# Patient Record
Sex: MALE | Race: WHITE | Employment: OTHER | URBAN - METROPOLITAN AREA
[De-identification: names, ages, dates, MRNs, and addresses within clinical notes are randomized per-mention and may not be internally consistent; named-entity substitution may affect disease eponyms.]

---

## 2017-10-05 ENCOUNTER — HOSPITAL ENCOUNTER (OUTPATIENT)
Dept: SURGERY | Age: 82
Discharge: HOME | End: 2017-10-05
Attending: ORTHOPAEDIC SURGERY
Payer: MEDICARE

## 2017-10-05 VITALS
WEIGHT: 164 LBS | SYSTOLIC BLOOD PRESSURE: 148 MMHG | BODY MASS INDEX: 24.29 KG/M2 | HEIGHT: 69 IN | HEART RATE: 66 BPM | OXYGEN SATURATION: 98 % | RESPIRATION RATE: 14 BRPM | DIASTOLIC BLOOD PRESSURE: 76 MMHG

## 2017-10-05 LAB
ALBUMIN SERPL-MCNC: 3.2 G/DL (ref 3.2–4.6)
ALBUMIN/GLOB SERPL: 1.1 {RATIO} (ref 1.2–3.5)
ALP SERPL-CCNC: 78 U/L (ref 50–136)
ALT SERPL-CCNC: 43 U/L (ref 12–65)
ANION GAP SERPL CALC-SCNC: 8 MMOL/L (ref 7–16)
APPEARANCE UR: CLEAR
APTT PPP: 25 SEC (ref 23.5–31.7)
AST SERPL-CCNC: 27 U/L (ref 15–37)
BACTERIA SPEC CULT: NORMAL
BILIRUB SERPL-MCNC: 1.3 MG/DL (ref 0.2–1.1)
BILIRUB UR QL: NEGATIVE
BUN SERPL-MCNC: 36 MG/DL (ref 8–23)
CALCIUM SERPL-MCNC: 8.4 MG/DL (ref 8.3–10.4)
CHLORIDE SERPL-SCNC: 109 MMOL/L (ref 98–107)
CO2 SERPL-SCNC: 24 MMOL/L (ref 21–32)
COLOR UR: NORMAL
CREAT SERPL-MCNC: 1.57 MG/DL (ref 0.8–1.5)
CRP SERPL-MCNC: <0.2 MG/DL (ref 0–0.9)
ERYTHROCYTE [DISTWIDTH] IN BLOOD BY AUTOMATED COUNT: 15.6 % (ref 11.9–14.6)
ERYTHROCYTE [SEDIMENTATION RATE] IN BLOOD: 2 MM/HR (ref 0–20)
GLOBULIN SER CALC-MCNC: 2.9 G/DL (ref 2.3–3.5)
GLUCOSE SERPL-MCNC: 253 MG/DL (ref 65–100)
GLUCOSE UR STRIP.AUTO-MCNC: NEGATIVE MG/DL
HCT VFR BLD AUTO: 44.6 % (ref 41.1–50.3)
HGB BLD-MCNC: 14.6 G/DL (ref 13.6–17.2)
HGB UR QL STRIP: NEGATIVE
INR PPP: 1 (ref 0.9–1.2)
KETONES UR QL STRIP.AUTO: NEGATIVE MG/DL
LEUKOCYTE ESTERASE UR QL STRIP.AUTO: NEGATIVE
MAGNESIUM SERPL-MCNC: 1.7 MG/DL (ref 1.8–2.4)
MCH RBC QN AUTO: 31.9 PG (ref 26.1–32.9)
MCHC RBC AUTO-ENTMCNC: 32.7 G/DL (ref 31.4–35)
MCV RBC AUTO: 97.6 FL (ref 79.6–97.8)
NITRITE UR QL STRIP.AUTO: NEGATIVE
PH UR STRIP: 5.5 [PH] (ref 5–9)
PLATELET # BLD AUTO: 126 K/UL (ref 150–450)
PMV BLD AUTO: 11.5 FL (ref 10.8–14.1)
POTASSIUM SERPL-SCNC: 4.8 MMOL/L (ref 3.5–5.1)
PROT SERPL-MCNC: 6.1 G/DL (ref 6.3–8.2)
PROT UR STRIP-MCNC: NEGATIVE MG/DL
PROTHROMBIN TIME: 11 SEC (ref 9.6–12)
RBC # BLD AUTO: 4.57 M/UL (ref 4.23–5.67)
SERVICE CMNT-IMP: NORMAL
SODIUM SERPL-SCNC: 141 MMOL/L (ref 136–145)
SP GR UR REFRACTOMETRY: 1.02 (ref 1–1.02)
UROBILINOGEN UR QL STRIP.AUTO: 0.2 EU/DL (ref 0.2–1)
WBC # BLD AUTO: 4.4 K/UL (ref 4.3–11.1)

## 2017-10-05 PROCEDURE — 85730 THROMBOPLASTIN TIME PARTIAL: CPT | Performed by: ORTHOPAEDIC SURGERY

## 2017-10-05 PROCEDURE — 85027 COMPLETE CBC AUTOMATED: CPT | Performed by: ORTHOPAEDIC SURGERY

## 2017-10-05 PROCEDURE — 87641 MR-STAPH DNA AMP PROBE: CPT | Performed by: ORTHOPAEDIC SURGERY

## 2017-10-05 PROCEDURE — 85610 PROTHROMBIN TIME: CPT | Performed by: ORTHOPAEDIC SURGERY

## 2017-10-05 PROCEDURE — 80053 COMPREHEN METABOLIC PANEL: CPT | Performed by: ORTHOPAEDIC SURGERY

## 2017-10-05 PROCEDURE — 81003 URINALYSIS AUTO W/O SCOPE: CPT | Performed by: ORTHOPAEDIC SURGERY

## 2017-10-05 PROCEDURE — 86140 C-REACTIVE PROTEIN: CPT | Performed by: ORTHOPAEDIC SURGERY

## 2017-10-05 PROCEDURE — 85652 RBC SED RATE AUTOMATED: CPT | Performed by: ORTHOPAEDIC SURGERY

## 2017-10-05 PROCEDURE — 83735 ASSAY OF MAGNESIUM: CPT | Performed by: ORTHOPAEDIC SURGERY

## 2017-10-05 RX ORDER — ATORVASTATIN CALCIUM 40 MG/1
40 TABLET, FILM COATED ORAL
COMMUNITY

## 2017-10-05 RX ORDER — SIMETHICONE 125 MG
125 CAPSULE ORAL
COMMUNITY

## 2017-10-05 RX ORDER — LOPERAMIDE HYDROCHLORIDE 2 MG/1
2 CAPSULE ORAL
COMMUNITY

## 2017-10-05 RX ORDER — DIPHENOXYLATE HYDROCHLORIDE AND ATROPINE SULFATE 2.5; .025 MG/1; MG/1
1 TABLET ORAL
COMMUNITY

## 2017-10-05 RX ORDER — SODIUM CHLORIDE 9 MG/ML
250 INJECTION, SOLUTION INTRAVENOUS AS NEEDED
Status: CANCELLED | OUTPATIENT
Start: 2017-10-05

## 2017-10-05 RX ORDER — LEVOTHYROXINE SODIUM 50 UG/1
50 TABLET ORAL
COMMUNITY

## 2017-10-05 NOTE — PERIOP NOTES
Patient verified name, , and surgery as listed in [facility]. Type 3 surgery, joint PAT assessment complete. Labs per surgeon: CBC, CMP, Magnesium, PT/PTT, ESR, CRP, UA, MRSA swab collected  Labs per anesthesia protocol: no additional labs needed  EKG: done 17 at [facility]- result within anesthesia guidelines; will request previous ECHO and Stress test for anesthesia reference. Pt instructed to go to o/p lab, Entrance B, the day before surgery for Type and Cross blood draw and green armband placement on non-operative arm. Orders placed in EMR on hold for arrival.    Hibiclens and instructions to return bottle on DOS given per hospital policy. Nasal Swab collected per MD order and instructions for Mupirocin nasal ointment if required. Patient provided with handouts including Guide to Surgery, Pain Management, Hand Hygiene, Blood Transfusion Education, and [facility] Brochure. Patient answered medical/surgical history questions at their best of ability. All prior to admission medications documented in [facility]. Original medication prescription bottles not visualized during patient appointment. Patient instructed to hold all vitamins 7 days prior to surgery and NSAIDS 5 days prior to surgery. Medications to be held: mobic and vitamins. Patient instructed to continue previous medications as prescribed prior to surgery and to take the following medications the day of surgery according to anesthesia guidelines with a small sip of water: asa 81mg, eye drops, synthroid, imodium, simethicone, lomotil, psyllium husk. Patient teach back successful and patient demonstrates knowledge of instruction.

## 2017-10-05 NOTE — PERIOP NOTES
Lab results within anesthesia guidelines except for elevated creatinine and decreased magnesium- will have anesthesia review per protocol. Non-fasting BG >250- will fax result to surgeon's office for review per anesthesia guidelines. MRSA swab result review- no further action required. Recent Results (from the past 12 hour(s))   CBC W/O DIFF    Collection Time: 10/05/17 10:22 AM   Result Value Ref Range    WBC 4.4 4.3 - 11.1 K/uL    RBC 4.57 4.23 - 5.67 M/uL    HGB 14.6 13.6 - 17.2 g/dL    HCT 44.6 41.1 - 50.3 %    MCV 97.6 79.6 - 97.8 FL    MCH 31.9 26.1 - 32.9 PG    MCHC 32.7 31.4 - 35.0 g/dL    RDW 15.6 (H) 11.9 - 14.6 %    PLATELET 013 (L) 716 - 450 K/uL    MPV 11.5 10.8 - 72.3 FL   METABOLIC PANEL, COMPREHENSIVE    Collection Time: 10/05/17 10:22 AM   Result Value Ref Range    Sodium 141 136 - 145 mmol/L    Potassium 4.8 3.5 - 5.1 mmol/L    Chloride 109 (H) 98 - 107 mmol/L    CO2 24 21 - 32 mmol/L    Anion gap 8 7 - 16 mmol/L    Glucose 253 (H) 65 - 100 mg/dL    BUN 36 (H) 8 - 23 MG/DL    Creatinine 1.57 (H) 0.8 - 1.5 MG/DL    GFR est AA 54 (L) >60 ml/min/1.73m2    GFR est non-AA 45 (L) >60 ml/min/1.73m2    Calcium 8.4 8.3 - 10.4 MG/DL    Bilirubin, total 1.3 (H) 0.2 - 1.1 MG/DL    ALT (SGPT) 43 12 - 65 U/L    AST (SGOT) 27 15 - 37 U/L    Alk.  phosphatase 78 50 - 136 U/L    Protein, total 6.1 (L) 6.3 - 8.2 g/dL    Albumin 3.2 3.2 - 4.6 g/dL    Globulin 2.9 2.3 - 3.5 g/dL    A-G Ratio 1.1 (L) 1.2 - 3.5     MAGNESIUM    Collection Time: 10/05/17 10:22 AM   Result Value Ref Range    Magnesium 1.7 (L) 1.8 - 2.4 mg/dL   PROTHROMBIN TIME + INR    Collection Time: 10/05/17 10:22 AM   Result Value Ref Range    Prothrombin time 11.0 9.6 - 12.0 sec    INR 1.0 0.9 - 1.2     PTT    Collection Time: 10/05/17 10:22 AM   Result Value Ref Range    aPTT 25.0 23.5 - 31.7 SEC   SED RATE, AUTOMATED    Collection Time: 10/05/17 10:22 AM   Result Value Ref Range    Sed rate, automated 2 0 - 20 mm/hr   C REACTIVE PROTEIN, QT Collection Time: 10/05/17 10:22 AM   Result Value Ref Range    C-Reactive protein <0.2 0.0 - 0.9 mg/dL   MSSA/MRSA SC BY PCR, NASAL SWAB    Collection Time: 10/05/17 10:22 AM   Result Value Ref Range    Special Requests: NASAL      Culture result:        SA target not detected. A MRSA NEGATIVE, SA NEGATIVE test result does not preclude MRSA or SA nasal colonization.    URINALYSIS W/ RFLX MICROSCOPIC    Collection Time: 10/05/17 11:15 AM   Result Value Ref Range    Color KIT      Appearance CLEAR      Specific gravity 1.022 1.001 - 1.023      pH (UA) 5.5 5.0 - 9.0      Protein NEGATIVE  NEG mg/dL    Glucose NEGATIVE  mg/dL    Ketone NEGATIVE  NEG mg/dL    Bilirubin NEGATIVE  NEG      Blood NEGATIVE  NEG      Urobilinogen 0.2 0.2 - 1.0 EU/dL    Nitrites NEGATIVE  NEG      Leukocyte Esterase NEGATIVE  NEG

## 2017-10-06 ENCOUNTER — ANESTHESIA EVENT (OUTPATIENT)
Dept: SURGERY | Age: 82
DRG: 483 | End: 2017-10-06
Payer: MEDICARE

## 2017-10-06 NOTE — PERIOP NOTES
10/5/2017 11:53 AM - Kailash, Lab In Briefcase   Component Results   Component Value Flag Ref Range Units Status   Special Requests: NASAL     Final   Culture result:      Final   SA target not detected.                                 A MRSA NEGATIVE, SA NEGATIVE test result does not preclude MRSA or SA nasal colonization.

## 2017-10-06 NOTE — PERIOP NOTES
Anesthesia () reviewed chart. Orders received to repeat Mg++ level DOS, otherwise chart OK for surgery.

## 2017-10-10 PROBLEM — T84.028A INSTABILITY OF PROSTHETIC SHOULDER JOINT (HCC): Status: ACTIVE | Noted: 2017-10-10

## 2017-10-10 PROBLEM — M12.812 ROTATOR CUFF TEAR ARTHROPATHY, LEFT: Status: ACTIVE | Noted: 2017-10-10

## 2017-10-10 PROBLEM — Z96.619 INSTABILITY OF PROSTHETIC SHOULDER JOINT (HCC): Status: ACTIVE | Noted: 2017-10-10

## 2017-10-10 PROBLEM — M75.102 ROTATOR CUFF TEAR ARTHROPATHY, LEFT: Status: ACTIVE | Noted: 2017-10-10

## 2017-10-10 PROBLEM — Z96.612 S/P SHOULDER HEMIARTHROPLASTY, LEFT: Status: ACTIVE | Noted: 2017-10-10

## 2017-10-10 NOTE — BRIEF OP NOTE
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 REMOVAL IMPLANT LEFT SHOULDER \"PRESS FIT EXACTECH HEMIARTHROPLASTY\"     2.  REVISION LEFT TOTAL SHOULDER ARTHROPLASTY WITH REVERSE DELTA EXTEND PROSTHESIS, LATISSIMUS DORSI AND TERES MAJOR TENDON TRANSFERS LEFT SHOULDER    Surgeon(s) and Role:     * Basil Harmon MD - Primary           Anesthesia: General WITH INTERSCALENE BLOCK    Complications: NONE    Implants:     Implant Name Type Inv.  Item Serial No.  Lot No. LRB No. Used Action   CEMENT BNE SIMPLEX TOBRA 4 --  - [id number]  CEMENT BNE SIMPLEX TOBRA 4 --  [id number] [employer] [facility] [id number] Left 1 Implanted   COMPNT GLENOSPHERE ECC 42MM -- DELTA EXTEND - [id number]  COMPNT GLENOSPHERE ECC 42MM -- DELTA EXTEND [id number] [facility] [employer] [id number] Left 1 Implanted   COMPNT RHONDA METAGLENE -- DELTA EXTEND - [id number]  COMPNT RHONDA METAGLENE -- DELTA EXTEND [id number] [facility] [employer] [id number] Left 1 Implanted   SCR GLENOID THRD 4.5X18MM -- DELTA EXTEND - [id number]  SCR GLENOID THRD 4.5X18MM -- DELTA EXTEND [id number] [facility] [employer] [id number] Left 1 Implanted   SCR GLENOID THRD 4.5X24MM -- DELTA EXTEND - [id number]  SCR GLENOID THRD 4.5X24MM -- DELTA EXTEND [id number] [facility] [employer] [id number] Left 1 Implanted   SCR BNE LCK GLENOID 4.5X48MM -- DELTA EXTEND - [id number]  SCR BNE LCK GLENOID 4.5X48MM -- DELTA EXTEND [id number] [facility] [employer] [id number] Left 1 Implanted   SCR BNE LCK GLENOID 4.5X48MM -- DELTA EXTEND - [id number]  SCR BNE LCK GLENOID 4.5X48MM -- DELTA EXTEND [id number] [facility] [employer] [id number] Left 1 Implanted   RSTRCTR ALEXANDER BNE BIOABSRB 10MM -- Jayson [id number]  RSTRCTR ALEXANDER BNE BIOABSRB 10MM -- BIOSTOP G [id number] [employer] [facility] [employer] [id number] Left 1 Implanted   STEM [facility] EPI STD SZ2 10M -- Zainab Lucia - [id number]  STEM HUM MBLOC EPI STD SZ2 10M -- DELTA XTEND [id number] [facility] [employer] [id number] Left 1 Implanted   CUP HUM STD DELT PE 42+9MM -- Zainab Myers - [id number]   CUP HUM STD DELT PE 42+9MM -- Zainab Myers [id number] [facility] [employer] [id number] Left 1 Implanted          Tresa English MD

## 2017-10-10 NOTE — H&P
[facility] [place] HISTORY AND PHYSICAL    Subjective:     Patient is a 80 y.o. RHD MALE WITH LEFT SHOULDER PAIN. SEE OFFICE NOTE. Patient Active Problem List    Diagnosis Date Noted    Instability of prosthetic shoulder joint ([facility] [address] 10/10/2017    Rotator cuff tear arthropathy, left 10/10/2017    S/P shoulder hemiarthroplasty, left 10/10/2017    Abscess of foot 04/06/2012     Past Medical History:   Diagnosis Date    Anemia fall 2016    r/t GI bleeds     Arthritis     osteo    CAD (coronary artery disease) 1997    4 stents total (1997, 2012, 2013); \"ECHO 7/2016 normal with good EF\" per cardio office note; \"Stress test 6/2016 Findings consistent with very mild inferior ischemia\"    Carotid stenosis 04/2017    carotid u/s: Right internal carotid artery has 50-69% stenosis. Left internal carotid artery has <50% stenosis.  Diabetes ([facility] [address] 05/1997    type 2; oral med; rare BG checks, last hgba1c- 6.9 (6/2017)    Diverticulosis 12/2011    with GI bleeding; no current problems    GERD (gastroesophageal reflux disease)     on med for control     Glaucoma     Heart murmur     ECHO 7/14/16- mild AS, mild MR, mild TR    Hiatal hernia     History of kidney stones     Hypercholesteremia     on med for control     Hypertension     hx of- no medications currently    Hypothyroid     managed with medication    MI (myocardial infarction)     x2, STEMI    TIA (transient ischemic attack) 07/2016    experienced numbness/tingling L arm and left lower face- \"only lasted a couple of hrs\"; 7/13 CT head: cerebral white matter changes have progressed since 4/19/13 likely indicative of chronic small vessel ischemic disease, no acute intracranial abnormality.       Unspecified sleep apnea     denies     Urethral stenosis     has had difficulty with placement/removal previously     Vasovagal syncope       Past Surgical History:   Procedure Laterality Date    HX BUNIONECTOMY Left     with hammer toe    HX CARPAL TUNNEL RELEASE Right 1968    HX CARPAL TUNNEL RELEASE Left 2012    HX CATARACT REMOVAL  1987 and 1990    Teofilo with IOLens implamts    HX COLECTOMY  01/13/2017    HX HEART CATHETERIZATION  1189,0596, 2012, 2013    total 4 stents: 2 stents- 1997, 1 stent- 2012 (BMS to RCA), 1 stent- 2013 (ANGELLA to RCA)    HX HEENT Right     eye surgery to replace lens due to fall     HX HEMORRHOIDECTOMY  1987    HX HERNIA REPAIR  1944    L inguinal    HX KNEE REPLACEMENT Bilateral 2002, 2008    HX LAP CHOLECYSTECTOMY  6004    umbilical hernia repair    HX ORTHOPAEDIC Left     trigger finger     HX ORTHOPAEDIC  0324-5899    multiple knee surgery    HX ROTATOR CUFF REPAIR Right 1995    HX ROTATOR CUFF REPAIR Left 2000    HX ROTATOR CUFF REPAIR Right 2007    with martina tendon repair    HX SHOULDER ARTHROSCOPY Left 2015    HX TONSILLECTOMY  1938    HX UROLOGICAL  1996    cysto with stone extraction and stent      Prior to Admission medications    Medication Sig Start Date End Date Taking? Authorizing Provider   simethicone (GAS-X) 125 mg capsule Take 125 mg by mouth four (4) times daily as needed for Flatulence. Historical Provider   loperamide (IMODIUM) 2 mg capsule Take 2 mg by mouth four (4) times daily as needed for Diarrhea. Historical Provider   diphenoxylate-atropine (LOMOTIL) 2.5-0.025 mg per tablet Take 1 Tab by mouth two (2) times daily as needed for Diarrhea. Historical Provider   atorvastatin (LIPITOR) 40 mg tablet Take 40 mg by mouth nightly. Historical Provider   levothyroxine (SYNTHROID) 50 mcg tablet Take 50 mcg by mouth Daily (before breakfast). Historical Provider   Psyllium Husk-Sucrose 3.4 gram/7 gram powd Take 3 Caps by mouth two (2) times a day. Historical Provider   omega-3 fatty acids-vitamin e (FISH OIL) 1,000 mg cap Take 2 Caps by mouth daily. Historical Provider   bimatoprost (LUMIGAN) 0.01 % ophthalmic drops Administer 1 Drop to right eye nightly.     Historical Provider timolol (TIMOPTIC) 0.5 % ophthalmic solution Administer 1 Drop to right eye every morning. Historical Provider   Omeprazole delayed release (PRILOSEC D/R) 20 mg tablet Take 20 mg by mouth Daily (before dinner). Historical Provider   meloxicam (MOBIC) 15 mg tablet Take 15 mg by mouth daily. Indications: OSTEOARTHRITIS    Historical Provider   brimonidine (ALPHAGAN P) 0.1 % ophthalmic solution Administer 1 Drop to right eye two (2) times a day. Take / use AM day of surgery  per anesthesia protocols. Historical Provider   glimepiride (AMARYL) 2 mg tablet Take 1 mg by mouth daily. Indications: type 2 diabetes mellitus    Historical Provider   b complex vitamins tablet Take 1 Tab by mouth daily. Historical Provider   aspirin 81 mg Tab Take 81 mg by mouth daily. Take / use 81 mg AM day of surgery  per anesthesia protocols. Skip Other, MD     No Known Allergies   Social History   Substance Use Topics    Smoking status: Never Smoker    Smokeless tobacco: Never Used    Alcohol use 4.2 oz/week     7 Glasses of wine per week      Family History   Problem Relation Age of Onset    Heart Disease Father     Heart Attack Father     Stroke Mother     Alzheimer Sister     Alzheimer Sister       Review of Systems  A comprehensive review of systems was negative except for that written in the HPI. Objective:     No data found. There were no vitals taken for this visit. General:  Alert, cooperative, no distress, appears stated age. Head:  Normocephalic, without obvious abnormality, atraumatic. Back:   Symmetric, no curvature. ROM normal. No CVA tenderness. Lungs:   Clear to auscultation bilaterally. Chest wall:  No tenderness or deformity. Heart:  Regular rate and rhythm, S1, S2 normal, no murmur, click, rub or gallop. Extremities: Extremities normal, atraumatic, no cyanosis or edema. Pulses: 2+ and symmetric all extremities.    Skin: Skin color, texture, turgor normal. No rashes or lesions   Lymph nodes: Cervical, supraclavicular, and axillary nodes normal.   Neurologic: CNII-XII intact. Normal strength, sensation and reflexes throughout. Assessment:   Principal Problem:    Instability of prosthetic shoulder joint ([address] (10/10/2017)    Active Problems:    Rotator cuff tear arthropathy, left (10/10/2017)      S/P shoulder hemiarthroplasty, left (10/10/2017)        Plan:     The various methods of treatment have been discussed with the patient and family. PATIENT HAS EXHAUSTED NON-OPERATIVE MODALITIES. After consideration of risks, benefits and other options for treatment, the patient has consented to surgical intervention. SEE OFFICE NOTE.       Gail Quiroga MD

## 2017-10-13 ENCOUNTER — ANESTHESIA (OUTPATIENT)
Dept: SURGERY | Age: 82
DRG: 483 | End: 2017-10-13
Payer: MEDICARE

## 2017-10-13 ENCOUNTER — APPOINTMENT (OUTPATIENT)
Dept: GENERAL RADIOLOGY | Age: 82
DRG: 483 | End: 2017-10-13
Attending: ORTHOPAEDIC SURGERY
Payer: MEDICARE

## 2017-10-13 ENCOUNTER — HOSPITAL ENCOUNTER (INPATIENT)
Age: 82
LOS: 3 days | Discharge: NURSING, REHAB OR LONG-TERM CARE | DRG: 483 | End: 2017-10-16
Attending: ORTHOPAEDIC SURGERY | Admitting: ORTHOPAEDIC SURGERY
Payer: MEDICARE

## 2017-10-13 LAB
EST. AVERAGE GLUCOSE BLD GHB EST-MCNC: 131 MG/DL
GLUCOSE BLD STRIP.AUTO-MCNC: 128 MG/DL (ref 65–100)
HBA1C MFR BLD: 6.2 % (ref 4.8–6)
MAGNESIUM SERPL-MCNC: 1.8 MG/DL (ref 1.8–2.4)

## 2017-10-13 PROCEDURE — 74011250636 HC RX REV CODE- 250/636: Performed by: ANESTHESIOLOGY

## 2017-10-13 PROCEDURE — 0RRK00Z REPLACEMENT OF LEFT SHOULDER JOINT WITH REVERSE BALL AND SOCKET SYNTHETIC SUBSTITUTE, OPEN APPROACH: ICD-10-PCS | Performed by: ORTHOPAEDIC SURGERY

## 2017-10-13 PROCEDURE — 0RPK0JZ REMOVAL OF SYNTHETIC SUBSTITUTE FROM LEFT SHOULDER JOINT, OPEN APPROACH: ICD-10-PCS | Performed by: ORTHOPAEDIC SURGERY

## 2017-10-13 PROCEDURE — 77030002937 HC SUT MERS J&J -B: Performed by: ORTHOPAEDIC SURGERY

## 2017-10-13 PROCEDURE — 77030003602 HC NDL NRV BLK BBMI -B: Performed by: ANESTHESIOLOGY

## 2017-10-13 PROCEDURE — 76210000016 HC OR PH I REC 1 TO 1.5 HR: Performed by: ORTHOPAEDIC SURGERY

## 2017-10-13 PROCEDURE — 74011250636 HC RX REV CODE- 250/636

## 2017-10-13 PROCEDURE — 77030035643 HC BLD SAW OSC PRECIS STRY -C: Performed by: ORTHOPAEDIC SURGERY

## 2017-10-13 PROCEDURE — 87075 CULTR BACTERIA EXCEPT BLOOD: CPT | Performed by: ORTHOPAEDIC SURGERY

## 2017-10-13 PROCEDURE — 74011000250 HC RX REV CODE- 250: Performed by: ANESTHESIOLOGY

## 2017-10-13 PROCEDURE — 76060000036 HC ANESTHESIA 2.5 TO 3 HR: Performed by: ORTHOPAEDIC SURGERY

## 2017-10-13 PROCEDURE — 83036 HEMOGLOBIN GLYCOSYLATED A1C: CPT | Performed by: ORTHOPAEDIC SURGERY

## 2017-10-13 PROCEDURE — 77030018836 HC SOL IRR NACL ICUM -A: Performed by: ORTHOPAEDIC SURGERY

## 2017-10-13 PROCEDURE — 77030020782 HC GWN BAIR PAWS FLX 3M -B: Performed by: ANESTHESIOLOGY

## 2017-10-13 PROCEDURE — 88305 TISSUE EXAM BY PATHOLOGIST: CPT | Performed by: ORTHOPAEDIC SURGERY

## 2017-10-13 PROCEDURE — 65270000029 HC RM PRIVATE

## 2017-10-13 PROCEDURE — 73030 X-RAY EXAM OF SHOULDER: CPT

## 2017-10-13 PROCEDURE — 86900 BLOOD TYPING SEROLOGIC ABO: CPT | Performed by: ORTHOPAEDIC SURGERY

## 2017-10-13 PROCEDURE — 77030013727 HC IRR FAN PULSVC ZIMM -B: Performed by: ORTHOPAEDIC SURGERY

## 2017-10-13 PROCEDURE — 77030019940 HC BLNKT HYPOTHRM STRY -B: Performed by: ANESTHESIOLOGY

## 2017-10-13 PROCEDURE — 77030018846 HC SOL IRR STRL H20 ICUM -A: Performed by: ORTHOPAEDIC SURGERY

## 2017-10-13 PROCEDURE — 88331 PATH CONSLTJ SURG 1 BLK 1SPC: CPT | Performed by: ORTHOPAEDIC SURGERY

## 2017-10-13 PROCEDURE — 77030011283 HC ELECTRD NDL COVD -A: Performed by: ORTHOPAEDIC SURGERY

## 2017-10-13 PROCEDURE — 74011000250 HC RX REV CODE- 250

## 2017-10-13 PROCEDURE — 83735 ASSAY OF MAGNESIUM: CPT | Performed by: ANESTHESIOLOGY

## 2017-10-13 PROCEDURE — 74011250637 HC RX REV CODE- 250/637: Performed by: ANESTHESIOLOGY

## 2017-10-13 PROCEDURE — 82962 GLUCOSE BLOOD TEST: CPT

## 2017-10-13 PROCEDURE — 77030011640 HC PAD GRND REM COVD -A: Performed by: ORTHOPAEDIC SURGERY

## 2017-10-13 PROCEDURE — 77030016570 HC BLNKT BAIR HGGR 3M -B: Performed by: ANESTHESIOLOGY

## 2017-10-13 PROCEDURE — 86923 COMPATIBILITY TEST ELECTRIC: CPT | Performed by: ORTHOPAEDIC SURGERY

## 2017-10-13 PROCEDURE — 74011250636 HC RX REV CODE- 250/636: Performed by: ORTHOPAEDIC SURGERY

## 2017-10-13 PROCEDURE — 77030031139 HC SUT VCRL2 J&J -A: Performed by: ORTHOPAEDIC SURGERY

## 2017-10-13 PROCEDURE — 77030008477 HC STYL SATN SLP COVD -A: Performed by: ANESTHESIOLOGY

## 2017-10-13 PROCEDURE — 74011250637 HC RX REV CODE- 250/637: Performed by: INTERNAL MEDICINE

## 2017-10-13 PROCEDURE — 77030002913 HC SUT ETHBND J&J -B: Performed by: ORTHOPAEDIC SURGERY

## 2017-10-13 PROCEDURE — 77030019908 HC STETH ESOPH SIMS -A: Performed by: ANESTHESIOLOGY

## 2017-10-13 PROCEDURE — 87205 SMEAR GRAM STAIN: CPT | Performed by: ORTHOPAEDIC SURGERY

## 2017-10-13 PROCEDURE — 0LX20ZZ TRANSFER LEFT SHOULDER TENDON, OPEN APPROACH: ICD-10-PCS | Performed by: ORTHOPAEDIC SURGERY

## 2017-10-13 PROCEDURE — C1776 JOINT DEVICE (IMPLANTABLE): HCPCS | Performed by: ORTHOPAEDIC SURGERY

## 2017-10-13 PROCEDURE — 76010000172 HC OR TIME 2.5 TO 3 HR INTENSV-TIER 1: Performed by: ORTHOPAEDIC SURGERY

## 2017-10-13 PROCEDURE — 77030008703 HC TU ET UNCUF COVD -A: Performed by: ANESTHESIOLOGY

## 2017-10-13 PROCEDURE — 94760 N-INVAS EAR/PLS OXIMETRY 1: CPT

## 2017-10-13 PROCEDURE — C1713 ANCHOR/SCREW BN/BN,TIS/BN: HCPCS | Performed by: ORTHOPAEDIC SURGERY

## 2017-10-13 PROCEDURE — 77030002986 HC SUT PROL J&J -A: Performed by: ORTHOPAEDIC SURGERY

## 2017-10-13 DEVICE — RESTRICTOR CEM DIA10MM UNIV FEM CNL UHMWPE BIOSTP G: Type: IMPLANTABLE DEVICE | Site: SHOULDER | Status: FUNCTIONAL

## 2017-10-13 DEVICE — SCREW BNE L18MM DIA4.5MM SHLDR TI NONLOCKING FULL THRD FOR: Type: IMPLANTABLE DEVICE | Site: SHOULDER | Status: FUNCTIONAL

## 2017-10-13 DEVICE — IMPLANTABLE DEVICE: Type: IMPLANTABLE DEVICE | Site: SHOULDER | Status: FUNCTIONAL

## 2017-10-13 DEVICE — SPHERE GLEN DIA42MM SHLDR CO CHROM ECC FOR DELT XTEND REV: Type: IMPLANTABLE DEVICE | Site: SHOULDER | Status: FUNCTIONAL

## 2017-10-13 DEVICE — SCREW BNE L24MM DIA4.5MM METAGLENE NONLOCKING FOR PLATFRM: Type: IMPLANTABLE DEVICE | Site: SHOULDER | Status: FUNCTIONAL

## 2017-10-13 DEVICE — SCREW BNE L48MM DIA4.5MM GLEN SHLDR METAGLENE LOK FOR DELT: Type: IMPLANTABLE DEVICE | Site: SHOULDER | Status: FUNCTIONAL

## 2017-10-13 DEVICE — STEM HUM SZ 2 L137MM DIA10MM 155DEG STD SHLDR CO CHROME HA: Type: IMPLANTABLE DEVICE | Site: SHOULDER | Status: FUNCTIONAL

## 2017-10-13 DEVICE — CEMENT BNE 20ML 41GM FULL DOSE PMMA W/ TOBRA M VISC RADPQ: Type: IMPLANTABLE DEVICE | Site: SHOULDER | Status: FUNCTIONAL

## 2017-10-13 DEVICE — CUP HUM DIA42MM +9MM OFFSET STD SHLDR POLYETH DELT XTEND: Type: IMPLANTABLE DEVICE | Site: SHOULDER | Status: FUNCTIONAL

## 2017-10-13 RX ORDER — PROPOFOL 10 MG/ML
INJECTION, EMULSION INTRAVENOUS AS NEEDED
Status: DISCONTINUED | OUTPATIENT
Start: 2017-10-13 | End: 2017-10-13 | Stop reason: HOSPADM

## 2017-10-13 RX ORDER — ACETAMINOPHEN 500 MG
500 TABLET ORAL
Status: DISCONTINUED | OUTPATIENT
Start: 2017-10-13 | End: 2017-10-16 | Stop reason: HOSPADM

## 2017-10-13 RX ORDER — MAGNESIUM SULFATE HEPTAHYDRATE 40 MG/ML
2 INJECTION, SOLUTION INTRAVENOUS ONCE
Status: COMPLETED | OUTPATIENT
Start: 2017-10-13 | End: 2017-10-14

## 2017-10-13 RX ORDER — LIDOCAINE HYDROCHLORIDE 10 MG/ML
0.1 INJECTION INFILTRATION; PERINEURAL AS NEEDED
Status: DISCONTINUED | OUTPATIENT
Start: 2017-10-13 | End: 2017-10-13 | Stop reason: HOSPADM

## 2017-10-13 RX ORDER — HYDROMORPHONE HYDROCHLORIDE 2 MG/1
2 TABLET ORAL
Status: DISCONTINUED | OUTPATIENT
Start: 2017-10-13 | End: 2017-10-16 | Stop reason: HOSPADM

## 2017-10-13 RX ORDER — HYDROMORPHONE HYDROCHLORIDE 2 MG/ML
0.5 INJECTION, SOLUTION INTRAMUSCULAR; INTRAVENOUS; SUBCUTANEOUS
Status: DISCONTINUED | OUTPATIENT
Start: 2017-10-13 | End: 2017-10-13 | Stop reason: HOSPADM

## 2017-10-13 RX ORDER — NEOSTIGMINE METHYLSULFATE 1 MG/ML
INJECTION INTRAVENOUS AS NEEDED
Status: DISCONTINUED | OUTPATIENT
Start: 2017-10-13 | End: 2017-10-13 | Stop reason: HOSPADM

## 2017-10-13 RX ORDER — TIMOLOL MALEATE 5 MG/ML
1 SOLUTION/ DROPS OPHTHALMIC
Status: DISCONTINUED | OUTPATIENT
Start: 2017-10-14 | End: 2017-10-16 | Stop reason: HOSPADM

## 2017-10-13 RX ORDER — LEVOTHYROXINE SODIUM 50 UG/1
50 TABLET ORAL
Status: DISCONTINUED | OUTPATIENT
Start: 2017-10-14 | End: 2017-10-16 | Stop reason: HOSPADM

## 2017-10-13 RX ORDER — SODIUM CHLORIDE 0.9 % (FLUSH) 0.9 %
5-10 SYRINGE (ML) INJECTION AS NEEDED
Status: DISCONTINUED | OUTPATIENT
Start: 2017-10-13 | End: 2017-10-13 | Stop reason: HOSPADM

## 2017-10-13 RX ORDER — GLYCOPYRROLATE 0.2 MG/ML
INJECTION INTRAMUSCULAR; INTRAVENOUS AS NEEDED
Status: DISCONTINUED | OUTPATIENT
Start: 2017-10-13 | End: 2017-10-13 | Stop reason: HOSPADM

## 2017-10-13 RX ORDER — ROCURONIUM BROMIDE 10 MG/ML
INJECTION, SOLUTION INTRAVENOUS AS NEEDED
Status: DISCONTINUED | OUTPATIENT
Start: 2017-10-13 | End: 2017-10-13 | Stop reason: HOSPADM

## 2017-10-13 RX ORDER — SODIUM CHLORIDE, SODIUM LACTATE, POTASSIUM CHLORIDE, CALCIUM CHLORIDE 600; 310; 30; 20 MG/100ML; MG/100ML; MG/100ML; MG/100ML
150 INJECTION, SOLUTION INTRAVENOUS CONTINUOUS
Status: DISCONTINUED | OUTPATIENT
Start: 2017-10-13 | End: 2017-10-13 | Stop reason: HOSPADM

## 2017-10-13 RX ORDER — ONDANSETRON 2 MG/ML
INJECTION INTRAMUSCULAR; INTRAVENOUS AS NEEDED
Status: DISCONTINUED | OUTPATIENT
Start: 2017-10-13 | End: 2017-10-13 | Stop reason: HOSPADM

## 2017-10-13 RX ORDER — DOCUSATE SODIUM 100 MG/1
100 CAPSULE, LIQUID FILLED ORAL 2 TIMES DAILY
Status: DISCONTINUED | OUTPATIENT
Start: 2017-10-14 | End: 2017-10-16 | Stop reason: HOSPADM

## 2017-10-13 RX ORDER — FACIAL-BODY WIPES
10 EACH TOPICAL DAILY PRN
Status: DISCONTINUED | OUTPATIENT
Start: 2017-10-13 | End: 2017-10-16 | Stop reason: HOSPADM

## 2017-10-13 RX ORDER — SODIUM CHLORIDE 0.9 % (FLUSH) 0.9 %
5-10 SYRINGE (ML) INJECTION EVERY 8 HOURS
Status: DISCONTINUED | OUTPATIENT
Start: 2017-10-13 | End: 2017-10-13 | Stop reason: HOSPADM

## 2017-10-13 RX ORDER — MIDAZOLAM HYDROCHLORIDE 1 MG/ML
2 INJECTION, SOLUTION INTRAMUSCULAR; INTRAVENOUS
Status: DISCONTINUED | OUTPATIENT
Start: 2017-10-13 | End: 2017-10-13 | Stop reason: HOSPADM

## 2017-10-13 RX ORDER — INSULIN LISPRO 100 [IU]/ML
INJECTION, SOLUTION INTRAVENOUS; SUBCUTANEOUS
Status: DISCONTINUED | OUTPATIENT
Start: 2017-10-14 | End: 2017-10-15

## 2017-10-13 RX ORDER — ASPIRIN 81 MG/1
81 TABLET ORAL DAILY
Status: DISCONTINUED | OUTPATIENT
Start: 2017-10-14 | End: 2017-10-16 | Stop reason: HOSPADM

## 2017-10-13 RX ORDER — SODIUM CHLORIDE 9 MG/ML
50 INJECTION, SOLUTION INTRAVENOUS CONTINUOUS
Status: DISCONTINUED | OUTPATIENT
Start: 2017-10-13 | End: 2017-10-13 | Stop reason: HOSPADM

## 2017-10-13 RX ORDER — ROPIVACAINE HYDROCHLORIDE 5 MG/ML
INJECTION, SOLUTION EPIDURAL; INFILTRATION; PERINEURAL AS NEEDED
Status: DISCONTINUED | OUTPATIENT
Start: 2017-10-13 | End: 2017-10-13 | Stop reason: HOSPADM

## 2017-10-13 RX ORDER — HYDROMORPHONE HYDROCHLORIDE 4 MG/1
4 TABLET ORAL
Status: DISCONTINUED | OUTPATIENT
Start: 2017-10-13 | End: 2017-10-16 | Stop reason: HOSPADM

## 2017-10-13 RX ORDER — SODIUM CHLORIDE 9 MG/ML
250 INJECTION, SOLUTION INTRAVENOUS AS NEEDED
Status: DISCONTINUED | OUTPATIENT
Start: 2017-10-13 | End: 2017-10-13 | Stop reason: HOSPADM

## 2017-10-13 RX ORDER — SIMETHICONE 80 MG
120 TABLET,CHEWABLE ORAL
Status: DISCONTINUED | OUTPATIENT
Start: 2017-10-13 | End: 2017-10-14

## 2017-10-13 RX ORDER — ACETAMINOPHEN 500 MG
1000 TABLET ORAL
Status: DISCONTINUED | OUTPATIENT
Start: 2017-10-13 | End: 2017-10-13 | Stop reason: HOSPADM

## 2017-10-13 RX ORDER — LOPERAMIDE HYDROCHLORIDE 2 MG/1
2 CAPSULE ORAL
Status: COMPLETED | OUTPATIENT
Start: 2017-10-13 | End: 2017-10-13

## 2017-10-13 RX ORDER — HYDROMORPHONE HCL IN 0.9% NACL 50 MG/50ML
1 PLASTIC BAG, INJECTION (ML) INJECTION
Status: DISCONTINUED | OUTPATIENT
Start: 2017-10-13 | End: 2017-10-16 | Stop reason: HOSPADM

## 2017-10-13 RX ORDER — VANCOMYCIN HYDROCHLORIDE
1250 EVERY 24 HOURS
Status: DISCONTINUED | OUTPATIENT
Start: 2017-10-14 | End: 2017-10-14 | Stop reason: DRUGHIGH

## 2017-10-13 RX ORDER — LANOLIN ALCOHOL/MO/W.PET/CERES
1 CREAM (GRAM) TOPICAL 2 TIMES DAILY WITH MEALS
Status: DISCONTINUED | OUTPATIENT
Start: 2017-10-14 | End: 2017-10-16 | Stop reason: HOSPADM

## 2017-10-13 RX ORDER — FENTANYL CITRATE 50 UG/ML
100 INJECTION, SOLUTION INTRAMUSCULAR; INTRAVENOUS ONCE
Status: COMPLETED | OUTPATIENT
Start: 2017-10-13 | End: 2017-10-13

## 2017-10-13 RX ORDER — ATORVASTATIN CALCIUM 40 MG/1
40 TABLET, FILM COATED ORAL
Status: DISCONTINUED | OUTPATIENT
Start: 2017-10-13 | End: 2017-10-16 | Stop reason: HOSPADM

## 2017-10-13 RX ORDER — SODIUM CHLORIDE 9 MG/ML
250 INJECTION, SOLUTION INTRAVENOUS AS NEEDED
Status: DISCONTINUED | OUTPATIENT
Start: 2017-10-13 | End: 2017-10-16 | Stop reason: HOSPADM

## 2017-10-13 RX ORDER — LOPERAMIDE HYDROCHLORIDE 2 MG/1
2 CAPSULE ORAL
Status: DISCONTINUED | OUTPATIENT
Start: 2017-10-13 | End: 2017-10-14

## 2017-10-13 RX ORDER — FAMOTIDINE 20 MG/1
20 TABLET, FILM COATED ORAL ONCE
Status: COMPLETED | OUTPATIENT
Start: 2017-10-13 | End: 2017-10-13

## 2017-10-13 RX ORDER — HYDROMORPHONE HYDROCHLORIDE 1 MG/ML
1 INJECTION, SOLUTION INTRAMUSCULAR; INTRAVENOUS; SUBCUTANEOUS
Status: DISCONTINUED | OUTPATIENT
Start: 2017-10-13 | End: 2017-10-13 | Stop reason: SDUPTHER

## 2017-10-13 RX ORDER — LIDOCAINE HYDROCHLORIDE 20 MG/ML
INJECTION, SOLUTION EPIDURAL; INFILTRATION; INTRACAUDAL; PERINEURAL AS NEEDED
Status: DISCONTINUED | OUTPATIENT
Start: 2017-10-13 | End: 2017-10-13 | Stop reason: HOSPADM

## 2017-10-13 RX ORDER — DIPHENOXYLATE HYDROCHLORIDE AND ATROPINE SULFATE 2.5; .025 MG/1; MG/1
1 TABLET ORAL
Status: DISCONTINUED | OUTPATIENT
Start: 2017-10-13 | End: 2017-10-16 | Stop reason: HOSPADM

## 2017-10-13 RX ORDER — PANTOPRAZOLE SODIUM 40 MG/1
40 TABLET, DELAYED RELEASE ORAL
Status: DISCONTINUED | OUTPATIENT
Start: 2017-10-14 | End: 2017-10-14

## 2017-10-13 RX ORDER — HYDROCODONE BITARTRATE AND ACETAMINOPHEN 5; 325 MG/1; MG/1
1 TABLET ORAL AS NEEDED
Status: DISCONTINUED | OUTPATIENT
Start: 2017-10-13 | End: 2017-10-13 | Stop reason: HOSPADM

## 2017-10-13 RX ADMIN — ONDANSETRON 4 MG: 2 INJECTION INTRAMUSCULAR; INTRAVENOUS at 18:08

## 2017-10-13 RX ADMIN — MAGNESIUM SULFATE HEPTAHYDRATE 2 G: 40 INJECTION, SOLUTION INTRAVENOUS at 23:00

## 2017-10-13 RX ADMIN — FENTANYL CITRATE 100 MCG: 50 INJECTION, SOLUTION INTRAMUSCULAR; INTRAVENOUS at 13:00

## 2017-10-13 RX ADMIN — MIDAZOLAM HYDROCHLORIDE 2 MG: 1 INJECTION, SOLUTION INTRAMUSCULAR; INTRAVENOUS at 13:00

## 2017-10-13 RX ADMIN — GLYCOPYRROLATE 0.4 MG: 0.2 INJECTION INTRAMUSCULAR; INTRAVENOUS at 18:08

## 2017-10-13 RX ADMIN — SODIUM CHLORIDE, SODIUM LACTATE, POTASSIUM CHLORIDE, AND CALCIUM CHLORIDE: 600; 310; 30; 20 INJECTION, SOLUTION INTRAVENOUS at 15:54

## 2017-10-13 RX ADMIN — SODIUM CHLORIDE, SODIUM LACTATE, POTASSIUM CHLORIDE, AND CALCIUM CHLORIDE: 600; 310; 30; 20 INJECTION, SOLUTION INTRAVENOUS at 16:01

## 2017-10-13 RX ADMIN — SODIUM CHLORIDE 1 G: 900 INJECTION, SOLUTION INTRAVENOUS at 16:45

## 2017-10-13 RX ADMIN — FAMOTIDINE 20 MG: 20 TABLET ORAL at 10:15

## 2017-10-13 RX ADMIN — NEOSTIGMINE METHYLSULFATE 3 MG: 1 INJECTION INTRAVENOUS at 18:08

## 2017-10-13 RX ADMIN — ROPIVACAINE HYDROCHLORIDE 30 ML: 5 INJECTION, SOLUTION EPIDURAL; INFILTRATION; PERINEURAL at 13:05

## 2017-10-13 RX ADMIN — LOPERAMIDE HYDROCHLORIDE 2 MG: 2 CAPSULE ORAL at 19:18

## 2017-10-13 RX ADMIN — SODIUM CHLORIDE, SODIUM LACTATE, POTASSIUM CHLORIDE, AND CALCIUM CHLORIDE 150 ML/HR: 600; 310; 30; 20 INJECTION, SOLUTION INTRAVENOUS at 10:00

## 2017-10-13 RX ADMIN — ATORVASTATIN CALCIUM 40 MG: 40 TABLET, FILM COATED ORAL at 23:59

## 2017-10-13 RX ADMIN — PROPOFOL 200 MG: 10 INJECTION, EMULSION INTRAVENOUS at 16:02

## 2017-10-13 RX ADMIN — LIDOCAINE HYDROCHLORIDE 0.1 ML: 10 INJECTION, SOLUTION INFILTRATION; PERINEURAL at 10:00

## 2017-10-13 RX ADMIN — ROCURONIUM BROMIDE 50 MG: 10 INJECTION, SOLUTION INTRAVENOUS at 16:02

## 2017-10-13 RX ADMIN — LIDOCAINE HYDROCHLORIDE 80 MG: 20 INJECTION, SOLUTION EPIDURAL; INFILTRATION; INTRACAUDAL; PERINEURAL at 16:00

## 2017-10-13 NOTE — ANESTHESIA PREPROCEDURE EVALUATION
Anesthetic History   No history of anesthetic complications            Review of Systems / Medical History  Patient summary reviewed, nursing notes reviewed and pertinent labs reviewed    Pulmonary        Sleep apnea: No treatment           Neuro/Psych         TIA     Cardiovascular    Hypertension: well controlled          Past MI and cardiac stents (x3, 1999, 2012 and 2013, no angina, still taking asa)    Exercise tolerance: <4 METS  Comments: 81 asa    Recent Vasovagal episode with full cardiac workup found to be negative.  Preserved EF and no significant Coronary obstruction   GI/Hepatic/Renal     GERD: well controlled    Renal disease: stones and CRI       Endo/Other    Diabetes: well controlled, type 2  Hypothyroidism: well controlled  Arthritis     Other Findings            Physical Exam    Airway  Mallampati: II  TM Distance: 4 - 6 cm  Neck ROM: normal range of motion   Mouth opening: Normal     Cardiovascular  Regular rate and rhythm,  S1 and S2 normal,  no murmur, click, rub, or gallop  Rhythm: regular  Rate: normal         Dental    Dentition: Caps/crowns     Pulmonary  Breath sounds clear to auscultation               Abdominal         Other Findings            Anesthetic Plan    ASA: 3  Anesthesia type: general - interscalene block      Post-op pain plan if not by surgeon: peripheral nerve block single    Induction: Intravenous  Anesthetic plan and risks discussed with: Patient and Family

## 2017-10-13 NOTE — H&P
Date of Surgery Update:  Holly Hoffman was seen and examined. History and physical has been reviewed. The patient has been examined.  There have been no significant clinical changes since the completion of the originally dated History and Physical.    Signed By: Julio Garnica MD     October 13, 2017 10:41 AM

## 2017-10-13 NOTE — PROGRESS NOTES
Doing well  Follow cultures  Continue vanco  Leave mendez in place until Sunday  Transfer to [facility] Monday if stable

## 2017-10-13 NOTE — PERIOP NOTES
TRANSFER - OUT REPORT:    Verbal report given to Samantha Reynoso RN on Jazmin Drain  being transferred to  [phone] for routine post - op       Report consisted of patients Situation, Background, Assessment and   Recommendations(SBAR). Information from the following report(s) OR Summary, Procedure Summary, Intake/Output and MAR was reviewed with the receiving nurse. Opportunity for questions and clarification was provided.       Patient transported on room air

## 2017-10-13 NOTE — DISCHARGE SUMMARY
[facility] Discharge Summary      Patient ID:  Leslie Henley  [id number]  37 y.o.  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 [facility]) [B67.323M, Z96.612]  Left rotator cuff tear arthropathy [M12.812]  History of left shoulder replacement [Z96.612]    Discharge Diagnoses: Principal Problem:    Instability of prosthetic shoulder joint ([address] (10/10/2017)    Active Problems:    Rotator cuff tear arthropathy, left (10/10/2017)      S/P shoulder hemiarthroplasty, left (10/10/2017)        Surgeon: Dory Evans MD      Preoperative Medical Clearance: YES                          Perioperative Antibiotics: Ancef  ___                                                Vancomycin  _X__          Post Op complications: none        Discharged to: SNF    Discharge instructions:  -Resume pre hospital diet             -Resume home medications per medical continuation form     SLING LEFT SHOULDER  CONTINUE PHYSICAL THERAPY  -Follow up in office as scheduled       Signed:  Dory Evans MD  10/16/2017  3:58 PM

## 2017-10-13 NOTE — ANESTHESIA POSTPROCEDURE EVALUATION
Post-Anesthesia Evaluation and Assessment    Patient: Ruth Ann Pete MRN: [id number]  SSN: [id number]    YOB: 1933  Age: 80 y.o. Sex: male       Cardiovascular Function/Vital Signs  Visit Vitals    /70    Pulse 65    Temp 36.4 °C (97.6 °F)    Resp 16    Ht 5' 9\" (1.753 m)    Wt 74.4 kg (164 lb)    SpO2 96%    BMI 24.22 kg/m2       Patient is status post general anesthesia for Procedure(s):   REMOVAL IMPLANT LEFT SHOULDER \"PRESS FIT EXACTECH HEMIARTHROPLASTY\"   2.  REVISION LEFT TOTAL SHOULDER ARTHROPLASTY WITH REVERSE DELTA EXTEND PROSTHESIS, LATISSIMUS DORSI AND TERES MAJOR TENDON TRANSFERS LEFT SHOULDER  .    Nausea/Vomiting: None    Postoperative hydration reviewed and adequate. Pain:  Pain Scale 1: Numeric (0 - 10) (10/13/17 1852)  Pain Intensity 1: 0 (10/13/17 1852)   Managed    Neurological Status:   Neuro (WDL): Exceptions to [facility] (10/13/17 1852)  Neuro  Neurologic State: Drowsy (10/13/17 1852)  Orientation Level: Oriented to person (10/13/17 1852)   At baseline    Mental Status and Level of Consciousness: Arousable    Pulmonary Status:   O2 Device: Room air (10/13/17 1852)   Adequate oxygenation and airway patent    Complications related to anesthesia: None    Post-anesthesia assessment completed.  No concerns    Signed By: Edyth Sandifer, MD     October 13, 2017

## 2017-10-14 ENCOUNTER — APPOINTMENT (OUTPATIENT)
Dept: GENERAL RADIOLOGY | Age: 82
DRG: 483 | End: 2017-10-14
Attending: ORTHOPAEDIC SURGERY
Payer: MEDICARE

## 2017-10-14 LAB
ANION GAP SERPL CALC-SCNC: 10 MMOL/L (ref 7–16)
BUN SERPL-MCNC: 27 MG/DL (ref 8–23)
CALCIUM SERPL-MCNC: 7.8 MG/DL (ref 8.3–10.4)
CHLORIDE SERPL-SCNC: 108 MMOL/L (ref 98–107)
CO2 SERPL-SCNC: 22 MMOL/L (ref 21–32)
CREAT SERPL-MCNC: 1.46 MG/DL (ref 0.8–1.5)
ERYTHROCYTE [DISTWIDTH] IN BLOOD BY AUTOMATED COUNT: 15.6 % (ref 11.9–14.6)
GLUCOSE BLD STRIP.AUTO-MCNC: 129 MG/DL (ref 65–100)
GLUCOSE SERPL-MCNC: 208 MG/DL (ref 65–100)
HCT VFR BLD AUTO: 36.2 % (ref 41.1–50.3)
HGB BLD-MCNC: 11.9 G/DL (ref 13.6–17.2)
MAGNESIUM SERPL-MCNC: 1.8 MG/DL (ref 1.8–2.4)
MCH RBC QN AUTO: 31.8 PG (ref 26.1–32.9)
MCHC RBC AUTO-ENTMCNC: 32.9 G/DL (ref 31.4–35)
MCV RBC AUTO: 96.8 FL (ref 79.6–97.8)
PLATELET # BLD AUTO: 136 K/UL (ref 150–450)
PMV BLD AUTO: 11.4 FL (ref 10.8–14.1)
POTASSIUM SERPL-SCNC: 4.2 MMOL/L (ref 3.5–5.1)
RBC # BLD AUTO: 3.74 M/UL (ref 4.23–5.67)
SODIUM SERPL-SCNC: 140 MMOL/L (ref 136–145)
WBC # BLD AUTO: 7.5 K/UL (ref 4.3–11.1)

## 2017-10-14 PROCEDURE — 74011000250 HC RX REV CODE- 250: Performed by: INTERNAL MEDICINE

## 2017-10-14 PROCEDURE — 74011250637 HC RX REV CODE- 250/637: Performed by: ORTHOPAEDIC SURGERY

## 2017-10-14 PROCEDURE — 97162 PT EVAL MOD COMPLEX 30 MIN: CPT

## 2017-10-14 PROCEDURE — 74011250636 HC RX REV CODE- 250/636: Performed by: ORTHOPAEDIC SURGERY

## 2017-10-14 PROCEDURE — 83735 ASSAY OF MAGNESIUM: CPT | Performed by: ORTHOPAEDIC SURGERY

## 2017-10-14 PROCEDURE — 80048 BASIC METABOLIC PNL TOTAL CA: CPT | Performed by: ORTHOPAEDIC SURGERY

## 2017-10-14 PROCEDURE — 73030 X-RAY EXAM OF SHOULDER: CPT

## 2017-10-14 PROCEDURE — 74011250637 HC RX REV CODE- 250/637: Performed by: INTERNAL MEDICINE

## 2017-10-14 PROCEDURE — 85027 COMPLETE CBC AUTOMATED: CPT | Performed by: ORTHOPAEDIC SURGERY

## 2017-10-14 PROCEDURE — 97110 THERAPEUTIC EXERCISES: CPT

## 2017-10-14 PROCEDURE — 82962 GLUCOSE BLOOD TEST: CPT

## 2017-10-14 PROCEDURE — 97116 GAIT TRAINING THERAPY: CPT

## 2017-10-14 PROCEDURE — 65270000029 HC RM PRIVATE

## 2017-10-14 PROCEDURE — 74011000302 HC RX REV CODE- 302: Performed by: ORTHOPAEDIC SURGERY

## 2017-10-14 PROCEDURE — 86580 TB INTRADERMAL TEST: CPT | Performed by: ORTHOPAEDIC SURGERY

## 2017-10-14 RX ORDER — MELOXICAM 7.5 MG/1
15 TABLET ORAL DAILY
Status: DISCONTINUED | OUTPATIENT
Start: 2017-10-14 | End: 2017-10-16 | Stop reason: HOSPADM

## 2017-10-14 RX ORDER — LOPERAMIDE HYDROCHLORIDE 2 MG/1
2 CAPSULE ORAL
Status: DISCONTINUED | OUTPATIENT
Start: 2017-10-14 | End: 2017-10-16 | Stop reason: HOSPADM

## 2017-10-14 RX ORDER — SODIUM CHLORIDE 0.9 % (FLUSH) 0.9 %
5-10 SYRINGE (ML) INJECTION EVERY 8 HOURS
Status: DISCONTINUED | OUTPATIENT
Start: 2017-10-14 | End: 2017-10-16 | Stop reason: HOSPADM

## 2017-10-14 RX ORDER — SODIUM CHLORIDE 9 MG/ML
75 INJECTION, SOLUTION INTRAVENOUS CONTINUOUS
Status: DISPENSED | OUTPATIENT
Start: 2017-10-14 | End: 2017-10-15

## 2017-10-14 RX ORDER — PROMETHAZINE HYDROCHLORIDE 25 MG/1
25 TABLET ORAL
Status: DISCONTINUED | OUTPATIENT
Start: 2017-10-14 | End: 2017-10-16 | Stop reason: HOSPADM

## 2017-10-14 RX ORDER — SIMETHICONE 80 MG
120 TABLET,CHEWABLE ORAL EVERY 4 HOURS
Status: DISCONTINUED | OUTPATIENT
Start: 2017-10-14 | End: 2017-10-16 | Stop reason: HOSPADM

## 2017-10-14 RX ORDER — SODIUM CHLORIDE 0.9 % (FLUSH) 0.9 %
5-10 SYRINGE (ML) INJECTION AS NEEDED
Status: DISCONTINUED | OUTPATIENT
Start: 2017-10-14 | End: 2017-10-16 | Stop reason: HOSPADM

## 2017-10-14 RX ADMIN — SIMETHICONE CHEW TAB 80 MG 120 MG: 80 TABLET ORAL at 03:12

## 2017-10-14 RX ADMIN — HYDROMORPHONE HYDROCHLORIDE 2 MG: 2 TABLET ORAL at 00:42

## 2017-10-14 RX ADMIN — ACETAMINOPHEN 500 MG: 500 TABLET, FILM COATED ORAL at 11:00

## 2017-10-14 RX ADMIN — HYDROMORPHONE HYDROCHLORIDE 2 MG: 2 TABLET ORAL at 03:26

## 2017-10-14 RX ADMIN — DIPHENOXYLATE HYDROCHLORIDE AND ATROPINE SULFATE 1 TABLET: 2.5; .025 TABLET ORAL at 23:15

## 2017-10-14 RX ADMIN — ATORVASTATIN CALCIUM 40 MG: 40 TABLET, FILM COATED ORAL at 21:09

## 2017-10-14 RX ADMIN — Medication 325 MG: at 08:04

## 2017-10-14 RX ADMIN — VANCOMYCIN HYDROCHLORIDE 1000 MG: 1 INJECTION, POWDER, LYOPHILIZED, FOR SOLUTION INTRAVENOUS at 18:43

## 2017-10-14 RX ADMIN — SIMETHICONE CHEW TAB 80 MG 120 MG: 80 TABLET ORAL at 00:00

## 2017-10-14 RX ADMIN — Medication 325 MG: at 18:01

## 2017-10-14 RX ADMIN — SODIUM CHLORIDE 75 ML/HR: 900 INJECTION, SOLUTION INTRAVENOUS at 11:00

## 2017-10-14 RX ADMIN — LOPERAMIDE HYDROCHLORIDE 2 MG: 2 CAPSULE ORAL at 23:08

## 2017-10-14 RX ADMIN — ASPIRIN 81 MG: 81 TABLET, COATED ORAL at 08:04

## 2017-10-14 RX ADMIN — B-COMPLEX W/ C & FOLIC ACID TAB 1 TABLET: TAB at 09:00

## 2017-10-14 RX ADMIN — TUBERCULIN PURIFIED PROTEIN DERIVATIVE 5 UNITS: 5 INJECTION, SOLUTION INTRADERMAL at 03:20

## 2017-10-14 RX ADMIN — SODIUM CHLORIDE 250 ML: 900 INJECTION, SOLUTION INTRAVENOUS at 00:03

## 2017-10-14 RX ADMIN — DOCUSATE SODIUM 100 MG: 100 CAPSULE, LIQUID FILLED ORAL at 18:00

## 2017-10-14 RX ADMIN — SIMETHICONE CHEW TAB 80 MG 120 MG: 80 TABLET ORAL at 16:00

## 2017-10-14 RX ADMIN — LEVOTHYROXINE SODIUM 50 MCG: 50 TABLET ORAL at 08:04

## 2017-10-14 RX ADMIN — SIMETHICONE CHEW TAB 80 MG 120 MG: 80 TABLET ORAL at 12:00

## 2017-10-14 RX ADMIN — ACETAMINOPHEN 500 MG: 500 TABLET, FILM COATED ORAL at 18:00

## 2017-10-14 RX ADMIN — HYDROMORPHONE HYDROCHLORIDE 4 MG: 4 TABLET ORAL at 21:09

## 2017-10-14 RX ADMIN — PSYLLIUM HUSK 1 PACKET: 3.4 POWDER ORAL at 09:00

## 2017-10-14 RX ADMIN — SIMETHICONE CHEW TAB 80 MG 120 MG: 80 TABLET ORAL at 08:00

## 2017-10-14 RX ADMIN — MELOXICAM 15 MG: 7.5 TABLET ORAL at 09:00

## 2017-10-14 RX ADMIN — SIMETHICONE CHEW TAB 80 MG 120 MG: 80 TABLET ORAL at 23:00

## 2017-10-14 RX ADMIN — LOPERAMIDE HYDROCHLORIDE 2 MG: 2 CAPSULE ORAL at 00:00

## 2017-10-14 RX ADMIN — TIMOLOL MALEATE 1 DROP: 5 SOLUTION/ DROPS OPHTHALMIC at 08:04

## 2017-10-14 RX ADMIN — HYDROMORPHONE HYDROCHLORIDE 4 MG: 4 TABLET ORAL at 07:55

## 2017-10-14 RX ADMIN — HYDROMORPHONE HYDROCHLORIDE 4 MG: 4 TABLET ORAL at 10:27

## 2017-10-14 RX ADMIN — PSYLLIUM HUSK 1 PACKET: 3.4 POWDER ORAL at 18:00

## 2017-10-14 RX ADMIN — LOPERAMIDE HYDROCHLORIDE 2 MG: 2 CAPSULE ORAL at 03:11

## 2017-10-14 NOTE — CONSULTS
MD Cely   Medical Director  [address] [place] [address], [address] [facility]  Tel: [phone]     Physical Medicine & Rehabilitation Note-consult    Patient: Jimbo Villalba MRN: [id number]  SSN: [id number]    YOB: 1933  Age: 80 y.o. Sex: male      Admit Date: 10/13/2017  Admitting Physician: Nela Garcia MD    Medical Decision Making/Plan/Recommend: s/p Reverse left total shoulder arthroplasty. Mobility impairment and functional deficits. Continue PT, OT for left shoulder rehab per protocol. Therapies limited to active and passive range of motion left elbow, hand. Gentle pendulums. No other shoulder motion. Sling to left shoulder. NWB left shoulder. Continue mobility, transfers, gait training. Will follow progress. Discussed rehab options and goals with patient. Recommend sub acute rehab at [facility]. Chief Complaint : Gait dysfunction secondary to below. 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 Removal of implant, left shoulder, \"press-fit Ex[employer]ech   hemiarthroplasty. 2. Revision left total shoulder arthroplasty with a reverse Delta Xtend prosthesis, latissimus dorsi and teres major tendon transfer, left shoulder    Pain  DVT risk  Post op hemorrhagic anemia  Hypertension  CAD (coronary artery disease)/ stents  Diabetes ([facility] [address]  Acute Rehab Dx:  Gait impairment  Mobility and ambulation deficits  Self Care/ADL deficits    Medical Dx:  Past Medical History:   Diagnosis Date    Anemia fall 2016    r/t GI bleeds     Arthritis     osteo    CAD (coronary artery disease) 1997 4 stents total (1997, 2012, 2013); \"ECHO 7/2016 normal with good EF\" per cardio office note; \"Stress test 6/2016 Findings consistent with very mild inferior ischemia\"    Carotid stenosis 04/2017    carotid u/s: Right internal carotid artery has 50-69% stenosis.  Left internal carotid artery has <50% stenosis.  Diabetes ([facility] [address] 05/1997    type 2; oral med; rare BG checks, last hgba1c- 6.9 (6/2017)    Diverticulosis 12/2011    with GI bleeding; no current problems    GERD (gastroesophageal reflux disease)     on med for control     Glaucoma     Heart murmur     ECHO 7/14/16- mild AS, mild MR, mild TR    Hiatal hernia     History of kidney stones     Hypercholesteremia     on med for control     Hypertension     hx of- no medications currently    Hypothyroid     managed with medication    MI (myocardial infarction)     x2, STEMI    TIA (transient ischemic attack) 07/2016    experienced numbness/tingling L arm and left lower face- \"only lasted a couple of hrs\"; 7/13 CT head: cerebral white matter changes have progressed since 4/19/13 likely indicative of chronic small vessel ischemic disease, no acute intracranial abnormality.  Unspecified sleep apnea     denies     Urethral stenosis     has had difficulty with placement/removal previously     Vasovagal syncope      Subjective:     Date of Evaluation:  October 14, 2017    HPI: Jana Borjas is a 80 y.o. male patient at [address] [place] [address] who was admitted on 10/13/2017  by Hiren Gutierrez MD with below mentioned medical history, is being seen for Physical Medicine and Rehabilitation consult. Jana Borjas who is s/p prior left shoulder hemiarthroplasty, presented with progressively worsening pain due to periprosthetic instability and rotator cuff tear arthropathy. He underwent a removal of implant, left shoulder, \"press-fit [employer]actech   Hemiarthroplasty and revision left total shoulder arthroplasty with a reverse Delta Xtend prosthesis, latissimus dorsi and teres major tendon transfer per Dr. Hiren Gutierrez MD on 10/13/2017. Pain, and common post op issues fairlywell tolerated. Patient is to be NWB LUE. Patient is to start to mobilize with acute PT and OT.  He has significant functional deficits, mobility impairement due to shoulder pain, decreased strength and NWB status. We are consulted to assist with rehab needs and placement. Elian Palomino is seen and examined today. Medical Records reviewed. He denies any other pre morbid functional deficits. He has been independent with ambulation, prior to admission, his UE ADLs were limited by pain, weakness. Current Level of Function: bed mobility - min A, transfers - CGA, decreased balance , ambulation - requires min A. Previous Functional Level-     independent       Family History   Problem Relation Age of Onset    Heart Disease Father     Heart Attack Father     Stroke Mother     Alzheimer Sister     Alzheimer Sister       Social History   Substance Use Topics    Smoking status: Never Smoker    Smokeless tobacco: Never Used    Alcohol use 4.2 oz/week     7 Glasses of wine per week     Past Surgical History:   Procedure Laterality Date    HX BUNIONECTOMY Left     with hammer toe    HX CARPAL TUNNEL RELEASE Right 1968    HX CARPAL TUNNEL RELEASE Left 2012    HX CATARACT REMOVAL  1987 and 1990    Teofilo with IOLens implamts    HX COLECTOMY  01/13/2017    HX HEART CATHETERIZATION  2938,4998, 2012, 2013    total 4 stents: 2 stents- 1997, 1 stent- 2012 (BMS to RCA), 1 stent- 2013 (ANGELLA to RCA)    HX HEENT Right     eye surgery to replace lens due to fall     HX HEMORRHOIDECTOMY  1987    HX HERNIA REPAIR  1944    L inguinal    HX KNEE REPLACEMENT Bilateral 2002, 2008    HX LAP CHOLECYSTECTOMY  0543    umbilical hernia repair    HX ORTHOPAEDIC Left     trigger finger     HX ORTHOPAEDIC  3183-0643    multiple knee surgery    HX ROTATOR CUFF REPAIR Right 1995    HX ROTATOR CUFF REPAIR Left 2000    HX ROTATOR CUFF REPAIR Right 2007    with martina tendon repair    HX SHOULDER ARTHROSCOPY Left 2015    HX TONSILLECTOMY  1938    HX UROLOGICAL  1996    cysto with stone extraction and stent      Prior to Admission medications    Medication Sig Start Date End Date Taking?  Authorizing Provider   simethicone (GAS-X) 125 mg capsule Take 125 mg by mouth four (4) times daily as needed for Flatulence. Yes Historical Provider   loperamide (IMODIUM) 2 mg capsule Take 2 mg by mouth four (4) times daily as needed for Diarrhea. Yes Historical Provider   diphenoxylate-atropine (LOMOTIL) 2.5-0.025 mg per tablet Take 1 Tab by mouth two (2) times daily as needed for Diarrhea. Yes Historical Provider   atorvastatin (LIPITOR) 40 mg tablet Take 40 mg by mouth nightly. Yes Historical Provider   levothyroxine (SYNTHROID) 50 mcg tablet Take 50 mcg by mouth Daily (before breakfast). Yes Historical Provider   Psyllium Husk-Sucrose 3.4 gram/7 gram powd Take 3 Caps by mouth two (2) times a day. Yes Historical Provider   omega-3 fatty acids-vitamin e (FISH OIL) 1,000 mg cap Take 2 Caps by mouth daily. Yes Historical Provider   bimatoprost (LUMIGAN) 0.01 % ophthalmic drops Administer 1 Drop to right eye nightly. Yes Historical Provider   timolol (TIMOPTIC) 0.5 % ophthalmic solution Administer 1 Drop to right eye every morning. Yes Historical Provider   Omeprazole delayed release (PRILOSEC D/R) 20 mg tablet Take 20 mg by mouth Daily (before dinner). Yes Historical Provider   meloxicam (MOBIC) 15 mg tablet Take 15 mg by mouth daily. Indications: OSTEOARTHRITIS   Yes Historical Provider   brimonidine (ALPHAGAN P) 0.1 % ophthalmic solution Administer 1 Drop to right eye two (2) times a day. Take / use AM day of surgery  per anesthesia protocols. Yes Historical Provider   glimepiride (AMARYL) 2 mg tablet Take 1 mg by mouth daily. Indications: type 2 diabetes mellitus   Yes Historical Provider   b complex vitamins tablet Take 1 Tab by mouth daily. Yes Historical Provider   aspirin 81 mg Tab Take 81 mg by mouth daily. Take / use 81 mg AM day of surgery  per anesthesia protocols.    Yes Skip Turner MD     No Known Allergies     Review of Systems:  Denies chest pain, shortness of breath, cough, headache, visual problems, abdominal pain, dysurea, calf pain. Pertinent positives are as noted in the medical records and unremarkable otherwise. Objective:     Vitals:  Blood pressure 117/76, pulse 90, temperature 98.4 °F (36.9 °C), resp. rate 16, height 5' 9\" (1.753 m), weight 164 lb (74.4 kg), SpO2 98 %. Temp (24hrs), Av.9 °F (36.6 °C), Min:97.5 °F (36.4 °C), Max:98.4 °F (36.9 °C)    BMI (calculated): 24.2 (10/13/17 0944)   Intake and Output:  10/12 1901 - 10/14 0700  In: 2450 [I.V.:2450]  Out: 750 [Urine:500]    Physical Exam:   General: Alert and age appropriately oriented. No acute cardio respiratory distress. HEENT: Normocephalic, no conjunctival pallor. Oral mucosa moist without cyanosis. No JVD. Lungs: Clear to auscultation bilaterally. Respiration even and unlabored   Heart: Regular rate and rhythm, S1, S2   No  Murmurs. Abdomen: Soft, non-tender, non-distended. Genitourinary: defered   Neuromuscular:      Grossly no focal motor deficits. Moves left fingers, hand well. Left wrist extension 5/5   Left wrist flexion 5/5   Left ADMQ 5/5   No sensory deficits distally BUE to soft touch. Skin/extremity: Non tender calves BLE. No rashes, no marginal erythema.                                                                                          Labs/Studies:  Recent Results (from the past 72 hour(s))   TYPE + CROSSMATCH    Collection Time: 10/13/17 10:35 AM   Result Value Ref Range    Crossmatch Expiration 10/16/2017     ABO/Rh(D) A POSITIVE     Antibody screen NEG     Unit number [id number]     Blood component type  LR AS5     Unit division 00     Status of unit ALLOCATED     Crossmatch result Compatible     Unit number [id number]     Blood component type  LR AS5     Unit division 00     Status of unit ALLOCATED     Crossmatch result Compatible    MAGNESIUM    Collection Time: 10/13/17 10:35 AM   Result Value Ref Range    Magnesium 1.8 1.8 - 2.4 mg/dL   HEMOGLOBIN A1C WITH EAG Collection Time: 10/13/17 10:35 AM   Result Value Ref Range    Hemoglobin A1c 6.2 (H) 4.8 - 6.0 %    Est. average glucose 131 mg/dL   GLUCOSE, POC    Collection Time: 10/13/17 10:38 AM   Result Value Ref Range    Glucose (POC) 128 (H) 65 - 100 mg/dL   CBC W/O DIFF    Collection Time: 10/14/17  9:02 AM   Result Value Ref Range    WBC 7.5 4.3 - 11.1 K/uL    RBC 3.74 (L) 4.23 - 5.67 M/uL    HGB 11.9 (L) 13.6 - 17.2 g/dL    HCT 36.2 (L) 41.1 - 50.3 %    MCV 96.8 79.6 - 97.8 FL    MCH 31.8 26.1 - 32.9 PG    MCHC 32.9 31.4 - 35.0 g/dL    RDW 15.6 (H) 11.9 - 14.6 %    PLATELET 300 (L) 155 - 450 K/uL    MPV 11.4 10.8 - 04.4 FL   METABOLIC PANEL, BASIC    Collection Time: 10/14/17  9:02 AM   Result Value Ref Range    Sodium 140 136 - 145 mmol/L    Potassium 4.2 3.5 - 5.1 mmol/L    Chloride 108 (H) 98 - 107 mmol/L    CO2 22 21 - 32 mmol/L    Anion gap 10 7 - 16 mmol/L    Glucose 208 (H) 65 - 100 mg/dL    BUN 27 (H) 8 - 23 MG/DL    Creatinine 1.46 0.8 - 1.5 MG/DL    GFR est AA 59 (L) >60 ml/min/1.73m2    GFR est non-AA 49 (L) >60 ml/min/1.73m2    Calcium 7.8 (L) 8.3 - 10.4 MG/DL   MAGNESIUM    Collection Time: 10/14/17  9:02 AM   Result Value Ref Range    Magnesium 1.8 1.8 - 2.4 mg/dL       Functional Assessment:  Reviewed participation and progress in therapies      Requires assist   Ambulation:  Requires assist for safety    Impression/Plan:     Principal Problem:    Instability of prosthetic shoulder joint ([address] (10/10/2017)    Active Problems:    Rotator cuff tear arthropathy, left (10/10/2017)      S/P shoulder hemiarthroplasty, left (10/10/2017)        Current Facility-Administered Medications   Medication Dose Route Frequency Provider Last Rate Last Dose    0.9% sodium chloride infusion  75 mL/hr IntraVENous CONTINUOUS Yesika Garduno MD 75 mL/hr at 10/14/17 1100 75 mL/hr at 10/14/17 1100    sodium chloride (NS) flush 5-10 mL  5-10 mL IntraVENous Gal Gastelum MD        sodium chloride (NS) flush 5-10 mL 5-10 mL IntraVENous PRN Addie RomeroOppenheim., MD        promethazine [facility]) tablet 25 mg  25 mg Oral Q4H PRN Addie Oppenheim., MD        brimonidine (ALPHAGAN P) 0.1 % ophthalmic solution 1 Drop (Patient Supplied)  1 Drop Both Eyes BID Frederick Severe, MD        [facility]) tablet 420 mg  120 mg Oral Q4H Frederick Severe, MD        loperamide (IMODIUM) capsule 2 mg  2 mg Oral Q4H PRN Ayan Severe, MD        meloxicam (MOBIC) tablet 15 mg  15 mg Oral DAILY Ayan Severe, MD        vancomycin (VANCOCIN) 1,000 mg in 0.9% sodium chloride (MBP/ADV) 250 mL  1,000 mg IntraVENous Q24H Rosa Oppenheim., MD        0.9% sodium chloride infusion 250 mL  250 mL IntraVENous PRN Addie RomeroOppenheim., MD 15 mL/hr at 10/14/17 0003 250 mL at 10/14/17 0003    bisacodyl (DULCOLAX) suppository 10 mg  10 mg Rectal DAILY PRN Addie Oppenheim., MD        sodium phosphate (FLEET'S) enema 118 mL  1 Enema Rectal PRN Addie Oppenheim., MD        ferrous sulfate tablet 325 mg  1 Tab Oral BID WITH MEALS Rosa Oppenheim., MD   325 mg at 10/14/17 0804    docusate sodium (COLACE) capsule 100 mg  100 mg Oral BID Addie Sultanaheim., MD        HYDROmorphone (DILAUDID) tablet 4 mg  4 mg Oral Q3H PRN Addie Oppenheim., MD   4 mg at 10/14/17 0755    HYDROmorphone (DILAUDID) tablet 2 mg  2 mg Oral Q3H PRN Addie Oppenheim., MD   2 mg at 10/14/17 7272    tuberculin injection 5 Units  5 Units IntraDERMal ONCE Rosa Oppenheim., MD   5 Units at 10/14/17 0320    HYDROmorphone in NS (DILAUDID) injection 1 mg  1 mg IntraVENous Q1H PRN Addie RomeroOppenheim., MD        atorvastatin (LIPITOR) tablet 40 mg  40 mg Oral QHS Frederick Severe, MD   40 mg at 10/13/17 6175    diphenoxylate-atropine (LOMOTIL) tablet 1 Tab  1 Tab Oral BID PRN Frederick Severe, MD        levothyroxine (SYNTHROID) tablet 50 mcg  50 mcg Oral ACB Frederick Severe, MD   50 mcg at 10/14/17 0804    psyllium husk-aspartame (METAMUCIL FIBER) packet 1 Packet  1 Packet Oral BID Chica Rivera MD        bimatoprost (LUMIGAN) 0.01 % ophthalmic drops 1 Drop (Patient Supplied)  1 Drop Right Eye QHS Chica Rivera MD   1 Drop at 10/13/17 2300    timolol (TIMOPTIC) 0.5 % ophthalmic solution 1 Drop  1 Drop Right Eye 7am Chica Rivera MD   1 Drop at 10/14/17 0804    multivitamin, stress formula (STRESS TAB) tablet 1 Tab  1 Tab Oral DAILY Chica Rivera MD        aspirin delayed-release tablet 81 mg  81 mg Oral DAILY Chica Rivera MD   81 mg at 10/14/17 0804    insulin lispro (HUMALOG) injection   SubCUTAneous AC&HS Chica Rivera MD        acetaminophen (TYLENOL) tablet 500 mg  500 mg Oral Q4H PRN Chica Rivera MD            Recommendations: Recommend sub acute rehab at [facility]. Coordination of rehab/medical care. Counseling of Physical Medicine & Rehab care issues management. Monitoring and management of rehab conditions per the plan of care/orders. Will follow along with you for PM&R issues and provide you follow up. Will follow with SW/ /Admissions Coordinators regarding insurance approvals and acceptance. Continue PT, OT for left shoulder rehab per protocol. Therapies limited to active and passive range of motion left elbow, hand. Gentle pendulums. No other shoulder motion. Sling to left shoulder. NWB left shoulder. Continue mobility, transfers, gait training. Will follow progress. Rehabilitation Management/ Medical Management: 1. Devices: sling L shoulder  2. Consult:Rehab team including PT, OT,  and . 3. Disposition Rehab-discussed with patient. 4. Thigh-high or knee-high VIDAL's when out of bed. 5. DVT Prophylaxis - per primary  6. Incentive spirometer Q1H while awake  7. Post op hemorrhagic anemia-monitor. 8. Activity: NWB LUE, sling to left shoulder  9. Planned Labs: CBC,BMP  10. Pain Control:  Continue scheduled tylenol, mobic and  PRN meds.    11. Wound Care: Keep left shoulder wound clean and dry and reinforce dressing PRN. Follow up with Dr Kvng Costello  2 weeks after discharge from rehab. Follow up with ORTHO per instructions. Thank you for the opportunity to participate in the care of this patient.     Signed By: Laurel Chase MD     October 14, 2017

## 2017-10-14 NOTE — PROGRESS NOTES
Shift assessment completed. Pt is alert & oriented x4. Able to verbalize needs. Resting quietly with no distress noted. Dressing to left shoulder is clean, dry and intact. Neurovascular and peripheral vascular checks WNL. Billy is draining clear yellow urine without difficulty. Pain is being managed with Dilaudid with patient tolerating well. Bed low and locked. Call light within reach. Patient instructed to call for assistance. Pt verbalizes understanding. Will monitor.

## 2017-10-14 NOTE — PROGRESS NOTES
Problem: Mobility Impaired (Adult and Pediatric)  Goal: *Acute Goals and Plan of Care (Insert Text)  GOALS (1-4 days):  (1.) Patient will move from supine to sit and sit to supine in bed with STAND BY ASSIST.   (2.) Patient will transfer from bed to chair and chair to bed with SUPERVISION using the least restrictive device. (3.) Patient will ambulate with SUPERVISION for 300 feet with the least restrictive device. (4.) Patient will be independent with shoulder HEP to increase range of motion per MD orders. ________________________________________________________________________________________________      PHYSICAL THERAPY: INITIAL ASSESSMENT, TREATMENT DAY: DAY OF ASSESSMENT, AM 10/14/2017  INPATIENT: Hospital Day: 2  [place] / Product Type: Medicare /      NAME/AGE/GENDER: Mateus Gambino is a 80 y.o. 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  REVISION LEFT TOTAL SHOULDER ARTHROPLASTY WITH REVERSE DELTA EXTEND PROSTHESIS, LATISSIMUS DORSI AND TERES MAJOR TENDON TRANSFERS LEFT SHOULDER   (Left)  1 Day Post-Op  ICD-10: Treatment Diagnosis:       · Pain in Left Shoulder (M25.512)  · Stiffness of Left Shoulder, Not elsewhere classified (M25.612)  · Generalized Muscle Weakness (M62.81)  · Other lack of cordination (R27.8)  · Difficulty in walking, Not elsewhere classified (R26.2)  · Other abnormalities of gait and mobility (R26.89)   Precaution/Allergies:  Review of patient's allergies indicates no known allergies.        ASSESSMENT:      Mr. Ramin Marr presents with painful & stiff left shoulder along with unsteady gait & transfers. This pt tea benefit from follow up therapy to help restore safe function & to establish HEP. This section established at most recent assessment   PROBLEM LIST (Impairments causing functional limitations):  1. Decreased [place] with Bed Mobility  2. Decreased [place] with Transfers  3. Decreased [place] with Ambulation  4. Decreased [place] with shoulder HEP    INTERVENTIONS PLANNED: (Benefits and precautions of physical therapy have been discussed with the patient.)  1. Bed Mobility Training  2. Transfer Training  3. Gait Training  4. Therapeutic Exercises per MD orders  5. Modalities for Pain      TREATMENT PLAN: Frequency/Duration: twice daily for duration of hospital stay  Rehabilitation Potential For Stated Goals: GOOD      RECOMMENDED REHABILITATION/EQUIPMENT: (at time of discharge pending progress): Continue Skilled Therapy and Rehab. HISTORY:   History of Present Injury/Illness (Reason for Referral):  Painful left shoulder  Past Medical History/Comorbidities:   Mr. Wing Briseno  has a past medical history of Anemia (fall 2016); Arthritis; CAD (coronary artery disease) (1997); Carotid stenosis (04/2017); Diabetes ([address] (05/1997); Diverticulosis (12/2011); GERD (gastroesophageal reflux disease); Glaucoma; Heart murmur; Hiatal hernia; History of kidney stones; Hypercholesteremia; Hypertension; Hypothyroid; MI (myocardial infarction); TIA (transient ischemic attack) (07/2016); Unspecified sleep apnea; Urethral stenosis; and Vasovagal syncope. He also has no past medical history of Adverse effect of anesthesia; Aneurysm ([address] Arrhythmia; Asthma; Autoimmune disease ([address] Cancer ([address] Chronic kidney disease; Chronic pain; Coagulation disorder ([address] COPD; DEMENTIA; Difficult intubation; Endocarditis; Gastrointestinal disorder; Heart failure ([address] Ill-defined condition; Infectious disease; Liver disease; Malignant hyperthermia due to anesthesia;  Morbid obesity ([address] Nausea & vomiting; Neurological disorder; Other ill-defined conditions(799.89); Pseudocholinesterase deficiency; Psychiatric disorder; PUD (peptic ulcer disease); Rheumatic fever; Seizures ([facility] Sleep disorder; Stroke [facility]); or Thromboembolus ([facility] Mr. Adela Dixon  has a past surgical history that includes heart catheterization (3141,2062, 2012, 2013); tonsillectomy (1938); cataract removal (1987 and 1990); lap cholecystectomy (2003); hernia repair (1944); urological (1996); orthopaedic (Left); orthopaedic (1558-1648); shoulder arthroscopy (Left, 2015); knee replacement (Bilateral, 2002, 2008); hemorrhoidectomy (1987); heent (Right); carpal tunnel release (Right, 1968); carpal tunnel release (Left, 2012); bunionectomy (Left); rotator cuff repair (Right, 1995); rotator cuff repair (Left, 2000); rotator cuff repair (Right, 2007); and colectomy (01/13/2017). Social History/Living Environment:   Home Environment: Private residence  # Steps to Enter: 3  Rails to Enter: No  One/Two Story Residence: One story  Living Alone: Yes  Support Systems: Child(wojciech)  Patient Expects to be Discharged to[de-identified] Skilled nursing facility  Prior Level of Function/Work/Activity:  Pt was independent without an assistive device prior to this admission   Number of Personal Factors/Comorbidities that affect the Plan of Care: 3+: HIGH COMPLEXITY   EXAMINATION:   Most Recent Physical Functioning:   Gross Assessment:  AROM: Generally decreased, functional (right UE & both LE's)  Strength: Generally decreased, functional (right UE & both LE's)  Coordination: Generally decreased, functional (right UE & both LE's)                    Balance:  Sitting: Intact; Without support  Standing: Impaired; Without support Bed Mobility:  Supine to Sit: Minimum assistance  Sit to Supine: Contact guard assistance  Scooting: Contact guard assistance       Transfers:  Sit to Stand: Stand-by asssistance  Stand to Sit: Stand-by asssistance  Bed to Chair: Stand-by asssistance  Gait:     Speed/Tracy: Fluctuations  Step Length:  (equal)  Gait Abnormalities: Decreased step clearance  Distance (ft): 300 Feet (ft)  Assistive Device:  (none)  Ambulation - Level of Assistance: Stand-by asssistance  Duration: 15 Minutes   Functional Mobility:         Gait/Ambulation:  cga        Transfers:  cga        Bed Mobility:  min   Body Structures Involved:  1. Joints  2. Muscles Body Functions Affected:  1. Sensory/Pain  2. Movement Related Activities and Participation Affected:  1. General Tasks and Demands  2. Mobility   Number of elements that affect the Plan of Care: 4+: HIGH COMPLEXITY   CLINICAL PRESENTATION:   Presentation: Evolving clinical presentation with changing clinical characteristics: MODERATE COMPLEXITY   CLINICAL DECISION MAKIN [facility] Inpatient Short Form  How much difficulty does the patient currently have. .. Unable A Lot A Little None   1. Turning over in bed (including adjusting bedclothes, sheets and blankets)? [ ] 1   [ ] 2   [X] 3   [ ] 4   2. Sitting down on and standing up from a chair with arms ( e.g., wheelchair, bedside commode, etc.)   [ ] 1   [ ] 2   [X] 3   [ ] 4   3. Moving from lying on back to sitting on the side of the bed? [ ] 1   [ ] 2   [X] 3   [ ] 4   How much help from another person does the patient currently need. .. Total A Lot A Little None   4. Moving to and from a bed to a chair (including a wheelchair)? [ ] 1   [ ] 2   [X] 3   [ ] 4   5. Need to walk in hospital room? [ ] 1   [ ] 2   [X] 3   [ ] 4   6. Climbing 3-5 steps with a railing? [X] 1   [ ] 2   [ ] 3   [ ] 4   © , Trustees of [address] [place] [address] [address] [address], under license to [employer]. All rights reserved    Score:  Initial: 16 Most Recent: X (Date: -- )     Interpretation of Tool:  Represents activities that are increasingly more difficult (i.e. Bed mobility, Transfers, Gait).        Score 24 23 22-20 19-15 14-10 9-7 6 Modifier CH CI CJ CK CL CM CN         · Mobility - Walking and Moving Around:               - CURRENT STATUS:    CK - 40%-59% impaired, limited or restricted               - GOAL STATUS:           CJ - 20%-39% impaired, limited or restricted               - D/C STATUS:                       ---------------To be determined---------------  [place] / Product Type: Medicare /       Medical Necessity:     · Patient is expected to demonstrate progress in strength, range of motion, balance, coordination and functional technique to decrease assistance required with bed mobility, transfers, gait & HEP. Reason for Services/Other Comments:  · Patient continues to require skilled intervention due to pt not safe with functional mobility & HEP. Use of outcome tool(s) and clinical judgement create a POC that gives a: Questionable prediction of patient's progress: MODERATE COMPLEXITY                 TREATMENT:   (In addition to Assessment/Re-Assessment sessions the following treatments were rendered)   Pre-treatment Symptoms/Complaints:  none  Pain: Initial: visual scale  Pain Intensity 1: 3  Pain Location 1: Shoulder  Pain Orientation 1: Left  Pain Intervention(s) 1: Cold pack, Exercise  Post Session:  3/10      Therapeutic Exercise: (15 Minutes):  Exercises per grid below to improve mobility and dynamic movement of arm - left to improve functional endurance. Required minimal verbal cues to promote proper body alignment and promote proper body mechanics. Progressed repetitions as indicated. Gait Training (15 Minutes):  Gait training to improve and/or restore physical functioning as related to mobility, strength, balance, coordination and dynamic movement of leg - bilateral to improve functional gait.   Ambulated 300 Feet (ft) with Stand-by asssistance using a  (none) and minimal cues related to their stride length and heel strike to promote proper body alignment, promote proper body posture and promote proper body mechanics. Date:  10/14 Date:    Date:      ACTIVITY/EXERCISE AM PM AM PM AM PM   Gripping 15 20            Wrist Flexion/Extension 15  20           Wrist Ulnar/Radial Deviation               Pronation/Supination 15  20           Elbow Flexion/Extension 15aa  20aa           Shoulder Flexion/Extension               Shoulder AB/ADduction               Shoulder IR/ER               Pulleys               Pendulums Gentle 15p  Clock & counter clockwise  gentle 20p clock & counter clockwise           Shrugs               Isometric:                 Flexion               Extension               ABduction               ADduction               Biceps/Triceps                               B = bilateral; AA = active assistive; A = active; P = passive  Education:  [X]  Home Exercises      [X]  Sling Application       [X]  Movement Precautions        [ ]  Pulleys          [ ]  Use of Ice    [ ]  Other:   Treatment/Session Assessment:    · Response to Treatment:  No concerns  · Interdisciplinary Collaboration:  · Registered Nurse  · After treatment position/precautions:  · Supine in bed, Bed/Chair-wheels locked, Bed in low position, Call light within reach and RN notified  · Compliance with Program/Exercises: Will assess as treatment progresses. · Recommendations/Intent for next treatment session: \"Next visit will focus on reduction in assistance provided\".   Total Treatment Duration:  PT Patient Time In/Time Out  Time In: 5892  Time Out: 8223 [facility],

## 2017-10-14 NOTE — PROGRESS NOTES
Problem: Mobility Impaired (Adult and Pediatric)  Goal: *Acute Goals and Plan of Care (Insert Text)  GOALS (1-4 days):  (1.) Patient will move from supine to sit and sit to supine in bed with STAND BY ASSIST.   (2.) Patient will transfer from bed to chair and chair to bed with SUPERVISION using the least restrictive device. (3.) Patient will ambulate with SUPERVISION for 300 feet with the least restrictive device. (4.) Patient will be independent with shoulder HEP to increase range of motion per MD orders. ________________________________________________________________________________________________      PHYSICAL THERAPY: INITIAL ASSESSMENT, TREATMENT DAY: DAY OF ASSESSMENT, AM 10/14/2017  INPATIENT: Hospital Day: 2  [place] / Product Type: Medicare /      NAME/AGE/GENDER: Elian Palomino is a 80 y.o. 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  REVISION LEFT TOTAL SHOULDER ARTHROPLASTY WITH REVERSE DELTA EXTEND PROSTHESIS, LATISSIMUS DORSI AND TERES MAJOR TENDON TRANSFERS LEFT SHOULDER   (Left)  1 Day Post-Op  ICD-10: Treatment Diagnosis:       · Pain in Left Shoulder (M25.512)  · Stiffness of Left Shoulder, Not elsewhere classified (M25.612)  · Generalized Muscle Weakness (M62.81)  · Other lack of cordination (R27.8)  · Difficulty in walking, Not elsewhere classified (R26.2)  · Other abnormalities of gait and mobility (R26.89)   Precaution/Allergies:  Review of patient's allergies indicates no known allergies.        ASSESSMENT:      Mr. Larwence Stack presents with painful & stiff left shoulder along with unsteady gait & transfers. This pt tea benefit from follow up therapy to help restore safe function & to establish HEP. This section established at most recent assessment   PROBLEM LIST (Impairments causing functional limitations):  1. Decreased [place] with Bed Mobility  2. Decreased [place] with Transfers  3. Decreased [place] with Ambulation  4. Decreased [place] with shoulder HEP    INTERVENTIONS PLANNED: (Benefits and precautions of physical therapy have been discussed with the patient.)  1. Bed Mobility Training  2. Transfer Training  3. Gait Training  4. Therapeutic Exercises per MD orders  5. Modalities for Pain      TREATMENT PLAN: Frequency/Duration: twice daily for duration of hospital stay  Rehabilitation Potential For Stated Goals: GOOD      RECOMMENDED REHABILITATION/EQUIPMENT: (at time of discharge pending progress): Continue Skilled Therapy and Rehab. HISTORY:   History of Present Injury/Illness (Reason for Referral):  Painful left shoulder  Past Medical History/Comorbidities:   Mr. Wing Briseno  has a past medical history of Anemia (fall 2016); Arthritis; CAD (coronary artery disease) (1997); Carotid stenosis (04/2017); Diabetes ([address] (05/1997); Diverticulosis (12/2011); GERD (gastroesophageal reflux disease); Glaucoma; Heart murmur; Hiatal hernia; History of kidney stones; Hypercholesteremia; Hypertension; Hypothyroid; MI (myocardial infarction); TIA (transient ischemic attack) (07/2016); Unspecified sleep apnea; Urethral stenosis; and Vasovagal syncope. He also has no past medical history of Adverse effect of anesthesia; Aneurysm ([address] Arrhythmia; Asthma; Autoimmune disease ([address] Cancer ([address] Chronic kidney disease; Chronic pain; Coagulation disorder ([address] COPD; DEMENTIA; Difficult intubation; Endocarditis; Gastrointestinal disorder; Heart failure ([address] Ill-defined condition; Infectious disease; Liver disease; Malignant hyperthermia due to anesthesia;  Morbid obesity ([address] Nausea & vomiting; Neurological disorder; Other ill-defined conditions(799.89); Pseudocholinesterase deficiency; Psychiatric disorder; PUD (peptic ulcer disease); Rheumatic fever; Seizures ([facility] Sleep disorder; Stroke [facility]); or Thromboembolus ([facility] Mr. Miriam Ludwig  has a past surgical history that includes heart catheterization (5974,2850, 2012, 2013); tonsillectomy (1938); cataract removal (1987 and 1990); lap cholecystectomy (2003); hernia repair (1944); urological (1996); orthopaedic (Left); orthopaedic (8982-2098); shoulder arthroscopy (Left, 2015); knee replacement (Bilateral, 2002, 2008); hemorrhoidectomy (1987); heent (Right); carpal tunnel release (Right, 1968); carpal tunnel release (Left, 2012); bunionectomy (Left); rotator cuff repair (Right, 1995); rotator cuff repair (Left, 2000); rotator cuff repair (Right, 2007); and colectomy (01/13/2017). Social History/Living Environment:   Home Environment: Private residence  # Steps to Enter: 3  Rails to Enter: No  One/Two Story Residence: One story  Living Alone: Yes  Support Systems: Child(wojciech)  Patient Expects to be Discharged to[de-identified] Skilled nursing facility  Prior Level of Function/Work/Activity:  Pt was independent without an assistive device prior to this admission   Number of Personal Factors/Comorbidities that affect the Plan of Care: 3+: HIGH COMPLEXITY   EXAMINATION:   Most Recent Physical Functioning:   Gross Assessment:  AROM: Generally decreased, functional (right UE & both LE's)  Strength: Generally decreased, functional (right UE & both LE's)  Coordination: Generally decreased, functional (right UE & both LE's)               Posture:     Balance:  Sitting: Intact; Without support  Standing: Impaired; With support (manual) Bed Mobility:  Supine to Sit: Minimum assistance  Sit to Supine:  (NT)  Scooting: Contact guard assistance  Wheelchair Mobility:     Transfers:  Sit to Stand: Contact guard assistance  Stand to Sit: Contact guard assistance  Bed to Chair: Contact guard assistance  Gait:     Speed/Tracy: Delayed  Step Length: Left shortened;Right shortened  Gait Abnormalities: Decreased step clearance;Trunk sway increased  Distance (ft): 100 Feet (ft)  Assistive Device:  (none)  Ambulation - Level of Assistance: Contact guard assistance   Functional Mobility:         Gait/Ambulation:  cga        Transfers:  cga        Bed Mobility:  min   Body Structures Involved:  1. Joints  2. Muscles Body Functions Affected:  1. Sensory/Pain  2. Movement Related Activities and Participation Affected:  1. General Tasks and Demands  2. Mobility   Number of elements that affect the Plan of Care: 4+: HIGH COMPLEXITY   CLINICAL PRESENTATION:   Presentation: Evolving clinical presentation with changing clinical characteristics: MODERATE COMPLEXITY   CLINICAL DECISION MAKIN [facility] Inpatient Short Form  How much difficulty does the patient currently have. .. Unable A Lot A Little None   1. Turning over in bed (including adjusting bedclothes, sheets and blankets)? [ ] 1   [ ] 2   [X] 3   [ ] 4   2. Sitting down on and standing up from a chair with arms ( e.g., wheelchair, bedside commode, etc.)   [ ] 1   [ ] 2   [X] 3   [ ] 4   3. Moving from lying on back to sitting on the side of the bed? [ ] 1   [ ] 2   [X] 3   [ ] 4   How much help from another person does the patient currently need. .. Total A Lot A Little None   4. Moving to and from a bed to a chair (including a wheelchair)? [ ] 1   [ ] 2   [X] 3   [ ] 4   5. Need to walk in hospital room? [ ] 1   [ ] 2   [X] 3   [ ] 4   6. Climbing 3-5 steps with a railing? [X] 1   [ ] 2   [ ] 3   [ ] 4   © , Trustees of [address] [place] [address] [address] [address], under license to [employer]. All rights reserved    Score:  Initial: 16 Most Recent: X (Date: -- )     Interpretation of Tool:  Represents activities that are increasingly more difficult (i.e. Bed mobility, Transfers, Gait). Score 24 23 22-20 19-15 14-10 9-7 6       Modifier CH CI CJ CK CL CM CN         · Mobility - Walking and Moving Around:               - CURRENT STATUS:    CK - 40%-59% impaired, limited or restricted               - GOAL STATUS:           CJ - 20%-39% impaired, limited or restricted               - D/C STATUS:                       ---------------To be determined---------------  [place] / Product Type: Medicare /       Medical Necessity:     · Patient is expected to demonstrate progress in strength, range of motion, balance, coordination and functional technique to decrease assistance required with bed mobility, transfers, gait & HEP. Reason for Services/Other Comments:  · Patient continues to require skilled intervention due to pt not safe with functional mobility & HEP. Use of outcome tool(s) and clinical judgement create a POC that gives a: Questionable prediction of patient's progress: MODERATE COMPLEXITY                 TREATMENT:   (In addition to Assessment/Re-Assessment sessions the following treatments were rendered)   Pre-treatment Symptoms/Complaints:  weakness  Pain: Initial: visual scale  Pain Intensity 1: 3  Pain Location 1: Shoulder  Pain Orientation 1: Left  Pain Intervention(s) 1: Repositioned  Post Session:  3/10      Therapeutic Exercise: (13 Minutes):  Exercises per grid below to improve mobility and dynamic movement of arm - left to improve functional endurance. Required minimal verbal cues to promote proper body alignment and promote proper body mechanics. Progressed repetitions as indicated.   Assessment/12 min           Date:  10/14 Date:    Date:      ACTIVITY/EXERCISE AM PM AM PM AM PM   Gripping 15             Wrist Flexion/Extension 15             Wrist Ulnar/Radial Deviation               Pronation/Supination 15             Elbow Flexion/Extension 15aa             Shoulder Flexion/Extension               Shoulder AB/ADduction Shoulder IR/ER               Pulleys               Pendulums Gentle 15p  Clock & counter clockwise             Shrugs               Isometric:                 Flexion               Extension               ABduction               ADduction               Biceps/Triceps                               B = bilateral; AA = active assistive; A = active; P = passive  Education:  [X]  Home Exercises      [X]  Sling Application       [X]  Movement Precautions        [ ]  Pulleys          [ ]  Use of Ice    [ ]  Other:   Treatment/Session Assessment:    · Response to Treatment:  Tolerated well  · Interdisciplinary Collaboration:  · Registered Nurse  · After treatment position/precautions:  · Up in chair  · Bed/Chair-wheels locked  · Call light within reach  · RN notified  · Compliance with Program/Exercises: Will assess as treatment progresses. · Recommendations/Intent for next treatment session: \"Next visit will focus on reduction in assistance provided\".   Total Treatment Duration:  PT Patient Time In/Time Out  Time In: 0945  Time Out: 1000 [facility] [place]

## 2017-10-14 NOTE — PROGRESS NOTES
He felt a pop rolling on the shoulder and requests an XR  Exam with no signs of acute issues    Will XR

## 2017-10-14 NOTE — PROGRESS NOTES
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 ([facility] [S78.590C, Z96.612]  Left rotator cuff tear arthropathy [M12.812]  History of left shoulder replacement [Z96.612]    Post Op day: 1 Day Post-Op    Subjective:     Craig Bangura     Appears to be doing okay       Objective:     Vital Signs:    Temp (24hrs), Av.9 °F (36.6 °C), Min:97.5 °F (36.4 °C), Max:98.4 °F (36.9 °C)      LAB:    [unfilled]  Lab Results   Component Value Date/Time    INR 1.0 10/05/2017 10:22 AM     Lab Results   Component Value Date/Time    HGB 11.9 10/14/2017 09:02 AM    HGB 14.6 10/05/2017 10:22 AM       Physical Exam:    No apparent distress   No neurovascular issues reported  No gross problems noted     Plan:     Continue PT/OT rehab as indicated    Nursing and SS for disposition plans         Signed By: Jennifer Vela MD

## 2017-10-14 NOTE — PROGRESS NOTES
TRANSFER - IN REPORT:    Verbal report received from [employer], RN(name) on Helene Martinez  being received from [employer]) for routine progression of care      Report consisted of patients Situation, Background, Assessment and   Recommendations(SBAR). Information from the following report(s) SBAR, Kardex, ED Summary, OR Summary, Procedure Summary, Intake/Output, MAR and Recent Results was reviewed with the receiving nurse. Opportunity for questions and clarification was provided. Assessment completed upon patients arrival to unit and care assumed.

## 2017-10-14 NOTE — CONSULTS
HOSPITALIST CONSULT  NAME:  Franci Nevarez   Age:  80 y.o.  :   1933   MRN:   [id number]  PCP: Chery Sampson MD  Consulting MD:    Treatment Team: Attending Provider: Len Samson MD; Consulting Provider: Cristela Birmingham MD    ADMISSION DIAGNOSIS:. Periprosthetic instability status post hemiarthroplasty, left   shoulder. 2. Rotator cuff tear arthropathy, left shoulder.      REASON FOR CONSULT: diabetes and HTN management. HPI:   Patient is a 80yr old male with pmhx sig for dm, htn, cad. He is not on any meds for the hTN, he takes amaryl for his diabetes. He had shoulder surgery with ortho today and hospitalist consulted for dm and htn. Last Hba1c was 6.2 yesterday. He has no complaints except wanting to ensure he gets all his meds at the scheduled times that he takes at home. 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 Complete ROS done and is as stated in HPI or otherwise negative  Past Medical History:   Diagnosis Date    Anemia 2016    r/t GI bleeds     Arthritis     osteo    CAD (coronary artery disease)     4 stents total (, , ); \"ECHO 2016 normal with good EF\" per cardio office note; \"Stress test 2016 Findings consistent with very mild inferior ischemia\"    Carotid stenosis 2017    carotid u/s: Right internal carotid artery has 50-69% stenosis. Left internal carotid artery has <50% stenosis.     Diabetes ([address] 1997    type 2; oral med; rare BG checks, last hgba1c- 6.9 (2017)    Diverticulosis 2011    with GI bleeding; no current problems    GERD (gastroesophageal reflux disease)     on med for control     Glaucoma     Heart murmur     ECHO 16- mild AS, mild MR, mild TR    Hiatal hernia     History of kidney stones     Hypercholesteremia     on med for control     Hypertension     hx of- no medications currently    Hypothyroid     managed with medication    MI (myocardial infarction)     x2, STEMI    TIA (transient ischemic attack) 07/2016    experienced numbness/tingling L arm and left lower face- \"only lasted a couple of hrs\"; 7/13 CT head: cerebral white matter changes have progressed since 4/19/13 likely indicative of chronic small vessel ischemic disease, no acute intracranial abnormality.  Unspecified sleep apnea     denies     Urethral stenosis     has had difficulty with placement/removal previously     Vasovagal syncope       Past Surgical History:   Procedure Laterality Date    HX BUNIONECTOMY Left     with hammer toe    HX CARPAL TUNNEL RELEASE Right 1968    HX CARPAL TUNNEL RELEASE Left 2012    HX CATARACT REMOVAL  1987 and 1990    Teofilo with IOLens implamts    HX COLECTOMY  01/13/2017    HX HEART CATHETERIZATION  7447,9286, 2012, 2013    total 4 stents: 2 stents- 1997, 1 stent- 2012 (BMS to RCA), 1 stent- 2013 (ANGELLA to RCA)    HX HEENT Right     eye surgery to replace lens due to fall     HX HEMORRHOIDECTOMY  1987    HX HERNIA REPAIR  1944    L inguinal    HX KNEE REPLACEMENT Bilateral 2002, 2008    HX LAP CHOLECYSTECTOMY  6820    umbilical hernia repair    HX ORTHOPAEDIC Left     trigger finger     HX ORTHOPAEDIC  2184-4822    multiple knee surgery    HX ROTATOR CUFF REPAIR Right 1995    HX ROTATOR CUFF REPAIR Left 2000    HX ROTATOR CUFF REPAIR Right 2007    with martina tendon repair    HX SHOULDER ARTHROSCOPY Left 2015    HX TONSILLECTOMY  1938    HX UROLOGICAL  1996    cysto with stone extraction and stent      Prior to Admission Medications   Prescriptions Last Dose Informant Patient Reported? Taking? Omeprazole delayed release (PRILOSEC D/R) 20 mg tablet 10/12/2017 at Unknown time  Yes Yes   Sig: Take 20 mg by mouth Daily (before dinner). Psyllium Husk-Sucrose 3.4 gram/7 gram powd 10/6/2017 at Unknown time  Yes Yes   Sig: Take 3 Caps by mouth two (2) times a day. aspirin 81 mg Tab 10/13/2017 at Unknown time  Yes Yes   Sig: Take 81 mg by mouth daily.  Take / use 81 mg AM day of surgery  per anesthesia protocols. atorvastatin (LIPITOR) 40 mg tablet 10/12/2017 at Unknown time  Yes Yes   Sig: Take 40 mg by mouth nightly. b complex vitamins tablet 10/6/2017 at Unknown time  Yes Yes   Sig: Take 1 Tab by mouth daily. bimatoprost (LUMIGAN) 0.01 % ophthalmic drops 10/12/2017 at Unknown time  Yes Yes   Sig: Administer 1 Drop to right eye nightly. brimonidine (ALPHAGAN P) 0.1 % ophthalmic solution 10/13/2017 at Unknown time  Yes Yes   Sig: Administer 1 Drop to right eye two (2) times a day. Take / use AM day of surgery  per anesthesia protocols. diphenoxylate-atropine (LOMOTIL) 2.5-0.025 mg per tablet 10/13/2017 at Unknown time  Yes Yes   Sig: Take 1 Tab by mouth two (2) times daily as needed for Diarrhea.   glimepiride (AMARYL) 2 mg tablet 10/12/2017 at Unknown time  Yes Yes   Sig: Take 1 mg by mouth daily. Indications: type 2 diabetes mellitus   levothyroxine (SYNTHROID) 50 mcg tablet 10/13/2017 at Unknown time  Yes Yes   Sig: Take 50 mcg by mouth Daily (before breakfast). loperamide (IMODIUM) 2 mg capsule 10/13/2017 at Unknown time  Yes Yes   Sig: Take 2 mg by mouth four (4) times daily as needed for Diarrhea.   meloxicam (MOBIC) 15 mg tablet 10/6/2017 at Unknown time  Yes Yes   Sig: Take 15 mg by mouth daily. Indications: OSTEOARTHRITIS   omega-3 fatty acids-vitamin e (FISH OIL) 1,000 mg cap 10/6/2017 at Unknown time  Yes Yes   Sig: Take 2 Caps by mouth daily. simethicone (GAS-X) 125 mg capsule 10/6/2017 at Unknown time  Yes Yes   Sig: Take 125 mg by mouth four (4) times daily as needed for Flatulence. timolol (TIMOPTIC) 0.5 % ophthalmic solution 10/13/2017 at Unknown time  Yes Yes   Sig: Administer 1 Drop to right eye every morning.       Facility-Administered Medications: None     No Known Allergies   Social History   Substance Use Topics    Smoking status: Never Smoker    Smokeless tobacco: Never Used    Alcohol use 4.2 oz/week     7 Glasses of wine per week Family History   Problem Relation Age of Onset    Heart Disease Father     Heart Attack Father     Stroke Mother     Alzheimer Sister     Alzheimer Sister       Objective:     Visit Vitals    /73 (BP 1 Location: Right arm)    Pulse 63    Temp 97.7 °F (36.5 °C)    Resp 16    Ht 5' 9\" (1.753 m)    Wt 74.4 kg (164 lb)    SpO2 97%    BMI 24.22 kg/m2      Temp (24hrs), Av.6 °F (36.4 °C), Min:97.6 °F (36.4 °C), Max:97.7 °F (36.5 °C)    Oxygen Therapy  O2 Sat (%): 97 % (10/13/17 2146)  Pulse via Oximetry: 98 beats per minute (10/13/17 2146)  O2 Device: Room air (10/13/17 2146)  O2 Flow Rate (L/min): 2 l/min (10/13/17 1535)  Physical Exam:  General:    Alert, cooperative, no distress, appears stated age. Head:   Normocephalic, without obvious abnormality, atraumatic. Nose:  Nares normal. No drainage or sinus tenderness. Lungs:   Clear to auscultation bilaterally. No Wheezing or Rhonchi. No rales. Heart:   Regular rate and rhythm,  no murmur, rub or gallop. Abdomen:   Soft, non-tender. Not distended. Bowel sounds normal.   Extremities: Left arm is in a sling. Skin:     Texture, turgor normal. No rashes or lesions.   Not Jaundiced  Neurologic: Alert and oriented x 3, no focal deficits   Data Review: personally by me   Recent Results (from the past 24 hour(s))   TYPE + CROSSMATCH    Collection Time: 10/13/17 10:35 AM   Result Value Ref Range    Crossmatch Expiration 10/16/2017     ABO/Rh(D) A POSITIVE     Antibody screen NEG     Unit number [id number]     Blood component type  LR AS5     Unit division 00     Status of unit ALLOCATED     Crossmatch result Compatible     Unit number [id number]     Blood component type  LR AS5     Unit division 00     Status of unit ALLOCATED     Crossmatch result Compatible    MAGNESIUM    Collection Time: 10/13/17 10:35 AM   Result Value Ref Range    Magnesium 1.8 1.8 - 2.4 mg/dL   HEMOGLOBIN A1C WITH EAG    Collection Time: 10/13/17 10:35 AM Result Value Ref Range    Hemoglobin A1c 6.2 (H) 4.8 - 6.0 %    Est. average glucose 131 mg/dL   GLUCOSE, POC    Collection Time: 10/13/17 10:38 AM   Result Value Ref Range    Glucose (POC) 128 (H) 65 - 100 mg/dL     Imaging /Procedures /Studies reviewed personally by me  XR Results (most recent):    Results from [place] encounter on 10/13/17   XR SHOULDER LT AP/LAT MIN 2 V   Narrative Left Shoulder     INDICATION: Follow-up surgery    AP and oblique views of the left shoulder were obtained     FINDINGS: The patient is post revision of the shoulder replacement. Implants  are well positioned. There is no fracture or dislocation. Impression IMPRESSION: Post left shoulder replacement          CT Scan  No results found for this or any previous visit. Assessment and Plan: Active Hospital Problems    Diagnosis Date Noted    Instability of prosthetic shoulder joint ([facility] [address] 10/10/2017    Rotator cuff tear arthropathy, left 10/10/2017    S/P shoulder hemiarthroplasty, left 10/10/2017       PLAN  ·  DM- appears fairly well controlled will hold Amaryl while in house and cover with insulin sliding scale- can continue amaryl on discharge  · HTN- will order  prn bp meds   · Pain mgt- will order tylenol per pt request as he does want any narcotics  · Continue other home meds. Pt has permission to take his home medications that we do not have in house  · dvt ppx per otho  · We will sign off now. Call if needed.       Signed By: Paxton Elizondo MD     October 13, 2017

## 2017-10-14 NOTE — OP NOTES
[address] [facility]   OPERATIVE REPORT       Name:  Jeramie Grant   MR#:  [id number]   :  1933   Account #:  [de-identified]   Date of Adm:  10/13/2017       DATE OF SURGERY: 10/13/2017     PREOPERATIVE DIAGNOSES   1. Periprosthetic instability status post hemiarthroplasty, left   shoulder. 2. Rotator cuff tear arthropathy, left shoulder. POSTOPERATIVE DIAGNOSES   1. Periprosthetic instability status post hemiarthroplasty, left   shoulder. 2. Rotator cuff tear arthropathy, left shoulder. NAME OF PROCEDURES   1. Removal of implant, left shoulder, \"press-fit Exactech   hemiarthroplasty. 2. Revision left total shoulder arthroplasty with a reverse   Delta Xtend prosthesis, latissimus dorsi and teres major tendon   transfer, left shoulder. PATHOLOGY: Periprosthetic instability with an unstable press-fit   Exactech hemiarthroplasty. CPT CODES: 15569 and [id number]. ICD-10 CODES: T84.028, F4409790 and M12.812. HARDWARE UTILIZED: A DePuy metaglene 42 eccentric glenosphere,   42+9 cup, 10-2 stem, 10 mm Biostop G, 48 mm superior and   inferior locking screw, 24 mm anterior and 18 mm posterior   cortical screws. SURGEON: Toya Cortes. Joceline Briseno MD     ANESTHESIA:     INDICATIONS: The patient is an 43-year-old gentleman with a   complex history regarding his left shoulder. The patient has   undergone a previous rotator cuff repair to the left shoulder. The patient has known rotator cuff arthropathy roughly a year   ago, underwent a hemiarthroplasty, left shoulder. The patient   has developed a painful hemiarthroplasty with periprosthetic   instability with anterior superior escape. The patient has   exhausted nonoperative modalities and following preoperative   cardiac clearance, is now brought to the operative suite for   revision left total shoulder arthroplasty. PROCEDURE: Following identification of the patient, the patient   taken to the operative suite.  Following administration of   general anesthesia, interscalene block for postop pain control,   placement of a Billy catheter, measurement of a CBC, ESR and CRP   all within normal limits, WBC 4.4, respectively, ESR 2, and CRP   of 0.2, the patient was then very carefully positioned on the   operative table in the beach chair fashion. No antibiotics were   administered preoperatively. Left shoulder was then prepped and   draped in sterile fashion. The previous surgical incision was identified and marked. Floraseal was applied. Once the [facility] was allowed to cure, the skin was   incised, subcutaneous tissue was then dissected down to the   cephalic vein. This was very carefully dissected free proximally   and distally and retracted laterally with the deltoid. The   deltoid was very meticulously and carefully elevated off the   proximal humerus. The humeral shaft was identified and   dissection was very carefully carried proximally. Glenohumeral   joint was identified. The shoulder was noted to be unstable,   with the proximal humerus subluxating anterior superior. The   shoulder was then mobilized, it was dissected free, it was   carefully dislocated. Frozen section was sent. Multiple cultures   were sent, labeled 1, 2, and 3 aerobic, anaerobic from the left   shoulder. The patient then received 1 g of IV vancomycin. The Exactech hemiarthroplasty was identified. The head was   removed, the set screw was removed, the neck was removed as   well. With use of an osteotome and a rongeur, the proximal stem   was dissected free. The Exactech humeral extractor was then applied and the press-  fit stem was removed without incident. Osteophytes and debris   were resected meticulously around the humeral stem. Axillary   nerve was protected throughout the procedure. Once this was then   complete, the glenoid was then inspected, glenoid retractors   were then inserted, glenoid was then meticulously exposed.    Starter wire was then drilled, glenoid was then drilled and   reamed and metaglene was inserted and secured with a 48 mm   superior and inferior locking screw, 24 mm anterior and 18 mm   posterior cortical screws. Metaglene was stable. A 42 eccentric   glenosphere was inserted and secured in standard fashion. Metaglene and glenosphere were stable. Humerus was dislocated   back from the wound and reamed to accommodate a 10-2 stem. It   was noted that 10-2 stem with 42+9 cup gave excellent stability   and excellent mobility. At this point, the humeral shaft was   then identified. The latissimus dorsi and teres major tendons   were then identified. They were mobilized off the humeral shaft. They were tagged and mobilized posteriorly for later transfer. Axillary and radial nerves were protected. At this point, the pathologist called and noted that the frozen   section was negative. At this point, the humeral canal was irrigated and dried and   vigorously debrided. A 10 mm Biostop G was then placed distally. A 10-2 stem was cemented. Antibiotic-impregnated cement was   mixed. Cement was inserted in the humeral canal and a 10-2 stem   was cemented in appropriate version. The fins were preloaded   with #5 sutures. Once this was then achieved and once the cement   was allowed to cure, a true 42+9 cup was inserted. Shoulder was   reduced, there was excellent stability with excellent mobility. At this point, the latissimus dorsi and teres major tendons were   then transferred posteriorly and secured using #5 and #2   Mersilene sutures. The arm was put through range of motion, and   stable. At this point, the wound was then irrigated. Deltopectoral   interval was approximated with #5 Mersilene sutures. Skin was   closed with 0 Vicryl figure-of-eight sutures and an 0 Prolene   subcuticular stitch. A sterile dressing was applied, sling and   swathe were applied.  The patient was then transferred to the   recovery room in stable condition. CPT CODES: 55715 and [id number]. ICD-10 CODES: T84.028, [id number], [id number]. Hardware utilized was [employer]um 42 eccentric glenosphere,   42+9 cup, 10-2 stem, 10 mm Biostop G, 48 mm superior and   inferior locking screw, 24 mm anterior and 18 mm posterior   cortical screws. The cement that was utilized had antibiotics in   the cement. His preoperative white count was 4.4, normal; ESR was 2, normal;   and CRP was less than 0.2, which was normal.     Billy catheter was placed preoperatively. Frozen section was negative. The patient received 1 g of IV vancomycin after the cultures   were obtained.          MD RENÉE Pimentel / Gillian Necessary   D:  10/13/2017   18:57   T:  10/13/2017   23:08   Job #:  [id number]

## 2017-10-14 NOTE — PROGRESS NOTES
10/13/17 2146   Oxygen Therapy   O2 Sat (%) 97 %   Pulse via Oximetry 98 beats per minute   O2 Device Room air   O2 spot check complete. Patient on room air and resting. No distress at this time.

## 2017-10-15 LAB
ANION GAP SERPL CALC-SCNC: 9 MMOL/L (ref 7–16)
BUN SERPL-MCNC: 30 MG/DL (ref 8–23)
CALCIUM SERPL-MCNC: 7.4 MG/DL (ref 8.3–10.4)
CHLORIDE SERPL-SCNC: 107 MMOL/L (ref 98–107)
CO2 SERPL-SCNC: 23 MMOL/L (ref 21–32)
CREAT SERPL-MCNC: 1.73 MG/DL (ref 0.8–1.5)
ERYTHROCYTE [DISTWIDTH] IN BLOOD BY AUTOMATED COUNT: 15.5 % (ref 11.9–14.6)
GLUCOSE BLD STRIP.AUTO-MCNC: 176 MG/DL (ref 65–100)
GLUCOSE SERPL-MCNC: 155 MG/DL (ref 65–100)
HCT VFR BLD AUTO: 30.6 % (ref 41.1–50.3)
HGB BLD-MCNC: 10 G/DL (ref 13.6–17.2)
MAGNESIUM SERPL-MCNC: 1.6 MG/DL (ref 1.8–2.4)
MCH RBC QN AUTO: 31.5 PG (ref 26.1–32.9)
MCHC RBC AUTO-ENTMCNC: 32.7 G/DL (ref 31.4–35)
MCV RBC AUTO: 96.5 FL (ref 79.6–97.8)
PLATELET # BLD AUTO: 106 K/UL (ref 150–450)
PMV BLD AUTO: 11 FL (ref 10.8–14.1)
POTASSIUM SERPL-SCNC: 3.8 MMOL/L (ref 3.5–5.1)
RBC # BLD AUTO: 3.17 M/UL (ref 4.23–5.67)
SODIUM SERPL-SCNC: 139 MMOL/L (ref 136–145)
WBC # BLD AUTO: 5.3 K/UL (ref 4.3–11.1)

## 2017-10-15 PROCEDURE — 74011250637 HC RX REV CODE- 250/637: Performed by: HOSPITALIST

## 2017-10-15 PROCEDURE — 85027 COMPLETE CBC AUTOMATED: CPT | Performed by: ORTHOPAEDIC SURGERY

## 2017-10-15 PROCEDURE — 74011250637 HC RX REV CODE- 250/637: Performed by: ORTHOPAEDIC SURGERY

## 2017-10-15 PROCEDURE — 36415 COLL VENOUS BLD VENIPUNCTURE: CPT | Performed by: ORTHOPAEDIC SURGERY

## 2017-10-15 PROCEDURE — 97530 THERAPEUTIC ACTIVITIES: CPT

## 2017-10-15 PROCEDURE — 97116 GAIT TRAINING THERAPY: CPT

## 2017-10-15 PROCEDURE — 82962 GLUCOSE BLOOD TEST: CPT

## 2017-10-15 PROCEDURE — 80048 BASIC METABOLIC PNL TOTAL CA: CPT | Performed by: ORTHOPAEDIC SURGERY

## 2017-10-15 PROCEDURE — 97110 THERAPEUTIC EXERCISES: CPT

## 2017-10-15 PROCEDURE — 74011250636 HC RX REV CODE- 250/636: Performed by: ORTHOPAEDIC SURGERY

## 2017-10-15 PROCEDURE — 83735 ASSAY OF MAGNESIUM: CPT | Performed by: ORTHOPAEDIC SURGERY

## 2017-10-15 PROCEDURE — 65270000029 HC RM PRIVATE

## 2017-10-15 PROCEDURE — 74011250637 HC RX REV CODE- 250/637: Performed by: INTERNAL MEDICINE

## 2017-10-15 RX ORDER — PANTOPRAZOLE SODIUM 40 MG/1
40 TABLET, DELAYED RELEASE ORAL
Status: DISCONTINUED | OUTPATIENT
Start: 2017-10-15 | End: 2017-10-16 | Stop reason: HOSPADM

## 2017-10-15 RX ORDER — GLIMEPIRIDE 2 MG/1
1 TABLET ORAL
Status: DISCONTINUED | OUTPATIENT
Start: 2017-10-15 | End: 2017-10-16 | Stop reason: HOSPADM

## 2017-10-15 RX ORDER — MAGNESIUM SULFATE HEPTAHYDRATE 40 MG/ML
2 INJECTION, SOLUTION INTRAVENOUS ONCE
Status: COMPLETED | OUTPATIENT
Start: 2017-10-15 | End: 2017-10-15

## 2017-10-15 RX ADMIN — LOPERAMIDE HYDROCHLORIDE 2 MG: 2 CAPSULE ORAL at 06:49

## 2017-10-15 RX ADMIN — SIMETHICONE CHEW TAB 80 MG 120 MG: 80 TABLET ORAL at 03:00

## 2017-10-15 RX ADMIN — TIMOLOL MALEATE 1 DROP: 5 SOLUTION/ DROPS OPHTHALMIC at 07:00

## 2017-10-15 RX ADMIN — SIMETHICONE CHEW TAB 80 MG 120 MG: 80 TABLET ORAL at 08:00

## 2017-10-15 RX ADMIN — MAGNESIUM SULFATE IN WATER 2 G: 40 INJECTION, SOLUTION INTRAVENOUS at 10:37

## 2017-10-15 RX ADMIN — SIMETHICONE CHEW TAB 80 MG 120 MG: 80 TABLET ORAL at 16:00

## 2017-10-15 RX ADMIN — PSYLLIUM HUSK 1 PACKET: 3.4 POWDER ORAL at 09:00

## 2017-10-15 RX ADMIN — B-COMPLEX W/ C & FOLIC ACID TAB 1 TABLET: TAB at 09:09

## 2017-10-15 RX ADMIN — Medication 325 MG: at 09:09

## 2017-10-15 RX ADMIN — DIPHENOXYLATE HYDROCHLORIDE AND ATROPINE SULFATE 1 TABLET: 2.5; .025 TABLET ORAL at 10:36

## 2017-10-15 RX ADMIN — Medication 5 ML: at 22:00

## 2017-10-15 RX ADMIN — SIMETHICONE CHEW TAB 80 MG 120 MG: 80 TABLET ORAL at 12:00

## 2017-10-15 RX ADMIN — ACETAMINOPHEN 500 MG: 500 TABLET, FILM COATED ORAL at 23:21

## 2017-10-15 RX ADMIN — LEVOTHYROXINE SODIUM 50 MCG: 50 TABLET ORAL at 06:36

## 2017-10-15 RX ADMIN — PSYLLIUM HUSK 1 PACKET: 3.4 POWDER ORAL at 18:00

## 2017-10-15 RX ADMIN — ACETAMINOPHEN 500 MG: 500 TABLET, FILM COATED ORAL at 10:30

## 2017-10-15 RX ADMIN — HYDROMORPHONE HYDROCHLORIDE 4 MG: 4 TABLET ORAL at 00:22

## 2017-10-15 RX ADMIN — ASPIRIN 81 MG: 81 TABLET, COATED ORAL at 09:09

## 2017-10-15 RX ADMIN — SIMETHICONE CHEW TAB 80 MG 120 MG: 80 TABLET ORAL at 06:50

## 2017-10-15 RX ADMIN — HYDROMORPHONE HYDROCHLORIDE 4 MG: 4 TABLET ORAL at 05:06

## 2017-10-15 RX ADMIN — MELOXICAM 15 MG: 7.5 TABLET ORAL at 09:00

## 2017-10-15 RX ADMIN — LOPERAMIDE HYDROCHLORIDE 2 MG: 2 CAPSULE ORAL at 23:17

## 2017-10-15 RX ADMIN — PANTOPRAZOLE SODIUM 40 MG: 40 TABLET, DELAYED RELEASE ORAL at 10:29

## 2017-10-15 RX ADMIN — HYDROMORPHONE HYDROCHLORIDE 4 MG: 4 TABLET ORAL at 10:30

## 2017-10-15 RX ADMIN — VANCOMYCIN HYDROCHLORIDE 1000 MG: 1 INJECTION, POWDER, LYOPHILIZED, FOR SOLUTION INTRAVENOUS at 16:00

## 2017-10-15 RX ADMIN — HYDROMORPHONE HYDROCHLORIDE 2 MG: 2 TABLET ORAL at 18:51

## 2017-10-15 RX ADMIN — Medication 325 MG: at 18:45

## 2017-10-15 RX ADMIN — HYDROMORPHONE HYDROCHLORIDE 2 MG: 2 TABLET ORAL at 23:21

## 2017-10-15 RX ADMIN — ATORVASTATIN CALCIUM 40 MG: 40 TABLET, FILM COATED ORAL at 18:45

## 2017-10-15 RX ADMIN — DIPHENOXYLATE HYDROCHLORIDE AND ATROPINE SULFATE 1 TABLET: 2.5; .025 TABLET ORAL at 23:16

## 2017-10-15 RX ADMIN — ACETAMINOPHEN 500 MG: 500 TABLET, FILM COATED ORAL at 18:54

## 2017-10-15 RX ADMIN — LOPERAMIDE HYDROCHLORIDE 2 MG: 2 CAPSULE ORAL at 03:06

## 2017-10-15 RX ADMIN — GLIMEPIRIDE 1 MG: 2 TABLET ORAL at 12:18

## 2017-10-15 RX ADMIN — ACETAMINOPHEN 500 MG: 500 TABLET, FILM COATED ORAL at 05:06

## 2017-10-15 NOTE — PROGRESS NOTES
Called by RN pt a physician post  shoulder surgery  Requesting to return to oral anti hyperglycemic agent.  Refusing sliding scale  Resume home meds  And  Monitor

## 2017-10-15 NOTE — PROGRESS NOTES
Problem: Mobility Impaired (Adult and Pediatric)  Goal: *Acute Goals and Plan of Care (Insert Text)  GOALS (1-4 days):  (1.) Patient will move from supine to sit and sit to supine in bed with STAND BY ASSIST.   (2.) Patient will transfer from bed to chair and chair to bed with SUPERVISION using the least restrictive device. (3.) Patient will ambulate with SUPERVISION for 300 feet with the least restrictive device. (4.) Patient will be independent with shoulder HEP to increase range of motion per MD orders. ________________________________________________________________________________________________      PHYSICAL THERAPY: Daily Note, Treatment Day: 1st and AM 10/15/2017  INPATIENT: Hospital Day: 3  [place] / [place] / Product Type: Medicare /      NAME/AGE/GENDER: Brandi Diego is a 80 y.o. 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  REVISION LEFT TOTAL SHOULDER ARTHROPLASTY WITH REVERSE DELTA EXTEND PROSTHESIS, LATISSIMUS DORSI AND TERES MAJOR TENDON TRANSFERS LEFT SHOULDER   (Left)  2 Days Post-Op  ICD-10: Treatment Diagnosis:       · Pain in Left Shoulder (M25.512)  · Stiffness of Left Shoulder, Not elsewhere classified (M25.612)  · Generalized Muscle Weakness (M62.81)  · Other lack of cordination (R27.8)  · Difficulty in walking, Not elsewhere classified (R26.2)  · Other abnormalities of gait and mobility (R26.89)   Precaution/Allergies:  Review of patient's allergies indicates no known allergies. ASSESSMENT:      Mr. Mány presents with painful & stiff left shoulder along with unsteady gait & transfers.  This pt tea benefit from follow up therapy to help restore safe function & to establish HEP. This section established at most recent assessment   PROBLEM LIST (Impairments causing functional limitations):  1. Decreased [place] with Bed Mobility  2. Decreased [place] with Transfers  3. Decreased [place] with Ambulation  4. Decreased [place] with shoulder HEP    INTERVENTIONS PLANNED: (Benefits and precautions of physical therapy have been discussed with the patient.)  1. Bed Mobility Training  2. Transfer Training  3. Gait Training  4. Therapeutic Exercises per MD orders  5. Modalities for Pain      TREATMENT PLAN: Frequency/Duration: twice daily for duration of hospital stay  Rehabilitation Potential For Stated Goals: GOOD      RECOMMENDED REHABILITATION/EQUIPMENT: (at time of discharge pending progress): Continue Skilled Therapy and Rehab. HISTORY:   History of Present Injury/Illness (Reason for Referral):  Painful left shoulder  Past Medical History/Comorbidities:   Mr. Venkatesh Garrison  has a past medical history of Anemia (fall 2016); Arthritis; CAD (coronary artery disease) (1997); Carotid stenosis (04/2017); Diabetes ([address] (05/1997); Diverticulosis (12/2011); GERD (gastroesophageal reflux disease); Glaucoma; Heart murmur; Hiatal hernia; History of kidney stones; Hypercholesteremia; Hypertension; Hypothyroid; MI (myocardial infarction); TIA (transient ischemic attack) (07/2016); Unspecified sleep apnea; Urethral stenosis; and Vasovagal syncope. He also has no past medical history of Adverse effect of anesthesia; Aneurysm ([address] Arrhythmia; Asthma; Autoimmune disease ([address] Cancer ([address] Chronic kidney disease; Chronic pain; Coagulation disorder ([address] COPD; DEMENTIA; Difficult intubation; Endocarditis; Gastrointestinal disorder; Heart failure ([address] Ill-defined condition; Infectious disease; Liver disease; Malignant hyperthermia due to anesthesia; Morbid obesity ([address]  Nausea & vomiting; Neurological disorder; Other ill-defined conditions(799.89); Pseudocholinesterase deficiency; Psychiatric disorder; PUD (peptic ulcer disease); Rheumatic fever; Seizures ([facility] Sleep disorder; Stroke [facility]); or Thromboembolus ([facility] Mr. Aurora Gerardo  has a past surgical history that includes heart catheterization (4939,3672, 2012, 2013); tonsillectomy (1938); cataract removal (1987 and 1990); lap cholecystectomy (2003); hernia repair (1944); urological (1996); orthopaedic (Left); orthopaedic (1920-9801); shoulder arthroscopy (Left, 2015); knee replacement (Bilateral, 2002, 2008); hemorrhoidectomy (1987); heent (Right); carpal tunnel release (Right, 1968); carpal tunnel release (Left, 2012); bunionectomy (Left); rotator cuff repair (Right, 1995); rotator cuff repair (Left, 2000); rotator cuff repair (Right, 2007); and colectomy (01/13/2017).   Social History/Living Environment:   Home Environment: Private residence  # Steps to Enter: 3  Rails to Enter: No  One/Two Story Residence: One story  Living Alone: Yes  Support Systems: Child(wojciech)  Patient Expects to be Discharged to[de-identified] Skilled nursing facility  Current DME Used/Available at Home: Raised toilet seat, Walker  Prior Level of Function/Work/Activity:  Pt was independent without an assistive device prior to this admission   Number of Personal Factors/Comorbidities that affect the Plan of Care: 3+: HIGH COMPLEXITY   EXAMINATION:   Most Recent Physical Functioning:   Gross Assessment:                       Balance:  Sitting: Intact  Standing: Intact  Standing - Static: Good  Standing - Dynamic : Fair Bed Mobility:          Transfers:  Sit to Stand: Supervision;Stand-by asssistance  Stand to Sit: Supervision;Stand-by asssistance  Gait:     Speed/Tracy: Fluctuations  Step Length: Left shortened;Right shortened  Gait Abnormalities: Decreased step clearance  Distance (ft): 15 Feet (ft)  Assistive Device:  (held to IV pole)  Ambulation - Level of Assistance: Stand-by asssistance Functional Mobility:         Gait/Ambulation:  cga        Transfers:  cga        Bed Mobility:  min   Body Structures Involved:  1. Joints  2. Muscles Body Functions Affected:  1. Sensory/Pain  2. Movement Related Activities and Participation Affected:  1. General Tasks and Demands  2. Mobility   Number of elements that affect the Plan of Care: 4+: HIGH COMPLEXITY   CLINICAL PRESENTATION:   Presentation: Evolving clinical presentation with changing clinical characteristics: MODERATE COMPLEXITY   CLINICAL DECISION MAKIN [facility] Short Form  How much difficulty does the patient currently have. .. Unable A Lot A Little None   1. Turning over in bed (including adjusting bedclothes, sheets and blankets)? [ ] 1   [ ] 2   [X] 3   [ ] 4   2. Sitting down on and standing up from a chair with arms ( e.g., wheelchair, bedside commode, etc.)   [ ] 1   [ ] 2   [X] 3   [ ] 4   3. Moving from lying on back to sitting on the side of the bed? [ ] 1   [ ] 2   [X] 3   [ ] 4   How much help from another person does the patient currently need. .. Total A Lot A Little None   4. Moving to and from a bed to a chair (including a wheelchair)? [ ] 1   [ ] 2   [X] 3   [ ] 4   5. Need to walk in hospital room? [ ] 1   [ ] 2   [X] 3   [ ] 4   6. Climbing 3-5 steps with a railing? [X] 1   [ ] 2   [ ] 3   [ ] 4   © , Trustees of [address] [place] [address], under license to [employer]. All rights reserved    Score:  Initial: 16 Most Recent: X (Date: -- )     Interpretation of Tool:  Represents activities that are increasingly more difficult (i.e. Bed mobility, Transfers, Gait).        Score 24 23 22-20 19-15 14-10 9-7 6       Modifier CH CI CJ CK CL CM CN         · Mobility - Walking and Moving Around:               - CURRENT STATUS:    CK - 40%-59% impaired, limited or restricted               - GOAL STATUS:           CJ - 20%-39% impaired, limited or restricted  - D/C STATUS:                       ---------------To be determined---------------  [place] / [place] / Product Type: Medicare /       Medical Necessity:     · Patient is expected to demonstrate progress in strength, range of motion, balance, coordination and functional technique to decrease assistance required with bed mobility, transfers, gait & HEP. Reason for Services/Other Comments:  · Patient continues to require skilled intervention due to pt not safe with functional mobility & HEP. Use of outcome tool(s) and clinical judgement create a POC that gives a: Questionable prediction of patient's progress: MODERATE COMPLEXITY                 TREATMENT:   (In addition to Assessment/Re-Assessment sessions the following treatments were rendered)   Pre-treatment Symptoms/Complaints:  none  Pain: Initial: visual scale     Post Session:  3/10      Therapeutic Exercise: (15 Minutes):  Exercises per grid below to improve mobility and dynamic movement of arm - left to improve functional endurance. Required minimal verbal cues to promote proper body alignment and promote proper body mechanics. Progressed repetitions as indicated.              Date:  10/14 Date:  10/15/17  Date:      ACTIVITY/EXERCISE AM PM AM PM AM PM   Gripping 15 20  20          Wrist Flexion/Extension 15  20  20         Wrist Ulnar/Radial Deviation               Pronation/Supination 15  20  20         Elbow Flexion/Extension 15aa  20aa  20         Shoulder Flexion/Extension               Shoulder AB/ADduction               Shoulder IR/ER               Pulleys               Pendulums Gentle 15p  Clock & counter clockwise  gentle 20p clock & counter clockwise 20 gentle          Shrugs               Isometric:                 Flexion               Extension               ABduction               ADduction               Biceps/Triceps                               B = bilateral; AA = active assistive; A = active; P = passive  Education:  [X]  Home Exercises      [X]  Sling Application       [X]  Movement Precautions        [ ]  Pulleys          [ ]  Use of Ice    [ ]  Other:   Treatment/Session Assessment:    · Response to Treatment:  No concerns  · Interdisciplinary Collaboration:  · Registered Nurse and Certified Nursing Assistant/Patient Care Technician  · After treatment position/precautions:  · up in bathroom with CNA in room  · Compliance with Program/Exercises: Will assess as treatment progresses. · Recommendations/Intent for next treatment session: \"Next visit will focus on reduction in assistance provided\".   Total Treatment Duration:  PT Patient Time In/Time Out  Time In: 1120  Time Out: [facility],

## 2017-10-15 NOTE — PROGRESS NOTES
Patient resting quietly, alert and oriented, no distress noted. Left shoulder dressing c/d/i with sling in place and elevated on pillow, assisted to bathroom to have vanessa lowery patent. Neurovascular and peripheral vascular checks WNL. Bed low and locked position. Call light within reach. Fresh ice placed on shoulder. Patient instructed to call for assistance, verbalizes understanding. Nursing assessment complete.

## 2017-10-15 NOTE — PROGRESS NOTES
Pt is refusing sliding scale insulin and requesting Amaryl. Pt wants nurse to call hospitalist and request his PO diabetes medication. Called Dr. Krystle Bansal for medication, and orders received.

## 2017-10-15 NOTE — PROGRESS NOTES
IV access taken out of right hand,site infiltrated,,new line placed by PICC line RN,,site placed on right upper arm,IV line,patent with fluids infusing,IV antibiotic also administered,,very conversant,,takes his own home medications,same with eye drops,,dressing was changed earlier,,incision line well approximated,,steri strips all intact,,no drainage,,small amount of swelling and bruising noted on left upper arm,,refused offer of prn medication for pain (Dilaudid) but took the Tylenol tablets,,arm in sling,strong radial pulse,,< 3sec.cap.refills,,very conversant,pleasant patient. .. Mliss Saez Florinda Saez

## 2017-10-15 NOTE — PROGRESS NOTES
Refused to have mendez catheter taken out today,,stated that he had major problem with mendez being taken out early last time he was a patient here,but agreed to have it out tomorrow,,has FLU vaccination ten days ago and Pneumonia vac. About two years ago as stated,,positione to his right side for comfort,,ice pack to left shoulder applied. .. Christine Chatman

## 2017-10-15 NOTE — PROGRESS NOTES
Pt resting in bed. Call light within reach. Bed low to ground. Side rails up x 3. Shift assessment complete. Radial pulses +2. Moves fingers well. Pain well controled at this time. No need voiced at this time.

## 2017-10-15 NOTE — PROGRESS NOTES
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 ([facility] [O10.737A, Z96.612]  Left rotator cuff tear arthropathy [M12.812]  History of left shoulder replacement [Z96.612]    Post Op day: 2 Days Post-Op    Subjective:     Chiara Lopes     Appears to be doing okay       Objective:     Vital Signs:    Temp (24hrs), Av.1 °F (36.7 °C), Min:97.9 °F (36.6 °C), Max:98.6 °F (37 °C)      LAB:    [unfilled]  Lab Results   Component Value Date/Time    INR 1.0 10/05/2017 10:22 AM     Lab Results   Component Value Date/Time    HGB 10.0 10/15/2017 04:24 AM    HGB 11.9 10/14/2017 09:02 AM       Physical Exam:    No apparent distress   No neurovascular issues reported  No gross problems noted     Plan:     Continue PT/OT rehab as indicated    Nursing and SS for disposition plans         Signed By: Artemio Roldan MD

## 2017-10-16 VITALS
SYSTOLIC BLOOD PRESSURE: 156 MMHG | DIASTOLIC BLOOD PRESSURE: 78 MMHG | TEMPERATURE: 98.1 F | BODY MASS INDEX: 24.29 KG/M2 | HEIGHT: 69 IN | HEART RATE: 72 BPM | RESPIRATION RATE: 16 BRPM | OXYGEN SATURATION: 99 % | WEIGHT: 164 LBS

## 2017-10-16 LAB
ANION GAP SERPL CALC-SCNC: 6 MMOL/L (ref 7–16)
BACTERIA SPEC CULT: NORMAL
BUN SERPL-MCNC: 20 MG/DL (ref 8–23)
CALCIUM SERPL-MCNC: 7.8 MG/DL (ref 8.3–10.4)
CHLORIDE SERPL-SCNC: 110 MMOL/L (ref 98–107)
CO2 SERPL-SCNC: 26 MMOL/L (ref 21–32)
CREAT SERPL-MCNC: 1.2 MG/DL (ref 0.8–1.5)
ERYTHROCYTE [DISTWIDTH] IN BLOOD BY AUTOMATED COUNT: 15.6 % (ref 11.9–14.6)
GLUCOSE SERPL-MCNC: 133 MG/DL (ref 65–100)
GRAM STN SPEC: NORMAL
HCT VFR BLD AUTO: 29.9 % (ref 41.1–50.3)
HGB BLD-MCNC: 9.6 G/DL (ref 13.6–17.2)
MAGNESIUM SERPL-MCNC: 1.9 MG/DL (ref 1.8–2.4)
MCH RBC QN AUTO: 31.1 PG (ref 26.1–32.9)
MCHC RBC AUTO-ENTMCNC: 32.1 G/DL (ref 31.4–35)
MCV RBC AUTO: 96.8 FL (ref 79.6–97.8)
MM INDURATION POC: 0 MM (ref 0–5)
PLATELET # BLD AUTO: 137 K/UL (ref 150–450)
PMV BLD AUTO: 11.2 FL (ref 10.8–14.1)
POTASSIUM SERPL-SCNC: 3.8 MMOL/L (ref 3.5–5.1)
PPD POC: 0 NEGATIVE
PPD POC: NORMAL NEGATIVE
PPD POC: NORMAL NEGATIVE
RBC # BLD AUTO: 3.09 M/UL (ref 4.23–5.67)
SERVICE CMNT-IMP: NORMAL
SODIUM SERPL-SCNC: 142 MMOL/L (ref 136–145)
WBC # BLD AUTO: 4.2 K/UL (ref 4.3–11.1)

## 2017-10-16 PROCEDURE — 74011250637 HC RX REV CODE- 250/637: Performed by: HOSPITALIST

## 2017-10-16 PROCEDURE — 83735 ASSAY OF MAGNESIUM: CPT | Performed by: ORTHOPAEDIC SURGERY

## 2017-10-16 PROCEDURE — 74011250637 HC RX REV CODE- 250/637: Performed by: INTERNAL MEDICINE

## 2017-10-16 PROCEDURE — 80048 BASIC METABOLIC PNL TOTAL CA: CPT | Performed by: ORTHOPAEDIC SURGERY

## 2017-10-16 PROCEDURE — 97116 GAIT TRAINING THERAPY: CPT

## 2017-10-16 PROCEDURE — 97110 THERAPEUTIC EXERCISES: CPT

## 2017-10-16 PROCEDURE — 74011250637 HC RX REV CODE- 250/637: Performed by: ORTHOPAEDIC SURGERY

## 2017-10-16 PROCEDURE — 85027 COMPLETE CBC AUTOMATED: CPT | Performed by: ORTHOPAEDIC SURGERY

## 2017-10-16 PROCEDURE — 36415 COLL VENOUS BLD VENIPUNCTURE: CPT | Performed by: ORTHOPAEDIC SURGERY

## 2017-10-16 RX ADMIN — TIMOLOL MALEATE 1 DROP: 5 SOLUTION/ DROPS OPHTHALMIC at 07:00

## 2017-10-16 RX ADMIN — B-COMPLEX W/ C & FOLIC ACID TAB 1 TABLET: TAB at 08:05

## 2017-10-16 RX ADMIN — SIMETHICONE CHEW TAB 80 MG 120 MG: 80 TABLET ORAL at 06:25

## 2017-10-16 RX ADMIN — GLIMEPIRIDE 1 MG: 2 TABLET ORAL at 08:06

## 2017-10-16 RX ADMIN — MELOXICAM 7.5 MG: 7.5 TABLET ORAL at 08:05

## 2017-10-16 RX ADMIN — LEVOTHYROXINE SODIUM 50 MCG: 50 TABLET ORAL at 06:22

## 2017-10-16 RX ADMIN — Medication 325 MG: at 08:06

## 2017-10-16 RX ADMIN — SIMETHICONE CHEW TAB 80 MG 120 MG: 80 TABLET ORAL at 03:00

## 2017-10-16 RX ADMIN — MELOXICAM 15 MG: 7.5 TABLET ORAL at 08:06

## 2017-10-16 RX ADMIN — ACETAMINOPHEN 500 MG: 500 TABLET, FILM COATED ORAL at 06:37

## 2017-10-16 RX ADMIN — PSYLLIUM HUSK 1 PACKET: 3.4 POWDER ORAL at 06:25

## 2017-10-16 RX ADMIN — SIMETHICONE CHEW TAB 80 MG 120 MG: 80 TABLET ORAL at 11:37

## 2017-10-16 RX ADMIN — DIPHENOXYLATE HYDROCHLORIDE AND ATROPINE SULFATE 1 TABLET: 2.5; .025 TABLET ORAL at 11:41

## 2017-10-16 RX ADMIN — PANTOPRAZOLE SODIUM 40 MG: 40 TABLET, DELAYED RELEASE ORAL at 06:22

## 2017-10-16 RX ADMIN — Medication 5 ML: at 04:00

## 2017-10-16 RX ADMIN — DOCUSATE SODIUM 100 MG: 100 CAPSULE, LIQUID FILLED ORAL at 08:03

## 2017-10-16 RX ADMIN — HYDROMORPHONE HYDROCHLORIDE 4 MG: 4 TABLET ORAL at 06:41

## 2017-10-16 RX ADMIN — LOPERAMIDE HYDROCHLORIDE 2 MG: 2 CAPSULE ORAL at 06:24

## 2017-10-16 RX ADMIN — LOPERAMIDE HYDROCHLORIDE 2 MG: 2 CAPSULE ORAL at 03:00

## 2017-10-16 RX ADMIN — SIMETHICONE CHEW TAB 80 MG 120 MG: 80 TABLET ORAL at 00:00

## 2017-10-16 RX ADMIN — ASPIRIN 81 MG: 81 TABLET, COATED ORAL at 08:06

## 2017-10-16 NOTE — PROGRESS NOTES
Patient transfered to [facility] today for rehab. Orders faxed prior to d/c.   Tuan Gaffney

## 2017-10-16 NOTE — PROGRESS NOTES
Patient resting quietly, alert and oriented, no distress noted. Left shoulder dressing c/d/i with sling in place and elevated on pillows. Patient was just up walking in room, stated that he just voided but requested a post void bladder scan. Bladder scanned for 212 and 226 respectively. Neurovascular and peripheral vascular checks WNL. Bed low and locked position. Call light within reach. Patient instructed to call for assistance, verbalizes understanding. Nursing assessment complete. Fresh ice obtained for shoulder.

## 2017-10-16 NOTE — DISCHARGE SUMMARY
Karlie Dias MD  Medical Director  [address] [place] [address], [address] [facility]  Tel: [phone]       REHABILITATION DISCHARGE SUMMARY     Patient: Star Lynch MRN: [id number]  SSN: [id number]    YOB: 1933  Age: 80 y.o. Sex: male      Date: 10/16/2017  Admit Date: 10/13/2017  Discharge Date: 10/16/2017    Admitting Physician: Pio Ross MD   Primary Care Physician: Gifty Boyce MD     Admission Condition: good    Chief Complaint : Gait dysfunction secondary to below. 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 Removal of implant, left shoulder, \"press-fit [employer]   hemiarthroplasty. 2. Revision left total shoulder arthroplasty with a reverse Delta Xtend prosthesis, latissimus dorsi and teres major tendon transfer, left shoulder     Pain  DVT risk  Post op hemorrhagic anemia  Hypertension  CAD (coronary artery disease)/ stents  Diabetes ([address]  Acute Rehab Dx:  Gait impairment  Mobility and ambulation deficits  Self Care/ADL deficits      HPI: Star Lynch is a 80 y.o. male patient at [address] [place] [address] who was admitted on 10/13/2017  by Pio Ross MD with below mentioned medical history, is being seen for Physical Medicine and Rehabilitation. Star Lynch who is s/p prior left shoulder hemiarthroplasty, presented with progressively worsening pain due to periprosthetic instability and rotator cuff tear arthropathy. He underwent a removal of implant, left shoulder, \"press-fit [employer]   Hemiarthroplasty and revision left total shoulder arthroplasty with a reverse Delta Xtend prosthesis, latissimus dorsi and teres major tendon transfer per Dr. Pio Ross MD on 10/13/2017. Pain, and common post op issues fairlywell tolerated. Patient is to be NWB LUE. Patient is to start to mobilize with acute PT and OT.  He has significant functional deficits, mobility impairement due to shoulder pain, decreased strength and NWB status. Damien Bazzi is seen and examined today. Medical Records reviewed. He denies any other pre morbid functional deficits. He has been independent with ambulation, prior to admission, his UE ADLs were limited by pain, weakness.        Rehabiitation Course:   Functional  Level On Admission: Walk: Moderate [place]  Functional Level At Discharge: Walk: Stand by Assist  Home Architecture: Home Situation  Home Environment: Private residence (10/14/17 1000)  # Steps to Enter: 3 (10/14/17 1000)  Rails to Enter: No (10/14/17 1000)  One/Two Story Residence: One story (10/14/17 1000)  Living Alone: Yes (10/14/17 1000)  Support Systems: Child(wojciech) (10/14/17 1000)  Patient Expects to be Discharged to[de-identified] Skilled nursing facility (10/14/17 1000)  Current DME Used/Available at Home: Raised toilet seat;Walker (10/13/17 0624)     Past Medical History:   Diagnosis Date    Anemia fall 2016    r/t GI bleeds     Arthritis     osteo    CAD (coronary artery disease) 1997    4 stents total (1997, 2012, 2013); \"ECHO 7/2016 normal with good EF\" per cardio office note; \"Stress test 6/2016 Findings consistent with very mild inferior ischemia\"    Carotid stenosis 04/2017    carotid u/s: Right internal carotid artery has 50-69% stenosis. Left internal carotid artery has <50% stenosis.     Diabetes ([facility] [address] 05/1997    type 2; oral med; rare BG checks, last hgba1c- 6.9 (6/2017)    Diverticulosis 12/2011    with GI bleeding; no current problems    GERD (gastroesophageal reflux disease)     on med for control     Glaucoma     Heart murmur     ECHO 7/14/16- mild AS, mild MR, mild TR    Hiatal hernia     History of kidney stones     Hypercholesteremia     on med for control     Hypertension     hx of- no medications currently    Hypothyroid     managed with medication    MI (myocardial infarction)     x2, STEMI    TIA (transient ischemic attack) 07/2016    experienced numbness/tingling L arm and left lower face- \"only lasted a couple of hrs\"; 7/13 CT head: cerebral white matter changes have progressed since 4/19/13 likely indicative of chronic small vessel ischemic disease, no acute intracranial abnormality.  Unspecified sleep apnea     denies     Urethral stenosis     has had difficulty with placement/removal previously     Vasovagal syncope       Past Surgical History:   Procedure Laterality Date    HX BUNIONECTOMY Left     with hammer toe    HX CARPAL TUNNEL RELEASE Right 1968    HX CARPAL TUNNEL RELEASE Left 2012    HX CATARACT REMOVAL  1987 and 1990    Teofilo with IOLens implamts    HX COLECTOMY  01/13/2017    HX HEART CATHETERIZATION  1783,2270, 2012, 2013    total 4 stents: 2 stents- 1997, 1 stent- 2012 (BMS to RCA), 1 stent- 2013 (ANGELLA to RCA)    HX HEENT Right     eye surgery to replace lens due to fall     HX [address]    L inguinal    HX KNEE REPLACEMENT Bilateral 2002, 2008    HX LAP CHOLECYSTECTOMY  6409    umbilical hernia repair    HX ORTHOPAEDIC Left     trigger finger     HX ORTHOPAEDIC  [id number]    multiple knee surgery    HX ROTATOR CUFF REPAIR Right 1995    HX ROTATOR CUFF REPAIR Left 2000    HX ROTATOR CUFF REPAIR Right 2007    with matrina tendon repair    HX SHOULDER ARTHROSCOPY Left 2015    HX TONSILLECTOMY  1938    HX UROLOGICAL  1996    cysto with stone extraction and stent      Family History   Problem Relation Age of Onset    Heart Disease Father     Heart Attack Father     Stroke Mother     Alzheimer Sister     Alzheimer Sister       Social History   Substance Use Topics    Smoking status: Never Smoker    Smokeless tobacco: Never Used    Alcohol use 4.2 oz/week     7 Glasses of wine per week       No Known Allergies    Prior to Admission medications    Medication Sig Start Date End Date Taking?  Authorizing Provider   simethicone (GAS-X) 125 mg capsule Take 125 mg by mouth four (4) times daily as needed for Flatulence. Yes Historical Provider   loperamide (IMODIUM) 2 mg capsule Take 2 mg by mouth four (4) times daily as needed for Diarrhea. Yes Historical Provider   diphenoxylate-atropine (LOMOTIL) 2.5-0.025 mg per tablet Take 1 Tab by mouth two (2) times daily as needed for Diarrhea. Yes Historical Provider   atorvastatin (LIPITOR) 40 mg tablet Take 40 mg by mouth nightly. Yes Historical Provider   levothyroxine (SYNTHROID) 50 mcg tablet Take 50 mcg by mouth Daily (before breakfast). Yes Historical Provider   Psyllium Husk-Sucrose 3.4 gram/7 gram powd Take 3 Caps by mouth two (2) times a day. Yes Historical Provider   omega-3 fatty acids-vitamin e (FISH OIL) 1,000 mg cap Take 2 Caps by mouth daily. Yes Historical Provider   bimatoprost (LUMIGAN) 0.01 % ophthalmic drops Administer 1 Drop to right eye nightly. Yes Historical Provider   timolol (TIMOPTIC) 0.5 % ophthalmic solution Administer 1 Drop to right eye every morning. Yes Historical Provider   Omeprazole delayed release (PRILOSEC D/R) 20 mg tablet Take 20 mg by mouth Daily (before dinner). Yes Historical Provider   meloxicam (MOBIC) 15 mg tablet Take 15 mg by mouth daily. Indications: OSTEOARTHRITIS   Yes Historical Provider   brimonidine (ALPHAGAN P) 0.1 % ophthalmic solution Administer 1 Drop to right eye two (2) times a day. Take / use AM day of surgery  per anesthesia protocols. Yes Historical Provider   glimepiride (AMARYL) 2 mg tablet Take 1 mg by mouth daily. Indications: type 2 diabetes mellitus   Yes Historical Provider   b complex vitamins tablet Take 1 Tab by mouth daily. Yes Historical Provider   aspirin 81 mg Tab Take 81 mg by mouth daily. Take / use 81 mg AM day of surgery  per anesthesia protocols.    Yes Skip Turner MD       Current Medications:  Current Facility-Administered Medications   Medication Dose Route Frequency Provider Last Rate Last Dose    pantoprazole (PROTONIX) tablet 40 mg  40 mg Oral ACB Alfredo Mukherjee MD   40 mg at 10/16/17 0622    glimepiride (AMARYL) tablet 1 mg  1 mg Oral ACB Titus Cruz MD   1 mg at 10/16/17 0806    sodium chloride (NS) flush 5-10 mL  5-10 mL IntraVENous Q8H Alfredo Mukherjee MD   5 mL at 10/16/17 0400    sodium chloride (NS) flush 5-10 mL  5-10 mL IntraVENous PRN Alfredo Mukherjee MD        promethazine (PHENERGAN) tablet 25 mg  25 mg Oral Q4H PRN Alfredo Mukherjee MD        brimonidine (ALPHAGAN P) 0.1 % ophthalmic solution 1 Drop (Patient Supplied)  1 Drop Both Eyes BID Ella Mcgregor MD   1 Drop at 10/16/17 0900    simethicone (MYLICON) tablet 929 mg  120 mg Oral Q4H Ella Mcgregor MD   120 mg at 10/16/17 4914    loperamide (IMODIUM) capsule 2 mg  2 mg Oral Q4H PRN Ella Mcgregor MD   2 mg at 10/16/17 5594    meloxicam (MOBIC) tablet 15 mg  15 mg Oral DAILY Ella Mcgregor MD   15 mg at 10/16/17 0806    vancomycin (VANCOCIN) 1,000 mg in 0.9% sodium chloride (MBP/ADV) 250 mL  1,000 mg IntraVENous Q24H Alfredo Mukherjee  mL/hr at 10/15/17 1600 1,000 mg at 10/15/17 1600    0.9% sodium chloride infusion 250 mL  250 mL IntraVENous PRN Alfredo Mukherjee MD 15 mL/hr at 10/14/17 0003 250 mL at 10/14/17 0003    bisacodyl (DULCOLAX) suppository 10 mg  10 mg Rectal DAILY PRN Alfredo Mukherjee MD        sodium phosphate (FLEET'S) enema 118 mL  1 Enema Rectal PRN Alfredo Mukherjee MD        ferrous sulfate tablet 325 mg  1 Tab Oral BID WITH MEALS Alfredo Mukherjee MD   325 mg at 10/16/17 1929    docusate sodium (COLACE) capsule 100 mg  100 mg Oral BID Alfredo Mukherjee MD   100 mg at 10/16/17 0803    HYDROmorphone (DILAUDID) tablet 4 mg  4 mg Oral Q3H PRN Alfredo Mukherjee MD   4 mg at 10/16/17 0641    HYDROmorphone (DILAUDID) tablet 2 mg  2 mg Oral Q3H PRN Alfredo Mukherjee MD   2 mg at 10/15/17 2321    HYDROmorphone in NS (DILAUDID) injection 1 mg  1 mg IntraVENous Q1H PRN Alfredo Mukherjee MD        atorvastatin (LIPITOR) tablet 40 mg  40 mg Oral QHS Deyanira Bruno MD   40 mg at 10/15/17 1845    diphenoxylate-atropine (LOMOTIL) tablet 1 Tab  1 Tab Oral BID PRN Deyanira Bruno MD   1 Tab at 10/15/17 2316    levothyroxine (SYNTHROID) tablet 50 mcg  50 mcg Oral ACB Deyanira Bruno MD   50 mcg at 10/16/17 0622    psyllium husk-aspartame (METAMUCIL FIBER) packet 1 Packet  1 Packet Oral BID Deyanira Bruno MD   1 Packet at 10/16/17 5247    bimatoprost (LUMIGAN) 0.01 % ophthalmic drops 1 Drop (Patient Supplied)  1 Drop Right Eye QHS Deyanira Bruno MD   1 Drop at 10/14/17 2200    timolol (TIMOPTIC) 0.5 % ophthalmic solution 1 Drop  1 Drop Right Eye 7am Deyanira Bruno MD   1 Drop at 10/16/17 0700    multivitamin, stress formula (STRESS TAB) tablet 1 Tab  1 Tab Oral DAILY Deyanira Bruno MD   1 Tab at 10/16/17 0805    aspirin delayed-release tablet 81 mg  81 mg Oral DAILY Deyanira Bruno MD   81 mg at 10/16/17 0806    acetaminophen (TYLENOL) tablet 500 mg  500 mg Oral Q4H PRN Deyanira Bruno MD   500 mg at 10/16/17 7302        Review of Systems: Denies chest pain, shortness of breath, cough, headache, visual problems, abdominal pain, dysurea, calf pain. Pertinent positives are as noted in the medical records and unremarkable otherwise. Vital Signs: Patient Vitals for the past 8 hrs:   BP Temp Pulse Resp SpO2   10/16/17 0801 156/78 98.1 °F (36.7 °C) 72 16 99 %   10/16/17 0221 152/80 98.2 °F (36.8 °C) 71 17 -      Physical Exam:   General: Alert and age appropriately oriented. No acute cardio respiratory distress. HEENT: Normocephalic, no conjunctival pallor. Oral mucosa moist without cyanosis. No JVD. Lungs: Clear to auscultation bilaterally. Respiration even and unlabored   Heart: Regular rate and rhythm, S1, S2   No  Murmurs. Abdomen: Soft, non-tender, non-distended. Genitourinary: defered   Neuromuscular:     Grossly no focal motor deficits.  Moves left fingers, hand well. Left wrist extension 5/5   Left wrist flexion 5/5   Left ADMQ 5/5   No sensory deficits distally BUE to soft touch. Skin/extremity: Non tender calves BLE.  No rashes, no marginal erythema.            Skin Incision(s)/Wound(s):        Lab Review:   Recent Results (from the past 72 hour(s))   TYPE + CROSSMATCH    Collection Time: 10/13/17 10:35 AM   Result Value Ref Range    Crossmatch Expiration 10/16/2017     ABO/Rh(D) A POSITIVE     Antibody screen NEG     Unit number [id number]     Blood component type  LR AS5     Unit division 00     Status of unit ALLOCATED     Crossmatch result Compatible     Unit number [id number]     Blood component type  LR AS5     Unit division 00     Status of unit ALLOCATED     Crossmatch result Compatible    MAGNESIUM    Collection Time: 10/13/17 10:35 AM   Result Value Ref Range    Magnesium 1.8 1.8 - 2.4 mg/dL   HEMOGLOBIN A1C WITH EAG    Collection Time: 10/13/17 10:35 AM   Result Value Ref Range    Hemoglobin A1c 6.2 (H) 4.8 - 6.0 %    Est. average glucose 131 mg/dL   GLUCOSE, POC    Collection Time: 10/13/17 10:38 AM   Result Value Ref Range    Glucose (POC) 128 (H) 65 - 100 mg/dL   CULTURE, WOUND W GRAM STAIN    Collection Time: 10/13/17  5:00 PM   Result Value Ref Range    Special Requests: LEFT  SHOL  1        GRAM STAIN 0 TO 1 WBC'S/OIF     GRAM STAIN NO DEFINITE ORGANISM SEEN      Culture result: NO GROWTH 1 DAY     CULTURE, WOUND W GRAM STAIN    Collection Time: 10/13/17  5:00 PM   Result Value Ref Range    Special Requests: LEFT  SHOL  2        GRAM STAIN NO WBCS SEEN      GRAM STAIN NO DEFINITE ORGANISM SEEN      Culture result: NO GROWTH 1 DAY     CULTURE, WOUND W GRAM STAIN    Collection Time: 10/13/17  5:00 PM   Result Value Ref Range    Special Requests: LEFT  SHOL  3        GRAM STAIN NO WBCS SEEN      GRAM STAIN NO DEFINITE ORGANISM SEEN      Culture result: NO GROWTH 1 DAY     CULTURE, WOUND W GRAM STAIN    Collection Time: 10/13/17 5:00 PM   Result Value Ref Range    Special Requests: LEFT  SHOL  4        GRAM STAIN NO WBCS SEEN      GRAM STAIN NO DEFINITE ORGANISM SEEN      Culture result: NO GROWTH 1 DAY     CULTURE, WOUND W GRAM STAIN    Collection Time: 10/13/17  5:00 PM   Result Value Ref Range    Special Requests: LFT  SHOL  5       GRAM STAIN NO WBCS SEEN      GRAM STAIN NO DEFINITE ORGANISM SEEN      Culture result: NO GROWTH 1 DAY     CULTURE, WOUND W GRAM STAIN    Collection Time: 10/13/17  5:00 PM   Result Value Ref Range    Special Requests: PENDING     GRAM STAIN NO WBCS SEEN      GRAM STAIN NO DEFINITE ORGANISM SEEN      Culture result: NO GROWTH 1 DAY     CULTURE, ANAEROBIC    Collection Time: 10/13/17  5:00 PM   Result Value Ref Range    Special Requests: LEFT  1        Culture result: NO ANAEROBIC GROWTH IN 2 DAYS     CULTURE, ANAEROBIC    Collection Time: 10/13/17  5:00 PM   Result Value Ref Range    Special Requests: LEFT  2        Culture result: NO ANAEROBIC GROWTH IN 2 DAYS     CULTURE, ANAEROBIC    Collection Time: 10/13/17  5:00 PM   Result Value Ref Range    Special Requests: LEFT  3        Culture result: NO ANAEROBIC GROWTH IN 2 DAYS     CULTURE, ANAEROBIC    Collection Time: 10/13/17  5:00 PM   Result Value Ref Range    Special Requests: LEFT  4        Culture result: NO ANAEROBIC GROWTH IN 2 DAYS     CULTURE, ANAEROBIC    Collection Time: 10/13/17  5:00 PM   Result Value Ref Range    Special Requests: LEFT  5        Culture result: NO ANAEROBIC GROWTH IN 2 DAYS     CBC W/O DIFF    Collection Time: 10/14/17  9:02 AM   Result Value Ref Range    WBC 7.5 4.3 - 11.1 K/uL    RBC 3.74 (L) 4.23 - 5.67 M/uL    HGB 11.9 (L) 13.6 - 17.2 g/dL    HCT 36.2 (L) 41.1 - 50.3 %    MCV 96.8 79.6 - 97.8 FL    MCH 31.8 26.1 - 32.9 PG    MCHC 32.9 31.4 - 35.0 g/dL    RDW 15.6 (H) 11.9 - 14.6 %    PLATELET 292 (L) 174 - 450 K/uL    MPV 11.4 10.8 - 74.7 FL   METABOLIC PANEL, BASIC    Collection Time: 10/14/17  9:02 AM   Result Value Ref Range    Sodium 140 136 - 145 mmol/L    Potassium 4.2 3.5 - 5.1 mmol/L    Chloride 108 (H) 98 - 107 mmol/L    CO2 22 21 - 32 mmol/L    Anion gap 10 7 - 16 mmol/L    Glucose 208 (H) 65 - 100 mg/dL    BUN 27 (H) 8 - 23 MG/DL    Creatinine 1.46 0.8 - 1.5 MG/DL    GFR est AA 59 (L) >60 ml/min/1.73m2    GFR est non-AA 49 (L) >60 ml/min/1.73m2    Calcium 7.8 (L) 8.3 - 10.4 MG/DL   MAGNESIUM    Collection Time: 10/14/17  9:02 AM   Result Value Ref Range    Magnesium 1.8 1.8 - 2.4 mg/dL   GLUCOSE, POC    Collection Time: 10/14/17  9:00 PM   Result Value Ref Range    Glucose (POC) 129 (H) 65 - 100 mg/dL   PLEASE READ & DOCUMENT PPD TEST IN 24 HRS    Collection Time: 10/15/17  3:00 AM   Result Value Ref Range    PPD 0 Negative    mm Induration 0 mm   METABOLIC PANEL, BASIC    Collection Time: 10/15/17  4:24 AM   Result Value Ref Range    Sodium 139 136 - 145 mmol/L    Potassium 3.8 3.5 - 5.1 mmol/L    Chloride 107 98 - 107 mmol/L    CO2 23 21 - 32 mmol/L    Anion gap 9 7 - 16 mmol/L    Glucose 155 (H) 65 - 100 mg/dL    BUN 30 (H) 8 - 23 MG/DL    Creatinine 1.73 (H) 0.8 - 1.5 MG/DL    GFR est AA 49 (L) >60 ml/min/1.73m2    GFR est non-AA 40 (L) >60 ml/min/1.73m2    Calcium 7.4 (L) 8.3 - 10.4 MG/DL   MAGNESIUM    Collection Time: 10/15/17  4:24 AM   Result Value Ref Range    Magnesium 1.6 (L) 1.8 - 2.4 mg/dL   CBC W/O DIFF    Collection Time: 10/15/17  4:24 AM   Result Value Ref Range    WBC 5.3 4.3 - 11.1 K/uL    RBC 3.17 (L) 4.23 - 5.67 M/uL    HGB 10.0 (L) 13.6 - 17.2 g/dL    HCT 30.6 (L) 41.1 - 50.3 %    MCV 96.5 79.6 - 97.8 FL    MCH 31.5 26.1 - 32.9 PG    MCHC 32.7 31.4 - 35.0 g/dL    RDW 15.5 (H) 11.9 - 14.6 %    PLATELET 483 (L) 779 - 450 K/uL    MPV 11.0 10.8 - 14.1 FL   GLUCOSE, POC    Collection Time: 10/15/17 11:39 AM   Result Value Ref Range    Glucose (POC) 176 (H) 65 - 100 mg/dL   PLEASE READ & DOCUMENT PPD TEST IN 48 HRS    Collection Time: 10/16/17  3:00 AM   Result Value Ref Range    PPD  Negative    mm Induration 0 mm   METABOLIC PANEL, BASIC    Collection Time: 10/16/17  5:18 AM   Result Value Ref Range    Sodium 142 136 - 145 mmol/L    Potassium 3.8 3.5 - 5.1 mmol/L    Chloride 110 (H) 98 - 107 mmol/L    CO2 26 21 - 32 mmol/L    Anion gap 6 (L) 7 - 16 mmol/L    Glucose 133 (H) 65 - 100 mg/dL    BUN 20 8 - 23 MG/DL    Creatinine 1.20 0.8 - 1.5 MG/DL    GFR est AA >60 >60 ml/min/1.73m2    GFR est non-AA >60 >60 ml/min/1.73m2    Calcium 7.8 (L) 8.3 - 10.4 MG/DL   MAGNESIUM    Collection Time: 10/16/17  5:18 AM   Result Value Ref Range    Magnesium 1.9 1.8 - 2.4 mg/dL   CBC W/O DIFF    Collection Time: 10/16/17  5:18 AM   Result Value Ref Range    WBC 4.2 (L) 4.3 - 11.1 K/uL    RBC 3.09 (L) 4.23 - 5.67 M/uL    HGB 9.6 (L) 13.6 - 17.2 g/dL    HCT 29.9 (L) 41.1 - 50.3 %    MCV 96.8 79.6 - 97.8 FL    MCH 31.1 26.1 - 32.9 PG    MCHC 32.1 31.4 - 35.0 g/dL    RDW 15.6 (H) 11.9 - 14.6 %    PLATELET 539 (L) 733 - 450 K/uL    MPV 11.2 10.8 - 14.1 FL   PLEASE READ & DOCUMENT PPD TEST IN 72 HRS    Collection Time: 10/16/17  8:21 AM   Result Value Ref Range    PPD neg Negative    mm Induration 0 mm       PT Initial  PT Most Recent   AROM: Generally decreased, functional (right UE & both LE's) (10/14/17 1000) Generally decreased, functional (right UE & both LE's) (10/14/17 1000)         Strength: Generally decreased, functional (right UE & both LE's) (10/14/17 1000) Generally decreased, functional (right UE & both LE's) (10/14/17 1000)   Coordination: Generally decreased, functional (right UE & both LE's) (10/14/17 1000) Generally decreased, functional (right UE & both LE's) (10/14/17 1000)                     Distance (ft): 100 Feet (ft) (10/14/17 1000) 15 Feet (ft) (10/15/17 1140)   Assistive Device:  (none) (10/14/17 1000)  (held to IV pole) (10/15/17 1140)       OT Initial OT Most Recent                                               ST Initial ST Most Recent                        Principal Problem:    Instability of prosthetic shoulder joint ([facility] [address] (10/10/2017)    Active Problems:    Rotator cuff tear arthropathy, left (10/10/2017)      S/P shoulder hemiarthroplasty, left (10/10/2017)          Condition on discharge from [facility] to SNF:  good  Rehabilitation  potential : Good   Goals in Rehab : Patient to reach maximal rehabilitation potential in functional mobility,ambulation and ADL/ self care skills ; to improve strength and endurance  Plan / Disposition :STR-Rehab  as discussed with patient/ family and the Case management/ Social service team  Continue PT, OT for left shoulder rehab per protocol. Therapies limited to active and passive range of motion left elbow, hand. Gentle pendulums. No other shoulder motion. Sling to left shoulder. NWB left shoulder. Continue mobility, transfers, gait training.           Discharge Instructions:  Rehabilitation Management/ Medical Management: 1. Devices: sling L shoulder  2. Consult:Rehab team including PT, OT,  and . 3. Disposition Rehab-discussed with patient. 4. Thigh-high or knee-high VIDAL's when out of bed. 5. DVT Prophylaxis - per primary  6. Incentive spirometer Q1H while awake  7. Post op hemorrhagic anemia-monitor. 8. Activity: NWB LUE, sling to left shoulder. Continue PT, OT for left shoulder rehab per protocol. Therapies limited to active and passive range of motion left elbow, hand. Gentle pendulums. No other shoulder motion. Sling to left shoulder. NWB left shoulder. Continue mobility, transfers, gait training.       9. Planned Labs: CBC,BMP  10. Pain Control:  Continue scheduled tylenol, mobic and  PRN meds. 11. Wound Care: Keep left shoulder wound clean and dry and reinforce dressing PRN. Follow up with ORTHO per instructions. Follow up with Dr Kvng Costello  2 weeks after discharge from rehab. [phone]. Transfer Medications:    ferrous sulfate tablet 325 mg   Route: Take 1 Tab by mouth two (2) times daily (with meals).  - Oral HYDROmorphone (DILAUDID) tablet 2 mg   Ordered Dose: 1-2 tabs Route: Oral Frequency: EVERY 4-6 HOURS  as needed for pain         acetaminophen (TYLENOL) tablet 500 mg   Route: Take 1 Tab by mouth every four (4) hours as needed (pain). - Oral        senna-docusate (PERICOLACE) 8.6-50 mg per tablet 2 Tab Ordered Dose: 2 Tab Route: Oral Frequency: DAILY       Current Discharge Medication List      CONTINUE these medications which have NOT CHANGED    Details   simethicone (GAS-X) 125 mg capsule Take 125 mg by mouth four (4) times daily as needed for Flatulence. loperamide (IMODIUM) 2 mg capsule Take 2 mg by mouth four (4) times daily as needed for Diarrhea. diphenoxylate-atropine (LOMOTIL) 2.5-0.025 mg per tablet Take 1 Tab by mouth two (2) times daily as needed for Diarrhea. atorvastatin (LIPITOR) 40 mg tablet Take 40 mg by mouth nightly. levothyroxine (SYNTHROID) 50 mcg tablet Take 50 mcg by mouth Daily (before breakfast). Psyllium Husk-Sucrose 3.4 gram/7 gram powd Take 3 Caps by mouth two (2) times a day. omega-3 fatty acids-vitamin e (FISH OIL) 1,000 mg cap Take 2 Caps by mouth daily. bimatoprost (LUMIGAN) 0.01 % ophthalmic drops Administer 1 Drop to right eye nightly. timolol (TIMOPTIC) 0.5 % ophthalmic solution Administer 1 Drop to right eye every morning. Omeprazole delayed release (PRILOSEC D/R) 20 mg tablet Take 20 mg by mouth Daily (before dinner). meloxicam (MOBIC) 15 mg tablet Take 15 mg by mouth daily. Indications: OSTEOARTHRITIS. HOLD. brimonidine (ALPHAGAN P) 0.1 % ophthalmic solution Administer 1 Drop to right eye two (2) times a day. glimepiride (AMARYL) 2 mg tablet Take 1 mg by mouth daily. Indications: type 2 diabetes mellitus      b complex vitamins tablet Take 1 Tab by mouth daily. aspirin 81 mg Tab Take 81 mg by mouth daily. O.K. Admit to sub acute rehab today. Discharge time: 35 minutes.     Signed By: [facility] Emerson Ferguson MD     October 16, 2017

## 2017-10-16 NOTE — PROGRESS NOTES
Problem: Mobility Impaired (Adult and Pediatric)  Goal: *Acute Goals and Plan of Care (Insert Text)  GOALS (1-4 days):  (1.) Patient will move from supine to sit and sit to supine in bed with STAND BY ASSIST.   (2.) Patient will transfer from bed to chair and chair to bed with SUPERVISION using the least restrictive device. Met 10/16  (3.) Patient will ambulate with SUPERVISION for 300 feet with the least restrictive device. Met 10/16  (4.) Patient will be independent with shoulder HEP to increase range of motion per MD orders. Met 10/16  ________________________________________________________________________________________________      PHYSICAL THERAPY: Daily Note, Treatment Day: 1st and PM 10/16/2017  INPATIENT: Hospital Day: 4  [place] / [place] / Product Type: Medicare /      NAME/AGE/GENDER: Jana Borjas is a 80 y.o. 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  REVISION LEFT TOTAL SHOULDER ARTHROPLASTY WITH REVERSE DELTA EXTEND PROSTHESIS, LATISSIMUS DORSI AND TERES MAJOR TENDON TRANSFERS LEFT SHOULDER   (Left)  3 Days Post-Op  ICD-10: Treatment Diagnosis:       · Pain in Left Shoulder (M25.512)  · Stiffness of Left Shoulder, Not elsewhere classified (M25.612)  · Generalized Muscle Weakness (M62.81)  · Other lack of cordination (R27.8)  · Difficulty in walking, Not elsewhere classified (R26.2)  · Other abnormalities of gait and mobility (R26.89)   Precaution/Allergies:  Review of patient's allergies indicates no known allergies. ASSESSMENT:      Mr. Gerald Mcgraw has progressed well with therapy. Most goals are met.  Pt still requesting assist to come out of bed due some abdominal muscle issue. Pt is ready for the next phase of his rehab program      This section established at most recent assessment   PROBLEM LIST (Impairments causing functional limitations):  1. Decreased [place] with Bed Mobility  2. Decreased [place] with Transfers  3. Decreased [place] with Ambulation  4. Decreased [place] with shoulder HEP    INTERVENTIONS PLANNED: (Benefits and precautions of physical therapy have been discussed with the patient.)  1. Bed Mobility Training  2. Transfer Training  3. Gait Training  4. Therapeutic Exercises per MD orders  5. Modalities for Pain      TREATMENT PLAN: Frequency/Duration: twice daily for duration of hospital stay  Rehabilitation Potential For Stated Goals: GOOD      RECOMMENDED REHABILITATION/EQUIPMENT: (at time of discharge pending progress): Continue Skilled Therapy. HISTORY:   History of Present Injury/Illness (Reason for Referral):  Painful left shoulder  Past Medical History/Comorbidities:   Mr. Devaughn Villanueva  has a past medical history of Anemia (fall 2016); Arthritis; CAD (coronary artery disease) (1997); Carotid stenosis (04/2017); Diabetes ([address] (05/1997); Diverticulosis (12/2011); GERD (gastroesophageal reflux disease); Glaucoma; Heart murmur; Hiatal hernia; History of kidney stones; Hypercholesteremia; Hypertension; Hypothyroid; MI (myocardial infarction); TIA (transient ischemic attack) (07/2016); Unspecified sleep apnea; Urethral stenosis; and Vasovagal syncope. He also has no past medical history of Adverse effect of anesthesia; Aneurysm ([address] Arrhythmia; Asthma; Autoimmune disease ([address] Cancer ([address] Chronic kidney disease; Chronic pain; Coagulation disorder ([address] COPD; DEMENTIA; Difficult intubation; Endocarditis; Gastrointestinal disorder; Heart failure ([address] Ill-defined condition; Infectious disease; Liver disease; Malignant hyperthermia due to anesthesia;  Morbid obesity ([facility] Nausea & vomiting; Neurological disorder; Other ill-defined conditions(799.89); Pseudocholinesterase deficiency; Psychiatric disorder; PUD (peptic ulcer disease); Rheumatic fever; Seizures ([facility] Sleep disorder; Stroke [facility]); or Thromboembolus ([facility] Mr. Rah Zuniga  has a past surgical history that includes heart catheterization (0324,6889, 2012, 2013); tonsillectomy (1938); cataract removal (1987 and 1990); lap cholecystectomy (2003); hernia repair (1944); urological (1996); orthopaedic (Left); orthopaedic (7756-1551); shoulder arthroscopy (Left, 2015); knee replacement (Bilateral, 2002, 2008); hemorrhoidectomy (1987); heent (Right); carpal tunnel release (Right, 1968); carpal tunnel release (Left, 2012); bunionectomy (Left); rotator cuff repair (Right, 1995); rotator cuff repair (Left, 2000); rotator cuff repair (Right, 2007); and colectomy (01/13/2017). Social History/Living Environment:   Home Environment: Private residence  # Steps to Enter: 3  Rails to Enter: No  One/Two Story Residence: One story  Living Alone: Yes  Support Systems: Child(wojciech)  Patient Expects to be Discharged to[de-identified] Skilled nursing facility  Current DME Used/Available at Home: Raised toilet seat, Walker  Prior Level of Function/Work/Activity:  Pt was independent without an assistive device prior to this admission   Number of Personal Factors/Comorbidities that affect the Plan of Care: 3+: HIGH COMPLEXITY   EXAMINATION:   Most Recent Physical Functioning:   Gross Assessment:  AROM: Generally decreased, functional (right UE & both LE's)  Strength: Generally decreased, functional (right UE & both LE's)  Coordination: Generally decreased, functional (right UE & both LE's)                    Balance:  Sitting: Intact; Without support  Standing: Intact; Without support Bed Mobility:  Supine to Sit: Minimum assistance  Sit to Supine:  (NT)  Scooting: Supervision       Transfers:  Sit to Stand: Supervision  Stand to Sit: Supervision  Bed to Chair: Supervision  Gait:     Speed/Tracy: Fluctuations  Step Length:  (equal)  Gait Abnormalities: Decreased step clearance  Distance (ft): 650 Feet (ft)  Assistive Device:  (none)  Ambulation - Level of Assistance: Supervision  Duration: 12 Minutes   Functional Mobility:         Gait/Ambulation:  sup        Transfers:  sup        Bed Mobility:  min   Body Structures Involved:  1. Joints  2. Muscles Body Functions Affected:  1. Sensory/Pain  2. Movement Related Activities and Participation Affected:  1. General Tasks and Demands  2. Mobility   Number of elements that affect the Plan of Care: 4+: HIGH COMPLEXITY   CLINICAL PRESENTATION:   Presentation: Evolving clinical presentation with changing clinical characteristics: MODERATE COMPLEXITY   CLINICAL DECISION MAKIN [facility] Mobility Inpatient Short Form  How much difficulty does the patient currently have. .. Unable A Lot A Little None   1. Turning over in bed (including adjusting bedclothes, sheets and blankets)? [ ] 1   [ ] 2   [X] 3   [ ] 4   2. Sitting down on and standing up from a chair with arms ( e.g., wheelchair, bedside commode, etc.)   [ ] 1   [ ] 2   [X] 3   [ ] 4   3. Moving from lying on back to sitting on the side of the bed? [ ] 1   [ ] 2   [X] 3   [ ] 4   How much help from another person does the patient currently need. .. Total A Lot A Little None   4. Moving to and from a bed to a chair (including a wheelchair)? [ ] 1   [ ] 2   [X] 3   [ ] 4   5. Need to walk in hospital room? [ ] 1   [ ] 2   [X] 3   [ ] 4   6. Climbing 3-5 steps with a railing? [X] 1   [ ] 2   [ ] 3   [ ] 4   © 2007, Trustees of [address] [place] [id number], under license to [employer]. All rights reserved    Score:  Initial: 16 Most Recent: X (Date: -- )     Interpretation of Tool:  Represents activities that are increasingly more difficult (i.e. Bed mobility, Transfers, Gait).        Score 24 23 22-20 19-15 14-10 9-7 6 Modifier CH CI CJ CK CL CM CN         · Mobility - Walking and Moving Around:               - CURRENT STATUS:    CK - 40%-59% impaired, limited or restricted               - GOAL STATUS:           CJ - 20%-39% impaired, limited or restricted               - D/C STATUS:                       ---------------To be determined---------------  [place] / Product Type: Medicare /       Medical Necessity:     · Patient is expected to demonstrate progress in strength, range of motion, balance, coordination and functional technique to decrease assistance required with bed mobility, transfers, gait & HEP. Reason for Services/Other Comments:  · Patient continues to require skilled intervention due to pt not safe with functional mobility & HEP. Use of outcome tool(s) and clinical judgement create a POC that gives a: Questionable prediction of patient's progress: MODERATE COMPLEXITY                 TREATMENT:   (In addition to Assessment/Re-Assessment sessions the following treatments were rendered)   Pre-treatment Symptoms/Complaints:  Pt pleased with his progress  Pain: Initial: visual scale  Pain Intensity 1: 2  Pain Location 1: Shoulder  Pain Orientation 1: Left  Pain Intervention(s) 1: Repositioned  Post Session:  2/10      Therapeutic Exercise: (13 Minutes):  Exercises per grid below to improve mobility and dynamic movement of arm - left to improve functional endurance. Required minimal verbal cues to promote proper body alignment and promote proper body mechanics. Progressed repetitions as indicated. Gait Training (12 Minutes):  Gait training to improve and/or restore physical functioning as related to mobility, strength, balance, coordination and dynamic movement of leg - bilateral to improve functional gait.   Ambulated 650 Feet (ft) with Supervision using a  (none) and slight cues related to their stride length and heel strike to promote proper body alignment, promote proper body posture and promote proper body mechanics. Date:  10/14 Date:  10/15/17  Date:10/16    ACTIVITY/EXERCISE AM PM AM PM AM PM   Gripping 15 20  20  20  25      Wrist Flexion/Extension 15  20  20  20  25     Wrist Ulnar/Radial Deviation               Pronation/Supination 15  20  20  20  25     Elbow Flexion/Extension 15aa  20aa  20  20  25     Shoulder Flexion/Extension               Shoulder AB/ADduction               Shoulder IR/ER               Pulleys               Pendulums Gentle 15p  Clock & counter clockwise  gentle 20p clock & counter clockwise 20 gentle     25p gentle clock & counter clockwise     Shrugs               Isometric:                 Flexion               Extension               ABduction               ADduction               Biceps/Triceps                               B = bilateral; AA = active assistive; A = active; P = passive  Education:  [X]  Home Exercises      [X]  Sling Application       [X]  Movement Precautions        [ ]  Pulleys          [ x]  Use of Ice    [ ]  Other:   Treatment/Session Assessment:    · Response to Treatment:  No distress  · Interdisciplinary Collaboration:  · Registered Nurse  · After treatment position/precautions:  · Up in chair, Bed/Chair-wheels locked, Call light within reach and RN notified  · Compliance with Program/Exercises: Will assess as treatment progresses. · Recommendations/Intent for next treatment session: \"Next visit will focus on reduction in assistance provided\".   Total Treatment Duration:  PT Patient Time In/Time Out  Time In: 1030  Time Out: 209 Ann Marie Ornelas, PT

## 2017-10-16 NOTE — PROGRESS NOTES
TRANSFER - OUT REPORT:    Verbal report given to Micha RN(name) on Berhane Chester  being transferred to [facility](unit) for routine progression of care       Report consisted of patients Situation, Background, Assessment and   Recommendations(SBAR). Information from the following report(s) SBAR, Kardex, OR Summary, Procedure Summary, Intake/Output, MAR, Accordion, Recent Results and Med Rec Status was reviewed with the receiving nurse. Lines:       Opportunity for questions and clarification was provided.       Patient transported with:

## 2017-10-16 NOTE — PROGRESS NOTES
Shift Assessment Complete. Pt is post op day 3 from LTSA. Pt is A&Ox 3.  +2 radial pulses with purposeful movement in all four extremities. Dressing is clean, dry and intact. Pt denies any pain or need at this time. Bed low and locked. Side rails x3. Call light with in reach. Pt verbalizes understanding of call light.

## 2017-10-17 LAB
ABO + RH BLD: NORMAL
BLD PROD TYP BPU: NORMAL
BLD PROD TYP BPU: NORMAL
BLOOD GROUP ANTIBODIES SERPL: NORMAL
BPU ID: NORMAL
BPU ID: NORMAL
CROSSMATCH RESULT,%XM: NORMAL
CROSSMATCH RESULT,%XM: NORMAL
SPECIMEN EXP DATE BLD: NORMAL
STATUS OF UNIT,%ST: NORMAL
STATUS OF UNIT,%ST: NORMAL
UNIT DIVISION, %UDIV: 0
UNIT DIVISION, %UDIV: 0

## 2017-11-06 LAB
BACTERIA SPEC CULT: NORMAL
SERVICE CMNT-IMP: NORMAL

## 2017-12-18 ENCOUNTER — HOSPITAL ENCOUNTER (OUTPATIENT)
Dept: PHYSICAL THERAPY | Age: 82
Discharge: HOME | End: 2017-12-18
Payer: MEDICARE

## 2017-12-18 PROCEDURE — G8984 CARRY CURRENT STATUS: HCPCS

## 2017-12-18 PROCEDURE — 97162 PT EVAL MOD COMPLEX 30 MIN: CPT

## 2017-12-18 PROCEDURE — G8985 CARRY GOAL STATUS: HCPCS

## 2017-12-18 NOTE — PROGRESS NOTES
Ambulatory/Rehab Services H2 Model Falls Risk Assessment    Risk Factor Pts. ·   Confusion/Disorientation/Impulsivity  []    4 ·   Symptomatic Depression  []   2 ·   Altered Elimination  []   1 ·   Dizziness/Vertigo  []   1 ·   Gender (Male)  [x]   1 ·   Any administered antiepileptics (anticonvulsants):  []   2 ·   Any administered benzodiazepines:  []   1 ·   Visual Impairment (specify):  []   1 ·   Portable Oxygen Use  []   1 ·   Orthostatic ? BP  []   1 ·   History of Recent Falls (within 3 mos.)  []   5     Ability to Rise from Chair (choose one) Pts. ·   Ability to rise in a single movement  []   0 ·   Pushes up, successful in one attempt  [x]   1 ·   Multiple attempts, but successful  []   3 ·   Unable to rise without assistance  []   4   Total: (5 or greater = High Risk) 2     Falls Prevention Plan:   []                Physical Limitations to Exercise (specify):   []                Mobility Assistance Device (type):   []                Exercise/Equipment Adaptation (specify):    ©2010 [facility] [place] [address] [place] [address] Patent #[id number].  Federal Law prohibits the replication, distribution or use without written permission from [facility] [employer]

## 2017-12-18 NOTE — THERAPY EVALUATION
Glenn Coreas  : 1933  [place] / [place] / Product Type: Medicare /  [address] at [facility] [place]  [facility], [address], [place] [address].  [phone]   [phone]       OUTPATIENT PHYSICAL THERAPY:Initial Assessment 2017      ICD-10: Treatment Diagnosis: Stiffness of left shoulder, not elsewhere classified (M25.612); Muscle wasting and atrophy, not elsewhere classified, left shoulder (M62.512); Presence of left artificial shoulder joint (X09.762)  Precautions/Allergies: Reverse TSA precautions; R RC deficient shoulder; lumbar stenosis. Fall Risk Score: 2 (? 5 = High Risk)  MD Orders: Eval and Treat; HEP; ROM; Strength; \"full motion/full strength\" (17) MEDICAL/REFERRING DIAGNOSIS:Rem Implant Lt Shldr/Revision TSA    DATE OF ONSET: 10-13-17  REFERRING PHYSICIAN: Eliseo Hubbard MD  RETURN PHYSICIAN APPOINTMENT: 1-15-17     INITIAL ASSESSMENT:  Mr. Lawyer Diaz 2 months s/p removal of implant of L shoulder hemiarthroplasty, and revision L TSA with a reverse Delta Xtend prosthesis, and latissimus dorsi and teres major tendon transfer. He appears to be progressing well at this point. His pain is under control, and he had no significant pain reports. He is mostly stiff to forward elevation PROM. Rotation PROM is WFL's. He is quite weak to the anterior deltoid primarily, and scapulohumeral rhythm dysfunction are both contributing to limited active elevation range against gravity. This weakness and stiffness is limiting functional use of his L/non-dominant UE. His R/dominant UE has known rotator cuff pathology, which is severely limiting functional use of this UE. He also has co-morbid lumbar spine pathology with radicular weakness to L4-5 levels that affects his gait and stability. He lives alone, and needs to be able to perform basic personal care and household ADL's independently.  He will benefit from PT for guided reverse TSA rehab to promote safe return to functional use of the L UE, and to prep for pending R shoulder surgery. PROBLEM LIST (Impacting functional limitations):  1. Decreased L shoulder ROM   2. Weakness L shoulder INTERVENTIONS PLANNED:  1. Manual therapies, therapeutic exercises, HEP for ROM    2. Therapeutic exercises and HEP for strength   TREATMENT PLAN:  Effective Dates: 12/18/2017 TO 3/17/2018. Frequency/Duration: 2-3 times a week for 12 weeks  GOALS: (Goals have been discussed and agreed upon with patient.)  Short-Term Functional Goals: Time Frame: 6 weeks   1. L shoulder AROM forward elevation greater than 120 degrees to progress into functional ranges. 2. Demonstrate good L shoulder forward elevation strength for reach to head with grooming ADL's and reaching to shoulder height with household ADL's.  3. Independent with initial HEP. Discharge Goals: Time Frame: 12 weeks  1. Score less than 20% on the DASH. 2. L shoulder AROM forward elevation greater than 135 degrees for improved use with household activities reaching overhead. 3. Demonstrate good functional L shoulder strength and endurance for improved use of his L UE with his normalized daily activities. 4. Independent with advanced shoulder HEP for continued self-management. Rehabilitation Potential For Stated Goals: Good  Regarding Gloria Braden's therapy, I certify that the treatment plan above will be carried out by a therapist or under their direction. Thank you for this referral,      Manish Rivera, PT, MSPT, OCS       Referring Physician Signature:               Yan Baez MD               The information in this section was collected on 12/18/17 (except where otherwise noted). HISTORY:   History of Present Injury/Illness (Reason for Referral): Long, complex history of L shoulder pain and dysfunction with rotator cuff pathology.  He was s/p L rotator cuff repair on 8/28/15. He had a fall to both hands when walking up steps at his house in June 2016, resulting complete cuff tears B shoulders. L shoulder hemiarthroplasty done 7/2016. He did rehab, but poor outcome, and was still unable to raise and use L arm over shoulder. This revision Reverse TSA on L was done on 10/13/2017. Was in the hospital 3 days, then [facility] to [facility], and finally [facility] to home on 12/15/17. He had complication of a hemearthrosis with drop in hematocrit and hemoglobin. It was aspirated. Blood values improved. He as been doing OT post-op shoulder rehab when in the nursing home, consisting of shoulder/UE ROM and strengthening. He has not done HEP yet. He has co-morbid R rotator cuff pathology limiting functional use of his dominant arm, and anticipates shoulder arthroplasty on this in the near future. he also has co-morbid lumbar pathology with L L4-5 myotome weakness resulting in L foot drop. He lives alone and needs to be able to manage all his ADL's independently. Past Medical History/Comorbidities: Mr. Wing Briseno  has a past medical history of Anemia (fall 2016); Arthritis; CAD (coronary artery disease) (1997); Carotid stenosis (04/2017); Diabetes ([address] (05/1997); Diverticulosis (12/2011); GERD (gastroesophageal reflux disease); Glaucoma; Heart murmur; Hiatal hernia; History of kidney stones; Hypercholesteremia; Hypertension; Hypothyroid; MI (myocardial infarction); TIA (transient ischemic attack) (07/2016); Unspecified sleep apnea; Urethral stenosis; and Vasovagal syncope.  Mr. Wing Briseno  has a past surgical history that includes heart catheterization (3626,7767, 2012, 2013); tonsillectomy (1938); cataract removal (1987 and 1990); lap cholecystectomy (2003); hernia repair (1944); urological (1996); orthopaedic (Left); orthopaedic (0518-6594); shoulder arthroscopy (Left, 2015); knee replacement (Bilateral, 2002, 2008); hemorrhoidectomy (1987); heent (Right); carpal tunnel release (Right, 1968); carpal tunnel release (Left, 2012); bunionectomy (Left); rotator cuff repair (Right, 1995); rotator cuff repair (Left, 2000); rotator cuff repair (Right, 2007); and colectomy (01/13/2017). Social History/Living Environment:  Lives alone. Has supportive children and grandchildren living in town. Prior Level of Function/Work/Activity: Limited overhead use of L UE prior to this surgery. Limited use of R UE currently secondary to cuff pathology. Dominant Side: RIGHT  Current Medications:       simethicone (GAS-X) 125 mg capsule, Take 125 mg by mouth four (4) times daily as needed for Flatulence. , Disp: , Rfl:     loperamide (IMODIUM) 2 mg capsule, Take 2 mg by mouth four (4) times daily as needed for Diarrhea., Disp: , Rfl:     diphenoxylate-atropine (LOMOTIL) 2.5-0.025 mg per tablet, Take 1 Tab by mouth two (2) times daily as needed for Diarrhea., Disp: , Rfl:     atorvastatin (LIPITOR) 40 mg tablet, Take 40 mg by mouth nightly., Disp: , Rfl:     levothyroxine (SYNTHROID) 50 mcg tablet, Take 50 mcg by mouth Daily (before breakfast). , Disp: , Rfl:     Psyllium Husk-Sucrose 3.4 gram/7 gram powd, Take 3 Caps by mouth two (2) times a day., Disp: , Rfl:     omega-3 fatty acids-vitamin e (FISH OIL) 1,000 mg cap, Take 2 Caps by mouth daily. , Disp: , Rfl:     bimatoprost (LUMIGAN) 0.01 % ophthalmic drops, Administer 1 Drop to right eye nightly., Disp: , Rfl:     timolol (TIMOPTIC) 0.5 % ophthalmic solution, Administer 1 Drop to right eye every morning., Disp: , Rfl:     Omeprazole delayed release (PRILOSEC D/R) 20 mg tablet, Take 20 mg by mouth Daily (before dinner). , Disp: , Rfl:     meloxicam (MOBIC) 15 mg tablet, Take 15 mg by mouth daily. Indications: OSTEOARTHRITIS, Disp: , Rfl:     brimonidine (ALPHAGAN P) 0.1 % ophthalmic solution, Administer 1 Drop to right eye two (2) times a day. Take / use AM day of surgery  per anesthesia protocols. , Disp: , Rfl:     glimepiride (AMARYL) 2 mg tablet, Take 1 mg by mouth daily. Indications: type 2 diabetes mellitus, Disp: , Rfl:     b complex vitamins tablet, Take 1 Tab by mouth daily. , Disp: , Rfl:     aspirin 81 mg Tab, Take 81 mg by mouth daily. Take / use 81 mg AM day of surgery  per anesthesia protocols. , Disp: , Rfl:    Date Last Reviewed: 12/18/17   Number of Personal Factors/Comorbidities that affect the Plan of Care: 3+: HIGH COMPLEXITY   EXAMINATION:   Observation/Orthostatic Postural Assessment: Walks into clinic without significant distress, L UE hanging normally by side. Surgical scar to L anterior shoulder/humerus is well-healed. No apparent discoloration noted. Palpation:No more than mild tenderness to L upper trap, anterior shoulder. ROM:   LUE AROM  L Shoulder Flexion: 95 (forward elevation)  LUE PROM  L Shoulder Flexion: 135 (forward elevation)  L Shoulder ABduction: 80 (with scapula stabilized)  L Shoulder Internal Rotation: 60 (stabilzed at 45 and 60 deg abd)  L Shoulder External Rotation: 60 (in shoulder neutral and 45-60 deg abd)   L Forearm, Wrist, and Hand AROM: grossly WNL's.        RUE AROM  R Shoulder Flexion: 45 (forward elevation with humeral head supeior glide)  R Shoulder Internal Rotation: 20 (supported in abduction; to T8 behind back.)  R Shoulder External Rotation: 50 (by side, 70 deg supported in abduction)  RUE PROM  R Shoulder Flexion: 150 (forward elevation)  R Shoulder ABduction: 90 (with scapula stabilized)  R Shoulder Internal Rotation: 50 (stabilized at 45 adn 80 deg abd)  R Shoulder External Rotation: 75 (in shoulder neutral, 70 at 45 adn 80 deg abd)      Strength:    L Shoulder Forward Elevation and Abduction: 3- against gravity; Strong and painless to extension, abd, flex, elbow flex and ext with Soft Tissue Differential testing. R Shoulder: 3- forward elevation against gravity. Special Tests: None for shoulder. Neurological Screen: Motor and light touch sensory to UQ appear intact.   Functional Mobility: Sit to/from Stand: compensatory independent with use of UE's, slow to elevate. Bed Mobility: compensatory independent with decreased use of UE's. Reports compensatory independent with dressing, grooming, self-care, and basic household ADL's with limited use of UE's and inability to reach overhead. Body Structures Involved:  1. Joints  2. Muscles Body Functions Affected:  1. Neuromusculoskeletal  2. Movement Related Activities and Participation Affected:  1. General Tasks and Demands  2. Mobility  3. Self Care   Number of elements (examined above) that affect the Plan of Care: 4+: HIGH COMPLEXITY   CLINICAL PRESENTATION:   Presentation: Evolving clinical presentation with changing clinical characteristics: MODERATE COMPLEXITY   CLINICAL DECISION MAKING:   Outcome Measure: Tool Used: Disabilities of the Arm, Shoulder and Hand (DASH) Questionnaire - Quick Version  Score:  Initial: 25/55 or 32% disability Most Recent: X/55 (Date: -- )   Interpretation of Score: The DASH is designed to measure the activities of daily living in person's with upper extremity dysfunction or pain. Each section is scored on a 1-5 scale, 5 representing the greatest disability. The scores of each section are added together for a total score of 55. This number is divided by 11, followed by subtracting 1 and multiplying by 25 to get a percent score of disability. This value represents the percentage disability: 0-20% minimal disability; 20-40% moderate disability; 40-60% severe disability; % dependent for care or exaggerated symptom behavior. Minimal detectable change is 12%. Score 11 12-19 20-28 29-37 38-45 46-54 55   Modifier CH CI CJ CK CL CM CN ?    Carrying, Moving, and Handling Objects:     - CURRENT STATUS: CJ - 20%-39% impaired, limited or restricted    - GOAL STATUS: CI - 1%-19% impaired, limited or restricted    - D/C STATUS:  ---------------To be determined---------------    Medical Necessity:   · Patient is expected to demonstrate progress in strength and range of motion to promote increased functional use of his L/non-dominant UE. · Co-morbid R rotator cuff pathology and lumbar spine pathology will most likely complicate progress. · Lives alone and will need to be able function independently. Reason for Services/Other Comments:  · Patient continues to require skilled intervention due to the complexity of his history of shoulder pathology and the complexity of this revision reverse TSA. · He will need safe progression of post-op rehab to maximize return to functional use of his L UE.  · He is pending to have R shoulder arthroplasty in the future   Use of outcome tool(s) and clinical judgement create a POC that gives a: Questionable prediction of patient's progress: MODERATE COMPLEXITY            TREATMENT:   (In addition to Assessment/Re-Assessment sessions the following treatments were rendered)  Pre-treatment Symptoms/Complaints: No significant pain to report to L shoulder, except with sleep posturing/positioning and activities requiring humeral elevation. Feels he is doing better than pre-surgery with who he is able to reach his head and reach out of car window to mailbox and bank deposit. Pain: Initial: Pain Intensity 1: 2 (/10 L shoulder)  Post Session:  No increase. Therapeutic Exercise (6 Minutes): Review of exercise done most recently in rehab center. Instruction and performance in exercises for HEP, including shoulder motion wand flexion and ER in supine; active strength exercises, including prone extension, horizontal abduction, scaption, and rows, side-lying abd to 90-deg, supine flexion \"press\", and standing wall slide scaption. Education in Reverse TSA precautions. He verbalizes understanding. Required moderate verbal and tactile cues for exercise technique and scapulohumeral rhythm. HEP: Provided written HEP for the above exercises,.  verbalizes understanding.     Treatment/Session Assessment: · Response to Treatment:  He is about 2 months post-op now. Good PROM, but stiff to forward elevation. Good start to strength work, but marked weakness to anterior deltoid. Pain is low and under control. · Compliance with Program/Exercises: Will assess as treatment progresses. · Recommendations/Intent for next treatment session: We will continue with L shoulder ROM treatment; review and progress early phase scapulohumeral strengthening.    Total Treatment Duration: 65 Minutes  PT Patient Time In/Time Out  Time In: 1440  Time Out: [address] Park[address]ailene Borja, PT, MSPT, OCS

## 2017-12-20 ENCOUNTER — HOSPITAL ENCOUNTER (OUTPATIENT)
Dept: PHYSICAL THERAPY | Age: 82
Discharge: HOME | End: 2017-12-20
Payer: MEDICARE

## 2017-12-20 PROCEDURE — 97110 THERAPEUTIC EXERCISES: CPT

## 2017-12-20 NOTE — PROGRESS NOTES
Jessika Daniel  : 1933  [place] / [place] / Product Type: Medicare /  [address]  [facility]  [address] [place] [address] [address], [place] [address].  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 Fall Risk Score: 2 (? 5 = High Risk)  MD Orders: Eval and Treat; HEP; ROM; Strength; \"full motion/full strength\" (17) MEDICAL/REFERRING DIAGNOSIS:Rem Implant Lt Shldr/Revision TSA    DATE OF ONSET: 10-13-17  REFERRING PHYSICIAN: Oliva Jamil MD  RETURN PHYSICIAN APPOINTMENT: 1-15-17     INITIAL ASSESSMENT:  Mr. Mychal Mobley 2 months s/p removal of implant of L shoulder hemiarthroplasty, and revision L TSA with a reverse Delta Xtend prosthesis, and latissimus dorsi and teres major tendon transfer. He appears to be progressing well at this point. His pain is under control, and he had no significant pain reports. He is mostly stiff to forward elevation PROM. Rotation PROM is WFL's. He is quite weak to the anterior deltoid primarily, and scapulohumeral rhythm dysfunction are both contributing to limited active elevation range against gravity. This weakness and stiffness is limiting functional use of his L/non-dominant UE. His R/dominant UE has known rotator cuff pathology, which is severely limiting functional use of this UE. He also has co-morbid lumbar spine pathology with radicular weakness to L4-5 levels that affects his gait and stability. He lives alone, and needs to be able to perform basic personal care and household ADL's independently.  He will benefit from PT for guided reverse TSA rehab to promote safe return to functional use of the L UE, and to prep for pending R shoulder surgery. PROBLEM LIST (Impacting functional limitations):  1. Decreased L shoulder ROM   2. Weakness L shoulder INTERVENTIONS PLANNED:  1. Manual therapies, therapeutic exercises, HEP for ROM    2. Therapeutic exercises and HEP for strength   TREATMENT PLAN:  Effective Dates: 12/18/2017 TO 3/17/2018. Frequency/Duration: 2-3 times a week for 12 weeks  GOALS: (Goals have been discussed and agreed upon with patient.)  Short-Term Functional Goals: Time Frame: 6 weeks   1. L shoulder AROM forward elevation greater than 120 degrees to progress into functional ranges. 2. Demonstrate good L shoulder forward elevation strength for reach to head with grooming ADL's and reaching to shoulder height with household ADL's.  3. Independent with initial HEP. Discharge Goals: Time Frame: 12 weeks  1. Score less than 20% on the DASH. 2. L shoulder AROM forward elevation greater than 135 degrees for improved use with household activities reaching overhead. 3. Demonstrate good functional L shoulder strength and endurance for improved use of his L UE with his normalized daily activities. 4. Independent with advanced shoulder HEP for continued self-management. Rehabilitation Potential For Stated Goals: Good              The information in this section was collected on 12/18/17 (except where otherwise noted). HISTORY:   History of Present Injury/Illness (Reason for Referral): Long, complex history of L shoulder pain and dysfunction with rotator cuff pathology. He was s/p L rotator cuff repair on 8/28/15. He had a fall to both hands when walking up steps at his house in June 2016, resulting complete cuff tears B shoulders. L shoulder hemiarthroplasty done 7/2016. He did rehab, but poor outcome, and was still unable to raise and use L arm over shoulder. This revision Reverse TSA on L was done on 10/13/2017.  Was in the hospital 3 days, then [facility] to [facility], and finally [address] [address] to home on 12/15/17. He had complication of a hemearthrosis with drop in hematocrit and hemoglobin. It was aspirated. Blood values improved. He as been doing OT post-op shoulder rehab when in the nursing home, consisting of shoulder/UE ROM and strengthening. He has not done HEP yet. He has co-morbid R rotator cuff pathology limiting functional use of his dominant arm, and anticipates shoulder arthroplasty on this in the near future. he also has co-morbid lumbar pathology with L L4-5 myotome weakness resulting in L foot drop. He lives alone and needs to be able to manage all his ADL's independently. Past Medical History/Comorbidities: Mr. Miriam Ludwig  has a past medical history of Anemia (fall 2016); Arthritis; CAD (coronary artery disease) (1997); Carotid stenosis (04/2017); Diabetes ([address] (05/1997); Diverticulosis (12/2011); GERD (gastroesophageal reflux disease); Glaucoma; Heart murmur; Hiatal hernia; History of kidney stones; Hypercholesteremia; Hypertension; Hypothyroid; MI (myocardial infarction); TIA (transient ischemic attack) (07/2016); Unspecified sleep apnea; Urethral stenosis; and Vasovagal syncope. Mr. Miriam Ludwig  has a past surgical history that includes heart catheterization (0934,1417, 2012, 2013); tonsillectomy (1938); cataract removal (1987 and 1990); lap cholecystectomy (2003); hernia repair (1944); urological (1996); orthopaedic (Left); orthopaedic (6476-2089); shoulder arthroscopy (Left, 2015); knee replacement (Bilateral, 2002, 2008); hemorrhoidectomy (1987); heent (Right); carpal tunnel release (Right, 1968); carpal tunnel release (Left, 2012); bunionectomy (Left); rotator cuff repair (Right, 1995); rotator cuff repair (Left, 2000); rotator cuff repair (Right, 2007); and colectomy (01/13/2017). Social History/Living Environment:  Lives alone. Has supportive children and grandchildren living in town.    Prior Level of Function/Work/Activity: Limited overhead use of L UE prior to this surgery. Limited use of R UE currently secondary to cuff pathology. Dominant Side: RIGHT  Current Medications:       simethicone (GAS-X) 125 mg capsule, Take 125 mg by mouth four (4) times daily as needed for Flatulence. , Disp: , Rfl:     loperamide (IMODIUM) 2 mg capsule, Take 2 mg by mouth four (4) times daily as needed for Diarrhea., Disp: , Rfl:     diphenoxylate-atropine (LOMOTIL) 2.5-0.025 mg per tablet, Take 1 Tab by mouth two (2) times daily as needed for Diarrhea., Disp: , Rfl:     atorvastatin (LIPITOR) 40 mg tablet, Take 40 mg by mouth nightly., Disp: , Rfl:     levothyroxine (SYNTHROID) 50 mcg tablet, Take 50 mcg by mouth Daily (before breakfast). , Disp: , Rfl:     Psyllium Husk-Sucrose 3.4 gram/7 gram powd, Take 3 Caps by mouth two (2) times a day., Disp: , Rfl:     omega-3 fatty acids-vitamin e (FISH OIL) 1,000 mg cap, Take 2 Caps by mouth daily. , Disp: , Rfl:     bimatoprost (LUMIGAN) 0.01 % ophthalmic drops, Administer 1 Drop to right eye nightly., Disp: , Rfl:     timolol (TIMOPTIC) 0.5 % ophthalmic solution, Administer 1 Drop to right eye every morning., Disp: , Rfl:     Omeprazole delayed release (PRILOSEC D/R) 20 mg tablet, Take 20 mg by mouth Daily (before dinner). , Disp: , Rfl:     meloxicam (MOBIC) 15 mg tablet, Take 15 mg by mouth daily. Indications: OSTEOARTHRITIS, Disp: , Rfl:     brimonidine (ALPHAGAN P) 0.1 % ophthalmic solution, Administer 1 Drop to right eye two (2) times a day. Take / use AM day of surgery  per anesthesia protocols. , Disp: , Rfl:     glimepiride (AMARYL) 2 mg tablet, Take 1 mg by mouth daily. Indications: type 2 diabetes mellitus, Disp: , Rfl:     b complex vitamins tablet, Take 1 Tab by mouth daily. , Disp: , Rfl:     aspirin 81 mg Tab, Take 81 mg by mouth daily. Take / use 81 mg AM day of surgery  per anesthesia protocols. , Disp: , Rfl:    Date Last Reviewed: 12/18/17   Number of Personal Factors/Comorbidities that affect the Plan of Care: 3+: HIGH COMPLEXITY   EXAMINATION:   12/20/2017  Observation/Orthostatic Postural Assessment: Walks into clinic without significant distress,    Palpation:Unremarkable. ROM: Not measured. Strength: not measured. .             Body Structures Involved:  1. Joints  2. Muscles Body Functions Affected:  1. Neuromusculoskeletal  2. Movement Related Activities and Participation Affected:  1. General Tasks and Demands  2. Mobility  3. Self Care   Number of elements (examined above) that affect the Plan of Care: 4+: HIGH COMPLEXITY   CLINICAL PRESENTATION:   Presentation: Evolving clinical presentation with changing clinical characteristics: MODERATE COMPLEXITY   CLINICAL DECISION MAKING:   Outcome Measure: Tool Used: Disabilities of the Arm, Shoulder and Hand (DASH) Questionnaire - Quick Version  Score:  Initial: 25/55 or 32% disability Most Recent: X/55 (Date: -- )   Interpretation of Score: The DASH is designed to measure the activities of daily living in person's with upper extremity dysfunction or pain. Each section is scored on a 1-5 scale, 5 representing the greatest disability. The scores of each section are added together for a total score of 55. This number is divided by 11, followed by subtracting 1 and multiplying by 25 to get a percent score of disability. This value represents the percentage disability: 0-20% minimal disability; 20-40% moderate disability; 40-60% severe disability; % dependent for care or exaggerated symptom behavior. Minimal detectable change is 12%. Score 11 12-19 20-28 29-37 38-45 46-54 55   Modifier CH CI CJ CK CL CM CN ?    Carrying, Moving, and Handling Objects:     - CURRENT STATUS: CJ - 20%-39% impaired, limited or restricted    - GOAL STATUS: CI - 1%-19% impaired, limited or restricted    - D/C STATUS:  ---------------To be determined---------------    Medical Necessity:   · Patient is expected to demonstrate progress in strength and range of motion to promote increased functional use of his L/non-dominant UE. · Co-morbid R rotator cuff pathology and lumbar spine pathology will most likely complicate progress. · Lives alone and will need to be able function independently. Reason for Services/Other Comments:  · Patient continues to require skilled intervention due to the complexity of his history of shoulder pathology and the complexity of this revision reverse TSA. · He will need safe progression of post-op rehab to maximize return to functional use of his L UE.  · He is pending to have R shoulder arthroplasty in the future   Use of outcome tool(s) and clinical judgement create a POC that gives a: Questionable prediction of patient's progress: MODERATE COMPLEXITY            TREATMENT:   (In addition to Assessment/Re-Assessment sessions the following treatments were rendered)  12/20/2017  Pre-treatment Symptoms/Complaints: No pain to report. Has been working on the [employer] provided. The wall slide is difficult. Pain: Initial:   No pain to report. Post Session:  No increase. Therapeutic Exercise (45 Minutes): L shoulder motion exercises with PROM and hold/relax physiologic motions to ER in shoulder neutral, 45, and 60-80 deg abd, IR stabilized at 45 and 60 deg abd, abduction, and forward elevation, 3x30\" each; light reciprocal inhibition to stretch into abduction; and assisted reciprocal inhibition to horizontal abductor stretch, 10\"x6. L shoulder muscle activation with manual resisted alternating isometrics to rhythmic stabilizations in supine 0-deg ER, 100-deg flexion, and side-lying 90-deg abd, 3x15-20\" each. Humeral elevation strengthening with supine shoulder flexion/\"press\" 1# x30 rep, 2# x5 rep; standing abduction to 90-deg AAROM 1# x10, flexion 1# x5; bent row 5# x30; 30-deg beach chair elevation \"press\" AAROM to AROM 3x10; wall slide x10; and UE [place] to forward elevation 3x10-15.  Required moderate verbal and tactile cues for exercise technique and scapulohumeral rhythm. HEP: He is to continue with existing HEP. He  verbalizes understanding. Treatment/Session Assessment:    · Response to Treatment:  Good performance of the exercise done today. Weakness to the shoulder limiting humeral elevation. · Compliance with Program/Exercises: Appears compliant. · Recommendations/Intent for next treatment session: We will continue with L shoulder ROM treatment; review and progress early phase scapulohumeral strengthening.    Total Treatment Duration: 45 Minutes  PT Patient Time In/Time Out  Time In: 0950  Time Out: [address] Hammonds, PT, MSPT, OCS

## 2017-12-21 ENCOUNTER — HOSPITAL ENCOUNTER (OUTPATIENT)
Dept: PHYSICAL THERAPY | Age: 82
Discharge: HOME | End: 2017-12-21
Payer: MEDICARE

## 2017-12-21 PROCEDURE — 97110 THERAPEUTIC EXERCISES: CPT

## 2017-12-21 NOTE — PROGRESS NOTES
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 Fall Risk Score: 2 (? 5 = High Risk)  MD Orders: Eval and Treat; HEP; ROM; Strength; \"full motion/full strength\" (17) MEDICAL/REFERRING DIAGNOSIS:Rem Implant Lt Shldr/Revision TSA    DATE OF ONSET: 10-13-17  REFERRING PHYSICIAN: Ezekiel Carpio MD  RETURN PHYSICIAN APPOINTMENT: 1-15-17     INITIAL ASSESSMENT:  Mr. Feliciano Armas 2 months s/p removal of implant of L shoulder hemiarthroplasty, and revision L TSA with a reverse Delta Xtend prosthesis, and latissimus dorsi and teres major tendon transfer. He appears to be progressing well at this point. His pain is under control, and he had no significant pain reports. He is mostly stiff to forward elevation PROM. Rotation PROM is WFL's. He is quite weak to the anterior deltoid primarily, and scapulohumeral rhythm dysfunction are both contributing to limited active elevation range against gravity. This weakness and stiffness is limiting functional use of his L/non-dominant UE. His R/dominant UE has known rotator cuff pathology, which is severely limiting functional use of this UE. He also has co-morbid lumbar spine pathology with radicular weakness to L4-5 levels that affects his gait and stability. He lives alone, and needs to be able to perform basic personal care and household ADL's independently.  He will benefit from PT for guided reverse TSA rehab to promote safe return to functional use of the L UE, and to prep for pending R shoulder surgery. PROBLEM LIST (Impacting functional limitations):  1. Decreased L shoulder ROM   2. Weakness L shoulder INTERVENTIONS PLANNED:  1. Manual therapies, therapeutic exercises, HEP for ROM    2. Therapeutic exercises and HEP for strength   TREATMENT PLAN:  Effective Dates: 12/18/2017 TO 3/17/2018. Frequency/Duration: 2-3 times a week for 12 weeks  GOALS: (Goals have been discussed and agreed upon with patient.)  Short-Term Functional Goals: Time Frame: 6 weeks   1. L shoulder AROM forward elevation greater than 120 degrees to progress into functional ranges. 2. Demonstrate good L shoulder forward elevation strength for reach to head with grooming ADL's and reaching to shoulder height with household ADL's.  3. Independent with initial HEP. Discharge Goals: Time Frame: 12 weeks  1. Score less than 20% on the DASH. 2. L shoulder AROM forward elevation greater than 135 degrees for improved use with household activities reaching overhead. 3. Demonstrate good functional L shoulder strength and endurance for improved use of his L UE with his normalized daily activities. 4. Independent with advanced shoulder HEP for continued self-management. Rehabilitation Potential For Stated Goals: Good              The information in this section was collected on 12/18/17 (except where otherwise noted). HISTORY:   History of Present Injury/Illness (Reason for Referral): Long, complex history of L shoulder pain and dysfunction with rotator cuff pathology. He was s/p L rotator cuff repair on 8/28/15. He had a fall to both hands when walking up steps at his house in June 2016, resulting complete cuff tears B shoulders. L shoulder hemiarthroplasty done 7/2016. He did rehab, but poor outcome, and was still unable to raise and use L arm over shoulder. This revision Reverse TSA on L was done on 10/13/2017.  Was in the hospital 3 days, then [facility] to [facility], and finally [address] [address] to home on 12/15/17. He had complication of a hemearthrosis with drop in hematocrit and hemoglobin. It was aspirated. Blood values improved. He as been doing OT post-op shoulder rehab when in the nursing home, consisting of shoulder/UE ROM and strengthening. He has not done HEP yet. He has co-morbid R rotator cuff pathology limiting functional use of his dominant arm, and anticipates shoulder arthroplasty on this in the near future. he also has co-morbid lumbar pathology with L L4-5 myotome weakness resulting in L foot drop. He lives alone and needs to be able to manage all his ADL's independently. Past Medical History/Comorbidities: Mr. Zoltan Bhatia  has a past medical history of Anemia (fall 2016); Arthritis; CAD (coronary artery disease) (1997); Carotid stenosis (04/2017); Diabetes ([address] (05/1997); Diverticulosis (12/2011); GERD (gastroesophageal reflux disease); Glaucoma; Heart murmur; Hiatal hernia; History of kidney stones; Hypercholesteremia; Hypertension; Hypothyroid; MI (myocardial infarction); TIA (transient ischemic attack) (07/2016); Unspecified sleep apnea; Urethral stenosis; and Vasovagal syncope. Mr. Zoltan Bhatia  has a past surgical history that includes heart catheterization (6930,2767, 2012, 2013); tonsillectomy (1938); cataract removal (1987 and 1990); lap cholecystectomy (2003); hernia repair (1944); urological (1996); orthopaedic (Left); orthopaedic ([id number]); shoulder arthroscopy (Left, 2015); knee replacement (Bilateral, 2002, 2008); hemorrhoidectomy (1987); heent (Right); carpal tunnel release (Right, 1968); carpal tunnel release (Left, 2012); bunionectomy (Left); rotator cuff repair (Right, 1995); rotator cuff repair (Left, 2000); rotator cuff repair (Right, 2007); and colectomy (01/13/2017). Social History/Living Environment:  Lives alone. Has supportive children and grandchildren living in town.    Prior Level of Function/Work/Activity: Limited overhead use of L UE prior to this surgery. Limited use of R UE currently secondary to cuff pathology. Dominant Side: RIGHT  Current Medications:       simethicone (GAS-X) 125 mg capsule, Take 125 mg by mouth four (4) times daily as needed for Flatulence. , Disp: , Rfl:     loperamide (IMODIUM) 2 mg capsule, Take 2 mg by mouth four (4) times daily as needed for Diarrhea., Disp: , Rfl:     diphenoxylate-atropine (LOMOTIL) 2.5-0.025 mg per tablet, Take 1 Tab by mouth two (2) times daily as needed for Diarrhea., Disp: , Rfl:     atorvastatin (LIPITOR) 40 mg tablet, Take 40 mg by mouth nightly., Disp: , Rfl:     levothyroxine (SYNTHROID) 50 mcg tablet, Take 50 mcg by mouth Daily (before breakfast). , Disp: , Rfl:     Psyllium Husk-Sucrose 3.4 gram/7 gram powd, Take 3 Caps by mouth two (2) times a day., Disp: , Rfl:     omega-3 fatty acids-vitamin e (FISH OIL) 1,000 mg cap, Take 2 Caps by mouth daily. , Disp: , Rfl:     bimatoprost (LUMIGAN) 0.01 % ophthalmic drops, Administer 1 Drop to right eye nightly., Disp: , Rfl:     timolol (TIMOPTIC) 0.5 % ophthalmic solution, Administer 1 Drop to right eye every morning., Disp: , Rfl:     Omeprazole delayed release (PRILOSEC D/R) 20 mg tablet, Take 20 mg by mouth Daily (before dinner). , Disp: , Rfl:     meloxicam (MOBIC) 15 mg tablet, Take 15 mg by mouth daily. Indications: OSTEOARTHRITIS, Disp: , Rfl:     brimonidine (ALPHAGAN P) 0.1 % ophthalmic solution, Administer 1 Drop to right eye two (2) times a day. Take / use AM day of surgery  per anesthesia protocols. , Disp: , Rfl:     glimepiride (AMARYL) 2 mg tablet, Take 1 mg by mouth daily. Indications: type 2 diabetes mellitus, Disp: , Rfl:     b complex vitamins tablet, Take 1 Tab by mouth daily. , Disp: , Rfl:     aspirin 81 mg Tab, Take 81 mg by mouth daily. Take / use 81 mg AM day of surgery  per anesthesia protocols. , Disp: , Rfl:    Date Last Reviewed: 12/18/17   Number of Personal Factors/Comorbidities that affect the Plan of Care: 3+: HIGH COMPLEXITY   EXAMINATION:   12/21/2017  Observation/Orthostatic Postural Assessment: Walks into clinic without significant distress,    Palpation:Unremarkable. ROM: Not measured. Strength: not measured. .             Body Structures Involved:  1. Joints  2. Muscles Body Functions Affected:  1. Neuromusculoskeletal  2. Movement Related Activities and Participation Affected:  1. General Tasks and Demands  2. Mobility  3. Self Care   Number of elements (examined above) that affect the Plan of Care: 4+: HIGH COMPLEXITY   CLINICAL PRESENTATION:   Presentation: Evolving clinical presentation with changing clinical characteristics: MODERATE COMPLEXITY   CLINICAL DECISION MAKING:   Outcome Measure: Tool Used: Disabilities of the Arm, Shoulder and Hand (DASH) Questionnaire - Quick Version  Score:  Initial: 25/55 or 32% disability Most Recent: X/55 (Date: -- )   Interpretation of Score: The DASH is designed to measure the activities of daily living in person's with upper extremity dysfunction or pain. Each section is scored on a 1-5 scale, 5 representing the greatest disability. The scores of each section are added together for a total score of 55. This number is divided by 11, followed by subtracting 1 and multiplying by 25 to get a percent score of disability. This value represents the percentage disability: 0-20% minimal disability; 20-40% moderate disability; 40-60% severe disability; % dependent for care or exaggerated symptom behavior. Minimal detectable change is 12%. Score 11 12-19 20-28 29-37 38-45 46-54 55   Modifier CH CI CJ CK CL CM CN ?    Carrying, Moving, and Handling Objects:     - CURRENT STATUS: CJ - 20%-39% impaired, limited or restricted    - GOAL STATUS: CI - 1%-19% impaired, limited or restricted    - D/C STATUS:  ---------------To be determined---------------    Medical Necessity:   · Patient is expected to demonstrate progress in strength and range of motion to promote increased functional use of his L/non-dominant UE. · Co-morbid R rotator cuff pathology and lumbar spine pathology will most likely complicate progress. · Lives alone and will need to be able function independently. Reason for Services/Other Comments:  · Patient continues to require skilled intervention due to the complexity of his history of shoulder pathology and the complexity of this revision reverse TSA. · He will need safe progression of post-op rehab to maximize return to functional use of his L UE.  · He is pending to have R shoulder arthroplasty in the future   Use of outcome tool(s) and clinical judgement create a POC that gives a: Questionable prediction of patient's progress: MODERATE COMPLEXITY            TREATMENT:   (In addition to Assessment/Re-Assessment sessions the following treatments were rendered)  12/21/2017  Pre-treatment Symptoms/Complaints:L shoulder is a little sore this morning. Had a rough night dealing with GI issues last night, so tired. Pain: Initial: Pain Intensity 1: 4 (/10 L shoulder) Post Session:  No increase reported. Therapeutic Exercise (50 Minutes): Initiated UBE on level 1 x8' for active warm up. L shoulder motion exercises with PROM to AAROM 3\"x10 each and hold/relax physiologic motions, 30\"x3 each, to ER in shoulder neutral, 45, and 80 deg abd, IR stabilized at 45 and 60 deg abd, abduction, forward elevation, and horizontal abd. L shoulder muscle activation with manual resisted alternating isometrics to rhythmic stabilizations in supine 0-deg ER, 100-deg flexion, and side-lying 90-deg abd, 3x15-20\" each. Humeral elevation strengthening with side-lying abduction, 3x10; side-lying middle and lower trap 2x10 each; standing flexion and Abduction to 90-deg active eccentrics 2x5 each; and UE [place] forward elevation 2x15 with horiz abd/add in elevation 2x10.  Required moderate verbal and tactile cues for exercise technique and scapulohumeral rhythm. HEP: He is to continue with existing HEP. Also provided written HEP for lumbopelvic mobility in hook-lying and sitting, and ankle dorsiflexion AROM in sitting and with pre-gait weight shifting in stride. He  verbalizes understanding. Treatment/Session Assessment:    · Response to Treatment:  Good performance of the exercise done today. Tightness to pec major noted. · Compliance with Program/Exercises: Appears compliant. · Recommendations/Intent for next treatment session: We will continue with L shoulder ROM treatment; review and progress early phase scapulohumeral strengthening.    Total Treatment Duration: 50 Minutes  PT Patient Time In/Time Out  Time In: 1120  Time Out: 1102 [facility], PT, MSPT, OCS

## 2017-12-26 ENCOUNTER — HOSPITAL ENCOUNTER (OUTPATIENT)
Dept: PHYSICAL THERAPY | Age: 82
Discharge: HOME | End: 2017-12-26
Payer: MEDICARE

## 2017-12-26 PROCEDURE — 97140 MANUAL THERAPY 1/> REGIONS: CPT

## 2017-12-26 PROCEDURE — 97110 THERAPEUTIC EXERCISES: CPT

## 2017-12-26 NOTE — PROGRESS NOTES
Rajesh Hernandez  : 1933  [place] / [place] / Product Type: Medicare /  [address]  [address]  [address] [facility], [address] [place] [address],[address], [address] 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 Fall Risk Score: 2 (? 5 = High Risk)  MD Orders: Eval and Treat; HEP; ROM; Strength; \"full motion/full strength\" (17) MEDICAL/REFERRING DIAGNOSIS:Rem Implant Lt Shldr/Revision TSA    DATE OF ONSET: 10-13-17  REFERRING PHYSICIAN: Obinna David MD  RETURN PHYSICIAN APPOINTMENT: 1-15-17     INITIAL ASSESSMENT:  Mr. Lauren Meza 2 months s/p removal of implant of L shoulder hemiarthroplasty, and revision L TSA with a reverse Delta Xtend prosthesis, and latissimus dorsi and teres major tendon transfer. He appears to be progressing well at this point. His pain is under control, and he had no significant pain reports. He is mostly stiff to forward elevation PROM. Rotation PROM is WFL's. He is quite weak to the anterior deltoid primarily, and scapulohumeral rhythm dysfunction are both contributing to limited active elevation range against gravity. This weakness and stiffness is limiting functional use of his L/non-dominant UE. His R/dominant UE has known rotator cuff pathology, which is severely limiting functional use of this UE. He also has co-morbid lumbar spine pathology with radicular weakness to L4-5 levels that affects his gait and stability. He lives alone, and needs to be able to perform basic personal care and household ADL's independently.  He will benefit from PT for guided reverse TSA rehab to promote safe return to functional use of the L UE, and to prep for pending R shoulder surgery. PROBLEM LIST (Impacting functional limitations):  1. Decreased L shoulder ROM   2. Weakness L shoulder INTERVENTIONS PLANNED:  1. Manual therapies, therapeutic exercises, HEP for ROM    2. Therapeutic exercises and HEP for strength   TREATMENT PLAN:  Effective Dates: 12/18/2017 TO 3/17/2018. Frequency/Duration: 2-3 times a week for 12 weeks  GOALS: (Goals have been discussed and agreed upon with patient.)  Short-Term Functional Goals: Time Frame: 6 weeks   1. L shoulder AROM forward elevation greater than 120 degrees to progress into functional ranges. 2. Demonstrate good L shoulder forward elevation strength for reach to head with grooming ADL's and reaching to shoulder height with household ADL's.  3. Independent with initial HEP. Discharge Goals: Time Frame: 12 weeks  1. Score less than 20% on the DASH. 2. L shoulder AROM forward elevation greater than 135 degrees for improved use with household activities reaching overhead. 3. Demonstrate good functional L shoulder strength and endurance for improved use of his L UE with his normalized daily activities. 4. Independent with advanced shoulder HEP for continued self-management. Rehabilitation Potential For Stated Goals: Good              The information in this section was collected on 12/18/17 (except where otherwise noted). HISTORY:   History of Present Injury/Illness (Reason for Referral): Long, complex history of L shoulder pain and dysfunction with rotator cuff pathology. He was s/p L rotator cuff repair on 8/28/15. He had a fall to both hands when walking up steps at his house in June 2016, resulting complete cuff tears B shoulders. L shoulder hemiarthroplasty done 7/2016. He did rehab, but poor outcome, and was still unable to raise and use L arm over shoulder. This revision Reverse TSA on L was done on 10/13/2017.  Was in the hospital 3 days, then [facility] to [facility], and finally [address] [address] to home on 12/15/17. He had complication of a hemearthrosis with drop in hematocrit and hemoglobin. It was aspirated. Blood values improved. He as been doing OT post-op shoulder rehab when in the nursing home, consisting of shoulder/UE ROM and strengthening. He has not done HEP yet. He has co-morbid R rotator cuff pathology limiting functional use of his dominant arm, and anticipates shoulder arthroplasty on this in the near future. he also has co-morbid lumbar pathology with L L4-5 myotome weakness resulting in L foot drop. He lives alone and needs to be able to manage all his ADL's independently. Past Medical History/Comorbidities: Mr. Venkatesh Garrison  has a past medical history of Anemia (fall 2016); Arthritis; CAD (coronary artery disease) (1997); Carotid stenosis (04/2017); Diabetes ([facility] [address] (05/1997); Diverticulosis (12/2011); GERD (gastroesophageal reflux disease); Glaucoma; Heart murmur; Hiatal hernia; History of kidney stones; Hypercholesteremia; Hypertension; Hypothyroid; MI (myocardial infarction); TIA (transient ischemic attack) (07/2016); Unspecified sleep apnea; Urethral stenosis; and Vasovagal syncope. Mr. Venkatesh Garrison  has a past surgical history that includes heart catheterization (0843,8110, 2012, 2013); tonsillectomy (1938); cataract removal (1987 and 1990); lap cholecystectomy (2003); hernia repair (1944); urological (1996); orthopaedic (Left); orthopaedic (3848-3077); shoulder arthroscopy (Left, 2015); knee replacement (Bilateral, 2002, 2008); hemorrhoidectomy (1987); heent (Right); carpal tunnel release (Right, 1968); carpal tunnel release (Left, 2012); bunionectomy (Left); rotator cuff repair (Right, 1995); rotator cuff repair (Left, 2000); rotator cuff repair (Right, 2007); and colectomy (01/13/2017). Social History/Living Environment:  Lives alone. Has supportive children and grandchildren living in town.    Prior Level of Function/Work/Activity: Limited overhead use of L UE prior to this surgery. Limited use of R UE currently secondary to cuff pathology. Dominant Side: RIGHT  Current Medications:       simethicone (GAS-X) 125 mg capsule, Take 125 mg by mouth four (4) times daily as needed for Flatulence. , Disp: , Rfl:     loperamide (IMODIUM) 2 mg capsule, Take 2 mg by mouth four (4) times daily as needed for Diarrhea., Disp: , Rfl:     diphenoxylate-atropine (LOMOTIL) 2.5-0.025 mg per tablet, Take 1 Tab by mouth two (2) times daily as needed for Diarrhea., Disp: , Rfl:     atorvastatin (LIPITOR) 40 mg tablet, Take 40 mg by mouth nightly., Disp: , Rfl:     levothyroxine (SYNTHROID) 50 mcg tablet, Take 50 mcg by mouth Daily (before breakfast). , Disp: , Rfl:     Psyllium Husk-Sucrose 3.4 gram/7 gram powd, Take 3 Caps by mouth two (2) times a day., Disp: , Rfl:     omega-3 fatty acids-vitamin e (FISH OIL) 1,000 mg cap, Take 2 Caps by mouth daily. , Disp: , Rfl:     bimatoprost (LUMIGAN) 0.01 % ophthalmic drops, Administer 1 Drop to right eye nightly., Disp: , Rfl:     timolol (TIMOPTIC) 0.5 % ophthalmic solution, Administer 1 Drop to right eye every morning., Disp: , Rfl:     Omeprazole delayed release (PRILOSEC D/R) 20 mg tablet, Take 20 mg by mouth Daily (before dinner). , Disp: , Rfl:     meloxicam (MOBIC) 15 mg tablet, Take 15 mg by mouth daily. Indications: OSTEOARTHRITIS, Disp: , Rfl:     brimonidine (ALPHAGAN P) 0.1 % ophthalmic solution, Administer 1 Drop to right eye two (2) times a day. Take / use AM day of surgery  per anesthesia protocols. , Disp: , Rfl:     glimepiride (AMARYL) 2 mg tablet, Take 1 mg by mouth daily. Indications: type 2 diabetes mellitus, Disp: , Rfl:     b complex vitamins tablet, Take 1 Tab by mouth daily. , Disp: , Rfl:     aspirin 81 mg Tab, Take 81 mg by mouth daily. Take / use 81 mg AM day of surgery  per anesthesia protocols. , Disp: , Rfl:    Date Last Reviewed: 12/2617   Number of Personal Factors/Comorbidities that affect the Plan of Care: 3+: HIGH COMPLEXITY   EXAMINATION:   12/26/2017  Observation/Orthostatic Postural Assessment: not assessed today    Palpation:tight lat dorsi, pec majot/minor     ROM: Not measured. Strength: not measured. .             Body Structures Involved:  1. Joints  2. Muscles Body Functions Affected:  1. Neuromusculoskeletal  2. Movement Related Activities and Participation Affected:  1. General Tasks and Demands  2. Mobility  3. Self Care   Number of elements (examined above) that affect the Plan of Care: 4+: HIGH COMPLEXITY   CLINICAL PRESENTATION:   Presentation: Evolving clinical presentation with changing clinical characteristics: MODERATE COMPLEXITY   CLINICAL DECISION MAKING:   Outcome Measure: Tool Used: Disabilities of the Arm, Shoulder and Hand (DASH) Questionnaire - Quick Version  Score:  Initial: 25/55 or 32% disability Most Recent: X/55 (Date: -- )   Interpretation of Score: The DASH is designed to measure the activities of daily living in person's with upper extremity dysfunction or pain. Each section is scored on a 1-5 scale, 5 representing the greatest disability. The scores of each section are added together for a total score of 55. This number is divided by 11, followed by subtracting 1 and multiplying by 25 to get a percent score of disability. This value represents the percentage disability: 0-20% minimal disability; 20-40% moderate disability; 40-60% severe disability; % dependent for care or exaggerated symptom behavior. Minimal detectable change is 12%. Score 11 12-19 20-28 29-37 38-45 46-54 55   Modifier CH CI CJ CK CL CM CN ?    Carrying, Moving, and Handling Objects:     - CURRENT STATUS: CJ - 20%-39% impaired, limited or restricted    - GOAL STATUS: CI - 1%-19% impaired, limited or restricted    - D/C STATUS:  ---------------To be determined---------------    Medical Necessity:   · Patient is expected to demonstrate progress in strength and range of motion to promote increased functional use of his L/non-dominant UE. · Co-morbid R rotator cuff pathology and lumbar spine pathology will most likely complicate progress. · Lives alone and will need to be able function independently. Reason for Services/Other Comments:  · Patient continues to require skilled intervention due to the complexity of his history of shoulder pathology and the complexity of this revision reverse TSA. · He will need safe progression of post-op rehab to maximize return to functional use of his L UE.  · He is pending to have R shoulder arthroplasty in the future   Use of outcome tool(s) and clinical judgement create a POC that gives a: Questionable prediction of patient's progress: MODERATE COMPLEXITY            TREATMENT:   (In addition to Assessment/Re-Assessment sessions the following treatments were rendered)  12/26/2017  Pre-treatment Symptoms/Complaints:L shoulder is mostly stiff feeling. Pain: Initial:  no pain rating reported since pt was stating no pain. Post Session:  Stated arm felt looser. Manual: (8 min) functional  myofascial release of lat dorsi and pec major/minor with progressive active stretching  Therapeutic Exercise ( 35 min):  L shoulder motion exercises with PROM to AAROM 3\"x10 each for ER in shoulder neutral and 45, and 80 deg abd;  forward elevation and horizontal abd. Date:  12/26/17 Date:   Date:     Activity/Exercise Parameters Parameters Parameters   Mid and lower trap Side lye  10x ea  1# 10x 2 ea     Push up + Manual resist  10x2     Side lye shoulder ABD 10x  1# 10x     Shoulder flexion Supine:   10x  Sitting: Active eccentric with min assist  100 degrees  Flexion and scaption  5x ea     Horz ABD Stand bent over L foot on stool:  1# 3x 3  Standing:  Yellow band  At 80 degrees flexion- 5x 2     Shoulder EXT 3# 10x2     Elbow EXT 3# 10x2         HEP: He is to continue with existing HEP. Added directions for addition of 1# weight to some exercises. Checked those off on and handed him updated copy of HEP. Treatment/Session Assessment:    · Response to Treatment:  Upper trap compensation for shoulder flexion above ~ 75 degrees but was able to hold arm at ~ 100 degrees when placed there. · Compliance with Program/Exercises: Appears compliant. · Recommendations/Intent for next treatment session: We will continue with L shoulder ROM treatment; review and progress early phase scapulohumeral strengthening. Note exercise program established for HEP in chart.   Total Treatment Duration: 43 Minutes  PT Patient Time In/Time Out  Time In: 1120  Time Out: [address], PT, [facility]

## 2017-12-28 ENCOUNTER — HOSPITAL ENCOUNTER (OUTPATIENT)
Dept: PHYSICAL THERAPY | Age: 82
Discharge: HOME | End: 2017-12-28
Payer: MEDICARE

## 2017-12-28 PROCEDURE — 97110 THERAPEUTIC EXERCISES: CPT

## 2017-12-28 PROCEDURE — 97140 MANUAL THERAPY 1/> REGIONS: CPT

## 2017-12-28 NOTE — PROGRESS NOTES
Jl Silva  : 1933  [place] / [place] / Product Type: Medicare /  [address]  at [facility] [facility]  [facility], [address], [facility] [place] Ke[place].  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 Fall Risk Score: 2 (? 5 = High Risk)  MD Orders: Eval and Treat; HEP; ROM; Strength; \"full motion/full strength\" (17) MEDICAL/REFERRING DIAGNOSIS:Rem Implant Lt Shldr/Revision TSA    DATE OF ONSET: 10-13-17  REFERRING PHYSICIAN: Lori Richards MD  RETURN PHYSICIAN APPOINTMENT: 1-15-17     INITIAL ASSESSMENT:  Mr. Katalina Jaramillo 2 months s/p removal of implant of L shoulder hemiarthroplasty, and revision L TSA with a reverse Delta Xtend prosthesis, and latissimus dorsi and teres major tendon transfer. He appears to be progressing well at this point. His pain is under control, and he had no significant pain reports. He is mostly stiff to forward elevation PROM. Rotation PROM is WFL's. He is quite weak to the anterior deltoid primarily, and scapulohumeral rhythm dysfunction are both contributing to limited active elevation range against gravity. This weakness and stiffness is limiting functional use of his L/non-dominant UE. His R/dominant UE has known rotator cuff pathology, which is severely limiting functional use of this UE. He also has co-morbid lumbar spine pathology with radicular weakness to L4-5 levels that affects his gait and stability. He lives alone, and needs to be able to perform basic personal care and household ADL's independently.  He will benefit from PT for guided reverse TSA rehab to promote safe return to functional use of the L UE, and to prep for pending R shoulder surgery. PROBLEM LIST (Impacting functional limitations):  1. Decreased L shoulder ROM   2. Weakness L shoulder INTERVENTIONS PLANNED:  1. Manual therapies, therapeutic exercises, HEP for ROM    2. Therapeutic exercises and HEP for strength   TREATMENT PLAN:  Effective Dates: 12/18/2017 TO 3/17/2018. Frequency/Duration: 2-3 times a week for 12 weeks  GOALS: (Goals have been discussed and agreed upon with patient.)  Short-Term Functional Goals: Time Frame: 6 weeks   1. L shoulder AROM forward elevation greater than 120 degrees to progress into functional ranges. 2. Demonstrate good L shoulder forward elevation strength for reach to head with grooming ADL's and reaching to shoulder height with household ADL's.  3. Independent with initial HEP. Discharge Goals: Time Frame: 12 weeks  1. Score less than 20% on the DASH. 2. L shoulder AROM forward elevation greater than 135 degrees for improved use with household activities reaching overhead. 3. Demonstrate good functional L shoulder strength and endurance for improved use of his L UE with his normalized daily activities. 4. Independent with advanced shoulder HEP for continued self-management. Rehabilitation Potential For Stated Goals: Good              The information in this section was collected on 12/18/17 (except where otherwise noted). HISTORY:   History of Present Injury/Illness (Reason for Referral): Long, complex history of L shoulder pain and dysfunction with rotator cuff pathology. He was s/p L rotator cuff repair on 8/28/15. He had a fall to both hands when walking up steps at his house in June 2016, resulting complete cuff tears B shoulders. L shoulder hemiarthroplasty done 7/2016. He did rehab, but poor outcome, and was still unable to raise and use L arm over shoulder. This revision Reverse TSA on L was done on 10/13/2017.  Was in the hospital 3 days, then [facility] to [facility], and finally [address] [address] to home on 12/15/17. He had complication of a hemearthrosis with drop in hematocrit and hemoglobin. It was aspirated. Blood values improved. He as been doing OT post-op shoulder rehab when in the nursing home, consisting of shoulder/UE ROM and strengthening. He has not done HEP yet. He has co-morbid R rotator cuff pathology limiting functional use of his dominant arm, and anticipates shoulder arthroplasty on this in the near future. he also has co-morbid lumbar pathology with L L4-5 myotome weakness resulting in L foot drop. He lives alone and needs to be able to manage all his ADL's independently. Past Medical History/Comorbidities: Mr. Sabas Pepper  has a past medical history of Anemia (fall 2016); Arthritis; CAD (coronary artery disease) (1997); Carotid stenosis (04/2017); Diabetes ([address] (05/1997); Diverticulosis (12/2011); GERD (gastroesophageal reflux disease); Glaucoma; Heart murmur; Hiatal hernia; History of kidney stones; Hypercholesteremia; Hypertension; Hypothyroid; MI (myocardial infarction); TIA (transient ischemic attack) (07/2016); Unspecified sleep apnea; Urethral stenosis; and Vasovagal syncope. Mr. Sabas Pepper  has a past surgical history that includes heart catheterization (2510,1571, 2012, 2013); tonsillectomy (1938); cataract removal (1987 and 1990); lap cholecystectomy (2003); hernia repair (1944); urological (1996); orthopaedic (Left); orthopaedic ([id number]); shoulder arthroscopy (Left, 2015); knee replacement (Bilateral, 2002, 2008); hemorrhoidectomy (1987); heent (Right); carpal tunnel release (Right, 1968); carpal tunnel release (Left, 2012); bunionectomy (Left); rotator cuff repair (Right, 1995); rotator cuff repair (Left, 2000); rotator cuff repair (Right, 2007); and colectomy (01/13/2017). Social History/Living Environment:  Lives alone. Has supportive children and grandchildren living in town.    Prior Level of Function/Work/Activity: Limited overhead use of L UE prior to this surgery. Limited use of R UE currently secondary to cuff pathology. Dominant Side: RIGHT  Current Medications:       simethicone (GAS-X) 125 mg capsule, Take 125 mg by mouth four (4) times daily as needed for Flatulence. , Disp: , Rfl:     loperamide (IMODIUM) 2 mg capsule, Take 2 mg by mouth four (4) times daily as needed for Diarrhea., Disp: , Rfl:     diphenoxylate-atropine (LOMOTIL) 2.5-0.025 mg per tablet, Take 1 Tab by mouth two (2) times daily as needed for Diarrhea., Disp: , Rfl:     atorvastatin (LIPITOR) 40 mg tablet, Take 40 mg by mouth nightly., Disp: , Rfl:     levothyroxine (SYNTHROID) 50 mcg tablet, Take 50 mcg by mouth Daily (before breakfast). , Disp: , Rfl:     Psyllium Husk-Sucrose 3.4 gram/7 gram powd, Take 3 Caps by mouth two (2) times a day., Disp: , Rfl:     omega-3 fatty acids-vitamin e (FISH OIL) 1,000 mg cap, Take 2 Caps by mouth daily. , Disp: , Rfl:     bimatoprost (LUMIGAN) 0.01 % ophthalmic drops, Administer 1 Drop to right eye nightly., Disp: , Rfl:     timolol (TIMOPTIC) 0.5 % ophthalmic solution, Administer 1 Drop to right eye every morning., Disp: , Rfl:     Omeprazole delayed release (PRILOSEC D/R) 20 mg tablet, Take 20 mg by mouth Daily (before dinner). , Disp: , Rfl:     meloxicam (MOBIC) 15 mg tablet, Take 15 mg by mouth daily. Indications: OSTEOARTHRITIS, Disp: , Rfl:     brimonidine (ALPHAGAN P) 0.1 % ophthalmic solution, Administer 1 Drop to right eye two (2) times a day. Take / use AM day of surgery  per anesthesia protocols. , Disp: , Rfl:     glimepiride (AMARYL) 2 mg tablet, Take 1 mg by mouth daily. Indications: type 2 diabetes mellitus, Disp: , Rfl:     b complex vitamins tablet, Take 1 Tab by mouth daily. , Disp: , Rfl:     aspirin 81 mg Tab, Take 81 mg by mouth daily. Take / use 81 mg AM day of surgery  per anesthesia protocols. , Disp: , Rfl:    Date Last Reviewed: 12/26/17   EXAMINATION:   Observation/Orthostatic Postural Assessment: not assessed today Palpation:tight lat dorsi, pec majot/minor     ROM: Not measured. Strength: not measured. .             Body Structures Involved:  1. Joints  2. Muscles Body Functions Affected:  1. Neuromusculoskeletal  2. Movement Related Activities and Participation Affected:  1. General Tasks and Demands  2. Mobility  3. Self Care   CLINICAL DECISION MAKING:   Outcome Measure: Tool Used: Disabilities of the Arm, Shoulder and Hand (DASH) Questionnaire - Quick Version  Score:  Initial: 25/55 or 32% disability Most Recent: X/55 (Date: -- )   Interpretation of Score: The DASH is designed to measure the activities of daily living in person's with upper extremity dysfunction or pain. Each section is scored on a 1-5 scale, 5 representing the greatest disability. The scores of each section are added together for a total score of 55. This number is divided by 11, followed by subtracting 1 and multiplying by 25 to get a percent score of disability. This value represents the percentage disability: 0-20% minimal disability; 20-40% moderate disability; 40-60% severe disability; % dependent for care or exaggerated symptom behavior. Minimal detectable change is 12%. Score 11 12-19 20-28 29-37 38-45 46-54 55   Modifier CH CI CJ CK CL CM CN ? Carrying, Moving, and Handling Objects:     - CURRENT STATUS: CJ - 20%-39% impaired, limited or restricted    - GOAL STATUS: CI - 1%-19% impaired, limited or restricted    - D/C STATUS:  ---------------To be determined---------------    Medical Necessity:   · Patient is expected to demonstrate progress in strength and range of motion to promote increased functional use of his L/non-dominant UE. · Co-morbid R rotator cuff pathology and lumbar spine pathology will most likely complicate progress. · Lives alone and will need to be able function independently.     Reason for Services/Other Comments:  · Patient continues to require skilled intervention due to the complexity of his history of shoulder pathology and the complexity of this revision reverse TSA. · He will need safe progression of post-op rehab to maximize return to functional use of his L UE.  · He is pending to have R shoulder arthroplasty in the future            TREATMENT:   (In addition to Assessment/Re-Assessment sessions the following treatments were rendered)  12/28/2017  Pre-treatment Symptoms/Complaints: reports that shoulder is feeling okay. No new problems. Pain: Initial: Pain Intensity 1: 3 (3 with movement, none at rest)   Post Session:  Stated arm felt felt better,   Manual: (8 min) functional  myofascial release of lat dorsi and pec major/minor with progressive active stretching  Therapeutic Exercise (20 [id number] min):  L shoulder motion exercises with PROM to AAROM 3\"x10 each for ER in shoulder neutral and 45, and 80 deg abd;  forward elevation and horizontal abd. Date:  12/26/17 Date:  12/28/17 Date:     Activity/Exercise Parameters Parameters Parameters   Mid and lower trap Side lye  10x ea  1# 10x 2 ea Side lying  2x10 ea with liftoff    Push up + Manual resist  10x2 Serratus punch 2x10    Side lye shoulder ABD 10x  1# 10x 2x10    Shoulder flexion Supine:   10x  Sitting: Active eccentric with min assist  100 degrees  Flexion and scaption  5x ea Supine 2x10    Horz ABD Stand bent over L foot on stool:  1# 3x 3  Standing:  Yellow band  At 80 degrees flexion- 5x 2 -    Shoulder EXT 3# 10x2 -    Elbow EXT 3# 10x2 -    Side lying ER  2x10    IR with band   Yellow 1x10    ER with band   Yellow 1x10    B bicep curls  2# 2x10              HEP: He is to continue with existing HEP. Treatment/Session Assessment:    · Response to Treatment:  Patient did well with most exercises, but had most difficulty with ER with band, though ER in side lying was okay. Requested help to put jacket on at end of session since R shoulder is problematic also.    · Compliance with Program/Exercises: Appears compliant. · Recommendations/Intent for next treatment session: We will continue with L shoulder ROM treatment and progression of exercises.    Total Treatment Duration: 43 Minutes  PT Patient Time In/Time Out  Time In: 1120  Time Out: [address] [place] [address] [address] Eleazar Quick, PT

## 2018-01-03 ENCOUNTER — HOSPITAL ENCOUNTER (OUTPATIENT)
Dept: PHYSICAL THERAPY | Age: 83
Discharge: HOME | End: 2018-01-03
Payer: MEDICARE

## 2018-01-03 PROCEDURE — 97110 THERAPEUTIC EXERCISES: CPT

## 2018-01-03 NOTE — PROGRESS NOTES
Marylen Downy  : 1933  [place] / [place] / Product Type: Medicare /  [address]  at [facility]  [address], [address], [place].  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 Fall Risk Score: 2 (? 5 = High Risk)  MD Orders: Eval and Treat; HEP; ROM; Strength; \"full motion/full strength\" (17) MEDICAL/REFERRING DIAGNOSIS:Rem Implant Lt Shldr/Revision TSA    DATE OF ONSET: 10-13-17  REFERRING PHYSICIAN: Fuentes Camarena MD  RETURN PHYSICIAN APPOINTMENT: 1-15-17     INITIAL ASSESSMENT:  Mr. Carr Danger 2 months s/p removal of implant of L shoulder hemiarthroplasty, and revision L TSA with a reverse Delta Xtend prosthesis, and latissimus dorsi and teres major tendon transfer. He appears to be progressing well at this point. His pain is under control, and he had no significant pain reports. He is mostly stiff to forward elevation PROM. Rotation PROM is WFL's. He is quite weak to the anterior deltoid primarily, and scapulohumeral rhythm dysfunction are both contributing to limited active elevation range against gravity. This weakness and stiffness is limiting functional use of his L/non-dominant UE. His R/dominant UE has known rotator cuff pathology, which is severely limiting functional use of this UE. He also has co-morbid lumbar spine pathology with radicular weakness to L4-5 levels that affects his gait and stability. He lives alone, and needs to be able to perform basic personal care and household ADL's independently.  He will benefit from PT for guided reverse TSA rehab to promote safe return to functional use of the L UE, and to prep for pending R shoulder surgery. PROBLEM LIST (Impacting functional limitations):  1. Decreased L shoulder ROM   2. Weakness L shoulder INTERVENTIONS PLANNED:  1. Manual therapies, therapeutic exercises, HEP for ROM    2. Therapeutic exercises and HEP for strength   TREATMENT PLAN:  Effective Dates: 12/18/2017 TO 3/17/2018. Frequency/Duration: 2-3 times a week for 12 weeks  GOALS: (Goals have been discussed and agreed upon with patient.)  Short-Term Functional Goals: Time Frame: 6 weeks   1. L shoulder AROM forward elevation greater than 120 degrees to progress into functional ranges. 2. Demonstrate good L shoulder forward elevation strength for reach to head with grooming ADL's and reaching to shoulder height with household ADL's.  3. Independent with initial HEP. Discharge Goals: Time Frame: 12 weeks  1. Score less than 20% on the DASH. 2. L shoulder AROM forward elevation greater than 135 degrees for improved use with household activities reaching overhead. 3. Demonstrate good functional L shoulder strength and endurance for improved use of his L UE with his normalized daily activities. 4. Independent with advanced shoulder HEP for continued self-management. Rehabilitation Potential For Stated Goals: Good              The information in this section was collected on 12/18/17 (except where otherwise noted). HISTORY:   History of Present Injury/Illness (Reason for Referral): Long, complex history of L shoulder pain and dysfunction with rotator cuff pathology. He was s/p L rotator cuff repair on 8/28/15. He had a fall to both hands when walking up steps at his house in June 2016, resulting complete cuff tears B shoulders. L shoulder hemiarthroplasty done 7/2016. He did rehab, but poor outcome, and was still unable to raise and use L arm over shoulder. This revision Reverse TSA on L was done on 10/13/2017.  Was in the hospital 3 days, then [facility] to [place] [facility] and finally DC'd to home on 12/15/17. He had complication of a hemearthrosis with drop in hematocrit and hemoglobin. It was aspirated. Blood values improved. He as been doing OT post-op shoulder rehab when in the nursing home, consisting of shoulder/UE ROM and strengthening. He has not done HEP yet. He has co-morbid R rotator cuff pathology limiting functional use of his dominant arm, and anticipates shoulder arthroplasty on this in the near future. he also has co-morbid lumbar pathology with L L4-5 myotome weakness resulting in L foot drop. He lives alone and needs to be able to manage all his ADL's independently. Past Medical History/Comorbidities: Mr. Wilfrido Mendoza  has a past medical history of Anemia (fall 2016); Arthritis; CAD (coronary artery disease) (1997); Carotid stenosis (04/2017); Diabetes ([address] (05/1997); Diverticulosis (12/2011); GERD (gastroesophageal reflux disease); Glaucoma; Heart murmur; Hiatal hernia; History of kidney stones; Hypercholesteremia; Hypertension; Hypothyroid; MI (myocardial infarction); TIA (transient ischemic attack) (07/2016); Unspecified sleep apnea; Urethral stenosis; and Vasovagal syncope. Mr. Wilfrido Mendoza  has a past surgical history that includes heart catheterization (1555,4344, 2012, 2013); tonsillectomy (1938); cataract removal (1987 and 1990); lap cholecystectomy (2003); hernia repair (1944); urological (1996); orthopaedic (Left); orthopaedic (6814-1612); shoulder arthroscopy (Left, 2015); knee replacement (Bilateral, 2002, 2008); hemorrhoidectomy (1987); heent (Right); carpal tunnel release (Right, 1968); carpal tunnel release (Left, 2012); bunionectomy (Left); rotator cuff repair (Right, 1995); rotator cuff repair (Left, 2000); rotator cuff repair (Right, 2007); and colectomy (01/13/2017). Social History/Living Environment:  Lives alone. Has supportive children and grandchildren living in town.    Prior Level of Function/Work/Activity: Limited overhead use of L UE prior to this surgery. Limited use of R UE currently secondary to cuff pathology. Dominant Side: RIGHT  Current Medications:       simethicone (GAS-X) 125 mg capsule, Take 125 mg by mouth four (4) times daily as needed for Flatulence. , Disp: , Rfl:     loperamide (IMODIUM) 2 mg capsule, Take 2 mg by mouth four (4) times daily as needed for Diarrhea., Disp: , Rfl:     diphenoxylate-atropine (LOMOTIL) 2.5-0.025 mg per tablet, Take 1 Tab by mouth two (2) times daily as needed for Diarrhea., Disp: , Rfl:     atorvastatin (LIPITOR) 40 mg tablet, Take 40 mg by mouth nightly., Disp: , Rfl:     levothyroxine (SYNTHROID) 50 mcg tablet, Take 50 mcg by mouth Daily (before breakfast). , Disp: , Rfl:     Psyllium Husk-Sucrose 3.4 gram/7 gram powd, Take 3 Caps by mouth two (2) times a day., Disp: , Rfl:     omega-3 fatty acids-vitamin e (FISH OIL) 1,000 mg cap, Take 2 Caps by mouth daily. , Disp: , Rfl:     bimatoprost (LUMIGAN) 0.01 % ophthalmic drops, Administer 1 Drop to right eye nightly., Disp: , Rfl:     timolol (TIMOPTIC) 0.5 % ophthalmic solution, Administer 1 Drop to right eye every morning., Disp: , Rfl:     Omeprazole delayed release (PRILOSEC D/R) 20 mg tablet, Take 20 mg by mouth Daily (before dinner). , Disp: , Rfl:     meloxicam (MOBIC) 15 mg tablet, Take 15 mg by mouth daily. Indications: OSTEOARTHRITIS, Disp: , Rfl:     brimonidine (ALPHAGAN P) 0.1 % ophthalmic solution, Administer 1 Drop to right eye two (2) times a day. Take / use AM day of surgery  per anesthesia protocols. , Disp: , Rfl:     glimepiride (AMARYL) 2 mg tablet, Take 1 mg by mouth daily. Indications: type 2 diabetes mellitus, Disp: , Rfl:     b complex vitamins tablet, Take 1 Tab by mouth daily. , Disp: , Rfl:     aspirin 81 mg Tab, Take 81 mg by mouth daily. Take / use 81 mg AM day of surgery  per anesthesia protocols. , Disp: , Rfl:    Date Last Reviewed: 1-3-18   EXAMINATION:   Observation/Orthostatic Postural Assessment: not assessed today Palpation:tight lat dorsi, pec majot/minor     ROM: Not measured. LUE AROM  L Shoulder Flexion: 95 (forward elevatoin)  LUE PROM  L Shoulder Flexion: 135 (forward elevation)  L Shoulder ABduction: 90 (with scapula stabilized)  L Shoulder Internal Rotation: 70 (stabilized at 45 abd, 65 at 80 deg abd)  L Shoulder External Rotation: 70 (in shoulder neutral, 45 and 80 deg abd)                  Strength: not measured. .             Body Structures Involved:  1. Joints  2. Muscles Body Functions Affected:  1. Neuromusculoskeletal  2. Movement Related Activities and Participation Affected:  1. General Tasks and Demands  2. Mobility  3. Self Care   CLINICAL DECISION MAKING:   Outcome Measure: Tool Used: Disabilities of the Arm, Shoulder and Hand (DASH) Questionnaire - Quick Version  Score:  Initial: 25/55 or 32% disability Most Recent: X/55 (Date: -- )   Interpretation of Score: The DASH is designed to measure the activities of daily living in person's with upper extremity dysfunction or pain. Each section is scored on a 1-5 scale, 5 representing the greatest disability. The scores of each section are added together for a total score of 55. This number is divided by 11, followed by subtracting 1 and multiplying by 25 to get a percent score of disability. This value represents the percentage disability: 0-20% minimal disability; 20-40% moderate disability; 40-60% severe disability; % dependent for care or exaggerated symptom behavior. Minimal detectable change is 12%. Score 11 12-19 20-28 29-37 38-45 46-54 55   Modifier CH CI CJ CK CL CM CN ?    Carrying, Moving, and Handling Objects:     - CURRENT STATUS: CJ - 20%-39% impaired, limited or restricted    - GOAL STATUS: CI - 1%-19% impaired, limited or restricted    - D/C STATUS:  ---------------To be determined---------------    Medical Necessity:   · Patient is expected to demonstrate progress in strength and range of motion to promote increased functional use of his L/non-dominant UE. · Co-morbid R rotator cuff pathology and lumbar spine pathology will most likely complicate progress. · Lives alone and will need to be able function independently. Reason for Services/Other Comments:  · Patient continues to require skilled intervention due to the complexity of his history of shoulder pathology and the complexity of this revision reverse TSA. · He will need safe progression of post-op rehab to maximize return to functional use of his L UE.  · He is pending to have R shoulder arthroplasty in the future          TREATMENT:   (In addition to Assessment/Re-Assessment sessions the following treatments were rendered)  1/3/2018  Pre-treatment Symptoms/Complaints: Shoulder is a little sore, but doing well. Has been working on his HEP. Did the pulleys this morning. Not able to lift the L arm very high still. Pain: Initial:    No VAS. Post Session:  Stated arm felt felt better,     Therapeutic Exercise (50 Minutes): UBE for active warm up x6' on level 1; then pulleys. L shoulder motion exercises with PROM to AAROM 3\"x10 each to ER in shoulder neutral, 45, and 80 deg abd, IR stabilized at 45 and 60 deg abd, abduction, forward elevation, horizontal abd/add, D1 and D2 flexion; and hold/relax physiologic motions, 30\"x3 each to abduction and forward elevation. Performed strength exercises with manual guiding as per grid below. Exercises and resistance as indicated. HEP: He is to continue with existing HEP. He verbalizes understanding.     Date:  12/26/17 Date:  12/28/17 Date:  1/3/18   Activity/Exercise Parameters Parameters Parameters   UBE   L. 1 x6'   Shoulder ROM   Pulleys to scaption x30 total;  Assisted as above   Mid and lower trap Side lye  10x ea  1# 10x 2 ea Side lying  2x10 ea with liftoff Side lying  1# 2x10 ea   Push up + Manual resist  10x2 Serratus punch 2x10 Supine press  2# 2*x10   Side lye shoulder ABD 10x  1# 10x 2x10 1# 2x10 Shoulder flexion Supine:   10x  Sitting: Active eccentric with min assist  100 degrees  Flexion and scaption  5x ea Supine 2x10 30-deg beach chair active eccentrics  1# 2x10   Horz ABD Stand bent over L foot on stool:  1# 3x 3  Standing:  Yellow band  At 80 degrees flexion- 5x 2 - -   Shoulder EXT 3# 10x2 - -   Elbow EXT 3# 10x2 - -   Side lying ER  2x10 -   IR with band   Yellow 1x10 -   ER with band   Yellow 1x10 -   B bicep curls  2# 2x10 -     Treatment/Session Assessment:    · Response to Treatment:  Continues with good rotation PROM to L shoulder, but still with stiffness to forward elevation. Range is WFL, however. Good effort with the  Strength exercises done. Weak to shoulder. He is about 3 months post op. · Compliance with Program/Exercises: Appears compliant. · Recommendations/Intent for next treatment session: We will continue with L shoulder ROM treatment and progression of strength exercises as able.    Total Treatment Duration: 50 Minutes  PT Patient Time In/Time Out  Time In: 1035  Time Out: [address], PT, MSPT, OCS

## 2018-01-05 ENCOUNTER — HOSPITAL ENCOUNTER (OUTPATIENT)
Dept: PHYSICAL THERAPY | Age: 83
Discharge: HOME | End: 2018-01-05
Payer: MEDICARE

## 2018-01-05 PROCEDURE — 97110 THERAPEUTIC EXERCISES: CPT

## 2018-01-05 NOTE — PROGRESS NOTES
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 Fall Risk Score: 2 (? 5 = High Risk)  MD Orders: Eval and Treat; HEP; ROM; Strength; \"full motion/full strength\" (17) MEDICAL/REFERRING DIAGNOSIS:Rem Implant Lt Shldr/Revision TSA    DATE OF ONSET: 10-13-17  REFERRING PHYSICIAN: Anuradha Reyes MD  RETURN PHYSICIAN APPOINTMENT: 1-15-17     INITIAL ASSESSMENT:  Mr. Efrain Cole 2 months s/p removal of implant of L shoulder hemiarthroplasty, and revision L TSA with a reverse Delta Xtend prosthesis, and latissimus dorsi and teres major tendon transfer. He appears to be progressing well at this point. His pain is under control, and he had no significant pain reports. He is mostly stiff to forward elevation PROM. Rotation PROM is WFL's. He is quite weak to the anterior deltoid primarily, and scapulohumeral rhythm dysfunction are both contributing to limited active elevation range against gravity. This weakness and stiffness is limiting functional use of his L/non-dominant UE. His R/dominant UE has known rotator cuff pathology, which is severely limiting functional use of this UE. He also has co-morbid lumbar spine pathology with radicular weakness to L4-5 levels that affects his gait and stability. He lives alone, and needs to be able to perform basic personal care and household ADL's independently.  He will benefit from PT for guided reverse TSA rehab to promote safe return to functional use of the L UE, and to prep for pending R shoulder surgery. PROBLEM LIST (Impacting functional limitations):  1. Decreased L shoulder ROM   2. Weakness L shoulder INTERVENTIONS PLANNED:  1. Manual therapies, therapeutic exercises, HEP for ROM    2. Therapeutic exercises and HEP for strength   TREATMENT PLAN:  Effective Dates: 12/18/2017 TO 3/17/2018. Frequency/Duration: 2-3 times a week for 12 weeks  GOALS: (Goals have been discussed and agreed upon with patient.)  Short-Term Functional Goals: Time Frame: 6 weeks   1. L shoulder AROM forward elevation greater than 120 degrees to progress into functional ranges. 2. Demonstrate good L shoulder forward elevation strength for reach to head with grooming ADL's and reaching to shoulder height with household ADL's.  3. Independent with initial HEP. Discharge Goals: Time Frame: 12 weeks  1. Score less than 20% on the DASH. 2. L shoulder AROM forward elevation greater than 135 degrees for improved use with household activities reaching overhead. 3. Demonstrate good functional L shoulder strength and endurance for improved use of his L UE with his normalized daily activities. 4. Independent with advanced shoulder HEP for continued self-management. Rehabilitation Potential For Stated Goals: Good              The information in this section was collected on 12/18/17 (except where otherwise noted). HISTORY:   History of Present Injury/Illness (Reason for Referral): Long, complex history of L shoulder pain and dysfunction with rotator cuff pathology. He was s/p L rotator cuff repair on 8/28/15. He had a fall to both hands when walking up steps at his house in June 2016, resulting complete cuff tears B shoulders. L shoulder hemiarthroplasty done 7/2016. He did rehab, but poor outcome, and was still unable to raise and use L arm over shoulder. This revision Reverse TSA on L was done on 10/13/2017.  Was in the hospital 3 days, then [facility] to [facility] and finally DC'd to home on 12/15/17. He had complication of a hemearthrosis with drop in hematocrit and hemoglobin. It was aspirated. Blood values improved. He as been doing OT post-op shoulder rehab when in the nursing home, consisting of shoulder/UE ROM and strengthening. He has not done HEP yet. He has co-morbid R rotator cuff pathology limiting functional use of his dominant arm, and anticipates shoulder arthroplasty on this in the near future. he also has co-morbid lumbar pathology with L L4-5 myotome weakness resulting in L foot drop. He lives alone and needs to be able to manage all his ADL's independently. Past Medical History/Comorbidities: Mr. Mer Sherman  has a past medical history of Anemia (fall 2016); Arthritis; CAD (coronary artery disease) (1997); Carotid stenosis (04/2017); Diabetes ([address] (05/1997); Diverticulosis (12/2011); GERD (gastroesophageal reflux disease); Glaucoma; Heart murmur; Hiatal hernia; History of kidney stones; Hypercholesteremia; Hypertension; Hypothyroid; MI (myocardial infarction); TIA (transient ischemic attack) (07/2016); Unspecified sleep apnea; Urethral stenosis; and Vasovagal syncope. Mr. Mer Sherman  has a past surgical history that includes heart catheterization (1000,6516, 2012, 2013); tonsillectomy (1938); cataract removal (1987 and 1990); lap cholecystectomy (2003); hernia repair (1944); urological (1996); orthopaedic (Left); orthopaedic ([id number]); shoulder arthroscopy (Left, 2015); knee replacement (Bilateral, 2002, 2008); hemorrhoidectomy (1987); heent (Right); carpal tunnel release (Right, 1968); carpal tunnel release (Left, 2012); bunionectomy (Left); rotator cuff repair (Right, 1995); rotator cuff repair (Left, 2000); rotator cuff repair (Right, 2007); and colectomy (01/13/2017). Social History/Living Environment:  Lives alone. Has supportive children and grandchildren living in town.    Prior Level of Function/Work/Activity: Limited overhead use of L UE prior to this surgery. Limited use of R UE currently secondary to cuff pathology. Dominant Side: RIGHT  Current Medications:       simethicone (GAS-X) 125 mg capsule, Take 125 mg by mouth four (4) times daily as needed for Flatulence. , Disp: , Rfl:     loperamide (IMODIUM) 2 mg capsule, Take 2 mg by mouth four (4) times daily as needed for Diarrhea., Disp: , Rfl:     diphenoxylate-atropine (LOMOTIL) 2.5-0.025 mg per tablet, Take 1 Tab by mouth two (2) times daily as needed for Diarrhea., Disp: , Rfl:     atorvastatin (LIPITOR) 40 mg tablet, Take 40 mg by mouth nightly., Disp: , Rfl:     levothyroxine (SYNTHROID) 50 mcg tablet, Take 50 mcg by mouth Daily (before breakfast). , Disp: , Rfl:     Psyllium Husk-Sucrose 3.4 gram/7 gram powd, Take 3 Caps by mouth two (2) times a day., Disp: , Rfl:     omega-3 fatty acids-vitamin e (FISH OIL) 1,000 mg cap, Take 2 Caps by mouth daily. , Disp: , Rfl:     bimatoprost (LUMIGAN) 0.01 % ophthalmic drops, Administer 1 Drop to right eye nightly., Disp: , Rfl:     timolol (TIMOPTIC) 0.5 % ophthalmic solution, Administer 1 Drop to right eye every morning., Disp: , Rfl:     Omeprazole delayed release (PRILOSEC D/R) 20 mg tablet, Take 20 mg by mouth Daily (before dinner). , Disp: , Rfl:     meloxicam (MOBIC) 15 mg tablet, Take 15 mg by mouth daily. Indications: OSTEOARTHRITIS, Disp: , Rfl:     brimonidine (ALPHAGAN P) 0.1 % ophthalmic solution, Administer 1 Drop to right eye two (2) times a day. Take / use AM day of surgery  per anesthesia protocols. , Disp: , Rfl:     glimepiride (AMARYL) 2 mg tablet, Take 1 mg by mouth daily. Indications: type 2 diabetes mellitus, Disp: , Rfl:     b complex vitamins tablet, Take 1 Tab by mouth daily. , Disp: , Rfl:     aspirin 81 mg Tab, Take 81 mg by mouth daily. Take / use 81 mg AM day of surgery  per anesthesia protocols. , Disp: , Rfl:    Date Last Reviewed: 1-3-18   EXAMINATION:   Observation/Orthostatic Postural Assessment: not assessed today Palpation:tight lat dorsi, pec majot/minor     ROM: Not measured. Strength: not measured. .             Body Structures Involved:  1. Joints  2. Muscles Body Functions Affected:  1. Neuromusculoskeletal  2. Movement Related Activities and Participation Affected:  1. General Tasks and Demands  2. Mobility  3. Self Care   CLINICAL DECISION MAKING:   Outcome Measure: Tool Used: Disabilities of the Arm, Shoulder and Hand (DASH) Questionnaire - Quick Version  Score:  Initial: 25/55 or 32% disability Most Recent: X/55 (Date: -- )   Interpretation of Score: The DASH is designed to measure the activities of daily living in person's with upper extremity dysfunction or pain. Each section is scored on a 1-5 scale, 5 representing the greatest disability. The scores of each section are added together for a total score of 55. This number is divided by 11, followed by subtracting 1 and multiplying by 25 to get a percent score of disability. This value represents the percentage disability: 0-20% minimal disability; 20-40% moderate disability; 40-60% severe disability; % dependent for care or exaggerated symptom behavior. Minimal detectable change is 12%. Score 11 12-19 20-28 29-37 38-45 46-54 55   Modifier CH CI CJ CK CL CM CN ? Carrying, Moving, and Handling Objects:     - CURRENT STATUS: CJ - 20%-39% impaired, limited or restricted    - GOAL STATUS: CI - 1%-19% impaired, limited or restricted    - D/C STATUS:  ---------------To be determined---------------    Medical Necessity:   · Patient is expected to demonstrate progress in strength and range of motion to promote increased functional use of his L/non-dominant UE. · Co-morbid R rotator cuff pathology and lumbar spine pathology will most likely complicate progress. · Lives alone and will need to be able function independently.     Reason for Services/Other Comments:  · Patient continues to require skilled intervention due to the complexity of his history of shoulder pathology and the complexity of this revision reverse TSA. · He will need safe progression of post-op rehab to maximize return to functional use of his L UE.  · He is pending to have R shoulder arthroplasty in the future          TREATMENT:   (In addition to Assessment/Re-Assessment sessions the following treatments were rendered)  1/5/2018  Pre-treatment Symptoms/Complaints: No significant soreness to the shoulder to report this morning. Did not get too sore after last time. Says he has trouble reaching to his R shoulder or neck to scratch an itch. Has to help push the L arm up with the R.   Pain: Initial:    No VAS. Post Session:  No significant pain to report. Therapeutic Exercise (45 Minutes): UBE for active warm up x6' on level 1. L shoulder motion exercises with assisted stretch into abduction/neutral rotation, horizontal abd with neutral rotation and full ER, horiz adduction with neutral rotation and full IR, and forward elevation, 3\"x10 each, then PNF reciprocal inhibition to abduction, contract/relax to horiz adduction positions, and forward elevation, 30\"x3-4 each. Performed strength exercises as per grid below. Supine AAROM to manual resisted AROM rhythmic initiation with flexion, horiz abd, horiz add, D1 and D2 flexion. Exercises and resistance as indicated. HEP: He is to continue with existing HEP. He verbalizes understanding.     Date:  12/26/17 Date:  12/28/17 Date:  1/3/18 Date  1/5/18   Activity/Exercise Parameters Parameters Parameters    UBE   L. 1 x6' L.1 x6'   Shoulder ROM   Pulleys to scaption x30 total;  Assisted as above Assisted stretch as above   Rhythmic Stabilization    Red flex bar at 100-deg flex, side-lying abd to 90-deg   2x30\" ea   Mid and lower trap Side lye  10x ea  1# 10x 2 ea Side lying  2x10 ea with liftoff Side lying  1# 2x10 ea -   Push up + Manual resist  10x2 Serratus punch 2x10 Supine press  2# 2*x10 Supine press  2# 3x6-10   Side lying shoulder ABD 10x  1# 10x 2x10 1# 2x10 3x4 with red flex bar stabilization x10\"    Shoulder flexion Supine:   10x  Sitting: Active eccentric with min assist  100 degrees  Flexion and scaption  5x ea Supine 2x10 30-deg beach chair active eccentrics  1# 2x10 Supine manual resisted 2x10;  Standing UE [place], full arc  3x10    Horz ABD Stand bent over L foot on stool:  1# 3x 3  Standing:  Yellow band  At 80 degrees flexion- 5x 2 - - Supine manual resisted H Abd/H Add 2x10 each   Shoulder EXT 3# 10x2 - - Standing   Green 2x15   Elbow EXT 3# 10x2 - - -   Side lying ER  2x10 - -   IR with band   Yellow 1x10 - -   ER with band   Yellow 1x10 - -   B bicep curls  2# 2x10 - -   Diagonal Flex    Supine manual resisted  2x10 each     Treatment/Session Assessment:    · Response to Treatment:  Good effort with the strength exercises done. Fatigues quickly. · Compliance with Program/Exercises: Appears compliant. · Recommendations/Intent for next treatment session: We will continue with L shoulder ROM treatment and progression of strength exercises as able.    Total Treatment Duration: 45 Minutes  PT Patient Time In/Time Out  Time In: 1040  Time Out: [facility], PT, MSPT, OCS

## 2018-01-08 ENCOUNTER — HOSPITAL ENCOUNTER (OUTPATIENT)
Dept: PHYSICAL THERAPY | Age: 83
Discharge: HOME | End: 2018-01-08
Payer: MEDICARE

## 2018-01-08 PROCEDURE — 97110 THERAPEUTIC EXERCISES: CPT

## 2018-01-08 NOTE — PROGRESS NOTES
Yovany Mas  : 1933  [place] / [place] / Product Type: Medicare /  [address]  at [facility] [place] [employer]  [address] [facility], [address] [place] [address],[address], [facility] [address].  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 Fall Risk Score: 2 (? 5 = High Risk)  MD Orders: Eval and Treat; HEP; ROM; Strength; \"full motion/full strength\" (17) MEDICAL/REFERRING DIAGNOSIS:Rem Implant Lt Shldr/Revision TSA    DATE OF ONSET: 10-13-17  REFERRING PHYSICIAN: Paolo Multani MD  RETURN PHYSICIAN APPOINTMENT: 1-15-17     INITIAL ASSESSMENT:  Mr. Kaelyn Lim 2 months s/p removal of implant of L shoulder hemiarthroplasty, and revision L TSA with a reverse Delta Xtend prosthesis, and latissimus dorsi and teres major tendon transfer. He appears to be progressing well at this point. His pain is under control, and he had no significant pain reports. He is mostly stiff to forward elevation PROM. Rotation PROM is WFL's. He is quite weak to the anterior deltoid primarily, and scapulohumeral rhythm dysfunction are both contributing to limited active elevation range against gravity. This weakness and stiffness is limiting functional use of his L/non-dominant UE. His R/dominant UE has known rotator cuff pathology, which is severely limiting functional use of this UE. He also has co-morbid lumbar spine pathology with radicular weakness to L4-5 levels that affects his gait and stability. He lives alone, and needs to be able to perform basic personal care and household ADL's independently.  He will benefit from PT for guided reverse TSA rehab to promote safe return to functional use of the L UE, and to prep for pending R shoulder surgery. PROBLEM LIST (Impacting functional limitations):  1. Decreased L shoulder ROM   2. Weakness L shoulder INTERVENTIONS PLANNED:  1. Manual therapies, therapeutic exercises, HEP for ROM    2. Therapeutic exercises and HEP for strength   TREATMENT PLAN:  Effective Dates: 12/18/2017 TO 3/17/2018. Frequency/Duration: 2-3 times a week for 12 weeks  GOALS: (Goals have been discussed and agreed upon with patient.)  Short-Term Functional Goals: Time Frame: 6 weeks   1. L shoulder AROM forward elevation greater than 120 degrees to progress into functional ranges. 2. Demonstrate good L shoulder forward elevation strength for reach to head with grooming ADL's and reaching to shoulder height with household ADL's.  3. Independent with initial HEP. Discharge Goals: Time Frame: 12 weeks  1. Score less than 20% on the DASH. 2. L shoulder AROM forward elevation greater than 135 degrees for improved use with household activities reaching overhead. 3. Demonstrate good functional L shoulder strength and endurance for improved use of his L UE with his normalized daily activities. 4. Independent with advanced shoulder HEP for continued self-management. Rehabilitation Potential For Stated Goals: Good              The information in this section was collected on 12/18/17 (except where otherwise noted). HISTORY:   History of Present Injury/Illness (Reason for Referral): Long, complex history of L shoulder pain and dysfunction with rotator cuff pathology. He was s/p L rotator cuff repair on 8/28/15. He had a fall to both hands when walking up steps at his house in June 2016, resulting complete cuff tears B shoulders. L shoulder hemiarthroplasty done 7/2016. He did rehab, but poor outcome, and was still unable to raise and use L arm over shoulder. This revision Reverse TSA on L was done on 10/13/2017.  Was in the hospital 3 days, then [facility] to [facility] and finally DC'd to home on 12/15/17. He had complication of a hemearthrosis with drop in hematocrit and hemoglobin. It was aspirated. Blood values improved. He as been doing OT post-op shoulder rehab when in the nursing home, consisting of shoulder/UE ROM and strengthening. He has not done HEP yet. He has co-morbid R rotator cuff pathology limiting functional use of his dominant arm, and anticipates shoulder arthroplasty on this in the near future. he also has co-morbid lumbar pathology with L L4-5 myotome weakness resulting in L foot drop. He lives alone and needs to be able to manage all his ADL's independently. Past Medical History/Comorbidities: Mr. Lazara Felix  has a past medical history of Anemia (fall 2016); Arthritis; CAD (coronary artery disease) (1997); Carotid stenosis (04/2017); Diabetes ([address] (05/1997); Diverticulosis (12/2011); GERD (gastroesophageal reflux disease); Glaucoma; Heart murmur; Hiatal hernia; History of kidney stones; Hypercholesteremia; Hypertension; Hypothyroid; MI (myocardial infarction); TIA (transient ischemic attack) (07/2016); Unspecified sleep apnea; Urethral stenosis; and Vasovagal syncope. Mr. Lazara Felix  has a past surgical history that includes heart catheterization (0744,1770, 2012, 2013); tonsillectomy (1938); cataract removal (1987 and 1990); lap cholecystectomy (2003); hernia repair (1944); urological (1996); orthopaedic (Left); orthopaedic ([id number]); shoulder arthroscopy (Left, 2015); knee replacement (Bilateral, 2002, 2008); hemorrhoidectomy (1987); heent (Right); carpal tunnel release (Right, 1968); carpal tunnel release (Left, 2012); bunionectomy (Left); rotator cuff repair (Right, 1995); rotator cuff repair (Left, 2000); rotator cuff repair (Right, 2007); and colectomy (01/13/2017). Social History/Living Environment:  Lives alone. Has supportive children and grandchildren living in town.    Prior Level of Function/Work/Activity: Limited overhead use of L UE prior to this surgery. Limited use of R UE currently secondary to cuff pathology. Dominant Side: RIGHT  Current Medications:       simethicone (GAS-X) 125 mg capsule, Take 125 mg by mouth four (4) times daily as needed for Flatulence. , Disp: , Rfl:     loperamide (IMODIUM) 2 mg capsule, Take 2 mg by mouth four (4) times daily as needed for Diarrhea., Disp: , Rfl:     diphenoxylate-atropine (LOMOTIL) 2.5-0.025 mg per tablet, Take 1 Tab by mouth two (2) times daily as needed for Diarrhea., Disp: , Rfl:     atorvastatin (LIPITOR) 40 mg tablet, Take 40 mg by mouth nightly., Disp: , Rfl:     levothyroxine (SYNTHROID) 50 mcg tablet, Take 50 mcg by mouth Daily (before breakfast). , Disp: , Rfl:     Psyllium Husk-Sucrose 3.4 gram/7 gram powd, Take 3 Caps by mouth two (2) times a day., Disp: , Rfl:     omega-3 fatty acids-vitamin e (FISH OIL) 1,000 mg cap, Take 2 Caps by mouth daily. , Disp: , Rfl:     bimatoprost (LUMIGAN) 0.01 % ophthalmic drops, Administer 1 Drop to right eye nightly., Disp: , Rfl:     timolol (TIMOPTIC) 0.5 % ophthalmic solution, Administer 1 Drop to right eye every morning., Disp: , Rfl:     Omeprazole delayed release (PRILOSEC D/R) 20 mg tablet, Take 20 mg by mouth Daily (before dinner). , Disp: , Rfl:     meloxicam (MOBIC) 15 mg tablet, Take 15 mg by mouth daily. Indications: OSTEOARTHRITIS, Disp: , Rfl:     brimonidine (ALPHAGAN P) 0.1 % ophthalmic solution, Administer 1 Drop to right eye two (2) times a day. Take / use AM day of surgery  per anesthesia protocols. , Disp: , Rfl:     glimepiride (AMARYL) 2 mg tablet, Take 1 mg by mouth daily. Indications: type 2 diabetes mellitus, Disp: , Rfl:     b complex vitamins tablet, Take 1 Tab by mouth daily. , Disp: , Rfl:     aspirin 81 mg Tab, Take 81 mg by mouth daily. Take / use 81 mg AM day of surgery  per anesthesia protocols. , Disp: , Rfl:    Date Last Reviewed: 1-8-18   EXAMINATION:   Observation/Orthostatic Postural Assessment: not assessed today Palpation:tight lat dorsi, pec majot/minor     ROM: Not measured. Strength: not measured. .             Body Structures Involved:  1. Joints  2. Muscles Body Functions Affected:  1. Neuromusculoskeletal  2. Movement Related Activities and Participation Affected:  1. General Tasks and Demands  2. Mobility  3. Self Care   CLINICAL DECISION MAKING:   Outcome Measure: Tool Used: Disabilities of the Arm, Shoulder and Hand (DASH) Questionnaire - Quick Version  Score:  Initial: 25/55 or 32% disability Most Recent: X/55 (Date: -- )   Interpretation of Score: The DASH is designed to measure the activities of daily living in person's with upper extremity dysfunction or pain. Each section is scored on a 1-5 scale, 5 representing the greatest disability. The scores of each section are added together for a total score of 55. This number is divided by 11, followed by subtracting 1 and multiplying by 25 to get a percent score of disability. This value represents the percentage disability: 0-20% minimal disability; 20-40% moderate disability; 40-60% severe disability; % dependent for care or exaggerated symptom behavior. Minimal detectable change is 12%. Score 11 12-19 20-28 29-37 38-45 46-54 55   Modifier CH CI CJ CK CL CM CN ? Carrying, Moving, and Handling Objects:     - CURRENT STATUS: CJ - 20%-39% impaired, limited or restricted    - GOAL STATUS: CI - 1%-19% impaired, limited or restricted    - D/C STATUS:  ---------------To be determined---------------    Medical Necessity:   · Patient is expected to demonstrate progress in strength and range of motion to promote increased functional use of his L/non-dominant UE. · Co-morbid R rotator cuff pathology and lumbar spine pathology will most likely complicate progress. · Lives alone and will need to be able function independently.     Reason for Services/Other Comments:  · Patient continues to require skilled intervention due to the complexity of his history of shoulder pathology and the complexity of this revision reverse TSA. · He will need safe progression of post-op rehab to maximize return to functional use of his L UE.  · He is pending to have R shoulder arthroplasty in the future          TREATMENT:   (In addition to Assessment/Re-Assessment sessions the following treatments were rendered)  1/8/2018  Pre-treatment Symptoms/Complaints: Says his shoulder is doing \"good'. A little sore this morning. Says he tries sleeping on it, and changes sides frequently at night. It is sore to lie on the L side. Pain: Initial:    No VAS. Post Session:  No significant pain to report. UBE for active warm up  Therapeutic Exercise (45 Minutes): Performed exercises for shoulder motion and strength as per grid below. Exercises and resistance modified as indicated. HEP: He is to continue with existing HEP. He verbalizes understanding.     Date:  12/26/17 Date:  12/28/17 Date:  1/3/18 Date  1/5/18 Date  1-8-18   Activity/Exercise Parameters Parameters Parameters     UBE   L. 1 x6' L.1 x6' L. 1 x8'   Shoulder ROM   Pulleys to scaption x30 total;  Assisted as above Assisted stretch as above -   Rhythmic Stabilization    Red flex bar at 100-deg flex, side-lying abd to 90-deg   2x30\" ea -   Mid and lower trap Side lye  10x ea  1# 10x 2 ea Side lying  2x10 ea with liftoff Side lying  1# 2x10 ea - Side lying  1# 2x5-10 ea   Push up + Manual resist  10x2 Serratus punch 2x10 Supine press  2# 2*x10 Supine press  2# 3x6-10 Supine press  2# 2x15   Side lying shoulder ABD 10x  1# 10x 2x10 1# 2x10 3x4 with red flex bar stabilization x10\"  1# 2x10   Shoulder flexion Supine:   10x  Sitting: Active eccentric with min assist  100 degrees  Flexion and scaption  5x ea Supine 2x10 30-deg beach chair active eccentrics  1# 2x10 Supine manual resisted 2x10;  Standing UE [place], full arc  3x10  Seated pulley AAROM scaption, flexion, abd 1x15 ea;  Standing Forward elevation eccentrics 2x5   Horz ABD Stand bent over L foot on stool:  1# 3x 3  Standing:  Yellow band  At 80 degrees flexion- 5x 2 - - Supine manual resisted H Abd/H Add 2x10 each Wall slide 1x5   Shoulder EXT 3# 10x2 - - Standing   Green 2x15 Standing   Green 2x15;  Lat Pull down, unilat  7# cable 2x15   Row/pulling     Bent Row  3# 2x15   Elbow EXT 3# 10x2 - - - -   Side lying ER  2x10 - - -   IR with band   Yellow 1x10 - - -   ER with band   Yellow 1x10 - - -   B bicep curls  2# 2x10 - -    Diagonal Flex    Supine manual resisted  2x10 each Wall slide D1 and D2 1x5-6 ea     Treatment/Session Assessment:    · Response to Treatment:  Good effort with the strength exercises done. Improving quality of moves, and some progress in resistance and reps. · Compliance with Program/Exercises: Appears compliant. · Recommendations/Intent for next treatment session: We will continue with L shoulder ROM treatment and progression of strength exercises as able.    Total Treatment Duration: 45 Minutes  PT Patient Time In/Time Out  Time In: 1035  Time Out: [facility], PT, MSPT, OCS

## 2018-01-10 ENCOUNTER — HOSPITAL ENCOUNTER (OUTPATIENT)
Dept: PHYSICAL THERAPY | Age: 83
Discharge: HOME | End: 2018-01-10
Payer: MEDICARE

## 2018-01-10 PROCEDURE — 97110 THERAPEUTIC EXERCISES: CPT

## 2018-01-10 NOTE — PROGRESS NOTES
Yesi Kumar  : 1933  [place] / [place] / Product Type: Medicare /  [address]  at [facility]  [address] [address] [facility], [address], [place].  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 Fall Risk Score: 2 (? 5 = High Risk)  MD Orders: Eval and Treat; HEP; ROM; Strength; \"full motion/full strength\" (17) MEDICAL/REFERRING DIAGNOSIS:Rem Implant Lt Shldr/Revision TSA    DATE OF ONSET: 10-13-17  REFERRING PHYSICIAN: Milton Conklin MD  RETURN PHYSICIAN APPOINTMENT: 1-15-17     INITIAL ASSESSMENT:  Mr. Cristo Sood 2 months s/p removal of implant of L shoulder hemiarthroplasty, and revision L TSA with a reverse Delta Xtend prosthesis, and latissimus dorsi and teres major tendon transfer. He appears to be progressing well at this point. His pain is under control, and he had no significant pain reports. He is mostly stiff to forward elevation PROM. Rotation PROM is WFL's. He is quite weak to the anterior deltoid primarily, and scapulohumeral rhythm dysfunction are both contributing to limited active elevation range against gravity. This weakness and stiffness is limiting functional use of his L/non-dominant UE. His R/dominant UE has known rotator cuff pathology, which is severely limiting functional use of this UE. He also has co-morbid lumbar spine pathology with radicular weakness to L4-5 levels that affects his gait and stability. He lives alone, and needs to be able to perform basic personal care and household ADL's independently.  He will benefit from PT for guided reverse TSA rehab to promote safe return to functional use of the L UE, and to prep for pending R shoulder surgery. PROBLEM LIST (Impacting functional limitations):  1. Decreased L shoulder ROM   2. Weakness L shoulder INTERVENTIONS PLANNED:  1. Manual therapies, therapeutic exercises, HEP for ROM    2. Therapeutic exercises and HEP for strength   TREATMENT PLAN:  Effective Dates: 12/18/2017 TO 3/17/2018. Frequency/Duration: 2-3 times a week for 12 weeks  GOALS: (Goals have been discussed and agreed upon with patient.)  Short-Term Functional Goals: Time Frame: 6 weeks   1. L shoulder AROM forward elevation greater than 120 degrees to progress into functional ranges. 2. Demonstrate good L shoulder forward elevation strength for reach to head with grooming ADL's and reaching to shoulder height with household ADL's.  3. Independent with initial HEP. Discharge Goals: Time Frame: 12 weeks  1. Score less than 20% on the DASH. 2. L shoulder AROM forward elevation greater than 135 degrees for improved use with household activities reaching overhead. 3. Demonstrate good functional L shoulder strength and endurance for improved use of his L UE with his normalized daily activities. 4. Independent with advanced shoulder HEP for continued self-management. Rehabilitation Potential For Stated Goals: Good              The information in this section was collected on 12/18/17 (except where otherwise noted). HISTORY:   History of Present Injury/Illness (Reason for Referral): Long, complex history of L shoulder pain and dysfunction with rotator cuff pathology. He was s/p L rotator cuff repair on 8/28/15. He had a fall to both hands when walking up steps at his house in June 2016, resulting complete cuff tears B shoulders. L shoulder hemiarthroplasty done 7/2016. He did rehab, but poor outcome, and was still unable to raise and use L arm over shoulder. This revision Reverse TSA on L was done on 10/13/2017.  Was in the hospital 3 days, then [facility] to [facility], and finally [address] [address] to home on 12/15/17. He had complication of a hemearthrosis with drop in hematocrit and hemoglobin. It was aspirated. Blood values improved. He as been doing OT post-op shoulder rehab when in the nursing home, consisting of shoulder/UE ROM and strengthening. He has not done HEP yet. He has co-morbid R rotator cuff pathology limiting functional use of his dominant arm, and anticipates shoulder arthroplasty on this in the near future. he also has co-morbid lumbar pathology with L L4-5 myotome weakness resulting in L foot drop. He lives alone and needs to be able to manage all his ADL's independently. Past Medical History/Comorbidities: Mr. Bere Chaudhry  has a past medical history of Anemia (fall 2016); Arthritis; CAD (coronary artery disease) (1997); Carotid stenosis (04/2017); Diabetes ([facility] [address] (05/1997); Diverticulosis (12/2011); GERD (gastroesophageal reflux disease); Glaucoma; Heart murmur; Hiatal hernia; History of kidney stones; Hypercholesteremia; Hypertension; Hypothyroid; MI (myocardial infarction); TIA (transient ischemic attack) (07/2016); Unspecified sleep apnea; Urethral stenosis; and Vasovagal syncope. Mr. Bere Chaudhry  has a past surgical history that includes heart catheterization (7093,2036, 2012, 2013); tonsillectomy (1938); cataract removal (1987 and 1990); lap cholecystectomy (2003); hernia repair (1944); urological (1996); orthopaedic (Left); orthopaedic ([id number]); shoulder arthroscopy (Left, 2015); knee replacement (Bilateral, 2002, 2008); hemorrhoidectomy (1987); heent (Right); carpal tunnel release (Right, 1968); carpal tunnel release (Left, 2012); bunionectomy (Left); rotator cuff repair (Right, 1995); rotator cuff repair (Left, 2000); rotator cuff repair (Right, 2007); and colectomy (01/13/2017). Social History/Living Environment:  Lives alone. Has supportive children and grandchildren living in town.    Prior Level of Function/Work/Activity: Limited overhead use of L UE prior to this surgery. Limited use of R UE currently secondary to cuff pathology. Dominant Side: RIGHT  Current Medications:       simethicone (GAS-X) 125 mg capsule, Take 125 mg by mouth four (4) times daily as needed for Flatulence. , Disp: , Rfl:     loperamide (IMODIUM) 2 mg capsule, Take 2 mg by mouth four (4) times daily as needed for Diarrhea., Disp: , Rfl:     diphenoxylate-atropine (LOMOTIL) 2.5-0.025 mg per tablet, Take 1 Tab by mouth two (2) times daily as needed for Diarrhea., Disp: , Rfl:     atorvastatin (LIPITOR) 40 mg tablet, Take 40 mg by mouth nightly., Disp: , Rfl:     levothyroxine (SYNTHROID) 50 mcg tablet, Take 50 mcg by mouth Daily (before breakfast). , Disp: , Rfl:     Psyllium Husk-Sucrose 3.4 gram/7 gram powd, Take 3 Caps by mouth two (2) times a day., Disp: , Rfl:     omega-3 fatty acids-vitamin e (FISH OIL) 1,000 mg cap, Take 2 Caps by mouth daily. , Disp: , Rfl:     bimatoprost (LUMIGAN) 0.01 % ophthalmic drops, Administer 1 Drop to right eye nightly., Disp: , Rfl:     timolol (TIMOPTIC) 0.5 % ophthalmic solution, Administer 1 Drop to right eye every morning., Disp: , Rfl:     Omeprazole delayed release (PRILOSEC D/R) 20 mg tablet, Take 20 mg by mouth Daily (before dinner). , Disp: , Rfl:     meloxicam (MOBIC) 15 mg tablet, Take 15 mg by mouth daily. Indications: OSTEOARTHRITIS, Disp: , Rfl:     brimonidine (ALPHAGAN P) 0.1 % ophthalmic solution, Administer 1 Drop to right eye two (2) times a day. Take / use AM day of surgery  per anesthesia protocols. , Disp: , Rfl:     glimepiride (AMARYL) 2 mg tablet, Take 1 mg by mouth daily. Indications: type 2 diabetes mellitus, Disp: , Rfl:     b complex vitamins tablet, Take 1 Tab by mouth daily. , Disp: , Rfl:     aspirin 81 mg Tab, Take 81 mg by mouth daily. Take / use 81 mg AM day of surgery  per anesthesia protocols. , Disp: , Rfl:    Date Last Reviewed: 1-8-18   EXAMINATION:   Observation/Orthostatic Postural Assessment: not assessed today Palpation:tight lat dorsi, pec majot/minor     ROM: Not measured. Strength: not measured. .             Body Structures Involved:  1. Joints  2. Muscles Body Functions Affected:  1. Neuromusculoskeletal  2. Movement Related Activities and Participation Affected:  1. General Tasks and Demands  2. Mobility  3. Self Care   CLINICAL DECISION MAKING:   Outcome Measure: Tool Used: Disabilities of the Arm, Shoulder and Hand (DASH) Questionnaire - Quick Version  Score:  Initial: 25/55 or 32% disability Most Recent: X/55 (Date: -- )   Interpretation of Score: The DASH is designed to measure the activities of daily living in person's with upper extremity dysfunction or pain. Each section is scored on a 1-5 scale, 5 representing the greatest disability. The scores of each section are added together for a total score of 55. This number is divided by 11, followed by subtracting 1 and multiplying by 25 to get a percent score of disability. This value represents the percentage disability: 0-20% minimal disability; 20-40% moderate disability; 40-60% severe disability; % dependent for care or exaggerated symptom behavior. Minimal detectable change is 12%. Score 11 12-19 20-28 29-37 38-45 46-54 55   Modifier CH CI CJ CK CL CM CN ? Carrying, Moving, and Handling Objects:     - CURRENT STATUS: CJ - 20%-39% impaired, limited or restricted    - GOAL STATUS: CI - 1%-19% impaired, limited or restricted    - D/C STATUS:  ---------------To be determined---------------    Medical Necessity:   · Patient is expected to demonstrate progress in strength and range of motion to promote increased functional use of his L/non-dominant UE. · Co-morbid R rotator cuff pathology and lumbar spine pathology will most likely complicate progress. · Lives alone and will need to be able function independently.     Reason for Services/Other Comments:  · Patient continues to require skilled intervention due to the complexity of his history of shoulder pathology and the complexity of this revision reverse TSA. · He will need safe progression of post-op rehab to maximize return to functional use of his L UE.  · He is pending to have R shoulder arthroplasty in the future          TREATMENT:   (In addition to Assessment/Re-Assessment sessions the following treatments were rendered)  1/10/2018  Pre-treatment Symptoms/Complaints: Says his shoulder is doing \"good'. He is just tired today. Continues to not sleep well. Pain: Initial:    No VAS. Post Session:  No significant pain to report. UBE for active warm up, followed by pulleys 3-ways. Therapeutic Exercise (30 Minutes): Performed exercises for shoulder motion and strength as per grid below. Exercises and resistance modified as indicated. Assisted stretching in supine to abduction and forward elevation with hold/relax, 30\"x3 each. HEP: He is to continue with existing HEP. He verbalizes understanding.     Date:  12/26/17 Date:  12/28/17 Date:  1/3/18 Date  1/5/18 Date  1-8-18 Date  1-10-18   Activity/Exercise Parameters Parameters Parameters      UBE   L. 1 x6' L.1 x6' L. 1 x8' L. 1 x8'   Shoulder ROM   Pulleys to scaption x30 total;  Assisted as above Assisted stretch as above - Assisted as above   Rhythmic Stabilization    Red flex bar at 100-deg flex, side-lying abd to 90-deg   2x30\" ea - -   Mid and lower trap Side lye  10x ea  1# 10x 2 ea Side lying  2x10 ea with liftoff Side lying  1# 2x10 ea - Side lying  1# 2x5-10 ea Side-lying  1# x10 ea   Push up + Manual resist  10x2 Serratus punch 2x10 Supine press  2# 2*x10 Supine press  2# 3x6-10 Supine press  2# 2x15 Supine press  2# 1x10   Side lying shoulder ABD 10x  1# 10x 2x10 1# 2x10 3x4 with red flex bar stabilization x10\"  1# 2x10 1# 1x13   Shoulder flexion Supine:   10x  Sitting: Active eccentric with min assist  100 degrees  Flexion and scaption  5x ea Supine 2x10 30-deg beach chair active eccentrics  1# 2x10 Supine manual resisted 2x10;  Standing UE [place], full arc  3x10  Seated pulley AAROM scaption, flexion, abd 1x15 ea;  Standing Forward elevation eccentrics 2x5 Seated pulley AAROM scaption, flexion, abd 2x15 ea;  Standing UE [place], full arc  1x10    Horz ABD Stand bent over L foot on stool:  1# 3x 3  Standing:  Yellow band  At 80 degrees flexion- 5x 2 - - Supine manual resisted H Abd/H Add 2x10 each Wall slide 1x5 With UE [place] in standing full overhead  1x15   Shoulder EXT 3# 10x2 - - Standing   Green 2x15 Standing   Green 2x15;  Lat Pull down, unilat  7# cable 2x15 Standing   Green 2x15   Row/pulling     Bent Row  3# 2x15 -   Elbow EXT 3# 10x2 - - - - -   Side lying ER  2x10 - - - -   IR with band   Yellow 1x10 - - - -   ER with band   Yellow 1x10 - - - -   B bicep curls  2# 2x10 - -  --   Diagonal Flex    Supine manual resisted  2x10 each Wall slide D1 and D2 1x5-6 ea      Treatment/Session Assessment:    · Response to Treatment:  Good effort with the exercises done. More fatigued and required more breaks between exercises. So did not push total rep count and exercise done. .   · Compliance with Program/Exercises: Appears compliant. · Recommendations/Intent for next treatment session: We will continue with L shoulder ROM treatment and progression of strength exercises as able.    Total Treatment Duration: 30 Minutes  PT Patient Time In/Time Out  Time In: 1040  Time Out: [id number] [facility], PT, MSPT, OCS

## 2018-01-12 ENCOUNTER — HOSPITAL ENCOUNTER (OUTPATIENT)
Dept: PHYSICAL THERAPY | Age: 83
Discharge: HOME | End: 2018-01-12
Payer: MEDICARE

## 2018-01-12 PROCEDURE — 97110 THERAPEUTIC EXERCISES: CPT

## 2018-01-12 PROCEDURE — G8984 CARRY CURRENT STATUS: HCPCS

## 2018-01-12 PROCEDURE — G8985 CARRY GOAL STATUS: HCPCS

## 2018-01-12 NOTE — PROGRESS NOTES
Deniece Homans  : 1933  [place] / [place] / Product Type: Medicare /  [id number] at [facility] [place]  [address] [facility], [address], [facility].  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 Fall Risk Score: 2 (? 5 = High Risk)  MD Orders: Eval and Treat; HEP; ROM; Strength; \"full motion/full strength\" (17) MEDICAL/REFERRING DIAGNOSIS:Rem Implant Lt Shldr/Revision TSA    DATE OF ONSET: 10-13-17  REFERRING PHYSICIAN: Daniel Villanueva MD  RETURN PHYSICIAN APPOINTMENT: 1-15-17     PROGRESS ASSESSMENT (18):  Mr. Radha Miller has attended 10 visits to date. He is now about 3 months s/p removal of implant of L shoulder hemiarthroplasty, and revision L TSA with a reverse Delta Xtend prosthesis, and latissimus dorsi and teres major tendon transfer. He is making slow progress. He has little pain reports. L shoulder PROM is in functional ranges. Strength and functional use are a little better. He is still weak to the anterior deltoid especially, limiting active forward elevation against gravity. DASH score is essentially status quo. Again, his R/dominant UE has known rotator cuff pathology, which is severely limiting functional use of this UE. He also has co-morbid lumbar spine pathology with radicular weakness to L4-5 levels that affects his gait and stability. He lives alone, and needs to be able to perform basic personal care and household ADL's independently.  He will benefit from continued PT to further progress reverse TSA rehab to promote safe return to functional use of the L UE, and to prep for pending R shoulder surgery. PROBLEM LIST (Impacting functional limitations):  1. Decreased L shoulder ROM   2. Weakness L shoulder INTERVENTIONS PLANNED:  1. Manual therapies, therapeutic exercises, HEP for ROM    2. Therapeutic exercises and HEP for strength   TREATMENT PLAN:  Effective Dates: 12/18/2017 TO 3/17/2018. Frequency/Duration: 2-3 times a week for 12 weeks  GOALS: (Goals have been discussed and agreed upon with patient.)  Short-Term Functional Goals: Time Frame: 6 weeks   1. L shoulder AROM forward elevation greater than 120 degrees to progress into functional ranges. Ongoing 1/12/18  2. Demonstrate good L shoulder forward elevation strength for reach to head with grooming ADL's and reaching to shoulder height with household ADL's. Ongoing 1/12/18  3. Independent with initial HEP. Met 1/12/18  Discharge Goals: Time Frame: 12 weeks  1. Score less than 20% on the DASH. 2. L shoulder AROM forward elevation greater than 135 degrees for improved use with household activities reaching overhead. 3. Demonstrate good functional L shoulder strength and endurance for improved use of his L UE with his normalized daily activities. 4. Independent with advanced shoulder HEP for continued self-management. Rehabilitation Potential For Stated Goals: Good              The information in this section was collected on 12/18/17 (except where otherwise noted). HISTORY:   History of Present Injury/Illness (Reason for Referral): Long, complex history of L shoulder pain and dysfunction with rotator cuff pathology. He was s/p L rotator cuff repair on 8/28/15. He had a fall to both hands when walking up steps at his house in June 2016, resulting complete cuff tears B shoulders. L shoulder hemiarthroplasty done 7/2016. He did rehab, but poor outcome, and was still unable to raise and use L arm over shoulder. This revision Reverse TSA on L was done on 10/13/2017.  Was in the hospital 3 days, then DC'd to [facility], and finally [address] [address] to home on 12/15/17. He had complication of a hemearthrosis with drop in hematocrit and hemoglobin. It was aspirated. Blood values improved. He as been doing OT post-op shoulder rehab when in the nursing home, consisting of shoulder/UE ROM and strengthening. He has not done HEP yet. He has co-morbid R rotator cuff pathology limiting functional use of his dominant arm, and anticipates shoulder arthroplasty on this in the near future. he also has co-morbid lumbar pathology with L L4-5 myotome weakness resulting in L foot drop. He lives alone and needs to be able to manage all his ADL's independently. Past Medical History/Comorbidities: Mr. Tisha Cochran  has a past medical history of Anemia (fall 2016); Arthritis; CAD (coronary artery disease) (1997); Carotid stenosis (04/2017); Diabetes ([address] (05/1997); Diverticulosis (12/2011); GERD (gastroesophageal reflux disease); Glaucoma; Heart murmur; Hiatal hernia; History of kidney stones; Hypercholesteremia; Hypertension; Hypothyroid; MI (myocardial infarction); TIA (transient ischemic attack) (07/2016); Unspecified sleep apnea; Urethral stenosis; and Vasovagal syncope. Mr. Tisha Cochran  has a past surgical history that includes heart catheterization (1483,8414, 2012, 2013); tonsillectomy (1938); cataract removal (1987 and 1990); lap cholecystectomy (2003); hernia repair (1944); urological (1996); orthopaedic (Left); orthopaedic ([id number]); shoulder arthroscopy (Left, 2015); knee replacement (Bilateral, 2002, 2008); hemorrhoidectomy (1987); heent (Right); carpal tunnel release (Right, 1968); carpal tunnel release (Left, 2012); bunionectomy (Left); rotator cuff repair (Right, 1995); rotator cuff repair (Left, 2000); rotator cuff repair (Right, 2007); and colectomy (01/13/2017). Social History/Living Environment:  Lives alone. Has supportive children and grandchildren living in town.    Prior Level of Function/Work/Activity: Limited overhead use of L UE prior to this surgery. Limited use of R UE currently secondary to cuff pathology. Dominant Side: RIGHT  Current Medications:       simethicone (GAS-X) 125 mg capsule, Take 125 mg by mouth four (4) times daily as needed for Flatulence. , Disp: , Rfl:     loperamide (IMODIUM) 2 mg capsule, Take 2 mg by mouth four (4) times daily as needed for Diarrhea., Disp: , Rfl:     diphenoxylate-atropine (LOMOTIL) 2.5-0.025 mg per tablet, Take 1 Tab by mouth two (2) times daily as needed for Diarrhea., Disp: , Rfl:     atorvastatin (LIPITOR) 40 mg tablet, Take 40 mg by mouth nightly., Disp: , Rfl:     levothyroxine (SYNTHROID) 50 mcg tablet, Take 50 mcg by mouth Daily (before breakfast). , Disp: , Rfl:     Psyllium Husk-Sucrose 3.4 gram/7 gram powd, Take 3 Caps by mouth two (2) times a day., Disp: , Rfl:     omega-3 fatty acids-vitamin e (FISH OIL) 1,000 mg cap, Take 2 Caps by mouth daily. , Disp: , Rfl:     bimatoprost (LUMIGAN) 0.01 % ophthalmic drops, Administer 1 Drop to right eye nightly., Disp: , Rfl:     timolol (TIMOPTIC) 0.5 % ophthalmic solution, Administer 1 Drop to right eye every morning., Disp: , Rfl:     Omeprazole delayed release (PRILOSEC D/R) 20 mg tablet, Take 20 mg by mouth Daily (before dinner). , Disp: , Rfl:     meloxicam (MOBIC) 15 mg tablet, Take 15 mg by mouth daily. Indications: OSTEOARTHRITIS, Disp: , Rfl:     brimonidine (ALPHAGAN P) 0.1 % ophthalmic solution, Administer 1 Drop to right eye two (2) times a day. Take / use AM day of surgery  per anesthesia protocols. , Disp: , Rfl:     glimepiride (AMARYL) 2 mg tablet, Take 1 mg by mouth daily. Indications: type 2 diabetes mellitus, Disp: , Rfl:     b complex vitamins tablet, Take 1 Tab by mouth daily. , Disp: , Rfl:     aspirin 81 mg Tab, Take 81 mg by mouth daily. Take / use 81 mg AM day of surgery  per anesthesia protocols. , Disp: , Rfl:    Date Last Reviewed: 1-8-18   EXAMINATION:   Observation/Orthostatic Postural Assessment: not assessed today    Palpation: Not assessed. ROM:  LUE AROM  L Shoulder Flexion: 100 (forward elvation with compensation)  LUE PROM  L Shoulder Flexion: 140 (forward elevation)  L Shoulder ABduction: 90 (with scapula stabilized)  L Shoulder Internal Rotation: 70 (stabilized at 45 abd, 60 at 60 abd)  L Shoulder External Rotation: 75 (in shoulder neutral and 45 abd, 70 at 60-80 abd)                  Strength:   L Shoulder: positional strength to ant deltoid:4-, middle deltoid 4, post deltoid 5, elbow flex and ext 5             Body Structures Involved:  1. Joints  2. Muscles Body Functions Affected:  1. Neuromusculoskeletal  2. Movement Related Activities and Participation Affected:  1. General Tasks and Demands  2. Mobility  3. Self Care   CLINICAL DECISION MAKING:   Outcome Measure: Tool Used: Disabilities of the Arm, Shoulder and Hand (DASH) Questionnaire - Quick Version  Score:  Initial: 25/55 or 32% disability Most Recent: 24/55 or30% disability (1/12/18)   Interpretation of Score: The DASH is designed to measure the activities of daily living in person's with upper extremity dysfunction or pain. Each section is scored on a 1-5 scale, 5 representing the greatest disability. The scores of each section are added together for a total score of 55. This number is divided by 11, followed by subtracting 1 and multiplying by 25 to get a percent score of disability. This value represents the percentage disability: 0-20% minimal disability; 20-40% moderate disability; 40-60% severe disability; % dependent for care or exaggerated symptom behavior. Minimal detectable change is 12%. Score 11 12-19 20-28 29-37 38-45 46-54 55   Modifier CH CI CJ CK CL CM CN ?    Carrying, Moving, and Handling Objects:     - CURRENT STATUS: CJ - 20%-39% impaired, limited or restricted (1/12/18)    - GOAL STATUS: CI - 1%-19% impaired, limited or restricted    - D/C STATUS:  ---------------To be determined---------------    Medical Necessity:   · Patient is expected to demonstrate progress in strength and range of motion to promote increased functional use of his L/non-dominant UE. · Co-morbid R rotator cuff pathology and lumbar spine pathology will most likely complicate progress. · Lives alone and will need to be able function independently. Reason for Services/Other Comments:  · Patient continues to require skilled intervention due to the complexity of his history of shoulder pathology and the complexity of this revision reverse TSA. · He will need safe progression of post-op rehab to maximize return to functional use of his L UE.  · He is pending to have R shoulder arthroplasty in the future          TREATMENT:   (In addition to Assessment/Re-Assessment sessions the following treatments were rendered)  1/12/2018  Pre-treatment Symptoms/Complaints: Says he feels much better today. Able to get some rest after last time, and slept well last night. Shoulder is feeling well today. No pain to report. Says the pulley exercise hurts his R shoulder, so would like learn how to modify that move. Pain: Initial:    No pain to report. Post Session:  No significant pain to report. UBE for active warm up. Therapeutic Exercise (40 Minutes): Performed exercises for shoulder motion and strength as per grid below. Exercises and resistance modified as indicated. Assisted stretching in supine to abduction and forward elevation with hold/relax, 30\"x3 each; and guided horizontal abd/add, and D1 and D2 flexion in supine. Also, performance in lumbar mobility pelvic tilts and lower trunk rotations in hook-lying; and active hamstring stretch in 90/90 and hip flexor stretcch in prone, 2x5 rep each with diaphragmatic breathing. HEP: He is to continue with existing HEP. He can work on the lumbar mobility and active LE stretches as able. He verbalizes understanding.     Date:  12/26/17 Date:  12/28/17 Date:  1/3/18 Date  1/5/18 Date  1-8-18 Date  1-10-18 Date  1-12-18   Activity/Exercise Parameters Parameters Parameters       UBE   L. 1 x6' L.1 x6' L. 1 x8' L. 1 x8' L. 1 x8'   Shoulder ROM   Pulleys to scaption x30 total;  Assisted as above Assisted stretch as above - Assisted as above Assisted as above   Rhythmic Stabilization    Red flex bar at 100-deg flex, side-lying abd to 90-deg   2x30\" ea - - -   Mid and lower trap Side lye  10x ea  1# 10x 2 ea Side lying  2x10 ea with liftoff Side lying  1# 2x10 ea - Side lying  1# 2x5-10 ea Side-lying  1# x10 ea Side-lying  1# 1x10 ea   Push up + Manual resist  10x2 Serratus punch 2x10 Supine press  2# 2*x10 Supine press  2# 3x6-10 Supine press  2# 2x15 Supine press  2# 1x10 Supine press  2# 3x10   Side lying shoulder ABD 10x  1# 10x 2x10 1# 2x10 3x4 with red flex bar stabilization x10\"  1# 2x10 1# 1x13 2# 2x10   Shoulder flexion Supine:   10x  Sitting: Active eccentric with min assist  100 degrees  Flexion and scaption  5x ea Supine 2x10 30-deg beach chair active eccentrics  1# 2x10 Supine manual resisted 2x10;  Standing UE [place], full arc  3x10  Seated pulley AAROM scaption, flexion, abd 1x15 ea;  Standing Forward elevation eccentrics 2x5 Seated pulley AAROM scaption, flexion, abd 2x15 ea;  Standing UE [place], full arc  1x10  standing full arc place and hold  3x5   Horz ABD Stand bent over L foot on stool:  1# 3x 3  Standing:  Yellow band  At 80 degrees flexion- 5x 2 - - Supine manual resisted H Abd/H Add 2x10 each Wall slide 1x5 With UE [place] in standing full overhead  1x15 Supine AAROM abd/add x10 ea   Shoulder EXT 3# 10x2 - - Standing   Green 2x15 Standing   Green 2x15;  Lat Pull down, unilat  7# cable 2x15 Standing   Green 2x15 -   Row/pulling     Bent Row  3# 2x15 - -   Elbow EXT 3# 10x2 - - - - - -   Side lying ER  2x10 - - - - -   IR with band   Yellow 1x10 - - - - -   ER with band   Yellow 1x10 - - - - -   B bicep curls  2# 2x10 - -  -- -   Diagonal Flex    Supine manual resisted  2x10 each Wall slide D1 and D2 1x5-6 ea  Supine AAROM D1 and D2 x10 ea     Treatment/Session Assessment:    · Response to Treatment:  Good effort with the exercises done. Improved performance of the exercise. Deltoid weakness is primary weak point. · Compliance with Program/Exercises: Appears compliant. · Recommendations/Intent for next treatment session: We will continue with L shoulder ROM treatment and progression of strength exercises as able.    Total Treatment Duration: 40 Minutes  PT Patient Time In/Time Out  Time In: 1210  Time Out: [facility], PT, MSPT, OCS

## 2018-01-16 ENCOUNTER — APPOINTMENT (OUTPATIENT)
Dept: PHYSICAL THERAPY | Age: 83
End: 2018-01-16
Payer: MEDICARE

## 2018-01-16 ENCOUNTER — HOSPITAL ENCOUNTER (OUTPATIENT)
Dept: PHYSICAL THERAPY | Age: 83
Discharge: HOME | End: 2018-01-16
Payer: MEDICARE

## 2018-01-16 PROCEDURE — 97110 THERAPEUTIC EXERCISES: CPT

## 2018-01-16 NOTE — PROGRESS NOTES
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 Fall Risk Score: 2 (? 5 = High Risk)  MD Orders: Eval and Treat; HEP; ROM; Strength; \"full motion/full strength\" (17) MEDICAL/REFERRING DIAGNOSIS:Rem Implant Lt Shldr/Revision TSA    DATE OF ONSET: 10-13-17  REFERRING PHYSICIAN: Lori Richards MD  RETURN PHYSICIAN APPOINTMENT: 1-15-17     PROGRESS ASSESSMENT (18):  Mr. Katalina Jaramillo has attended 10 visits to date. He is now about 3 months s/p removal of implant of L shoulder hemiarthroplasty, and revision L TSA with a reverse Delta Xtend prosthesis, and latissimus dorsi and teres major tendon transfer. He is making slow progress. He has little pain reports. L shoulder PROM is in functional ranges. Strength and functional use are a little better. He is still weak to the anterior deltoid especially, limiting active forward elevation against gravity. DASH score is essentially status quo. Again, his R/dominant UE has known rotator cuff pathology, which is severely limiting functional use of this UE. He also has co-morbid lumbar spine pathology with radicular weakness to L4-5 levels that affects his gait and stability. He lives alone, and needs to be able to perform basic personal care and household ADL's independently.  He will benefit from continued PT to further progress reverse TSA rehab to promote safe return to functional use of the L UE, and to prep for pending R shoulder surgery. PROBLEM LIST (Impacting functional limitations):  1. Decreased L shoulder ROM   2. Weakness L shoulder INTERVENTIONS PLANNED:  1. Manual therapies, therapeutic exercises, HEP for ROM    2. Therapeutic exercises and HEP for strength   TREATMENT PLAN:  Effective Dates: 12/18/2017 TO 3/17/2018. Frequency/Duration: 2-3 times a week for 12 weeks  GOALS: (Goals have been discussed and agreed upon with patient.)  Short-Term Functional Goals: Time Frame: 6 weeks   1. L shoulder AROM forward elevation greater than 120 degrees to progress into functional ranges. Ongoing 1/12/18  2. Demonstrate good L shoulder forward elevation strength for reach to head with grooming ADL's and reaching to shoulder height with household ADL's. Ongoing 1/12/18  3. Independent with initial HEP. Met 1/12/18  Discharge Goals: Time Frame: 12 weeks  1. Score less than 20% on the DASH. 2. L shoulder AROM forward elevation greater than 135 degrees for improved use with household activities reaching overhead. 3. Demonstrate good functional L shoulder strength and endurance for improved use of his L UE with his normalized daily activities. 4. Independent with advanced shoulder HEP for continued self-management. Rehabilitation Potential For Stated Goals: Good              The information in this section was collected on 12/18/17 (except where otherwise noted). HISTORY:   History of Present Injury/Illness (Reason for Referral): Long, complex history of L shoulder pain and dysfunction with rotator cuff pathology. He was s/p L rotator cuff repair on 8/28/15. He had a fall to both hands when walking up steps at his house in June 2016, resulting complete cuff tears B shoulders. L shoulder hemiarthroplasty done 7/2016. He did rehab, but poor outcome, and was still unable to raise and use L arm over shoulder. This revision Reverse TSA on L was done on 10/13/2017.  Was in the hospital 3 days, then [facility] to [facility], and finally DC'd to home on 12/15/17. He had complication of a hemearthrosis with drop in hematocrit and hemoglobin. It was aspirated. Blood values improved. He as been doing OT post-op shoulder rehab when in the nursing home, consisting of shoulder/UE ROM and strengthening. He has not done HEP yet. He has co-morbid R rotator cuff pathology limiting functional use of his dominant arm, and anticipates shoulder arthroplasty on this in the near future. he also has co-morbid lumbar pathology with L L4-5 myotome weakness resulting in L foot drop. He lives alone and needs to be able to manage all his ADL's independently. Past Medical History/Comorbidities: Mr. Anish Tidwell  has a past medical history of Anemia (fall 2016); Arthritis; CAD (coronary artery disease) (1997); Carotid stenosis (04/2017); Diabetes ([facility] [address] (05/1997); Diverticulosis (12/2011); GERD (gastroesophageal reflux disease); Glaucoma; Heart murmur; Hiatal hernia; History of kidney stones; Hypercholesteremia; Hypertension; Hypothyroid; MI (myocardial infarction); TIA (transient ischemic attack) (07/2016); Unspecified sleep apnea; Urethral stenosis; and Vasovagal syncope. Mr. Anish Tidwell  has a past surgical history that includes heart catheterization (0141,9030, 2012, 2013); tonsillectomy (1938); cataract removal (1987 and 1990); lap cholecystectomy (2003); hernia repair (1944); urological (1996); orthopaedic (Left); orthopaedic ([id number]); shoulder arthroscopy (Left, 2015); knee replacement (Bilateral, 2002, 2008); hemorrhoidectomy (1987); heent (Right); carpal tunnel release (Right, 1968); carpal tunnel release (Left, 2012); bunionectomy (Left); rotator cuff repair (Right, 1995); rotator cuff repair (Left, 2000); rotator cuff repair (Right, 2007); and colectomy (01/13/2017). Social History/Living Environment:  Lives alone. Has supportive children and grandchildren living in town.    Prior Level of Function/Work/Activity: Limited overhead use of L UE prior to this surgery. Limited use of R UE currently secondary to cuff pathology. Dominant Side: RIGHT  Current Medications:       simethicone (GAS-X) 125 mg capsule, Take 125 mg by mouth four (4) times daily as needed for Flatulence. , Disp: , Rfl:     loperamide (IMODIUM) 2 mg capsule, Take 2 mg by mouth four (4) times daily as needed for Diarrhea., Disp: , Rfl:     diphenoxylate-atropine (LOMOTIL) 2.5-0.025 mg per tablet, Take 1 Tab by mouth two (2) times daily as needed for Diarrhea., Disp: , Rfl:     atorvastatin (LIPITOR) 40 mg tablet, Take 40 mg by mouth nightly., Disp: , Rfl:     levothyroxine (SYNTHROID) 50 mcg tablet, Take 50 mcg by mouth Daily (before breakfast). , Disp: , Rfl:     Psyllium Husk-Sucrose 3.4 gram/7 gram powd, Take 3 Caps by mouth two (2) times a day., Disp: , Rfl:     omega-3 fatty acids-vitamin e (FISH OIL) 1,000 mg cap, Take 2 Caps by mouth daily. , Disp: , Rfl:     bimatoprost (LUMIGAN) 0.01 % ophthalmic drops, Administer 1 Drop to right eye nightly., Disp: , Rfl:     timolol (TIMOPTIC) 0.5 % ophthalmic solution, Administer 1 Drop to right eye every morning., Disp: , Rfl:     Omeprazole delayed release (PRILOSEC D/R) 20 mg tablet, Take 20 mg by mouth Daily (before dinner). , Disp: , Rfl:     meloxicam (MOBIC) 15 mg tablet, Take 15 mg by mouth daily. Indications: OSTEOARTHRITIS, Disp: , Rfl:     brimonidine (ALPHAGAN P) 0.1 % ophthalmic solution, Administer 1 Drop to right eye two (2) times a day. Take / use AM day of surgery  per anesthesia protocols. , Disp: , Rfl:     glimepiride (AMARYL) 2 mg tablet, Take 1 mg by mouth daily. Indications: type 2 diabetes mellitus, Disp: , Rfl:     b complex vitamins tablet, Take 1 Tab by mouth daily. , Disp: , Rfl:     aspirin 81 mg Tab, Take 81 mg by mouth daily. Take / use 81 mg AM day of surgery  per anesthesia protocols. , Disp: , Rfl:    Date Last Reviewed: 1-16-18   EXAMINATION:   Observation/Orthostatic Postural Assessment: not assessed today Palpation: Not assessed. ROM:Not measured. Strength: Not measured. Body Structures Involved:  1. Joints  2. Muscles Body Functions Affected:  1. Neuromusculoskeletal  2. Movement Related Activities and Participation Affected:  1. General Tasks and Demands  2. Mobility  3. Self Care   CLINICAL DECISION MAKING:   Outcome Measure: Tool Used: Disabilities of the Arm, Shoulder and Hand (DASH) Questionnaire - Quick Version  Score:  Initial: 25/55 or 32% disability Most Recent: 24/55 or30% disability (1/12/18)   Interpretation of Score: The DASH is designed to measure the activities of daily living in person's with upper extremity dysfunction or pain. Each section is scored on a 1-5 scale, 5 representing the greatest disability. The scores of each section are added together for a total score of 55. This number is divided by 11, followed by subtracting 1 and multiplying by 25 to get a percent score of disability. This value represents the percentage disability: 0-20% minimal disability; 20-40% moderate disability; 40-60% severe disability; % dependent for care or exaggerated symptom behavior. Minimal detectable change is 12%. Score 11 12-19 20-28 29-37 38-45 46-54 55   Modifier CH CI CJ CK CL CM CN ? Carrying, Moving, and Handling Objects:     - CURRENT STATUS: CJ - 20%-39% impaired, limited or restricted (1/12/18)    - GOAL STATUS: CI - 1%-19% impaired, limited or restricted    - D/C STATUS:  ---------------To be determined---------------    Medical Necessity:   · Patient is expected to demonstrate progress in strength and range of motion to promote increased functional use of his L/non-dominant UE. · Co-morbid R rotator cuff pathology and lumbar spine pathology will most likely complicate progress. · Lives alone and will need to be able function independently.     Reason for Services/Other Comments:  · Patient continues to require skilled intervention due to the complexity of his history of shoulder pathology and the complexity of this revision reverse TSA. · He will need safe progression of post-op rehab to maximize return to functional use of his L UE.  · He is pending to have R shoulder arthroplasty in the future          TREATMENT:   (In addition to Assessment/Re-Assessment sessions the following treatments were rendered)  1/16/2018  Pre-treatment Symptoms/Complaints: Says he gets discouraged because he is limited with the functional use of his arms, especially reaching with overhead activities. No significant pain to report to the L shoulder. Pain: Initial:    No pain to report. Post Session:  No significant pain to report. UBE for active warm up. Therapeutic Exercise (40 Minutes): Performed exercises for shoulder motion and strength as per grid below. Shoulder ROM with passive hold/relax stretch to ER and IR at 3 positions, and assisted stretch with reciprocal inhibition to abduction and forward elevation 10\"x6 each; AAROM to horizontal abd/add, D1 and D2 flexion/extension x10 each. Exercises and resistance for strengthening modified as indicated. HEP: He is to continue with existing HEP. He can work on the lumbar mobility and active LE stretches as able. He verbalizes understanding.     Date:  12/26/17 Date:  12/28/17 Date:  1/3/18 Date  1/5/18 Date  1-8-18 Date  1-10-18 Date  1-12-18 Date  1-16-18   Activity/Exercise Parameters Parameters Parameters        UBE   L. 1 x6' L.1 x6' L. 1 x8' L. 1 x8' L. 1 x8' L. 1 x10'   Shoulder ROM   Pulleys to scaption x30 total;  Assisted as above Assisted stretch as above - Assisted as above Assisted as above Assisted as above   Rhythmic Stabilization    Red flex bar at 100-deg flex, side-lying abd to 90-deg   2x30\" ea - - - -   Mid and lower trap Side lye  10x ea  1# 10x 2 ea Side lying  2x10 ea with liftoff Side lying  1# 2x10 ea - Side lying  1# 2x5-10 ea Side-lying  1# x10 ea Side-lying  1# 1x10 ea Side-lying  1# 1x10 ea   Push up + Manual resist  10x2 Serratus punch 2x10 Supine press  2# 2*x10 Supine press  2# 3x6-10 Supine press  2# 2x15 Supine press  2# 1x10 Supine press  2# 3x10 Supine press  3# 1x15;  Standing tubing press downward  Blue 1x15,  Upward   Red 1x10   Side lying shoulder ABD 10x  1# 10x 2x10 1# 2x10 3x4 with red flex bar stabilization x10\"  1# 2x10 1# 1x13 2# 2x10 2# x10   Shoulder flexion Supine:   10x  Sitting: Active eccentric with min assist  100 degrees  Flexion and scaption  5x ea Supine 2x10 30-deg beach chair active eccentrics  1# 2x10 Supine manual resisted 2x10;  Standing UE [employer], full arc  3x10  Seated pulley AAROM scaption, flexion, abd 1x15 ea;  Standing Forward elevation eccentrics 2x5 Seated pulley AAROM scaption, flexion, abd 2x15 ea;  Standing UE [employer], full arc  1x10  standing full arc place and hold  3x5 40-deg beach chair press  1# 5x5;  UE [employer] 1x10;   Wall slide to 12 and 10 o'clock x5 ea   Horz ABD Stand bent over L foot on stool:  1# 3x 3  Standing:  Yellow band  At 80 degrees flexion- 5x 2 - - Supine manual resisted H Abd/H Add 2x10 each Wall slide 1x5 With UE [employer] in standing full overhead  1x15 Supine AAROM abd/add x10 ea Supine manual resisted abd/add x10 ea   Shoulder EXT 3# 10x2 - - Standing   Green 2x15 Standing   Green 2x15;  Lat Pull down, unilat  7# cable 2x15 Standing   Green 2x15 - -   Row/pulling     Bent Row  3# 2x15 - - Blue tubing 1x15   Elbow EXT 3# 10x2 - - - - - - cable press down  7# x20   Side lying ER  2x10 - - - - - Static hold 1# 6\"x10   IR with band   Yellow 1x10 - - - - - -   ER with band   Yellow 1x10 - - - - - -   B bicep curls  2# 2x10 - -  -- - 4# 1x20   Diagonal Flex    Supine manual resisted  2x10 each Wall slide D1 and D2 1x5-6 ea  Supine AAROM D1 and D2 x10 ea Supine manual resisted D1 and D2 flex and ext x10 ea     Treatment/Session Assessment:    · Response to Treatment:  Good effort with the exercises done. Making gains in resistance with strength moves. Weak on humeral elevation concentrics against gravity, but improving with active hold and eccentric returns   · Compliance with Program/Exercises: Appear compliant.s  · Recommendations/Intent for next treatment session: We will continue with L shoulder ROM treatment and progression of strength exercises as able.    Total Treatment Duration: 40 Minutes  PT Patient Time In/Time Out  Time In: 1040  Time Out: [id number] [facility], PT, MSPT, OCS

## 2018-01-18 ENCOUNTER — HOSPITAL ENCOUNTER (OUTPATIENT)
Dept: PHYSICAL THERAPY | Age: 83
Discharge: HOME | End: 2018-01-18
Payer: MEDICARE

## 2018-01-18 PROCEDURE — 97110 THERAPEUTIC EXERCISES: CPT

## 2018-01-18 NOTE — PROGRESS NOTES
Renee Joseph  : 1933  [place] / [place] / Product Type: Medicare /  [address] at [facility] [place]  [address], [address], [place] [address].  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 Fall Risk Score: 2 (? 5 = High Risk)  MD Orders: Eval and Treat; HEP; ROM; Strength; \"full motion/full strength\" (17) MEDICAL/REFERRING DIAGNOSIS:Rem Implant Lt Shldr/Revision TSA    DATE OF ONSET: 10-13-17  REFERRING PHYSICIAN: Rosana Parks MD  RETURN PHYSICIAN APPOINTMENT: 1-15-17     PROGRESS ASSESSMENT (18):  Mr. Bere Chaudhry has attended 10 visits to date. He is now about 3 months s/p removal of implant of L shoulder hemiarthroplasty, and revision L TSA with a reverse Delta Xtend prosthesis, and latissimus dorsi and teres major tendon transfer. He is making slow progress. He has little pain reports. L shoulder PROM is in functional ranges. Strength and functional use are a little better. He is still weak to the anterior deltoid especially, limiting active forward elevation against gravity. DASH score is essentially status quo. Again, his R/dominant UE has known rotator cuff pathology, which is severely limiting functional use of this UE. He also has co-morbid lumbar spine pathology with radicular weakness to L4-5 levels that affects his gait and stability. He lives alone, and needs to be able to perform basic personal care and household ADL's independently.  He will benefit from continued PT to further progress reverse TSA rehab to promote safe return to functional use of the L UE, and to prep for pending R shoulder surgery. PROBLEM LIST (Impacting functional limitations):  1. Decreased L shoulder ROM   2. Weakness L shoulder INTERVENTIONS PLANNED:  1. Manual therapies, therapeutic exercises, HEP for ROM    2. Therapeutic exercises and HEP for strength   TREATMENT PLAN:  Effective Dates: 12/18/2017 TO 3/17/2018. Frequency/Duration: 2-3 times a week for 12 weeks  GOALS: (Goals have been discussed and agreed upon with patient.)  Short-Term Functional Goals: Time Frame: 6 weeks   1. L shoulder AROM forward elevation greater than 120 degrees to progress into functional ranges. Ongoing 1/12/18  2. Demonstrate good L shoulder forward elevation strength for reach to head with grooming ADL's and reaching to shoulder height with household ADL's. Ongoing 1/12/18  3. Independent with initial HEP. Met 1/12/18  Discharge Goals: Time Frame: 12 weeks  1. Score less than 20% on the DASH. 2. L shoulder AROM forward elevation greater than 135 degrees for improved use with household activities reaching overhead. 3. Demonstrate good functional L shoulder strength and endurance for improved use of his L UE with his normalized daily activities. 4. Independent with advanced shoulder HEP for continued self-management. Rehabilitation Potential For Stated Goals: Good              The information in this section was collected on 12/18/17 (except where otherwise noted). HISTORY:   History of Present Injury/Illness (Reason for Referral): Long, complex history of L shoulder pain and dysfunction with rotator cuff pathology. He was s/p L rotator cuff repair on 8/28/15. He had a fall to both hands when walking up steps at his house in June 2016, resulting complete cuff tears B shoulders. L shoulder hemiarthroplasty done 7/2016. He did rehab, but poor outcome, and was still unable to raise and use L arm over shoulder. This revision Reverse TSA on L was done on 10/13/2017.  Was in the hospital 3 days, then [facility] to [facility], and finally DC'd to home on 12/15/17. He had complication of a hemearthrosis with drop in hematocrit and hemoglobin. It was aspirated. Blood values improved. He as been doing OT post-op shoulder rehab when in the nursing home, consisting of shoulder/UE ROM and strengthening. He has not done HEP yet. He has co-morbid R rotator cuff pathology limiting functional use of his dominant arm, and anticipates shoulder arthroplasty on this in the near future. he also has co-morbid lumbar pathology with L L4-5 myotome weakness resulting in L foot drop. He lives alone and needs to be able to manage all his ADL's independently. Past Medical History/Comorbidities: Mr. Paola De Oliveira  has a past medical history of Anemia (fall 2016); Arthritis; CAD (coronary artery disease) (1997); Carotid stenosis (04/2017); Diabetes ([facility] [address] (05/1997); Diverticulosis (12/2011); GERD (gastroesophageal reflux disease); Glaucoma; Heart murmur; Hiatal hernia; History of kidney stones; Hypercholesteremia; Hypertension; Hypothyroid; MI (myocardial infarction); TIA (transient ischemic attack) (07/2016); Unspecified sleep apnea; Urethral stenosis; and Vasovagal syncope. Mr. Paola De Oliveira  has a past surgical history that includes heart catheterization ([id number], 2012, 2013); tonsillectomy (1938); cataract removal (1987 and 1990); lap cholecystectomy (2003); hernia repair (1944); urological (1996); orthopaedic (Left); orthopaedic ([id number]); shoulder arthroscopy (Left, 2015); knee replacement (Bilateral, 2002, 2008); hemorrhoidectomy (1987); heent (Right); carpal tunnel release (Right, 1968); carpal tunnel release (Left, 2012); bunionectomy (Left); rotator cuff repair (Right, 1995); rotator cuff repair (Left, 2000); rotator cuff repair (Right, 2007); and colectomy (01/13/2017). Social History/Living Environment:  Lives alone. Has supportive children and grandchildren living in town.    Prior Level of Function/Work/Activity: Limited overhead use of L UE prior to this surgery. Limited use of R UE currently secondary to cuff pathology. Dominant Side: RIGHT  Current Medications:       simethicone (GAS-X) 125 mg capsule, Take 125 mg by mouth four (4) times daily as needed for Flatulence. , Disp: , Rfl:     loperamide (IMODIUM) 2 mg capsule, Take 2 mg by mouth four (4) times daily as needed for Diarrhea., Disp: , Rfl:     diphenoxylate-atropine (LOMOTIL) 2.5-0.025 mg per tablet, Take 1 Tab by mouth two (2) times daily as needed for Diarrhea., Disp: , Rfl:     atorvastatin (LIPITOR) 40 mg tablet, Take 40 mg by mouth nightly., Disp: , Rfl:     levothyroxine (SYNTHROID) 50 mcg tablet, Take 50 mcg by mouth Daily (before breakfast). , Disp: , Rfl:     Psyllium Husk-Sucrose 3.4 gram/7 gram powd, Take 3 Caps by mouth two (2) times a day., Disp: , Rfl:     omega-3 fatty acids-vitamin e (FISH OIL) 1,000 mg cap, Take 2 Caps by mouth daily. , Disp: , Rfl:     bimatoprost (LUMIGAN) 0.01 % ophthalmic drops, Administer 1 Drop to right eye nightly., Disp: , Rfl:     timolol (TIMOPTIC) 0.5 % ophthalmic solution, Administer 1 Drop to right eye every morning., Disp: , Rfl:     Omeprazole delayed release (PRILOSEC D/R) 20 mg tablet, Take 20 mg by mouth Daily (before dinner). , Disp: , Rfl:     meloxicam (MOBIC) 15 mg tablet, Take 15 mg by mouth daily. Indications: OSTEOARTHRITIS, Disp: , Rfl:     brimonidine (ALPHAGAN P) 0.1 % ophthalmic solution, Administer 1 Drop to right eye two (2) times a day. Take / use AM day of surgery  per anesthesia protocols. , Disp: , Rfl:     glimepiride (AMARYL) 2 mg tablet, Take 1 mg by mouth daily. Indications: type 2 diabetes mellitus, Disp: , Rfl:     b complex vitamins tablet, Take 1 Tab by mouth daily. , Disp: , Rfl:     aspirin 81 mg Tab, Take 81 mg by mouth daily. Take / use 81 mg AM day of surgery  per anesthesia protocols. , Disp: , Rfl:    Date Last Reviewed: 1-16-18   EXAMINATION:   Observation/Orthostatic Postural Assessment: not assessed today Palpation: Not assessed. ROM:Not measured. Strength: Not measured. Body Structures Involved:  1. Joints  2. Muscles Body Functions Affected:  1. Neuromusculoskeletal  2. Movement Related Activities and Participation Affected:  1. General Tasks and Demands  2. Mobility  3. Self Care   CLINICAL DECISION MAKING:   Outcome Measure: Tool Used: Disabilities of the Arm, Shoulder and Hand (DASH) Questionnaire - Quick Version  Score:  Initial: 25/55 or 32% disability Most Recent: 24/55 or30% disability (1/12/18)   Interpretation of Score: The DASH is designed to measure the activities of daily living in person's with upper extremity dysfunction or pain. Each section is scored on a 1-5 scale, 5 representing the greatest disability. The scores of each section are added together for a total score of 55. This number is divided by 11, followed by subtracting 1 and multiplying by 25 to get a percent score of disability. This value represents the percentage disability: 0-20% minimal disability; 20-40% moderate disability; 40-60% severe disability; % dependent for care or exaggerated symptom behavior. Minimal detectable change is 12%. Score 11 12-19 20-28 29-37 38-45 46-54 55   Modifier CH CI CJ CK CL CM CN ? Carrying, Moving, and Handling Objects:     - CURRENT STATUS: CJ - 20%-39% impaired, limited or restricted (1/12/18)    - GOAL STATUS: CI - 1%-19% impaired, limited or restricted    - D/C STATUS:  ---------------To be determined---------------    Medical Necessity:   · Patient is expected to demonstrate progress in strength and range of motion to promote increased functional use of his L/non-dominant UE. · Co-morbid R rotator cuff pathology and lumbar spine pathology will most likely complicate progress. · Lives alone and will need to be able function independently.     Reason for Services/Other Comments:  · Patient continues to require skilled intervention due to the complexity of his history of shoulder pathology and the complexity of this revision reverse TSA. · He will need safe progression of post-op rehab to maximize return to functional use of his L UE.  · He is pending to have R shoulder arthroplasty in the future          TREATMENT:   (In addition to Assessment/Re-Assessment sessions the following treatments were rendered)  1/18/2018  Pre-treatment Symptoms/Complaints: No significant pain or new issues to report to the L shoulder. Pain: Initial:    No pain to report. Post Session:  No significant pain to report. UBE for active warm up. Therapeutic Exercise (35 Minutes): Performed exercises for shoulder motion and strength as per grid below. Exercises and resistance for strengthening modified as indicated. HEP: He is to continue with existing HEP. He can work on the lumbar mobility and active LE stretches as able. He verbalizes understanding.     Date:  12/26/17 Date:  12/28/17 Date:  1/3/18 Date  1/5/18 Date  1-8-18 Date  1-10-18 Date  1-12-18 Date  1-16-18 Date  1-18-18   Activity/Exercise Parameters Parameters Parameters         UBE   L. 1 x6' L.1 x6' L. 1 x8' L. 1 x8' L. 1 x8' L. 1 x10' L. 1 x10'   Shoulder ROM   Pulleys to scaption x30 total;  Assisted as above Assisted stretch as above - Assisted as above Assisted as above Assisted as above -   Rhythmic Stabilization    Red flex bar at 100-deg flex, side-lying abd to 90-deg   2x30\" ea - - - - -   Mid and lower trap Side lye  10x ea  1# 10x 2 ea Side lying  2x10 ea with liftoff Side lying  1# 2x10 ea - Side lying  1# 2x5-10 ea Side-lying  1# x10 ea Side-lying  1# 1x10 ea Side-lying  1# 1x10 ea Side-lying  2# 2x10 ea   Push up + Manual resist  10x2 Serratus punch 2x10 Supine press  2# 2*x10 Supine press  2# 3x6-10 Supine press  2# 2x15 Supine press  2# 1x10 Supine press  2# 3x10 Supine press  3# 1x15;  Standing tubing press downward  Blue 1x15,  Upward   Red 1x10 Supine press  4# 2x15;  Standing tubing press   Upward   Red 1x10   Side lying shoulder ABD 10x  1# 10x 2x10 1# 2x10 3x4 with red flex bar stabilization x10\"  1# 2x10 1# 1x13 2# 2x10 2# x10 2# 2x10   Shoulder flexion Supine:   10x  Sitting: Active eccentric with min assist  100 degrees  Flexion and scaption  5x ea Supine 2x10 30-deg beach chair active eccentrics  1# 2x10 Supine manual resisted 2x10;  Standing UE Sunspot, full arc  3x10  Seated pulley AAROM scaption, flexion, abd 1x15 ea;  Standing Forward elevation eccentrics 2x5 Seated pulley AAROM scaption, flexion, abd 2x15 ea;  Standing UE Sunspot, full arc  1x10  standing full arc place and hold  3x5 40-deg beach chair press  1# 5x5;  UE Sunspot 1x10; Wall slide to 12 and 10 o'clock x5 ea Standing AAROM with active eccentrics 3x10   Horz ABD Stand bent over L foot on stool:  1# 3x 3  Standing:  Yellow band  At 80 degrees flexion- 5x 2 - - Supine manual resisted H Abd/H Add 2x10 each Wall slide 1x5 With UE Sunspot in standing full overhead  1x15 Supine AAROM abd/add x10 ea Supine manual resisted abd/add x10 ea Standing   Red 3x10   Shoulder EXT 3# 10x2 - - Standing   Green 2x15 Standing   Green 2x15;  Lat Pull down, unilat  7# cable 2x15 Standing   Green 2x15 - - Standing bilat  Blue 2x15   Row/pulling     Bent Row  3# 2x15 - - Blue tubing 1x15 Standing   Blue 2x15   Elbow EXT 3# 10x2 - - - - - - cable press down  7# x20 -   Side lying ER  2x10 - - - - - Static hold 1# 6\"x10 -   IR with band   Yellow 1x10 - - - - - - -   ER with band   Yellow 1x10 - - - - - - -   B bicep curls  2# 2x10 - -  -- - 4# 1x20 -   Diagonal Flex    Supine manual resisted  2x10 each Wall slide D1 and D2 1x5-6 ea  Supine AAROM D1 and D2 x10 ea Supine manual resisted D1 and D2 flex and ext x10 ea -     Treatment/Session Assessment:    · Response to Treatment: Good effort with the exercises done. Making gains in resistance with strength moves.  Weak on humeral elevation against gravity   · Compliance with Program/Exercises: Appear compliant. · Recommendations/Intent for next treatment session: We will continue with L shoulder ROM treatment and progression of strength exercises as able.    Total Treatment Duration: 35 Minutes  PT Patient Time In/Time Out  Time In: 1505  Time Out: [address], PT, MSPT, OCS

## 2018-01-19 ENCOUNTER — HOSPITAL ENCOUNTER (OUTPATIENT)
Dept: PHYSICAL THERAPY | Age: 83
Discharge: HOME | End: 2018-01-19
Payer: MEDICARE

## 2018-01-19 PROCEDURE — 97110 THERAPEUTIC EXERCISES: CPT

## 2018-01-19 NOTE — PROGRESS NOTES
Jam Shankar  : 1933  [place] / [place] / Product Type: Medicare /  [address] at [facility] [place]  [facility], [address], [place].  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 Fall Risk Score: 2 (? 5 = High Risk)  MD Orders: Eval and Treat; HEP; ROM; Strength; \"full motion/full strength\" (17) MEDICAL/REFERRING DIAGNOSIS:Rem Implant Lt Shldr/Revision TSA    DATE OF ONSET: 10-13-17  REFERRING PHYSICIAN: Adam Chopra MD  RETURN PHYSICIAN APPOINTMENT: 1-15-17     PROGRESS ASSESSMENT (18):  Mr. Anish Tidwell has attended 10 visits to date. He is now about 3 months s/p removal of implant of L shoulder hemiarthroplasty, and revision L TSA with a reverse Delta Xtend prosthesis, and latissimus dorsi and teres major tendon transfer. He is making slow progress. He has little pain reports. L shoulder PROM is in functional ranges. Strength and functional use are a little better. He is still weak to the anterior deltoid especially, limiting active forward elevation against gravity. DASH score is essentially status quo. Again, his R/dominant UE has known rotator cuff pathology, which is severely limiting functional use of this UE. He also has co-morbid lumbar spine pathology with radicular weakness to L4-5 levels that affects his gait and stability. He lives alone, and needs to be able to perform basic personal care and household ADL's independently.  He will benefit from continued PT to further progress reverse TSA rehab to promote safe return to functional use of the L UE, and to prep for pending R shoulder surgery. PROBLEM LIST (Impacting functional limitations):  1. Decreased L shoulder ROM   2. Weakness L shoulder INTERVENTIONS PLANNED:  1. Manual therapies, therapeutic exercises, HEP for ROM    2. Therapeutic exercises and HEP for strength   TREATMENT PLAN:  Effective Dates: 12/18/2017 TO 3/17/2018. Frequency/Duration: 2-3 times a week for 12 weeks  GOALS: (Goals have been discussed and agreed upon with patient.)  Short-Term Functional Goals: Time Frame: 6 weeks   1. L shoulder AROM forward elevation greater than 120 degrees to progress into functional ranges. Ongoing 1/12/18  2. Demonstrate good L shoulder forward elevation strength for reach to head with grooming ADL's and reaching to shoulder height with household ADL's. Ongoing 1/12/18  3. Independent with initial HEP. Met 1/12/18  Discharge Goals: Time Frame: 12 weeks  1. Score less than 20% on the DASH. 2. L shoulder AROM forward elevation greater than 135 degrees for improved use with household activities reaching overhead. 3. Demonstrate good functional L shoulder strength and endurance for improved use of his L UE with his normalized daily activities. 4. Independent with advanced shoulder HEP for continued self-management. Rehabilitation Potential For Stated Goals: Good              The information in this section was collected on 12/18/17 (except where otherwise noted). HISTORY:   History of Present Injury/Illness (Reason for Referral): Long, complex history of L shoulder pain and dysfunction with rotator cuff pathology. He was s/p L rotator cuff repair on 8/28/15. He had a fall to both hands when walking up steps at his house in June 2016, resulting complete cuff tears B shoulders. L shoulder hemiarthroplasty done 7/2016. He did rehab, but poor outcome, and was still unable to raise and use L arm over shoulder. This revision Reverse TSA on L was done on 10/13/2017.  Was in the hospital 3 days, then [facility] to [facility], and finally DC'd to home on 12/15/17. He had complication of a hemearthrosis with drop in hematocrit and hemoglobin. It was aspirated. Blood values improved. He as been doing OT post-op shoulder rehab when in the nursing home, consisting of shoulder/UE ROM and strengthening. He has not done HEP yet. He has co-morbid R rotator cuff pathology limiting functional use of his dominant arm, and anticipates shoulder arthroplasty on this in the near future. he also has co-morbid lumbar pathology with L L4-5 myotome weakness resulting in L foot drop. He lives alone and needs to be able to manage all his ADL's independently. Past Medical History/Comorbidities: Mr. Myah Botello  has a past medical history of Anemia (fall 2016); Arthritis; CAD (coronary artery disease) (1997); Carotid stenosis (04/2017); Diabetes ([address] (05/1997); Diverticulosis (12/2011); GERD (gastroesophageal reflux disease); Glaucoma; Heart murmur; Hiatal hernia; History of kidney stones; Hypercholesteremia; Hypertension; Hypothyroid; MI (myocardial infarction); TIA (transient ischemic attack) (07/2016); Unspecified sleep apnea; Urethral stenosis; and Vasovagal syncope. Mr. Myah Botello  has a past surgical history that includes heart catheterization (2217,1341, 2012, 2013); tonsillectomy (1938); cataract removal (1987 and 1990); lap cholecystectomy (2003); hernia repair (1944); urological (1996); orthopaedic (Left); orthopaedic ([id number]); shoulder arthroscopy (Left, 2015); knee replacement (Bilateral, 2002, 2008); hemorrhoidectomy (1987); heent (Right); carpal tunnel release (Right, 1968); carpal tunnel release (Left, 2012); bunionectomy (Left); rotator cuff repair (Right, 1995); rotator cuff repair (Left, 2000); rotator cuff repair (Right, 2007); and colectomy (01/13/2017). Social History/Living Environment:  Lives alone. Has supportive children and grandchildren living in town.    Prior Level of Function/Work/Activity: Limited overhead use of L UE prior to this surgery. Limited use of R UE currently secondary to cuff pathology. Dominant Side: RIGHT  Current Medications:       simethicone (GAS-X) 125 mg capsule, Take 125 mg by mouth four (4) times daily as needed for Flatulence. , Disp: , Rfl:     loperamide (IMODIUM) 2 mg capsule, Take 2 mg by mouth four (4) times daily as needed for Diarrhea., Disp: , Rfl:     diphenoxylate-atropine (LOMOTIL) 2.5-0.025 mg per tablet, Take 1 Tab by mouth two (2) times daily as needed for Diarrhea., Disp: , Rfl:     atorvastatin (LIPITOR) 40 mg tablet, Take 40 mg by mouth nightly., Disp: , Rfl:     levothyroxine (SYNTHROID) 50 mcg tablet, Take 50 mcg by mouth Daily (before breakfast). , Disp: , Rfl:     Psyllium Husk-Sucrose 3.4 gram/7 gram powd, Take 3 Caps by mouth two (2) times a day., Disp: , Rfl:     omega-3 fatty acids-vitamin e (FISH OIL) 1,000 mg cap, Take 2 Caps by mouth daily. , Disp: , Rfl:     bimatoprost (LUMIGAN) 0.01 % ophthalmic drops, Administer 1 Drop to right eye nightly., Disp: , Rfl:     timolol (TIMOPTIC) 0.5 % ophthalmic solution, Administer 1 Drop to right eye every morning., Disp: , Rfl:     Omeprazole delayed release (PRILOSEC D/R) 20 mg tablet, Take 20 mg by mouth Daily (before dinner). , Disp: , Rfl:     meloxicam (MOBIC) 15 mg tablet, Take 15 mg by mouth daily. Indications: OSTEOARTHRITIS, Disp: , Rfl:     brimonidine (ALPHAGAN P) 0.1 % ophthalmic solution, Administer 1 Drop to right eye two (2) times a day. Take / use AM day of surgery  per anesthesia protocols. , Disp: , Rfl:     glimepiride (AMARYL) 2 mg tablet, Take 1 mg by mouth daily. Indications: type 2 diabetes mellitus, Disp: , Rfl:     b complex vitamins tablet, Take 1 Tab by mouth daily. , Disp: , Rfl:     aspirin 81 mg Tab, Take 81 mg by mouth daily. Take / use 81 mg AM day of surgery  per anesthesia protocols. , Disp: , Rfl:    Date Last Reviewed: 1-16-18   EXAMINATION:   Observation/Orthostatic Postural Assessment: not assessed today Palpation: Not assessed. ROM:Not measured. Strength: Not measured. Body Structures Involved:  1. Joints  2. Muscles Body Functions Affected:  1. Neuromusculoskeletal  2. Movement Related Activities and Participation Affected:  1. General Tasks and Demands  2. Mobility  3. Self Care   CLINICAL DECISION MAKING:   Outcome Measure: Tool Used: Disabilities of the Arm, Shoulder and Hand (DASH) Questionnaire - Quick Version  Score:  Initial: 25/55 or 32% disability Most Recent: 24/55 or30% disability (1/12/18)   Interpretation of Score: The DASH is designed to measure the activities of daily living in person's with upper extremity dysfunction or pain. Each section is scored on a 1-5 scale, 5 representing the greatest disability. The scores of each section are added together for a total score of 55. This number is divided by 11, followed by subtracting 1 and multiplying by 25 to get a percent score of disability. This value represents the percentage disability: 0-20% minimal disability; 20-40% moderate disability; 40-60% severe disability; % dependent for care or exaggerated symptom behavior. Minimal detectable change is 12%. Score 11 12-19 20-28 29-37 38-45 46-54 55   Modifier CH CI CJ CK CL CM CN ? Carrying, Moving, and Handling Objects:     - CURRENT STATUS: CJ - 20%-39% impaired, limited or restricted (1/12/18)    - GOAL STATUS: CI - 1%-19% impaired, limited or restricted    - D/C STATUS:  ---------------To be determined---------------    Medical Necessity:   · Patient is expected to demonstrate progress in strength and range of motion to promote increased functional use of his L/non-dominant UE. · Co-morbid R rotator cuff pathology and lumbar spine pathology will most likely complicate progress. · Lives alone and will need to be able function independently.     Reason for Services/Other Comments:  · Patient continues to require skilled intervention due to the complexity of his history of shoulder pathology and the complexity of this revision reverse TSA. · He will need safe progression of post-op rehab to maximize return to functional use of his L UE.  · He is pending to have R shoulder arthroplasty in the future          TREATMENT:   (In addition to Assessment/Re-Assessment sessions the following treatments were rendered)  1/19/2018  Pre-treatment Symptoms/Complaints: Shoulder is just a little sore after yesterday's session. This is mild. Pain: Initial:    Mild sorenss. Post Session:  Muscle fatigue     UBE for active warm up. Therapeutic Exercise (50 Minutes): Performed exercises for active shoulder motion and strength as per grid below. Exercises and resistance for strengthening modified as indicated. Manual resisted alternating isometrics in supine at 0-deg ER and 100-deg flexion, and side-lying 90-deg abduction. HEP: He is to continue with existing HEP. He can work on the lumbar mobility and active LE stretches as able. He verbalizes understanding.     Date:  12/26/17 Date:  12/28/17 Date:  1/3/18 Date  1/5/18 Date  1-8-18 Date  1-10-18 Date  1-12-18 Date  1-16-18 Date  1-18-18 Date  1-19-18   Activity/Exercise Parameters Parameters Parameters          UBE   L. 1 x6' L.1 x6' L. 1 x8' L. 1 x8' L. 1 x8' L. 1 x10' L. 1 x10' L. 1 x10'   Shoulder ROM   Pulleys to scaption x30 total;  Assisted as above Assisted stretch as above - Assisted as above Assisted as above Assisted as above - Supine AROM flex, horiz abd, D1 and D2 x10 ea   Rhythmic Stabilization    Red flex bar at 100-deg flex, side-lying abd to 90-deg   2x30\" ea - - - - - Alt iso  Supine 0-deg -deg flex, side-lying 90-deg abd 3x10 each   Mid and lower trap Side lye  10x ea  1# 10x 2 ea Side lying  2x10 ea with liftoff Side lying  1# 2x10 ea - Side lying  1# 2x5-10 ea Side-lying  1# x10 ea Side-lying  1# 1x10 ea Side-lying  1# 1x10 ea Side-lying  2# 2x10 ea -   Push up + Manual resist  10x2 Serratus punch 2x10 Supine press  2# 2*x10 Supine press  2# 3x6-10 Supine press  2# 2x15 Supine press  2# 1x10 Supine press  2# 3x10 Supine press  3# 1x15;  Standing tubing press downward  Blue 1x15,  Upward   Red 1x10 Supine press  4# 2x15;  Standing tubing press   Upward   Red 1x10 Manual resisted supine press 3x10   Side lying shoulder ABD 10x  1# 10x 2x10 1# 2x10 3x4 with red flex bar stabilization x10\"  1# 2x10 1# 1x13 2# 2x10 2# x10 2# 2x10 Side-lying manual resisted 3x10   Shoulder flexion Supine:   10x  Sitting: Active eccentric with min assist  100 degrees  Flexion and scaption  5x ea Supine 2x10 30-deg beach chair active eccentrics  1# 2x10 Supine manual resisted 2x10;  Standing UE [place], full arc  3x10  Seated pulley AAROM scaption, flexion, abd 1x15 ea;  Standing Forward elevation eccentrics 2x5 Seated pulley AAROM scaption, flexion, abd 2x15 ea;  Standing UE [place], full arc  1x10  standing full arc place and hold  3x5 40-deg beach chair press  1# 5x5;  UE [place] 1x10;   Wall slide to 12 and 10 o'clock x5 ea Standing AAROM with active eccentrics 3x10 55-deg beach chair AROM to AAROM concentrics with active eccentrics 3x10    Horz ABD Stand bent over L foot on stool:  1# 3x 3  Standing:  Yellow band  At 80 degrees flexion- 5x 2 - - Supine manual resisted H Abd/H Add 2x10 each Wall slide 1x5 With UE [place] in standing full overhead  1x15 Supine AAROM abd/add x10 ea Supine manual resisted abd/add x10 ea Standing   Red 3x10 -   Shoulder EXT 3# 10x2 - - Standing   Green 2x15 Standing   Green 2x15;  Lat Pull down, unilat  7# cable 2x15 Standing   Green 2x15 - - Standing bilat  Blue 2x15 -   Row/pulling     Bent Row  3# 2x15 - - Blue tubing 1x15 Standing   Blue 2x15 -   Elbow EXT 3# 10x2 - - - - - - cable press down  7# x20 - -   Side lying ER  2x10 - - - - - Static hold 1# 6\"x10 - Manual resisted hold/eccentric 3x10   IR with band   Yellow 1x10 - - - - - - - -   ER with band   Yellow 1x10 - - - - - - - -   B bicep curls  2# 2x10 - -  -- - 4# 1x20 - -   Diagonal Flex    Supine manual resisted  2x10 each Wall slide D1 and D2 1x5-6 ea  Supine AAROM D1 and D2 x10 ea Supine manual resisted D1 and D2 flex and ext x10 ea - 55-deg beach chair D1 flex to top of head x5     Treatment/Session Assessment:    · Response to Treatment: Very good effort with the exercises done. Improving muscle action and strength progression. Manual resisted moves today for stabilization and humeral elevation strength. · Compliance with Program/Exercises: Appear compliant. · Recommendations/Intent for next treatment session: We will continue with L shoulder ROM treatment and progression of strength exercises as able.    Total Treatment Duration: 50 Minutes  PT Patient Time In/Time Out  Time In: 1115  Time Out: [address] [facility] [address], PT, MSPT, OCS

## 2018-01-22 ENCOUNTER — HOSPITAL ENCOUNTER (OUTPATIENT)
Dept: PHYSICAL THERAPY | Age: 83
Discharge: HOME | End: 2018-01-22
Payer: MEDICARE

## 2018-01-22 PROCEDURE — 97110 THERAPEUTIC EXERCISES: CPT

## 2018-01-22 NOTE — PROGRESS NOTES
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 Fall Risk Score: 2 (? 5 = High Risk)  MD Orders: Eval and Treat; HEP; ROM; Strength; \"full motion/full strength\" (17) MEDICAL/REFERRING DIAGNOSIS:Rem Implant Lt Shldr/Revision TSA    DATE OF ONSET: 10-13-17  REFERRING PHYSICIAN: Silke Gonzalez MD  RETURN PHYSICIAN APPOINTMENT: 1-15-17     PROGRESS ASSESSMENT (18):  Mr. Lovely Ribeiro has attended 10 visits to date. He is now about 3 months s/p removal of implant of L shoulder hemiarthroplasty, and revision L TSA with a reverse Delta Xtend prosthesis, and latissimus dorsi and teres major tendon transfer. He is making slow progress. He has little pain reports. L shoulder PROM is in functional ranges. Strength and functional use are a little better. He is still weak to the anterior deltoid especially, limiting active forward elevation against gravity. DASH score is essentially status quo. Again, his R/dominant UE has known rotator cuff pathology, which is severely limiting functional use of this UE. He also has co-morbid lumbar spine pathology with radicular weakness to L4-5 levels that affects his gait and stability. He lives alone, and needs to be able to perform basic personal care and household ADL's independently.  He will benefit from continued PT to further progress reverse TSA rehab to promote safe return to functional use of the L UE, and to prep for pending R shoulder surgery. PROBLEM LIST (Impacting functional limitations):  1. Decreased L shoulder ROM   2. Weakness L shoulder INTERVENTIONS PLANNED:  1. Manual therapies, therapeutic exercises, HEP for ROM    2. Therapeutic exercises and HEP for strength   TREATMENT PLAN:  Effective Dates: 12/18/2017 TO 3/17/2018. Frequency/Duration: 2-3 times a week for 12 weeks  GOALS: (Goals have been discussed and agreed upon with patient.)  Short-Term Functional Goals: Time Frame: 6 weeks   1. L shoulder AROM forward elevation greater than 120 degrees to progress into functional ranges. Ongoing 1/12/18  2. Demonstrate good L shoulder forward elevation strength for reach to head with grooming ADL's and reaching to shoulder height with household ADL's. Ongoing 1/12/18  3. Independent with initial HEP. Met 1/12/18  Discharge Goals: Time Frame: 12 weeks  1. Score less than 20% on the DASH. 2. L shoulder AROM forward elevation greater than 135 degrees for improved use with household activities reaching overhead. 3. Demonstrate good functional L shoulder strength and endurance for improved use of his L UE with his normalized daily activities. 4. Independent with advanced shoulder HEP for continued self-management. Rehabilitation Potential For Stated Goals: Good              The information in this section was collected on 12/18/17 (except where otherwise noted). HISTORY:   History of Present Injury/Illness (Reason for Referral): Long, complex history of L shoulder pain and dysfunction with rotator cuff pathology. He was s/p L rotator cuff repair on 8/28/15. He had a fall to both hands when walking up steps at his house in June 2016, resulting complete cuff tears B shoulders. L shoulder hemiarthroplasty done 7/2016. He did rehab, but poor outcome, and was still unable to raise and use L arm over shoulder. This revision Reverse TSA on L was done on 10/13/2017.  Was in the hospital 3 days, then [facility] [place] [facility] to [facility], and finally DC'd to home on 12/15/17. He had complication of a hemearthrosis with drop in hematocrit and hemoglobin. It was aspirated. Blood values improved. He as been doing OT post-op shoulder rehab when in the nursing home, consisting of shoulder/UE ROM and strengthening. He has not done HEP yet. He has co-morbid R rotator cuff pathology limiting functional use of his dominant arm, and anticipates shoulder arthroplasty on this in the near future. he also has co-morbid lumbar pathology with L L4-5 myotome weakness resulting in L foot drop. He lives alone and needs to be able to manage all his ADL's independently. Past Medical History/Comorbidities: Mr. Sixto Hernandez  has a past medical history of Anemia (fall 2016); Arthritis; CAD (coronary artery disease) (1997); Carotid stenosis (04/2017); Diabetes ([address] (05/1997); Diverticulosis (12/2011); GERD (gastroesophageal reflux disease); Glaucoma; Heart murmur; Hiatal hernia; History of kidney stones; Hypercholesteremia; Hypertension; Hypothyroid; MI (myocardial infarction); TIA (transient ischemic attack) (07/2016); Unspecified sleep apnea; Urethral stenosis; and Vasovagal syncope. Mr. Sixto Hernandez  has a past surgical history that includes heart catheterization (3156,5856, 2012, 2013); tonsillectomy (1938); cataract removal (1987 and 1990); lap cholecystectomy (2003); hernia repair (1944); urological (1996); orthopaedic (Left); orthopaedic ([id number]); shoulder arthroscopy (Left, 2015); knee replacement (Bilateral, 2002, 2008); hemorrhoidectomy (1987); heent (Right); carpal tunnel release (Right, 1968); carpal tunnel release (Left, 2012); bunionectomy (Left); rotator cuff repair (Right, 1995); rotator cuff repair (Left, 2000); rotator cuff repair (Right, 2007); and colectomy (01/13/2017). Social History/Living Environment:  Lives alone. Has supportive children and grandchildren living in town.    Prior Level of Function/Work/Activity: Limited overhead use of L UE prior to this surgery. Limited use of R UE currently secondary to cuff pathology. Dominant Side: RIGHT  Current Medications:       simethicone (GAS-X) 125 mg capsule, Take 125 mg by mouth four (4) times daily as needed for Flatulence. , Disp: , Rfl:     loperamide (IMODIUM) 2 mg capsule, Take 2 mg by mouth four (4) times daily as needed for Diarrhea., Disp: , Rfl:     diphenoxylate-atropine (LOMOTIL) 2.5-0.025 mg per tablet, Take 1 Tab by mouth two (2) times daily as needed for Diarrhea., Disp: , Rfl:     atorvastatin (LIPITOR) 40 mg tablet, Take 40 mg by mouth nightly., Disp: , Rfl:     levothyroxine (SYNTHROID) 50 mcg tablet, Take 50 mcg by mouth Daily (before breakfast). , Disp: , Rfl:     Psyllium Husk-Sucrose 3.4 gram/7 gram powd, Take 3 Caps by mouth two (2) times a day., Disp: , Rfl:     omega-3 fatty acids-vitamin e (FISH OIL) 1,000 mg cap, Take 2 Caps by mouth daily. , Disp: , Rfl:     bimatoprost (LUMIGAN) 0.01 % ophthalmic drops, Administer 1 Drop to right eye nightly., Disp: , Rfl:     timolol (TIMOPTIC) 0.5 % ophthalmic solution, Administer 1 Drop to right eye every morning., Disp: , Rfl:     Omeprazole delayed release (PRILOSEC D/R) 20 mg tablet, Take 20 mg by mouth Daily (before dinner). , Disp: , Rfl:     meloxicam (MOBIC) 15 mg tablet, Take 15 mg by mouth daily. Indications: OSTEOARTHRITIS, Disp: , Rfl:     brimonidine (ALPHAGAN P) 0.1 % ophthalmic solution, Administer 1 Drop to right eye two (2) times a day. Take / use AM day of surgery  per anesthesia protocols. , Disp: , Rfl:     glimepiride (AMARYL) 2 mg tablet, Take 1 mg by mouth daily. Indications: type 2 diabetes mellitus, Disp: , Rfl:     b complex vitamins tablet, Take 1 Tab by mouth daily. , Disp: , Rfl:     aspirin 81 mg Tab, Take 81 mg by mouth daily. Take / use 81 mg AM day of surgery  per anesthesia protocols. , Disp: , Rfl:    Date Last Reviewed: 1-22-18   EXAMINATION:   Observation/Orthostatic Postural Assessment: not assessed today Palpation: Not assessed. ROM:  LUE AROM  L Shoulder Flexion: 100 (forward elevation)                  Strength: Not measured. Body Structures Involved:  1. Joints  2. Muscles Body Functions Affected:  1. Neuromusculoskeletal  2. Movement Related Activities and Participation Affected:  1. General Tasks and Demands  2. Mobility  3. Self Care   CLINICAL DECISION MAKING:   Outcome Measure: Tool Used: Disabilities of the Arm, Shoulder and Hand (DASH) Questionnaire - Quick Version  Score:  Initial: 25/55 or 32% disability Most Recent: 24/55 or30% disability (1/12/18)   Interpretation of Score: The DASH is designed to measure the activities of daily living in person's with upper extremity dysfunction or pain. Each section is scored on a 1-5 scale, 5 representing the greatest disability. The scores of each section are added together for a total score of 55. This number is divided by 11, followed by subtracting 1 and multiplying by 25 to get a percent score of disability. This value represents the percentage disability: 0-20% minimal disability; 20-40% moderate disability; 40-60% severe disability; % dependent for care or exaggerated symptom behavior. Minimal detectable change is 12%. Score 11 12-19 20-28 29-37 38-45 46-54 55   Modifier CH CI CJ CK CL CM CN ? Carrying, Moving, and Handling Objects:     - CURRENT STATUS: CJ - 20%-39% impaired, limited or restricted (1/12/18)    - GOAL STATUS: CI - 1%-19% impaired, limited or restricted    - D/C STATUS:  ---------------To be determined---------------    Medical Necessity:   · Patient is expected to demonstrate progress in strength and range of motion to promote increased functional use of his L/non-dominant UE. · Co-morbid R rotator cuff pathology and lumbar spine pathology will most likely complicate progress. · Lives alone and will need to be able function independently.     Reason for Services/Other Comments:  · Patient continues to require skilled intervention due to the complexity of his history of shoulder pathology and the complexity of this revision reverse TSA. · He will need safe progression of post-op rehab to maximize return to functional use of his L UE.  · He is pending to have R shoulder arthroplasty in the future          TREATMENT:   (In addition to Assessment/Re-Assessment sessions the following treatments were rendered)  1/22/2018  Pre-treatment Symptoms/Complaints: Shoulder is doing well. No new issues to report. Pain: Initial:    Mild sorenss. Post Session:  Muscle fatigue     UBE for active warm up. Therapeutic Exercise (35 Minutes): Performed exercises for active shoulder motion and strength as per grid below. Exercises and resistance for strengthening modified as indicated. Manual guiding and assist as he fatigues. HEP: He is to continue with existing HEP. He verbalizes understanding. Date  1-16-18 Date  1-18-18 Date  1-19-18 Date  1-22-18   Activity/Exercise       UBE L. 1 x10' L. 1 x10' L. 1 x10' L. 2 x10'   Shoulder ROM Assisted as above - Supine AROM flex, horiz abd, D1 and D2 x10 ea -   Rhythmic Stabilization - - Alt iso  Supine 0-deg -deg flex, side-lying 90-deg abd 3x10 each Ball oscillation on wall 90-deg flex, scap, abd  2x30 ea   Mid and lower trap Side-lying  1# 1x10 ea Side-lying  2# 2x10 ea - -   Push up + Supine press  3# 1x15;  Standing tubing press downward  Blue 1x15,  Upward   Red 1x10 Supine press  4# 2x15;  Standing tubing press   Upward   Red 1x10 Manual resisted supine press 3x10 Standing tubing press downward red 3x10,   Upward   Red 3x10   Side lying shoulder ABD 2# x10 2# 2x10 Side-lying manual resisted 3x10 -   Shoulder flexion 40-deg beach chair press  1# 5x5;  UE [place] 1x10;   Wall slide to 12 and 10 o'clock x5 ea Standing AAROM with active eccentrics 3x10 55-deg beach chair AROM to AAROM concentrics with active eccentrics 3x10  UE [place] 3x10;  Wall slide 3x10   Horz ABD Supine manual resisted abd/add x10 ea Standing   Red 3x10 - Red   3x10;  Horiz add red 3x10   Shoulder EXT - Standing bilat  Blue 2x15 - Standing  bilat   Black 2x15   Row/pulling Blue tubing 1x15 Standing   Blue 2x15 - Black  2x15   Elbow EXT cable press down  7# x20 - - -   Side lying ER Static hold 1# 6\"x10 - Manual resisted hold/eccentric 3x10 -   IR with band  - - - Single yellow  1x10   ER with band  - - - single yellow  1x10   B bicep curls 4# 1x20 - - -   Diagonal Flex Supine manual resisted D1 and D2 flex and ext x10 ea - 55-deg beach chair D1 flex to top of head x5 -     Treatment/Session Assessment:    · Response to Treatment: Very good effort with the exercises done. · Compliance with Program/Exercises: Appear compliant. · Recommendations/Intent for next treatment session: We will continue with L shoulder ROM treatment and progression of strength exercises as able.    Total Treatment Duration: 35 Minutes  PT Patient Time In/Time Out  Time In: 1435  Time Out: [id number] Harpal Borja, PT, MSPT, OCS

## 2018-01-24 ENCOUNTER — HOSPITAL ENCOUNTER (OUTPATIENT)
Dept: PHYSICAL THERAPY | Age: 83
Discharge: HOME | End: 2018-01-24
Payer: MEDICARE

## 2018-01-24 PROCEDURE — 97110 THERAPEUTIC EXERCISES: CPT

## 2018-01-24 NOTE — PROGRESS NOTES
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 Fall Risk Score: 2 (? 5 = High Risk)  MD Orders: Eval and Treat; HEP; ROM; Strength; \"full motion/full strength\" (17) MEDICAL/REFERRING DIAGNOSIS:Rem Implant Lt Shldr/Revision TSA    DATE OF ONSET: 10-13-17  REFERRING PHYSICIAN: Meghan Herron MD  RETURN PHYSICIAN APPOINTMENT:     PROGRESS ASSESSMENT (18):  Mr. Myah Botello has attended 10 visits to date. He is now about 3 months s/p removal of implant of L shoulder hemiarthroplasty, and revision L TSA with a reverse Delta Xtend prosthesis, and latissimus dorsi and teres major tendon transfer. He is making slow progress. He has little pain reports. L shoulder PROM is in functional ranges. Strength and functional use are a little better. He is still weak to the anterior deltoid especially, limiting active forward elevation against gravity. DASH score is essentially status quo. Again, his R/dominant UE has known rotator cuff pathology, which is severely limiting functional use of this UE. He also has co-morbid lumbar spine pathology with radicular weakness to L4-5 levels that affects his gait and stability. He lives alone, and needs to be able to perform basic personal care and household ADL's independently.  He will benefit from continued PT to further progress reverse TSA rehab to promote safe return to functional use of the L UE, and to prep for pending R shoulder surgery. PROBLEM LIST (Impacting functional limitations):  1. Decreased L shoulder ROM   2. Weakness L shoulder INTERVENTIONS PLANNED:  1. Manual therapies, therapeutic exercises, HEP for ROM    2. Therapeutic exercises and HEP for strength   TREATMENT PLAN:  Effective Dates: 12/18/2017 TO 3/17/2018. Frequency/Duration: 2-3 times a week for 12 weeks  GOALS: (Goals have been discussed and agreed upon with patient.)  Short-Term Functional Goals: Time Frame: 6 weeks   1. L shoulder AROM forward elevation greater than 120 degrees to progress into functional ranges. Ongoing 1/12/18  2. Demonstrate good L shoulder forward elevation strength for reach to head with grooming ADL's and reaching to shoulder height with household ADL's. Ongoing 1/12/18  3. Independent with initial HEP. Met 1/12/18  Discharge Goals: Time Frame: 12 weeks  1. Score less than 20% on the DASH. 2. L shoulder AROM forward elevation greater than 135 degrees for improved use with household activities reaching overhead. 3. Demonstrate good functional L shoulder strength and endurance for improved use of his L UE with his normalized daily activities. 4. Independent with advanced shoulder HEP for continued self-management. Rehabilitation Potential For Stated Goals: Good              The information in this section was collected on 12/18/17 (except where otherwise noted). HISTORY:   History of Present Injury/Illness (Reason for Referral): Long, complex history of L shoulder pain and dysfunction with rotator cuff pathology. He was s/p L rotator cuff repair on 8/28/15. He had a fall to both hands when walking up steps at his house in June 2016, resulting complete cuff tears B shoulders. L shoulder hemiarthroplasty done 7/2016. He did rehab, but poor outcome, and was still unable to raise and use L arm over shoulder. This revision Reverse TSA on L was done on 10/13/2017.  Was in the hospital 3 days, then [facility] to [facility], and finally DC'd to home on 12/15/17. He had complication of a hemearthrosis with drop in hematocrit and hemoglobin. It was aspirated. Blood values improved. He as been doing OT post-op shoulder rehab when in the nursing home, consisting of shoulder/UE ROM and strengthening. He has not done HEP yet. He has co-morbid R rotator cuff pathology limiting functional use of his dominant arm, and anticipates shoulder arthroplasty on this in the near future. he also has co-morbid lumbar pathology with L L4-5 myotome weakness resulting in L foot drop. He lives alone and needs to be able to manage all his ADL's independently. Past Medical History/Comorbidities: Mr. Colette Pang  has a past medical history of Anemia (fall 2016); Arthritis; CAD (coronary artery disease) (1997); Carotid stenosis (04/2017); Diabetes ([address] (05/1997); Diverticulosis (12/2011); GERD (gastroesophageal reflux disease); Glaucoma; Heart murmur; Hiatal hernia; History of kidney stones; Hypercholesteremia; Hypertension; Hypothyroid; MI (myocardial infarction); TIA (transient ischemic attack) (07/2016); Unspecified sleep apnea; Urethral stenosis; and Vasovagal syncope. Mr. Colette Pang  has a past surgical history that includes heart catheterization (2762,4581, 2012, 2013); tonsillectomy (1938); cataract removal (1987 and 1990); lap cholecystectomy (2003); hernia repair (1944); urological (1996); orthopaedic (Left); orthopaedic ([id number]); shoulder arthroscopy (Left, 2015); knee replacement (Bilateral, 2002, 2008); hemorrhoidectomy (1987); heent (Right); carpal tunnel release (Right, 1968); carpal tunnel release (Left, 2012); bunionectomy (Left); rotator cuff repair (Right, 1995); rotator cuff repair (Left, 2000); rotator cuff repair (Right, 2007); and colectomy (01/13/2017). Social History/Living Environment:  Lives alone. Has supportive children and grandchildren living in town. Prior Level of Function/Work/Activity: Limited overhead use of L UE prior to this surgery. Limited use of R UE currently secondary to cuff pathology. Dominant Side: RIGHT  Current Medications:       simethicone (GAS-X) 125 mg capsule, Take 125 mg by mouth four (4) times daily as needed for Flatulence. , Disp: , Rfl:     loperamide (IMODIUM) 2 mg capsule, Take 2 mg by mouth four (4) times daily as needed for Diarrhea., Disp: , Rfl:     diphenoxylate-atropine (LOMOTIL) 2.5-0.025 mg per tablet, Take 1 Tab by mouth two (2) times daily as needed for Diarrhea., Disp: , Rfl:     atorvastatin (LIPITOR) 40 mg tablet, Take 40 mg by mouth nightly., Disp: , Rfl:     levothyroxine (SYNTHROID) 50 mcg tablet, Take 50 mcg by mouth Daily (before breakfast). , Disp: , Rfl:     Psyllium Husk-Sucrose 3.4 gram/7 gram powd, Take 3 Caps by mouth two (2) times a day., Disp: , Rfl:     omega-3 fatty acids-vitamin e (FISH OIL) 1,000 mg cap, Take 2 Caps by mouth daily. , Disp: , Rfl:     bimatoprost (LUMIGAN) 0.01 % ophthalmic drops, Administer 1 Drop to right eye nightly., Disp: , Rfl:     timolol (TIMOPTIC) 0.5 % ophthalmic solution, Administer 1 Drop to right eye every morning., Disp: , Rfl:     Omeprazole delayed release (PRILOSEC D/R) 20 mg tablet, Take 20 mg by mouth Daily (before dinner). , Disp: , Rfl:     meloxicam (MOBIC) 15 mg tablet, Take 15 mg by mouth daily. Indications: OSTEOARTHRITIS, Disp: , Rfl:     brimonidine (ALPHAGAN P) 0.1 % ophthalmic solution, Administer 1 Drop to right eye two (2) times a day. Take / use AM day of surgery  per anesthesia protocols. , Disp: , Rfl:     glimepiride (AMARYL) 2 mg tablet, Take 1 mg by mouth daily. Indications: type 2 diabetes mellitus, Disp: , Rfl:     b complex vitamins tablet, Take 1 Tab by mouth daily. , Disp: , Rfl:     aspirin 81 mg Tab, Take 81 mg by mouth daily. Take / use 81 mg AM day of surgery  per anesthesia protocols. , Disp: , Rfl:    Date Last Reviewed: 1-22-18   EXAMINATION:   Observation/Orthostatic Postural Assessment: not assessed today    Palpation: Not assessed. ROM:                     Strength: Not measured. Body Structures Involved:  1. Joints  2. Muscles Body Functions Affected:  1. Neuromusculoskeletal  2. Movement Related Activities and Participation Affected:  1. General Tasks and Demands  2. Mobility  3. Self Care   CLINICAL DECISION MAKING:   Outcome Measure: Tool Used: Disabilities of the Arm, Shoulder and Hand (DASH) Questionnaire - Quick Version  Score:  Initial: 25/55 or 32% disability Most Recent: 24/55 or30% disability (1/12/18)   Interpretation of Score: The DASH is designed to measure the activities of daily living in person's with upper extremity dysfunction or pain. Each section is scored on a 1-5 scale, 5 representing the greatest disability. The scores of each section are added together for a total score of 55. This number is divided by 11, followed by subtracting 1 and multiplying by 25 to get a percent score of disability. This value represents the percentage disability: 0-20% minimal disability; 20-40% moderate disability; 40-60% severe disability; % dependent for care or exaggerated symptom behavior. Minimal detectable change is 12%. Score 11 12-19 20-28 29-37 38-45 46-54 55   Modifier CH CI CJ CK CL CM CN ? Carrying, Moving, and Handling Objects:     - CURRENT STATUS: CJ - 20%-39% impaired, limited or restricted (1/12/18)    - GOAL STATUS: CI - 1%-19% impaired, limited or restricted    - D/C STATUS:  ---------------To be determined---------------    Medical Necessity:   · Patient is expected to demonstrate progress in strength and range of motion to promote increased functional use of his L/non-dominant UE. · Co-morbid R rotator cuff pathology and lumbar spine pathology will most likely complicate progress. · Lives alone and will need to be able function independently.     Reason for Services/Other Comments:  · Patient continues to require skilled intervention due to the complexity of his history of shoulder pathology and the complexity of this revision reverse TSA. · He will need safe progression of post-op rehab to maximize return to functional use of his L UE.  · He is pending to have R shoulder arthroplasty in the future          TREATMENT:   (In addition to Assessment/Re-Assessment sessions the following treatments were rendered)  1/24/2018  Pre-treatment Symptoms/Complaints: Shoulder was pretty sore after last time. Especially after the wall slides. Doing better today. Just a little sore. Pain: Initial: Pain Intensity 1: 0 (/10 now at rest, 6/10 after last time.)   Post Session:  No more than muscle fatigue reported. UBE for active warm up. Therapeutic Exercise (50 Minutes): Performed exercises for active shoulder motion with PROM and assisted stretching in supine to L shoulder ER at 30, 45, and 80 deg abd, abduction, IR stabilized at 45 and 60 deg abd, forward elevation, horizontal abd/add, D1 and D2 flexion, 3'x10 each with AAROM; 3 sets of 10 to abd and forward elevation with active release to pec major and teres major/lats. Shoulder strength, with emphasis to deltoid action, with manual resisted and active eccentrics as per grid below. HEP: He is to continue with existing HEP. He verbalizes understanding.     Date  1-16-18 Date  1-18-18 Date  1-19-18 Date  1-22-18 Date  1-24-18    Activity/Exercise         UBE L. 1 x10' L. 1 x10' L. 1 x10' L. 2 x10' L. 2 x10'    Shoulder ROM Assisted as above - Supine AROM flex, horiz abd, D1 and D2 x10 ea - Assisted as above    Rhythmic Stabilization - - Alt iso  Supine 0-deg -deg flex, side-lying 90-deg abd 3x10 each Ball oscillation on wall 90-deg flex, scap, abd  2x30 ea -    Mid and lower trap Side-lying  1# 1x10 ea Side-lying  2# 2x10 ea - - -    Push up + Supine press  3# 1x15;  Standing tubing press downward  Blue 1x15,  Upward   Red 1x10 Supine press  4# 2x15;  Standing tubing press   Upward   Red 1x10 Manual resisted supine press 3x10 Standing tubing press downward red 3x10,   Upward   Red 3x10 -    Side lying shoulder ABD 2# x10 2# 2x10 Side-lying manual resisted 3x10 - Side-lying manual resisted 3x10    Shoulder flexion 40-deg beach chair press  1# 5x5;  UE [place] 1x10; Wall slide to 12 and 10 o'clock x5 ea Standing AAROM with active eccentrics 3x10 55-deg beach chair AROM to AAROM concentrics with active eccentrics 3x10  UE [place] 3x10; Wall slide 3x10 Supine manual resisted 3x10;  55-deg beach chair AROM to AAROM concentrics with active eccentrics 2x10     Horz ABD Supine manual resisted abd/add x10 ea Standing   Red 3x10 - Red   3x10;  Horiz add red 3x10 Supine horiz abd add manual resisted 3x10    Shoulder EXT - Standing bilat  Blue 2x15 - Standing  bilat   Black 2x15 -    Row/pulling Blue tubing 1x15 Standing   Blue 2x15 - Black  2x15 -    Elbow EXT cable press down  7# x20 - - - -    Side lying ER Static hold 1# 6\"x10 - Manual resisted hold/eccentric 3x10 - Manual resisted hold/eccentric 3x10    IR with band  - - - Single yellow  1x10 -    ER with band  - - - single yellow  1x10 -    B bicep curls 4# 1x20 - - - -    Diagonal Flex Supine manual resisted D1 and D2 flex and ext x10 ea - 55-deg beach chair D1 flex to top of head x5 - Supine  D1 and D2 flex, manual resisted 3x10 ea      Treatment/Session Assessment:    · Response to Treatment: Very good effort with the exercises done. Increased soreness after last time, so treatment focused on motion and deltoid/elevation strength. No significant soreness during or post session. · Compliance with Program/Exercises: Appear compliant. · Recommendations/Intent for next treatment session: We will continue with L shoulder ROM treatment and progression of strength exercises as able.    Total Treatment Duration: 50 Minutes  PT Patient Time In/Time Out  Time In: 1120  Time Out: [address], PT, [facility]SPT, OCS

## 2018-01-25 ENCOUNTER — HOSPITAL ENCOUNTER (OUTPATIENT)
Dept: PHYSICAL THERAPY | Age: 83
Discharge: HOME | End: 2018-01-25
Payer: MEDICARE

## 2018-01-25 PROCEDURE — 97110 THERAPEUTIC EXERCISES: CPT

## 2018-01-25 NOTE — PROGRESS NOTES
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 Fall Risk Score: 2 (? 5 = High Risk)  MD Orders: Eval and Treat; HEP; ROM; Strength; \"full motion/full strength\" (17) MEDICAL/REFERRING DIAGNOSIS:Rem Implant Lt Shldr/Revision TSA    DATE OF ONSET: 10-13-17  REFERRING PHYSICIAN: Rachel Herron MD  RETURN PHYSICIAN APPOINTMENT:     PROGRESS ASSESSMENT (18):  Mr. Tanvi Nielsen has attended 10 visits to date. He is now about 3 months s/p removal of implant of L shoulder hemiarthroplasty, and revision L TSA with a reverse Delta Xtend prosthesis, and latissimus dorsi and teres major tendon transfer. He is making slow progress. He has little pain reports. L shoulder PROM is in functional ranges. Strength and functional use are a little better. He is still weak to the anterior deltoid especially, limiting active forward elevation against gravity. DASH score is essentially status quo. Again, his R/dominant UE has known rotator cuff pathology, which is severely limiting functional use of this UE. He also has co-morbid lumbar spine pathology with radicular weakness to L4-5 levels that affects his gait and stability. He lives alone, and needs to be able to perform basic personal care and household ADL's independently.  He will benefit from continued PT to further progress reverse TSA rehab to promote safe return to functional use of the L UE, and to prep for pending R shoulder surgery. PROBLEM LIST (Impacting functional limitations):  1. Decreased L shoulder ROM   2. Weakness L shoulder INTERVENTIONS PLANNED:  1. Manual therapies, therapeutic exercises, HEP for ROM    2. Therapeutic exercises and HEP for strength   TREATMENT PLAN:  Effective Dates: 12/18/2017 TO 3/17/2018. Frequency/Duration: 2-3 times a week for 12 weeks  GOALS: (Goals have been discussed and agreed upon with patient.)  Short-Term Functional Goals: Time Frame: 6 weeks   1. L shoulder AROM forward elevation greater than 120 degrees to progress into functional ranges. Ongoing 1/12/18  2. Demonstrate good L shoulder forward elevation strength for reach to head with grooming ADL's and reaching to shoulder height with household ADL's. Ongoing 1/12/18  3. Independent with initial HEP. Met 1/12/18  Discharge Goals: Time Frame: 12 weeks  1. Score less than 20% on the DASH. 2. L shoulder AROM forward elevation greater than 135 degrees for improved use with household activities reaching overhead. 3. Demonstrate good functional L shoulder strength and endurance for improved use of his L UE with his normalized daily activities. 4. Independent with advanced shoulder HEP for continued self-management. Rehabilitation Potential For Stated Goals: Good              The information in this section was collected on 12/18/17 (except where otherwise noted). HISTORY:   History of Present Injury/Illness (Reason for Referral): Long, complex history of L shoulder pain and dysfunction with rotator cuff pathology. He was s/p L rotator cuff repair on 8/28/15. He had a fall to both hands when walking up steps at his house in June 2016, resulting complete cuff tears B shoulders. L shoulder hemiarthroplasty done 7/2016. He did rehab, but poor outcome, and was still unable to raise and use L arm over shoulder. This revision Reverse TSA on L was done on 10/13/2017.  Was in the hospital 3 days, then [facility] to [facility], and finally [facility] to home on 12/15/17. He had complication of a hemearthrosis with drop in hematocrit and hemoglobin. It was aspirated. Blood values improved. He as been doing OT post-op shoulder rehab when in the nursing home, consisting of shoulder/UE ROM and strengthening. He has not done HEP yet. He has co-morbid R rotator cuff pathology limiting functional use of his dominant arm, and anticipates shoulder arthroplasty on this in the near future. he also has co-morbid lumbar pathology with L L4-5 myotome weakness resulting in L foot drop. He lives alone and needs to be able to manage all his ADL's independently. Past Medical History/Comorbidities: Mr. Lilly Sinha  has a past medical history of Anemia (fall 2016); Arthritis; CAD (coronary artery disease) (1997); Carotid stenosis (04/2017); Diabetes ([facility] [address] (05/1997); Diverticulosis (12/2011); GERD (gastroesophageal reflux disease); Glaucoma; Heart murmur; Hiatal hernia; History of kidney stones; Hypercholesteremia; Hypertension; Hypothyroid; MI (myocardial infarction); TIA (transient ischemic attack) (07/2016); Unspecified sleep apnea; Urethral stenosis; and Vasovagal syncope. Mr. Lilly Sinha  has a past surgical history that includes heart catheterization (7623,5106, 2012, 2013); tonsillectomy (1938); cataract removal (1987 and 1990); lap cholecystectomy (2003); hernia repair (1944); urological (1996); orthopaedic (Left); orthopaedic ([id number]); shoulder arthroscopy (Left, 2015); knee replacement (Bilateral, 2002, 2008); hemorrhoidectomy (1987); heent (Right); carpal tunnel release (Right, 1968); carpal tunnel release (Left, 2012); bunionectomy (Left); rotator cuff repair (Right, 1995); rotator cuff repair (Left, 2000); rotator cuff repair (Right, 2007); and colectomy (01/13/2017). Social History/Living Environment:  Lives alone. Has supportive children and grandchildren living in town. Prior Level of Function/Work/Activity: Limited overhead use of L UE prior to this surgery. Limited use of R UE currently secondary to cuff pathology. Dominant Side: RIGHT  Current Medications:       simethicone (GAS-X) 125 mg capsule, Take 125 mg by mouth four (4) times daily as needed for Flatulence. , Disp: , Rfl:     loperamide (IMODIUM) 2 mg capsule, Take 2 mg by mouth four (4) times daily as needed for Diarrhea., Disp: , Rfl:     diphenoxylate-atropine (LOMOTIL) 2.5-0.025 mg per tablet, Take 1 Tab by mouth two (2) times daily as needed for Diarrhea., Disp: , Rfl:     atorvastatin (LIPITOR) 40 mg tablet, Take 40 mg by mouth nightly., Disp: , Rfl:     levothyroxine (SYNTHROID) 50 mcg tablet, Take 50 mcg by mouth Daily (before breakfast). , Disp: , Rfl:     Psyllium Husk-Sucrose 3.4 gram/7 gram powd, Take 3 Caps by mouth two (2) times a day., Disp: , Rfl:     omega-3 fatty acids-vitamin e (FISH OIL) 1,000 mg cap, Take 2 Caps by mouth daily. , Disp: , Rfl:     bimatoprost (LUMIGAN) 0.01 % ophthalmic drops, Administer 1 Drop to right eye nightly., Disp: , Rfl:     timolol (TIMOPTIC) 0.5 % ophthalmic solution, Administer 1 Drop to right eye every morning., Disp: , Rfl:     Omeprazole delayed release (PRILOSEC D/R) 20 mg tablet, Take 20 mg by mouth Daily (before dinner). , Disp: , Rfl:     meloxicam (MOBIC) 15 mg tablet, Take 15 mg by mouth daily. Indications: OSTEOARTHRITIS, Disp: , Rfl:     brimonidine (ALPHAGAN P) 0.1 % ophthalmic solution, Administer 1 Drop to right eye two (2) times a day. Take / use AM day of surgery  per anesthesia protocols. , Disp: , Rfl:     glimepiride (AMARYL) 2 mg tablet, Take 1 mg by mouth daily. Indications: type 2 diabetes mellitus, Disp: , Rfl:     b complex vitamins tablet, Take 1 Tab by mouth daily. , Disp: , Rfl:     aspirin 81 mg Tab, Take 81 mg by mouth daily. Take / use 81 mg AM day of surgery  per anesthesia protocols. , Disp: , Rfl:    Date Last Reviewed: 1-22-18   EXAMINATION:   Observation/Orthostatic Postural Assessment: not assessed today    Palpation: Not assessed. ROM:                     Strength: Not measured. Body Structures Involved:  1. Joints  2. Muscles Body Functions Affected:  1. Neuromusculoskeletal  2. Movement Related Activities and Participation Affected:  1. General Tasks and Demands  2. Mobility  3. Self Care   CLINICAL DECISION MAKING:   Outcome Measure: Tool Used: Disabilities of the Arm, Shoulder and Hand (DASH) Questionnaire - Quick Version  Score:  Initial: 25/55 or 32% disability Most Recent: 24/55 or30% disability (1/12/18)   Interpretation of Score: The DASH is designed to measure the activities of daily living in person's with upper extremity dysfunction or pain. Each section is scored on a 1-5 scale, 5 representing the greatest disability. The scores of each section are added together for a total score of 55. This number is divided by 11, followed by subtracting 1 and multiplying by 25 to get a percent score of disability. This value represents the percentage disability: 0-20% minimal disability; 20-40% moderate disability; 40-60% severe disability; % dependent for care or exaggerated symptom behavior. Minimal detectable change is 12%. Score 11 12-19 20-28 29-37 38-45 46-54 55   Modifier CH CI CJ CK CL CM CN ? Carrying, Moving, and Handling Objects:     - CURRENT STATUS: CJ - 20%-39% impaired, limited or restricted (1/12/18)    - GOAL STATUS: CI - 1%-19% impaired, limited or restricted    - D/C STATUS:  ---------------To be determined---------------    Medical Necessity:   · Patient is expected to demonstrate progress in strength and range of motion to promote increased functional use of his L/non-dominant UE. · Co-morbid R rotator cuff pathology and lumbar spine pathology will most likely complicate progress. · Lives alone and will need to be able function independently.     Reason for Services/Other Comments:  · Patient continues to require skilled intervention due to the complexity of his history of shoulder pathology and the complexity of this revision reverse TSA. · He will need safe progression of post-op rehab to maximize return to functional use of his L UE.  · He is pending to have R shoulder arthroplasty in the future          TREATMENT:   (In addition to Assessment/Re-Assessment sessions the following treatments were rendered)  1/25/2018  Pre-treatment Symptoms/Complaints: Shoulder was pretty sore after last time. Especially after the wall slides. Doing better today. Just a little sore. Pain: Initial:     Post Session:  No more than muscle fatigue reported. 10 minutes of UBE for active warm up. (per below)   Therapeutic Exercise ( 39 minutes) for improved L shoulder ROM with PROM and assisted stretching in supine to L shoulder ER at 30, 45, and 80 deg of ABD, gentle ABD to tolerance, IR at 45 and 60 deg ABD and forward elevation, and horizontal ABD/ADD. Manual assistance provided to elevate L UE and to cue patient to move appropriately without shrugging L shoulder to perform forward elevation. HEP: Pt to continue with existing HEP. No changes made today.     Date  1-16-18 Date  1-18-18 Date  1-19-18 Date  1-22-18 Date  1-24-18 Date  1-25-18   Activity/Exercise         UBE L. 1 x10' L. 1 x10' L. 1 x10' L. 2 x10' L. 2 x10' L. 2 x 10' (5 min fwd, 5 min bwd)   Shoulder ROM Assisted as above - Supine AROM flex, horiz abd, D1 and D2 x10 ea - Assisted as above Assisted as above   Rhythmic Stabilization - - Alt iso  Supine 0-deg -deg flex, side-lying 90-deg abd 3x10 each Ball oscillation on wall 90-deg flex, scap, abd  2x30 ea -    Mid and lower trap Side-lying  1# 1x10 ea Side-lying  2# 2x10 ea - - -    Push up + Supine press  3# 1x15;  Standing tubing press downward  Blue 1x15,  Upward   Red 1x10 Supine press  4# 2x15;  Standing tubing press   Upward   Red 1x10 Manual resisted supine press 3x10 Standing tubing press downward red 3x10,   Upward   Red 3x10 -    Side lying shoulder ABD 2# x10 2# 2x10 Side-lying manual resisted 3x10 - Side-lying manual resisted 3x10 Side-lying 3 x 10   Shoulder flexion 40-deg beach chair press  1# 5x5;  UE [place] 1x10; Wall slide to 12 and 10 o'clock x5 ea Standing AAROM with active eccentrics 3x10 55-deg beach chair AROM to AAROM concentrics with active eccentrics 3x10  UE [place] 3x10; Wall slide 3x10 Supine manual resisted 3x10;  55-deg beach chair AROM to AAROM concentrics with active eccentrics 2x10  Supine manual resisted 3 x 10    UE [place] x 10 L UE       Horz ABD Supine manual resisted abd/add x10 ea Standing   Red 3x10 - Red   3x10;  Horiz add red 3x10 Supine horiz abd add manual resisted 3x10 Supine horiz ABD/ADD manual resistance 3 x 10   Shoulder EXT - Standing bilat  Blue 2x15 - Standing  bilat   Black 2x15 -    Row/pulling Blue tubing 1x15 Standing   Blue 2x15 - Black  2x15 - Black 2 x 15   Elbow EXT cable press down  7# x20 - - - -    Side lying ER Static hold 1# 6\"x10 - Manual resisted hold/eccentric 3x10 - Manual resisted hold/eccentric 3x10  3 x 10 w/o weight   IR with band  - - - Single yellow  1x10 - Yellow 1 x 15   ER with band  - - - single yellow  1x10 - Yellow 1  X 15   B bicep curls 4# 1x20 - - - -    Diagonal Flex Supine manual resisted D1 and D2 flex and ext x10 ea - 55-deg beach chair D1 flex to top of head x5 - Supine  D1 and D2 flex, manual resisted 3x10 ea      Treatment/Session Assessment:    · Response to Treatment: Patient demonstrates good L shoulder PROM. Patient also performed UBE [place] without upper trap compensation, but became fatigued on 10th repetition. Patient also demonstrated weakness with resisted external rotations. · Compliance with Program/Exercises: Appear compliant. · Recommendations/Intent for next treatment session: We will continue with L shoulder ROM treatment and progression of strength exercises as able.    Total Treatment Duration: 39 Minutes (+ 10 min untimed on upper body ergometer)  PT Patient Time In/Time Out  Time In: 1035  Time Out: 8[address] [facility], PT, DPT

## 2018-01-30 ENCOUNTER — APPOINTMENT (OUTPATIENT)
Dept: PHYSICAL THERAPY | Age: 83
End: 2018-01-30
Payer: MEDICARE

## 2018-01-31 ENCOUNTER — HOSPITAL ENCOUNTER (OUTPATIENT)
Dept: PHYSICAL THERAPY | Age: 83
Discharge: HOME | End: 2018-01-31
Payer: MEDICARE

## 2018-01-31 PROCEDURE — 97110 THERAPEUTIC EXERCISES: CPT

## 2018-01-31 NOTE — PROGRESS NOTES
Jaylene Vasques  : 1933  [place] / [place] / Product Type: Medicare /  [address] at [facility] [place]  [address] [facility] 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 Fall Risk Score: 2 (? 5 = High Risk)  MD Orders: Eval and Treat; HEP; ROM; Strength; \"full motion/full strength\" (18) MEDICAL/REFERRING DIAGNOSIS:Rem Implant Lt Shldr/Revision TSA    DATE OF ONSET: 10-13-17  REFERRING PHYSICIAN: Loretta Herron MD  RETURN PHYSICIAN APPOINTMENT: 3-21-18     PROGRESS ASSESSMENT (18):  Mr. Bibi Brown has attended 10 visits to date. He is now about 3 months s/p removal of implant of L shoulder hemiarthroplasty, and revision L TSA with a reverse Delta Xtend prosthesis, and latissimus dorsi and teres major tendon transfer. He is making slow progress. He has little pain reports. L shoulder PROM is in functional ranges. Strength and functional use are a little better. He is still weak to the anterior deltoid especially, limiting active forward elevation against gravity. DASH score is essentially status quo. Again, his R/dominant UE has known rotator cuff pathology, which is severely limiting functional use of this UE. He also has co-morbid lumbar spine pathology with radicular weakness to L4-5 levels that affects his gait and stability. He lives alone, and needs to be able to perform basic personal care and household ADL's independently.  He will benefit from continued PT to further progress reverse TSA rehab to promote safe return to functional use of the L UE, and to prep for pending R shoulder surgery. PROBLEM LIST (Impacting functional limitations):  1. Decreased L shoulder ROM   2. Weakness L shoulder INTERVENTIONS PLANNED:  1. Manual therapies, therapeutic exercises, HEP for ROM    2. Therapeutic exercises and HEP for strength   TREATMENT PLAN:  Effective Dates: 12/18/2017 TO 3/17/2018. Frequency/Duration: 2-3 times a week for 12 weeks  GOALS: (Goals have been discussed and agreed upon with patient.)  Short-Term Functional Goals: Time Frame: 6 weeks   1. L shoulder AROM forward elevation greater than 120 degrees to progress into functional ranges. Ongoing 1/12/18  2. Demonstrate good L shoulder forward elevation strength for reach to head with grooming ADL's and reaching to shoulder height with household ADL's. Ongoing 1/12/18  3. Independent with initial HEP. Met 1/12/18  Discharge Goals: Time Frame: 12 weeks  1. Score less than 20% on the DASH. 2. L shoulder AROM forward elevation greater than 135 degrees for improved use with household activities reaching overhead. 3. Demonstrate good functional L shoulder strength and endurance for improved use of his L UE with his normalized daily activities. 4. Independent with advanced shoulder HEP for continued self-management. Rehabilitation Potential For Stated Goals: Good              The information in this section was collected on 12/18/17 (except where otherwise noted). HISTORY:   History of Present Injury/Illness (Reason for Referral): Long, complex history of L shoulder pain and dysfunction with rotator cuff pathology. He was s/p L rotator cuff repair on 8/28/15. He had a fall to both hands when walking up steps at his house in June 2016, resulting complete cuff tears B shoulders. L shoulder hemiarthroplasty done 7/2016. He did rehab, but poor outcome, and was still unable to raise and use L arm over shoulder. This revision Reverse TSA on L was done on 10/13/2017.  Was in the hospital 3 days, then [facility], and finally DC'd to home on 12/15/17. He had complication of a hemearthrosis with drop in hematocrit and hemoglobin. It was aspirated. Blood values improved. He as been doing OT post-op shoulder rehab when in the nursing home, consisting of shoulder/UE ROM and strengthening. He has not done HEP yet. He has co-morbid R rotator cuff pathology limiting functional use of his dominant arm, and anticipates shoulder arthroplasty on this in the near future. he also has co-morbid lumbar pathology with L L4-5 myotome weakness resulting in L foot drop. He lives alone and needs to be able to manage all his ADL's independently. Past Medical History/Comorbidities: Mr. Paola De Oliveira  has a past medical history of Anemia (fall 2016); Arthritis; CAD (coronary artery disease) (1997); Carotid stenosis (04/2017); Diabetes ([address] (05/1997); Diverticulosis (12/2011); GERD (gastroesophageal reflux disease); Glaucoma; Heart murmur; Hiatal hernia; History of kidney stones; Hypercholesteremia; Hypertension; Hypothyroid; MI (myocardial infarction); TIA (transient ischemic attack) (07/2016); Unspecified sleep apnea; Urethral stenosis; and Vasovagal syncope. Mr. Paola De Oliveira  has a past surgical history that includes heart catheterization (1762,0098, 2012, 2013); tonsillectomy (1938); cataract removal (1987 and 1990); lap cholecystectomy (2003); hernia repair (1944); urological (1996); orthopaedic (Left); orthopaedic (4524-6301); shoulder arthroscopy (Left, 2015); knee replacement (Bilateral, 2002, 2008); hemorrhoidectomy (1987); heent (Right); carpal tunnel release (Right, 1968); carpal tunnel release (Left, 2012); bunionectomy (Left); rotator cuff repair (Right, 1995); rotator cuff repair (Left, 2000); rotator cuff repair (Right, 2007); and colectomy (01/13/2017). Social History/Living Environment:  Lives alone. Has supportive children and grandchildren living in town.    Prior Level of Function/Work/Activity: Limited overhead use of L UE prior to this surgery. Limited use of R UE currently secondary to cuff pathology. Dominant Side: RIGHT  Current Medications:       simethicone (GAS-X) 125 mg capsule, Take 125 mg by mouth four (4) times daily as needed for Flatulence. , Disp: , Rfl:     loperamide (IMODIUM) 2 mg capsule, Take 2 mg by mouth four (4) times daily as needed for Diarrhea., Disp: , Rfl:     diphenoxylate-atropine (LOMOTIL) 2.5-0.025 mg per tablet, Take 1 Tab by mouth two (2) times daily as needed for Diarrhea., Disp: , Rfl:     atorvastatin (LIPITOR) 40 mg tablet, Take 40 mg by mouth nightly., Disp: , Rfl:     levothyroxine (SYNTHROID) 50 mcg tablet, Take 50 mcg by mouth Daily (before breakfast). , Disp: , Rfl:     Psyllium Husk-Sucrose 3.4 gram/7 gram powd, Take 3 Caps by mouth two (2) times a day., Disp: , Rfl:     omega-3 fatty acids-vitamin e (FISH OIL) 1,000 mg cap, Take 2 Caps by mouth daily. , Disp: , Rfl:     bimatoprost (LUMIGAN) 0.01 % ophthalmic drops, Administer 1 Drop to right eye nightly., Disp: , Rfl:     timolol (TIMOPTIC) 0.5 % ophthalmic solution, Administer 1 Drop to right eye every morning., Disp: , Rfl:     Omeprazole delayed release (PRILOSEC D/R) 20 mg tablet, Take 20 mg by mouth Daily (before dinner). , Disp: , Rfl:     meloxicam (MOBIC) 15 mg tablet, Take 15 mg by mouth daily. Indications: OSTEOARTHRITIS, Disp: , Rfl:     brimonidine (ALPHAGAN P) 0.1 % ophthalmic solution, Administer 1 Drop to right eye two (2) times a day. Take / use AM day of surgery  per anesthesia protocols. , Disp: , Rfl:     glimepiride (AMARYL) 2 mg tablet, Take 1 mg by mouth daily. Indications: type 2 diabetes mellitus, Disp: , Rfl:     b complex vitamins tablet, Take 1 Tab by mouth daily. , Disp: , Rfl:     aspirin 81 mg Tab, Take 81 mg by mouth daily. Take / use 81 mg AM day of surgery  per anesthesia protocols. , Disp: , Rfl:    Date Last Reviewed: 1-31-18   EXAMINATION:   Observation/Orthostatic Postural Assessment: not assessed today Palpation: Not assessed. ROM:Not measured. Strength: Not measured. Body Structures Involved:  1. Joints  2. Muscles Body Functions Affected:  1. Neuromusculoskeletal  2. Movement Related Activities and Participation Affected:  1. General Tasks and Demands  2. Mobility  3. Self Care   CLINICAL DECISION MAKING:   Outcome Measure: Tool Used: Disabilities of the Arm, Shoulder and Hand (DASH) Questionnaire - Quick Version  Score:  Initial: 25/55 or 32% disability Most Recent: 24/55 or30% disability (1/12/18)   Interpretation of Score: The DASH is designed to measure the activities of daily living in person's with upper extremity dysfunction or pain. Each section is scored on a 1-5 scale, 5 representing the greatest disability. The scores of each section are added together for a total score of 55. This number is divided by 11, followed by subtracting 1 and multiplying by 25 to get a percent score of disability. This value represents the percentage disability: 0-20% minimal disability; 20-40% moderate disability; 40-60% severe disability; % dependent for care or exaggerated symptom behavior. Minimal detectable change is 12%. Score 11 12-19 20-28 29-37 38-45 46-54 55   Modifier CH CI CJ CK CL CM CN ? Carrying, Moving, and Handling Objects:     - CURRENT STATUS: CJ - 20%-39% impaired, limited or restricted (1/12/18)    - GOAL STATUS: CI - 1%-19% impaired, limited or restricted    - D/C STATUS:  ---------------To be determined---------------    Medical Necessity:   · Patient is expected to demonstrate progress in strength and range of motion to promote increased functional use of his L/non-dominant UE. · Co-morbid R rotator cuff pathology and lumbar spine pathology will most likely complicate progress. · Lives alone and will need to be able function independently.     Reason for Services/Other Comments:  · Patient continues to require skilled intervention due to the complexity of his history of shoulder pathology and the complexity of this revision reverse TSA. · He will need safe progression of post-op rehab to maximize return to functional use of his L UE.  · He is pending to have R shoulder arthroplasty in the future          TREATMENT:   (In addition to Assessment/Re-Assessment sessions the following treatments were rendered)  1/31/2018   Reports having his follow up with Dr. Marcie Alves this morning. He was advised to continue with PT working on strength, and can start working on reaching behind back now. He is to follow up again in 6 weeks. Pre-treatment Symptoms/Complaints: Shoulder is doing well. No significant pain to report. Has been working on his HEP. Wants to be able to reach behind his back better. Pain: Initial:    No pain to report. Post Session:  No more than muscle fatigue reported. UBE for active warm up. Therapeutic Exercise (50 Minutes): Performed exercise for L shoulder ROM with assisted stretching in supine to ER at 30, 45, and 80 deg of ABD, abd, IR at 45 and 80 deg abd, forward elevation, and horizontal abd/add, and D1 and D2 flexion/ext--3\"x10 each; hold/relax stretch to forward elevation and abduction with scapula stabilized. Strength exercises performed as per grid below. In supine, rhythmic initiation to manual resisted with static holds to D1 and D2 flexion and extension in available range; assist with humeral elevation concentrics in 40-deg and 50-deg beach chair positions. Added standing extension active stretch with wand, and active muscle work to extension and extension/adduction with isometric hold wall pushes. HEP: He is to continue with existing HEP. He can start the standing extension motions and extension isometrics for behind back reaching. He verbalizes understanding. Date  1-16-18 Date  1-18-18 Date  1-19-18 Date  1-22-18 Date  1-24-18 Date  1-25-18 Date  1-31-18   Activity/Exercise          UBE L. 1 x10' L. 1 x10' L. 1 x10' L. 2 x10' L. 2 x10' L. 2 x 10' (5 min fwd, 5 min bwd) L. 2 x10'   Shoulder ROM Assisted as above - Supine AROM flex, horiz abd, D1 and D2 x10 ea - Assisted as above Assisted as above Assisted as above; Active extension, ext/add   Rhythmic Stabilization - - Alt iso  Supine 0-deg -deg flex, side-lying 90-deg abd 3x10 each Ball oscillation on wall 90-deg flex, scap, abd  2x30 ea -  -   Mid and lower trap Side-lying  1# 1x10 ea Side-lying  2# 2x10 ea - - -  -   Push up + Supine press  3# 1x15;  Standing tubing press downward  Blue 1x15,  Upward   Red 1x10 Supine press  4# 2x15;  Standing tubing press   Upward   Red 1x10 Manual resisted supine press 3x10 Standing tubing press downward red 3x10,   Upward   Red 3x10 -  -   Side lying shoulder ABD 2# x10 2# 2x10 Side-lying manual resisted 3x10 - Side-lying manual resisted 3x10 Side-lying 3 x 10 -   Shoulder flexion 40-deg beach chair press  1# 5x5;  UE [place] 1x10; Wall slide to 12 and 10 o'clock x5 ea Standing AAROM with active eccentrics 3x10 55-deg beach chair AROM to AAROM concentrics with active eccentrics 3x10  UE [place] 3x10;   Wall slide 3x10 Supine manual resisted 3x10;  55-deg beach chair AROM to AAROM concentrics with active eccentrics 2x10  Supine manual resisted 3 x 10    UE [place] x 10 L UE     Beach chair press overhead at 40-deg x10 and 55-deg 3x5-7   Horz ABD Supine manual resisted abd/add x10 ea Standing   Red 3x10 - Red   3x10;  Horiz add red 3x10 Supine horiz abd add manual resisted 3x10 Supine horiz ABD/ADD manual resistance 3 x 10 Supine horiz ABD/ADD manual resistance 3 x 10   Shoulder EXT - Standing bilat  Blue 2x15 - Standing  bilat   Black 2x15 -  Isometric wall push to ext and ext/add 5\"x5-6 each   Row/pulling Blue tubing 1x15 Standing   Blue 2x15 - Black  2x15 - Black 2 x 15 -   Elbow EXT cable press down  7# x20 - - - -  -   Side lying ER Static hold 1# 6\"x10 - Manual resisted hold/eccentric 3x10 - Manual resisted hold/eccentric 3x10  3 x 10 w/o weight -   IR with band  - - - Single yellow  1x10 - Yellow 1 x 15 -   ER with band  - - - single yellow  1x10 - Yellow 1  X 15 -   B bicep curls 4# 1x20 - - - -  -   Diagonal Flex Supine manual resisted D1 and D2 flex and ext x10 ea - 55-deg beach chair D1 flex to top of head x5 - Supine  D1 and D2 flex, manual resisted 3x10 ea  Supine  D1 and D2 flex and ext, manual resisted 3x10 ea     Treatment/Session Assessment:    · Response to Treatment: Good effort and performance of the exercises. Mild stiffness to pecs and teres major noted. Weak to anterior deltoid. Making steady gains. · Compliance with Program/Exercises: Appear compliant. · Recommendations/Intent for next treatment session: We will continue with L shoulder ROM treatment and progression of strength exercises as able.    Total Treatment Duration: 50 minutes  PT Patient Time In/Time Out  Time In: 1110  Time Out: [address], PT, [facility], OCS

## 2018-02-02 ENCOUNTER — APPOINTMENT (OUTPATIENT)
Dept: PHYSICAL THERAPY | Age: 83
End: 2018-02-02

## 2018-02-05 ENCOUNTER — APPOINTMENT (OUTPATIENT)
Dept: PHYSICAL THERAPY | Age: 83
End: 2018-02-05

## 2018-02-07 ENCOUNTER — APPOINTMENT (OUTPATIENT)
Dept: PHYSICAL THERAPY | Age: 83
End: 2018-02-07

## 2018-02-09 ENCOUNTER — APPOINTMENT (OUTPATIENT)
Dept: PHYSICAL THERAPY | Age: 83
End: 2018-02-09

## 2018-02-12 ENCOUNTER — APPOINTMENT (OUTPATIENT)
Dept: PHYSICAL THERAPY | Age: 83
End: 2018-02-12

## 2018-02-14 ENCOUNTER — APPOINTMENT (OUTPATIENT)
Dept: PHYSICAL THERAPY | Age: 83
End: 2018-02-14

## 2018-02-16 ENCOUNTER — APPOINTMENT (OUTPATIENT)
Dept: PHYSICAL THERAPY | Age: 83
End: 2018-02-16

## 2018-03-05 ENCOUNTER — HOSPITAL ENCOUNTER (OUTPATIENT)
Dept: PHYSICAL THERAPY | Age: 83
Discharge: HOME | End: 2018-03-05
Payer: MEDICARE

## 2018-03-05 PROCEDURE — G8984 CARRY CURRENT STATUS: HCPCS

## 2018-03-05 PROCEDURE — 97110 THERAPEUTIC EXERCISES: CPT

## 2018-03-05 PROCEDURE — G8985 CARRY GOAL STATUS: HCPCS

## 2018-03-05 PROCEDURE — 97164 PT RE-EVAL EST PLAN CARE: CPT

## 2018-03-05 NOTE — THERAPY RECERTIFICATION
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 Fall Risk Score: 2 (? 5 = High Risk)  MD Orders: Eval and Treat; HEP; ROM; Strength; \"full motion/full strength\" (18) MEDICAL/REFERRING DIAGNOSIS:Rem Implant Lt Shldr/Revision TSA    DATE OF ONSET: 10-13-17  REFERRING PHYSICIAN: Jacey Herron MD  RETURN PHYSICIAN APPOINTMENT: 3-21-18     RE-EVALUATION ASSESSMENT (3-5-18):  Mr. Javon Treviño has attended 18 total visits to date. He is now nearly 5 months s/p removal of implant of L shoulder hemiarthroplasty, and revision L TSA with a reverse Delta Xtend prosthesis, and latissimus dorsi and teres major tendon transfer. He returns today after being away from PT secondary to a bowel surgery. This is doing better now, and he has been released from his abdominal physician. He continues to make slow progress. He has no pain reports. L shoulder PROM is a little stiff, but in functional ranges. Strength and functional use are continue to progress. He is still weak to the deltoid limiting active forward elevation against gravity. DASH score is improved. Again, his R/dominant UE has known rotator cuff pathology, which is severely limiting functional use of this UE. He also has co-morbid lumbar spine pathology with radicular weakness to L4-5 levels that affects his gait and stability.  He lives alone, and needs to be able to perform basic personal care and household ADL's independently. He will benefit from continued PT to further progress reverse TSA rehab to promote safe return to functional use of the L UE, and to prep for pending R shoulder surgery. PROBLEM LIST (Impacting functional limitations):  1. Decreased L shoulder ROM   2. Weakness L shoulder INTERVENTIONS PLANNED:  1. Manual therapies, therapeutic exercises, HEP for ROM    2. Therapeutic exercises and HEP for strength   TREATMENT PLAN:  Effective Dates: 3/5/2018 to 5/18/2018. Frequency/Duration: 3 times a week for an additional 8 weeks to further progress strengthening for functional independence. GOALS: (Goals have been discussed and agreed upon with patient.)  Short-Term Functional Goals: Time Frame: 6 weeks   1. L shoulder AROM forward elevation greater than 120 degrees to progress into functional ranges. Progressing, ongoing 3/5/18  2. Demonstrate good L shoulder forward elevation strength for reach to head with grooming ADL's and reaching to shoulder height with household ADL's. Progressing, ongoing 3/5/18  3. Independent with initial HEP. Met 1/12/18  Discharge Goals: Time Frame: 12 weeks  1. Score less than 20% on the DASH. Nearly met 3/5/18  2. L shoulder AROM forward elevation greater than 135 degrees for improved use with household activities reaching overhead. Ongoing 3/5/18  3. Demonstrate good functional L shoulder strength and endurance for improved use of his L UE with his normalized daily activities. Ongoing 3/5/18  4. Independent with advanced shoulder HEP for continued self-management. Ongoing 3/5/18  Rehabilitation Potential For Stated Goals: Good  Regarding Holli Braden's therapy, I certify that the treatment plan above will be carried out by a therapist or under their direction.   Thank you for this referral,    Pola Lambert, PT, MSPT, OCS       Referring Physician Signature: Santhosh Gordon MD          Date                        The information in this section was collected on 12/18/17 (except where otherwise noted). HISTORY:   History of Present Injury/Illness (Reason for Referral): Long, complex history of L shoulder pain and dysfunction with rotator cuff pathology. He was s/p L rotator cuff repair on 8/28/15. He had a fall to both hands when walking up steps at his house in June 2016, resulting complete cuff tears B shoulders. L shoulder hemiarthroplasty done 7/2016. He did rehab, but poor outcome, and was still unable to raise and use L arm over shoulder. This revision Reverse TSA on L was done on 10/13/2017. Was in the hospital 3 days, then [facility] [facility] to [facility], and finally [facility] to home on 12/15/17. He had complication of a hemearthrosis with drop in hematocrit and hemoglobin. It was aspirated. Blood values improved. He as been doing OT post-op shoulder rehab when in the nursing home, consisting of shoulder/UE ROM and strengthening. He has not done HEP yet. He has co-morbid R rotator cuff pathology limiting functional use of his dominant arm, and anticipates shoulder arthroplasty on this in the near future. he also has co-morbid lumbar pathology with L L4-5 myotome weakness resulting in L foot drop. He lives alone and needs to be able to manage all his ADL's independently. Past Medical History/Comorbidities: Mr. Anish Tidwell  has a past medical history of Anemia (fall 2016); Arthritis; CAD (coronary artery disease) (1997); Carotid stenosis (04/2017); Diabetes ([facility] [address] (05/1997); Diverticulosis (12/2011); GERD (gastroesophageal reflux disease); Glaucoma; Heart murmur; Hiatal hernia; History of kidney stones; Hypercholesteremia; Hypertension; Hypothyroid; MI (myocardial infarction); TIA (transient ischemic attack) (07/2016); Unspecified sleep apnea; Urethral stenosis; and Vasovagal syncope.  Mr. Anish Tidwell  has a past surgical history that includes heart catheterization (9782,1283, 2012, 2013); tonsillectomy (1938); cataract removal (1987 and 1990); lap cholecystectomy (2003); hernia repair (1944); urological (1996); orthopaedic (Left); orthopaedic (1712-9643); shoulder arthroscopy (Left, 2015); knee replacement (Bilateral, 2002, 2008); hemorrhoidectomy (1987); heent (Right); carpal tunnel release (Right, 1968); carpal tunnel release (Left, 2012); bunionectomy (Left); rotator cuff repair (Right, 1995); rotator cuff repair (Left, 2000); rotator cuff repair (Right, 2007); and colectomy (01/13/2017). Social History/Living Environment:  Lives alone. Has supportive children and grandchildren living in town. Prior Level of Function/Work/Activity: Limited overhead use of L UE prior to this surgery. Limited use of R UE currently secondary to cuff pathology. Dominant Side: RIGHT  Current Medications:       simethicone (GAS-X) 125 mg capsule, Take 125 mg by mouth four (4) times daily as needed for Flatulence. , Disp: , Rfl:     loperamide (IMODIUM) 2 mg capsule, Take 2 mg by mouth four (4) times daily as needed for Diarrhea., Disp: , Rfl:     diphenoxylate-atropine (LOMOTIL) 2.5-0.025 mg per tablet, Take 1 Tab by mouth two (2) times daily as needed for Diarrhea., Disp: , Rfl:     atorvastatin (LIPITOR) 40 mg tablet, Take 40 mg by mouth nightly., Disp: , Rfl:     levothyroxine (SYNTHROID) 50 mcg tablet, Take 50 mcg by mouth Daily (before breakfast). , Disp: , Rfl:     Psyllium Husk-Sucrose 3.4 gram/7 gram powd, Take 3 Caps by mouth two (2) times a day., Disp: , Rfl:     omega-3 fatty acids-vitamin e (FISH OIL) 1,000 mg cap, Take 2 Caps by mouth daily. , Disp: , Rfl:     bimatoprost (LUMIGAN) 0.01 % ophthalmic drops, Administer 1 Drop to right eye nightly., Disp: , Rfl:     timolol (TIMOPTIC) 0.5 % ophthalmic solution, Administer 1 Drop to right eye every morning., Disp: , Rfl:     Omeprazole delayed release (PRILOSEC D/R) 20 mg tablet, Take 20 mg by mouth Daily (before dinner). , Disp: , Rfl:     meloxicam (MOBIC) 15 mg tablet, Take 15 mg by mouth daily.  Indications: OSTEOARTHRITIS, Disp: , Rfl:    brimonidine (ALPHAGAN P) 0.1 % ophthalmic solution, Administer 1 Drop to right eye two (2) times a day. Take / use AM day of surgery  per anesthesia protocols. , Disp: , Rfl:     glimepiride (AMARYL) 2 mg tablet, Take 1 mg by mouth daily. Indications: type 2 diabetes mellitus, Disp: , Rfl:     b complex vitamins tablet, Take 1 Tab by mouth daily. , Disp: , Rfl:     aspirin 81 mg Tab, Take 81 mg by mouth daily. Take / use 81 mg AM day of surgery  per anesthesia protocols. , Disp: , Rfl:    Date Last Reviewed: 3-5-18   EXAMINATION:   Observation/Orthostatic Postural Assessment: unremarkable. Palpation: Unremarkable. ROM:   LUE AROM  L Shoulder Flexion: 105 (forwrad elvation; place and hold to 115 deg)  L Shoulder Internal Rotation:  (to posterior hip behind back)  LUE PROM  L Shoulder Flexion: 145 (forward elevation)  L Shoulder ABduction: 95 (with scapula stabilized)  L Shoulder Internal Rotation: 60 (at 45 adn 80 deg abd)  L Shoulder External Rotation: 80 (in shoulder neutral, 85 at 45 and 80 dega bd)        Strength:   L Shoulder Forward elevation and abduction: 3- against gravity; Deltoid positional strength:4. CLINICAL DECISION MAKING:   Outcome Measure: Tool Used: Disabilities of the Arm, Shoulder and Hand (DASH) Questionnaire - Quick Version  Score:  Initial: 25/55 or 32% disability 24/55 or30% disability (1/12/18) Most Recent: 20/55 or 20% disability (3-5-18)   Interpretation of Score: The DASH is designed to measure the activities of daily living in person's with upper extremity dysfunction or pain. Each section is scored on a 1-5 scale, 5 representing the greatest disability. The scores of each section are added together for a total score of 55. This number is divided by 11, followed by subtracting 1 and multiplying by 25 to get a percent score of disability.  This value represents the percentage disability: 0-20% minimal disability; 20-40% moderate disability; 40-60% severe disability; % dependent for care or exaggerated symptom behavior. Minimal detectable change is 12%. Score 11 12-19 20-28 29-37 38-45 46-54 55   Modifier CH CI CJ CK CL CM CN ? Carrying, Moving, and Handling Objects:     - CURRENT STATUS: CJ - 20%-39% impaired, limited or restricted (3/5/18)    - GOAL STATUS: CI - 1%-19% impaired, limited or restricted    - D/C STATUS:  ---------------To be determined---------------    Medical Necessity:   · Patient is expected to demonstrate progress in strength and range of motion to promote increased functional use of his L/non-dominant UE. · Co-morbid R rotator cuff pathology and lumbar spine pathology will most likely complicate progress. · Lives alone and will need to be able function independently. Reason for Services/Other Comments:  · Patient continues to require skilled intervention due to the complexity of his history of shoulder pathology and the complexity of this revision reverse TSA. · He will need safe progression of post-op rehab to maximize return to functional use of his L UE.  · He is pending to have R shoulder arthroplasty in the future          TREATMENT:   (In addition to Assessment/Re-Assessment sessions the following treatments were rendered)  3/5/2018   Reports having an abdominal surgery 3-4 weeks ago secondary to a bowel issue. The procedure went well. He had home health PT for 3 weeks, but only worked on functional mobility, walking, and lower body strength. Did not do any of his shoulder exercises during this time. He has been [facility] from [facility] and released by his abdominial surgeon. Pre-treatment Symptoms/Complaints: No pain to the L shoulder. Did not do his shoulder HEP the past month, but has been using his L arm as he can. Still with difficulty raising and lifting his L arm overhead. Pain: Initial:    No pain to report. Post Session:  No more than muscle fatigue reported.      UBE for active warm up.  Therapeutic Exercise (30 Minutes): Performed exercise for L shoulder ROM with assisted stretching in supine to ER at 30, 45, and 80 deg of ABD, abd, IR at 45 and 80 deg abd, forward elevation, and horizontal abd/add, 3\"x10 each. Then strength exercises performed as per grid below with min assist on eccentrics through end range lag, and active eccentrics. HEP: He is to continue with existing HEP. He verbalizes understanding. Date  1-16-18 Date  1-18-18 Date  1-19-18 Date  1-22-18 Date  1-24-18 Date  1-25-18 Date  1-31-18 Date  3-5-18   Activity/Exercise           UBE L. 1 x10' L. 1 x10' L. 1 x10' L. 2 x10' L. 2 x10' L. 2 x 10' (5 min fwd, 5 min bwd) L. 2 x10' L. 2 x10'   Shoulder ROM Assisted as above - Supine AROM flex, horiz abd, D1 and D2 x10 ea - Assisted as above Assisted as above Assisted as above; Active extension, ext/add Assisted as above   Rhythmic Stabilization - - Alt iso  Supine 0-deg -deg flex, side-lying 90-deg abd 3x10 each Ball oscillation on wall 90-deg flex, scap, abd  2x30 ea -  - -   Mid and lower trap Side-lying  1# 1x10 ea Side-lying  2# 2x10 ea - - -  - -   Push up + Supine press  3# 1x15;  Standing tubing press downward  Blue 1x15,  Upward   Red 1x10 Supine press  4# 2x15;  Standing tubing press   Upward   Red 1x10 Manual resisted supine press 3x10 Standing tubing press downward red 3x10,   Upward   Red 3x10 -  - Standing tubing dynamic hugs  Green x8  Red x8  Yellow x8   Side lying shoulder ABD 2# x10 2# 2x10 Side-lying manual resisted 3x10 - Side-lying manual resisted 3x10 Side-lying 3 x 10 - -   Shoulder flexion 40-deg beach chair press  1# 5x5;  UE [place] 1x10; Wall slide to 12 and 10 o'clock x5 ea Standing AAROM with active eccentrics 3x10 55-deg beach chair AROM to AAROM concentrics with active eccentrics 3x10  UE [place] 3x10;   Wall slide 3x10 Supine manual resisted 3x10;  55-deg beach chair AROM to AAROM concentrics with active eccentrics 2x10  Supine manual resisted 3 x 10    UE [place] x 10 L UE     [place] chair press overhead at 40-deg x10 and 55-deg 3x5-7 45-60 deg [place] chair overhead press combo active eccentrics  1# x10  2# 3x6   [place]z ABD Supine manual resisted abd/add x10 ea Standing   Red 3x10 - Red   3x10;  Horiz add red 3x10 Supine horiz abd add manual resisted 3x10 Supine horiz ABD/ADD manual resistance 3 x 10 Supine horiz ABD/ADD manual resistance 3 x 10 -   Shoulder EXT - Standing bilat  Blue 2x15 - Standing  bilat   Black 2x15 -  Isometric wall push to ext and ext/add 5\"x5-6 each Standing  bilat   Blue 1x30   Row/pulling Blue tubing 1x15 Standing   Blue 2x15 - Black  2x15 - Black 2 x 15 - -   [place] EXT cable press down  7# x20 - - - -  - -   Side lying ER Static hold 1# 6\"x10 - Manual resisted hold/eccentric 3x10 - Manual resisted hold/eccentric 3x10  3 x 10 w/o weight - -   IR with band  - - - Single yellow  1x10 - Yellow 1 x 15 - -   ER with band  - - - single yellow  1x10 - Yellow 1  X 15 - -   B bicep curls 4# 1x20 - - - -  - -   Diagonal Flex Supine manual resisted D1 and D2 flex and ext x10 ea - 55-deg beach chair D1 flex to top of head x5 - Supine  D1 and D2 flex, manual resisted 3x10 ea  Supine  D1 and D2 flex and ext, manual resisted 3x10 ea -     Treatment/Session Assessment:    · Response to Treatment: Good effort and performance of the exercises. Good L shoulder PROM and continues to have functional ranges. Weak to deltoid and humeral elevation against gravity, but AROM is a little better. He is now nearly 5 months post op. · Compliance with Program/Exercises: Appear compliant. · Recommendations/Intent for next treatment session: We will resume L shoulder ROM treatment and emphasis on progressive strengthening.    Total Treatment Duration: 50 minutes  PT Patient Time In/Time Out  Time In: 1140  Time Out: [address], PT, MSPT, OCS

## 2018-03-06 ENCOUNTER — HOSPITAL ENCOUNTER (OUTPATIENT)
Dept: PHYSICAL THERAPY | Age: 83
Discharge: HOME | End: 2018-03-06
Payer: MEDICARE

## 2018-03-06 PROCEDURE — 97110 THERAPEUTIC EXERCISES: CPT

## 2018-03-06 NOTE — THERAPY RECERTIFICATION
Jayelne Vasques  : 1933  [place] / [place] B / Product Type: Medicare /  [address]  at [facility] [employer] [employer]  [facility], [address] [place] [address],[address], [facility] [address]  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 Fall Risk Score: 2 (? 5 = High Risk)  MD Orders: Eval and Treat; HEP; ROM; Strength; \"full motion/full strength\" (18) MEDICAL/REFERRING DIAGNOSIS:Rem Implant Lt Shldr/Revision TSA    DATE OF ONSET: 10-13-17  REFERRING PHYSICIAN: Loretta Herron MD  RETURN PHYSICIAN APPOINTMENT: 3-21-18     RE-EVALUATION ASSESSMENT (3-5-18):  Mr. Bibi Brown has attended 18 total visits to date. He is now nearly 5 months s/p removal of implant of L shoulder hemiarthroplasty, and revision L TSA with a reverse Delta Xtend prosthesis, and latissimus dorsi and teres major tendon transfer. He returns today after being away from PT secondary to a bowel surgery. This is doing better now, and he has been released from his abdominal physician. He continues to make slow progress. He has no pain reports. L shoulder PROM is a little stiff, but in functional ranges. Strength and functional use are continue to progress. He is still weak to the deltoid limiting active forward elevation against gravity. DASH score is improved. Again, his R/dominant UE has known rotator cuff pathology, which is severely limiting functional use of this UE. He also has co-morbid lumbar spine pathology with radicular weakness to L4-5 levels that affects his gait and stability.  He lives alone, and needs to be able to perform basic personal care and household ADL's independently. He will benefit from continued PT to further progress reverse TSA rehab to promote safe return to functional use of the L UE, and to prep for pending R shoulder surgery. PROBLEM LIST (Impacting functional limitations):  1. Decreased L shoulder ROM   2. Weakness L shoulder INTERVENTIONS PLANNED:  1. Manual therapies, therapeutic exercises, HEP for ROM    2. Therapeutic exercises and HEP for strength   TREATMENT PLAN:  Effective Dates: 3/5/2018 to 5/18/2018. Frequency/Duration: 3 times a week for an additional 8 weeks to further progress strengthening for functional independence. GOALS: (Goals have been discussed and agreed upon with patient.)  Short-Term Functional Goals: Time Frame: 6 weeks   1. L shoulder AROM forward elevation greater than 120 degrees to progress into functional ranges. Progressing, ongoing 3/5/18  2. Demonstrate good L shoulder forward elevation strength for reach to head with grooming ADL's and reaching to shoulder height with household ADL's. Progressing, ongoing 3/5/18  3. Independent with initial HEP. Met 1/12/18  Discharge Goals: Time Frame: 12 weeks  1. Score less than 20% on the DASH. Nearly met 3/5/18  2. L shoulder AROM forward elevation greater than 135 degrees for improved use with household activities reaching overhead. Ongoing 3/5/18  3. Demonstrate good functional L shoulder strength and endurance for improved use of his L UE with his normalized daily activities. Ongoing 3/5/18  4. Independent with advanced shoulder HEP for continued self-management. Ongoing 3/5/18  Rehabilitation Potential For Stated Goals: Good              The information in this section was collected on 12/18/17 (except where otherwise noted). HISTORY:   History of Present Injury/Illness (Reason for Referral): Long, complex history of L shoulder pain and dysfunction with rotator cuff pathology. He was s/p L rotator cuff repair on 8/28/15.  He had a fall to both hands when walking up steps at his house in June 2016, resulting complete cuff tears B shoulders. L shoulder hemiarthroplasty done 7/2016. He did rehab, but poor outcome, and was still unable to raise and use L arm over shoulder. This revision Reverse TSA on L was done on 10/13/2017. Was in the hospital 3 days, then [facility] to [facility], and finally [facility] to home on 12/15/17. He had complication of a hemearthrosis with drop in hematocrit and hemoglobin. It was aspirated. Blood values improved. He as been doing OT post-op shoulder rehab when in the nursing home, consisting of shoulder/UE ROM and strengthening. He has not done HEP yet. He has co-morbid R rotator cuff pathology limiting functional use of his dominant arm, and anticipates shoulder arthroplasty on this in the near future. he also has co-morbid lumbar pathology with L L4-5 myotome weakness resulting in L foot drop. He lives alone and needs to be able to manage all his ADL's independently. Past Medical History/Comorbidities: Mr. Bibi Brown  has a past medical history of Anemia (fall 2016); Arthritis; CAD (coronary artery disease) (1997); Carotid stenosis (04/2017); Diabetes ([address] (05/1997); Diverticulosis (12/2011); GERD (gastroesophageal reflux disease); Glaucoma; Heart murmur; Hiatal hernia; History of kidney stones; Hypercholesteremia; Hypertension; Hypothyroid; MI (myocardial infarction); TIA (transient ischemic attack) (07/2016); Unspecified sleep apnea; Urethral stenosis; and Vasovagal syncope.  Mr. Bibi Brown  has a past surgical history that includes heart catheterization (7375,[id number], 2012, 2013); tonsillectomy (1938); cataract removal (1987 and 1990); lap cholecystectomy (2003); hernia repair (1944); urological (1996); orthopaedic (Left); orthopaedic ([id number]); shoulder arthroscopy (Left, 2015); knee replacement (Bilateral, 2002, 2008); hemorrhoidectomy (1987); heent (Right); carpal tunnel release (Right, 1968); carpal tunnel release (Left, 2012); bunionectomy (Left); rotator cuff repair (Right, 1995); rotator cuff repair (Left, 2000); rotator cuff repair (Right, 2007); and colectomy (01/13/2017). Social History/Living Environment:  Lives alone. Has supportive children and grandchildren living in town. Prior Level of Function/Work/Activity: Limited overhead use of L UE prior to this surgery. Limited use of R UE currently secondary to cuff pathology. Dominant Side: RIGHT  Current Medications:       simethicone (GAS-X) 125 mg capsule, Take 125 mg by mouth four (4) times daily as needed for Flatulence. , Disp: , Rfl:     loperamide (IMODIUM) 2 mg capsule, Take 2 mg by mouth four (4) times daily as needed for Diarrhea., Disp: , Rfl:     diphenoxylate-atropine (LOMOTIL) 2.5-0.025 mg per tablet, Take 1 Tab by mouth two (2) times daily as needed for Diarrhea., Disp: , Rfl:     atorvastatin (LIPITOR) 40 mg tablet, Take 40 mg by mouth nightly., Disp: , Rfl:     levothyroxine (SYNTHROID) 50 mcg tablet, Take 50 mcg by mouth Daily (before breakfast). , Disp: , Rfl:     Psyllium Husk-Sucrose 3.4 gram/7 gram powd, Take 3 Caps by mouth two (2) times a day., Disp: , Rfl:     omega-3 fatty acids-vitamin e (FISH OIL) 1,000 mg cap, Take 2 Caps by mouth daily. , Disp: , Rfl:     bimatoprost (LUMIGAN) 0.01 % ophthalmic drops, Administer 1 Drop to right eye nightly., Disp: , Rfl:     timolol (TIMOPTIC) 0.5 % ophthalmic solution, Administer 1 Drop to right eye every morning., Disp: , Rfl:     Omeprazole delayed release (PRILOSEC D/R) 20 mg tablet, Take 20 mg by mouth Daily (before dinner). , Disp: , Rfl:     meloxicam (MOBIC) 15 mg tablet, Take 15 mg by mouth daily. Indications: OSTEOARTHRITIS, Disp: , Rfl:     brimonidine (ALPHAGAN P) 0.1 % ophthalmic solution, Administer 1 Drop to right eye two (2) times a day. Take / use AM day of surgery  per anesthesia protocols. , Disp: , Rfl:     glimepiride (AMARYL) 2 mg tablet, Take 1 mg by mouth daily.  Indications: type 2 diabetes mellitus, Disp: , Rfl:     b complex vitamins tablet, Take 1 Tab by mouth daily. , Disp: , Rfl:     aspirin 81 mg Tab, Take 81 mg by mouth daily. Take / use 81 mg AM day of surgery  per anesthesia protocols. , Disp: , Rfl:    Date Last Reviewed: 3-5-18   EXAMINATION:   Observation/Orthostatic Postural Assessment: unremarkable. Palpation: Unremarkable. ROM: Not measured. Strength: Not measured. CLINICAL DECISION MAKING:   Outcome Measure: Tool Used: Disabilities of the Arm, Shoulder and Hand (DASH) Questionnaire - Quick Version  Score:  Initial: 25/55 or 32% disability 24/55 or30% disability (1/12/18) Most Recent: 20/55 or 20% disability (3-5-18)   Interpretation of Score: The DASH is designed to measure the activities of daily living in person's with upper extremity dysfunction or pain. Each section is scored on a 1-5 scale, 5 representing the greatest disability. The scores of each section are added together for a total score of 55. This number is divided by 11, followed by subtracting 1 and multiplying by 25 to get a percent score of disability. This value represents the percentage disability: 0-20% minimal disability; 20-40% moderate disability; 40-60% severe disability; % dependent for care or exaggerated symptom behavior. Minimal detectable change is 12%. Score 11 12-19 20-28 29-37 38-45 46-54 55   Modifier CH CI CJ CK CL CM CN ? Carrying, Moving, and Handling Objects:     - CURRENT STATUS: CJ - 20%-39% impaired, limited or restricted (3/5/18)    - GOAL STATUS: CI - 1%-19% impaired, limited or restricted    - D/C STATUS:  ---------------To be determined---------------    Medical Necessity:   · Patient is expected to demonstrate progress in strength and range of motion to promote increased functional use of his L/non-dominant UE. · Co-morbid R rotator cuff pathology and lumbar spine pathology will most likely complicate progress.    · Lives alone and will need to be able function independently. Reason for Services/Other Comments:  · Patient continues to require skilled intervention due to the complexity of his history of shoulder pathology and the complexity of this revision reverse TSA. · He will need safe progression of post-op rehab to maximize return to functional use of his L UE.  · He is pending to have R shoulder arthroplasty in the future          TREATMENT:   (In addition to Assessment/Re-Assessment sessions the following treatments were rendered)  3/6/2018   Pre-treatment Symptoms/Complaints: Just a little sore to the L shoulder after yesterday's session. Pain: Initial:    No pain to report. Post Session:  No more than muscle fatigue reported. UBE for active warm up. Therapeutic Exercise (40 Minutes): Performed exercise for L shoulder ROM with assisted stretching in supine to ER at 30, 45, and 80 deg of ABD, abd, IR at 45 and 80 deg abd, forward elevation, and horizontal abd/add, 3\"x10 each. Then strength exercises performed as per grid below with emphasis on manual resisted shoulder stabilization as indicated. HEP: He is to continue with existing HEP. He verbalizes understanding. Date  1-16-18 Date  1-18-18 Date  1-19-18 Date  1-22-18 Date  1-24-18 Date  1-25-18 Date  1-31-18 Date  3-5-18 Date  3-6-18   Activity/Exercise            UBE L. 1 x10' L. 1 x10' L. 1 x10' L. 2 x10' L. 2 x10' L. 2 x 10' (5 min fwd, 5 min bwd) L. 2 x10' L. 2 x10' U/LBE  L. 4 x15' (on own)   Shoulder ROM Assisted as above - Supine AROM flex, horiz abd, D1 and D2 x10 ea - Assisted as above Assisted as above Assisted as above; Active extension, ext/add Assisted as above Assisted as above   Rhythmic Stabilization - - Alt iso  Supine 0-deg -deg flex, side-lying 90-deg abd 3x10 each Ball oscillation on wall 90-deg flex, scap, abd  2x30 ea -  - - Alt iso to rhyth stab in supine 0-deg ER, 100-deg flex;   Side-lying 90-deg abd  3x30\" each   Mid and lower trap Side-lying  1# 1x10 ea Side-lying  2# 2x10 ea - - -  - - -   Push up + Supine press  3# 1x15;  Standing tubing press downward  Blue 1x15,  Upward   Red 1x10 Supine press  4# 2x15;  Standing tubing press   Upward   Red 1x10 Manual resisted supine press 3x10 Standing tubing press downward red 3x10,   Upward   Red 3x10 -  - Standing tubing dynamic hugs  Green x8  Red x8  Yellow x8 -   Side lying shoulder ABD 2# x10 2# 2x10 Side-lying manual resisted 3x10 - Side-lying manual resisted 3x10 Side-lying 3 x 10 - - Light manual resisted to 90-deg  3x10   Shoulder flexion 40-deg beach chair press  1# 5x5;  UE [place] 1x10; Wall slide to 12 and 10 o'clock x5 ea Standing AAROM with active eccentrics 3x10 55-deg beach chair AROM to AAROM concentrics with active eccentrics 3x10  UE [place] 3x10;   Wall slide 3x10 Supine manual resisted 3x10;  55-deg beach chair AROM to AAROM concentrics with active eccentrics 2x10  Supine manual resisted 3 x 10    UE [place] x 10 L UE     Douglas chair press overhead at 40-deg x10 and 55-deg 3x5-7 45-60 deg Douglas chair overhead press combo active eccentrics  1# x10  2# 3x6 UE [place], standing 3x10, and with manual resisted stabilization   Horz ABD Supine manual resisted abd/add x10 ea Standing   Red 3x10 - Red   3x10;  Horiz add red 3x10 Supine horiz abd add manual resisted 3x10 Supine horiz ABD/ADD manual resistance 3 x 10 Supine horiz ABD/ADD manual resistance 3 x 10 - -   Shoulder EXT - Standing bilat  Blue 2x15 - Standing  bilat   Black 2x15 -  Isometric wall push to ext and ext/add 5\"x5-6 each Standing  bilat   Blue 1x30 -   Row/pulling Blue tubing 1x15 Standing   Blue 2x15 - Black  2x15 - Black 2 x 15 - - -   Elbow EXT cable press down  7# x20 - - - -  - - --   Side lying ER Static hold 1# 6\"x10 - Manual resisted hold/eccentric 3x10 - Manual resisted hold/eccentric 3x10  3 x 10 w/o weight - - Side-lying manual resisted eccentrics  3x10   IR with band  - - - Single yellow  1x10 - Yellow 1 x 15 - - Supine manual resisted, light 2x10   ER with band  - - - single yellow  1x10 - Yellow 1  X 15 - - Supine manual resisted, light 2x10;   B bicep curls 4# 1x20 - - - -  - - -   Diagonal Flex Supine manual resisted D1 and D2 flex and ext x10 ea - 55-deg beach chair D1 flex to top of head x5 - Supine  D1 and D2 flex, manual resisted 3x10 ea  Supine  D1 and D2 flex and ext, manual resisted 3x10 ea - -     Treatment/Session Assessment:    · Response to Treatment: Good effort and performance of the exercises. Emphasis on humeral rotational stabilization control today. · Compliance with Program/Exercises: Appear compliant. · Recommendations/Intent for next treatment session: We will resume L shoulder ROM treatment and emphasis on progressive strengthening.    Total Treatment Duration: 40 minutes  PT Patient Time In/Time Out  Time In: 1350  Time Out: [address] [facility] [facility], PT, MSPT, OCS

## 2018-03-07 ENCOUNTER — HOSPITAL ENCOUNTER (OUTPATIENT)
Dept: PHYSICAL THERAPY | Age: 83
Discharge: HOME | End: 2018-03-07
Payer: MEDICARE

## 2018-03-07 PROCEDURE — 97110 THERAPEUTIC EXERCISES: CPT

## 2018-03-07 NOTE — THERAPY RECERTIFICATION
Yovany Mas  : 1933  [place] / [place] / Product Type: Medicare /  [address]  at [facility] [place] [employer]  [address] [facility], [address] [place] [address],[address], [facility] [address]  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 Fall Risk Score: 2 (? 5 = High Risk)  MD Orders: Eval and Treat; HEP; ROM; Strength; \"full motion/full strength\" (18) MEDICAL/REFERRING DIAGNOSIS:Rem Implant Lt Shldr/Revision TSA    DATE OF ONSET: 10-13-17  REFERRING PHYSICIAN: Krishna Herron MD  RETURN PHYSICIAN APPOINTMENT: 3-21-18     RE-EVALUATION ASSESSMENT (3-5-18):  Mr. Kaelyn Lim has attended 18 total visits to date. He is now nearly 5 months s/p removal of implant of L shoulder hemiarthroplasty, and revision L TSA with a reverse Delta Xtend prosthesis, and latissimus dorsi and teres major tendon transfer. He returns today after being away from PT secondary to a bowel surgery. This is doing better now, and he has been released from his abdominal physician. He continues to make slow progress. He has no pain reports. L shoulder PROM is a little stiff, but in functional ranges. Strength and functional use are continue to progress. He is still weak to the deltoid limiting active forward elevation against gravity. DASH score is improved. Again, his R/dominant UE has known rotator cuff pathology, which is severely limiting functional use of this UE. He also has co-morbid lumbar spine pathology with radicular weakness to L4-5 levels that affects his gait and stability.  He lives alone, and needs to be able to perform basic personal care and household ADL's independently. He will benefit from continued PT to further progress reverse TSA rehab to promote safe return to functional use of the L UE, and to prep for pending R shoulder surgery. PROBLEM LIST (Impacting functional limitations):  1. Decreased L shoulder ROM   2. Weakness L shoulder INTERVENTIONS PLANNED:  1. Manual therapies, therapeutic exercises, HEP for ROM    2. Therapeutic exercises and HEP for strength   TREATMENT PLAN:  Effective Dates: 3/5/2018 to 5/18/2018. Frequency/Duration: 3 times a week for an additional 8 weeks to further progress strengthening for functional independence. GOALS: (Goals have been discussed and agreed upon with patient.)  Short-Term Functional Goals: Time Frame: 6 weeks   1. L shoulder AROM forward elevation greater than 120 degrees to progress into functional ranges. Progressing, ongoing 3/5/18  2. Demonstrate good L shoulder forward elevation strength for reach to head with grooming ADL's and reaching to shoulder height with household ADL's. Progressing, ongoing 3/5/18  3. Independent with initial HEP. Met 1/12/18  Discharge Goals: Time Frame: 12 weeks  1. Score less than 20% on the DASH. Nearly met 3/5/18  2. L shoulder AROM forward elevation greater than 135 degrees for improved use with household activities reaching overhead. Ongoing 3/5/18  3. Demonstrate good functional L shoulder strength and endurance for improved use of his L UE with his normalized daily activities. Ongoing 3/5/18  4. Independent with advanced shoulder HEP for continued self-management. Ongoing 3/5/18  Rehabilitation Potential For Stated Goals: Good              The information in this section was collected on 12/18/17 (except where otherwise noted). HISTORY:   History of Present Injury/Illness (Reason for Referral): Long, complex history of L shoulder pain and dysfunction with rotator cuff pathology. He was s/p L rotator cuff repair on 8/28/15.  He had a fall to both hands when walking up steps at his house in June 2016, resulting complete cuff tears B shoulders. L shoulder hemiarthroplasty done 7/2016. He did rehab, but poor outcome, and was still unable to raise and use L arm over shoulder. This revision Reverse TSA on L was done on 10/13/2017. Was in the hospital 3 days, then [facility] to [facility], and finally [facility] to home on 12/15/17. He had complication of a hemearthrosis with drop in hematocrit and hemoglobin. It was aspirated. Blood values improved. He as been doing OT post-op shoulder rehab when in the nursing home, consisting of shoulder/UE ROM and strengthening. He has not done HEP yet. He has co-morbid R rotator cuff pathology limiting functional use of his dominant arm, and anticipates shoulder arthroplasty on this in the near future. he also has co-morbid lumbar pathology with L L4-5 myotome weakness resulting in L foot drop. He lives alone and needs to be able to manage all his ADL's independently. Past Medical History/Comorbidities: Mr. Tisha Cochran  has a past medical history of Anemia (fall 2016); Arthritis; CAD (coronary artery disease) (1997); Carotid stenosis (04/2017); Diabetes ([address] (05/1997); Diverticulosis (12/2011); GERD (gastroesophageal reflux disease); Glaucoma; Heart murmur; Hiatal hernia; History of kidney stones; Hypercholesteremia; Hypertension; Hypothyroid; MI (myocardial infarction); TIA (transient ischemic attack) (07/2016); Unspecified sleep apnea; Urethral stenosis; and Vasovagal syncope.  Mr. Tisha Cochran  has a past surgical history that includes heart catheterization (6836,2506, 2012, 2013); tonsillectomy (1938); cataract removal (1987 and 1990); lap cholecystectomy (2003); hernia repair (1944); urological (1996); orthopaedic (Left); orthopaedic ([id number]); shoulder arthroscopy (Left, 2015); knee replacement (Bilateral, 2002, 2008); hemorrhoidectomy (1987); heent (Right); carpal tunnel release (Right, 1968); carpal tunnel release (Left, 2012); bunionectomy (Left); rotator cuff repair (Right, 1995); rotator cuff repair (Left, 2000); rotator cuff repair (Right, 2007); and colectomy (01/13/2017). Social History/Living Environment:  Lives alone. Has supportive children and grandchildren living in town. Prior Level of Function/Work/Activity: Limited overhead use of L UE prior to this surgery. Limited use of R UE currently secondary to cuff pathology. Dominant Side: RIGHT  Current Medications:       simethicone (GAS-X) 125 mg capsule, Take 125 mg by mouth four (4) times daily as needed for Flatulence. , Disp: , Rfl:     loperamide (IMODIUM) 2 mg capsule, Take 2 mg by mouth four (4) times daily as needed for Diarrhea., Disp: , Rfl:     diphenoxylate-atropine (LOMOTIL) 2.5-0.025 mg per tablet, Take 1 Tab by mouth two (2) times daily as needed for Diarrhea., Disp: , Rfl:     atorvastatin (LIPITOR) 40 mg tablet, Take 40 mg by mouth nightly., Disp: , Rfl:     levothyroxine (SYNTHROID) 50 mcg tablet, Take 50 mcg by mouth Daily (before breakfast). , Disp: , Rfl:     Psyllium Husk-Sucrose 3.4 gram/7 gram powd, Take 3 Caps by mouth two (2) times a day., Disp: , Rfl:     omega-3 fatty acids-vitamin e (FISH OIL) 1,000 mg cap, Take 2 Caps by mouth daily. , Disp: , Rfl:     bimatoprost (LUMIGAN) 0.01 % ophthalmic drops, Administer 1 Drop to right eye nightly., Disp: , Rfl:     timolol (TIMOPTIC) 0.5 % ophthalmic solution, Administer 1 Drop to right eye every morning., Disp: , Rfl:     Omeprazole delayed release (PRILOSEC D/R) 20 mg tablet, Take 20 mg by mouth Daily (before dinner). , Disp: , Rfl:     meloxicam (MOBIC) 15 mg tablet, Take 15 mg by mouth daily. Indications: OSTEOARTHRITIS, Disp: , Rfl:     brimonidine (ALPHAGAN P) 0.1 % ophthalmic solution, Administer 1 Drop to right eye two (2) times a day. Take / use AM day of surgery  per anesthesia protocols. , Disp: , Rfl:     glimepiride (AMARYL) 2 mg tablet, Take 1 mg by mouth daily.  Indications: type 2 diabetes mellitus, Disp: , Rfl:     b complex vitamins tablet, Take 1 Tab by mouth daily. , Disp: , Rfl:     aspirin 81 mg Tab, Take 81 mg by mouth daily. Take / use 81 mg AM day of surgery  per anesthesia protocols. , Disp: , Rfl:    Date Last Reviewed: 3-5-18   EXAMINATION:   Observation/Orthostatic Postural Assessment: unremarkable. Palpation: Unremarkable. ROM: Not measured. Strength: Not measured. CLINICAL DECISION MAKING:   Outcome Measure: Tool Used: Disabilities of the Arm, Shoulder and Hand (DASH) Questionnaire - Quick Version  Score:  Initial: 25/55 or 32% disability 24/55 or30% disability (1/12/18) Most Recent: 20/55 or 20% disability (3-5-18)   Interpretation of Score: The DASH is designed to measure the activities of daily living in person's with upper extremity dysfunction or pain. Each section is scored on a 1-5 scale, 5 representing the greatest disability. The scores of each section are added together for a total score of 55. This number is divided by 11, followed by subtracting 1 and multiplying by 25 to get a percent score of disability. This value represents the percentage disability: 0-20% minimal disability; 20-40% moderate disability; 40-60% severe disability; % dependent for care or exaggerated symptom behavior. Minimal detectable change is 12%. Score 11 12-19 20-28 29-37 38-45 46-54 55   Modifier CH CI CJ CK CL CM CN ? Carrying, Moving, and Handling Objects:     - CURRENT STATUS: CJ - 20%-39% impaired, limited or restricted (3/5/18)    - GOAL STATUS: CI - 1%-19% impaired, limited or restricted    - D/C STATUS:  ---------------To be determined---------------    Medical Necessity:   · Patient is expected to demonstrate progress in strength and range of motion to promote increased functional use of his L/non-dominant UE. · Co-morbid R rotator cuff pathology and lumbar spine pathology will most likely complicate progress.    · Lives alone and will need to be able function independently. Reason for Services/Other Comments:  · Patient continues to require skilled intervention due to the complexity of his history of shoulder pathology and the complexity of this revision reverse TSA. · He will need safe progression of post-op rehab to maximize return to functional use of his L UE.  · He is pending to have R shoulder arthroplasty in the future          TREATMENT:   (In addition to Assessment/Re-Assessment sessions the following treatments were rendered)  3/7/2018   Pre-treatment Symptoms/Complaints: Doing \"pretty good\". Tired from not getting too much sleep last night. Pain: Initial:    No pain to report. Post Session:  No more than muscle fatigue reported. UBE for active warm up. Therapeutic Exercise (30 Minutes): Performed exercise for L shoulder ROM with strength exercises as per grid below with emphasis on deltoid and whole arm moves as indicated. HEP: He is to continue with existing HEP. He verbalizes understanding. Date  1-16-18 Date  1-18-18 Date  1-19-18 Date  1-22-18 Date  1-24-18 Date  1-25-18 Date  1-31-18 Date  3-5-18 Date  3-6-18 Date  3-7-18   Activity/Exercise             UBE L. 1 x10' L. 1 x10' L. 1 x10' L. 2 x10' L. 2 x10' L. 2 x 10' (5 min fwd, 5 min bwd) L. 2 x10' L. 2 x10' U/LBE  L. 4 x15' (on own) U/LBE  L. 4 x15' (on own)   Shoulder ROM Assisted as above - Supine AROM flex, horiz abd, D1 and D2 x10 ea - Assisted as above Assisted as above Assisted as above; Active extension, ext/add Assisted as above Assisted as above -   Rhythmic Stabilization - - Alt iso  Supine 0-deg -deg flex, side-lying 90-deg abd 3x10 each Ball oscillation on wall 90-deg flex, scap, abd  2x30 ea -  - - Alt iso to rhyth stab in supine 0-deg ER, 100-deg flex;   Side-lying 90-deg abd  3x30\" each -   Mid and lower trap Side-lying  1# 1x10 ea Side-lying  2# 2x10 ea - - -  - - - -   Push up + Supine press  3# 1x15;  Standing tubing press downward  Blue 1x15,  Upward   Red 1x10 Supine press  4# 2x15;  Standing tubing press   Upward   Red 1x10 Manual resisted supine press 3x10 Standing tubing press downward red 3x10,   Upward   Red 3x10 -  - Standing tubing dynamic hugs  Green x8  Red x8  Yellow x8 - Dynamic hugs downward  grenn 1x30, 1x20;  Upward   yellow 3x10 assisted   Side lying shoulder ABD 2# x10 2# 2x10 Side-lying manual resisted 3x10 - Side-lying manual resisted 3x10 Side-lying 3 x 10 - - Light manual resisted to 90-deg  3x10 -   Shoulder flexion 40-deg beach chair press  1# 5x5;  UE [place] 1x10; Wall slide to 12 and 10 o'clock x5 ea Standing AAROM with active eccentrics 3x10 55-deg beach chair AROM to AAROM concentrics with active eccentrics 3x10  UE [place] 3x10;   Wall slide 3x10 Supine manual resisted 3x10;  55-deg beach chair AROM to AAROM concentrics with active eccentrics 2x10  Supine manual resisted 3 x 10    UE [place] x 10 L UE     [place] chair press overhead at 40-deg x10 and 55-deg 3x5-7 45-60 deg [employer] chair overhead press combo active eccentrics  1# x10  2# 3x6 UE [place], standing 3x10, and with manual resisted stabilization Standing scaption to 90-deg  1# 3x5 AAROM,  Abduction to 90-deg   1# 3x6-8   Horz ABD Supine manual resisted abd/add x10 ea Standing   Red 3x10 - Red   3x10;  Horiz add red 3x10 Supine horiz abd add manual resisted 3x10 Supine horiz ABD/ADD manual resistance 3 x 10 Supine horiz ABD/ADD manual resistance 3 x 10 - - -   Shoulder EXT - Standing bilat  Blue 2x15 - Standing  bilat   Black 2x15 -  Isometric wall push to ext and ext/add 5\"x5-6 each Standing  bilat   Blue 1x30 - Standing  bilat   Blue 1x30   Row/pulling Blue tubing 1x15 Standing   Blue 2x15 - Black  2x15 - Black 2 x 15 - - - Standing   Double blue  x30 total   [place] EXT cable press down  7# x20 - - - -  - - -- -   Side lying ER Static hold 1# 6\"x10 - Manual resisted hold/eccentric 3x10 - Manual resisted hold/eccentric 3x10  3 x 10 w/o weight - - Side-lying manual resisted eccentrics  3x10 -   IR with band  - - - Single yellow  1x10 - Yellow 1 x 15 - - Supine manual resisted, light 2x10 -   ER with band  - - - single yellow  1x10 - Yellow 1  X 15 - - Supine manual resisted, light 2x10; -   B bicep curls 4# 1x20 - - - -  - - - -   Diagonal Flex Supine manual resisted D1 and D2 flex and ext x10 ea - 55-deg beach chair D1 flex to top of head x5 - Supine  D1 and D2 flex, manual resisted 3x10 ea  Supine  D1 and D2 flex and ext, manual resisted 3x10 ea - - -     Treatment/Session Assessment:    · Response to Treatment: Good effort and performance of the exercises. Better forward elevation today. Fatigues quick. · Compliance with Program/Exercises: Appear compliant. · Recommendations/Intent for next treatment session: We will continue with shoulder ROM and progressive strengthening.    Total Treatment Duration: 30 minutes  PT Patient Time In/Time Out  Time In: 0900  Time Out: 0945    Carlitos Vital, PT, MSPT, OCS

## 2018-03-13 ENCOUNTER — HOSPITAL ENCOUNTER (OUTPATIENT)
Dept: PHYSICAL THERAPY | Age: 83
Discharge: HOME | End: 2018-03-13
Payer: MEDICARE

## 2018-03-13 PROCEDURE — 97110 THERAPEUTIC EXERCISES: CPT

## 2018-03-13 NOTE — PROGRESS NOTES
Jaylene Vasques  : 1933  [place] / [place] / Product Type: Medicare /  [address]  at [facility] [place] REAL  [facility], [address] [place] [address],[address], [facility] [address]  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 Fall Risk Score: 2 (? 5 = High Risk)  MD Orders: Eval and Treat; HEP; ROM; Strength; \"full motion/full strength\" (18) MEDICAL/REFERRING DIAGNOSIS:Rem Implant Lt Shldr/Revision TSA    DATE OF ONSET: 10-13-17  REFERRING PHYSICIAN: Loretta Herron MD  RETURN PHYSICIAN APPOINTMENT: 3-21-18     RE-EVALUATION ASSESSMENT (3-5-18):  Mr. Bibi Brown has attended 18 total visits to date. He is now nearly 5 months s/p removal of implant of L shoulder hemiarthroplasty, and revision L TSA with a reverse Delta Xtend prosthesis, and latissimus dorsi and teres major tendon transfer. He returns today after being away from PT secondary to a bowel surgery. This is doing better now, and he has been released from his abdominal physician. He continues to make slow progress. He has no pain reports. L shoulder PROM is a little stiff, but in functional ranges. Strength and functional use are continue to progress. He is still weak to the deltoid limiting active forward elevation against gravity. DASH score is improved. Again, his R/dominant UE has known rotator cuff pathology, which is severely limiting functional use of this UE. He also has co-morbid lumbar spine pathology with radicular weakness to L4-5 levels that affects his gait and stability.  He lives alone, and needs to be able to perform basic personal care and household ADL's independently. He will benefit from continued PT to further progress reverse TSA rehab to promote safe return to functional use of the L UE, and to prep for pending R shoulder surgery. PROBLEM LIST (Impacting functional limitations):  1. Decreased L shoulder ROM   2. Weakness L shoulder INTERVENTIONS PLANNED:  1. Manual therapies, therapeutic exercises, HEP for ROM    2. Therapeutic exercises and HEP for strength   TREATMENT PLAN:  Effective Dates: 3/5/2018 to 5/18/2018. Frequency/Duration: 3 times a week for an additional 8 weeks to further progress strengthening for functional independence. GOALS: (Goals have been discussed and agreed upon with patient.)  Short-Term Functional Goals: Time Frame: 6 weeks   1. L shoulder AROM forward elevation greater than 120 degrees to progress into functional ranges. Progressing, ongoing 3/5/18  2. Demonstrate good L shoulder forward elevation strength for reach to head with grooming ADL's and reaching to shoulder height with household ADL's. Progressing, ongoing 3/5/18  3. Independent with initial HEP. Met 1/12/18  Discharge Goals: Time Frame: 12 weeks  1. Score less than 20% on the DASH. Nearly met 3/5/18  2. L shoulder AROM forward elevation greater than 135 degrees for improved use with household activities reaching overhead. Ongoing 3/5/18  3. Demonstrate good functional L shoulder strength and endurance for improved use of his L UE with his normalized daily activities. Ongoing 3/5/18  4. Independent with advanced shoulder HEP for continued self-management. Ongoing 3/5/18  Rehabilitation Potential For Stated Goals: Good              The information in this section was collected on 12/18/17 (except where otherwise noted). HISTORY:   History of Present Injury/Illness (Reason for Referral): Long, complex history of L shoulder pain and dysfunction with rotator cuff pathology. He was s/p L rotator cuff repair on 8/28/15.  He had a fall to both hands when walking up steps at his house in June 2016, resulting complete cuff tears B shoulders. L shoulder hemiarthroplasty done 7/2016. He did rehab, but poor outcome, and was still unable to raise and use L arm over shoulder. This revision Reverse TSA on L was done on 10/13/2017. Was in the hospital 3 days, then [facility] to [facility], and finally [facility] to home on 12/15/17. He had complication of a hemearthrosis with drop in hematocrit and hemoglobin. It was aspirated. Blood values improved. He as been doing OT post-op shoulder rehab when in the nursing home, consisting of shoulder/UE ROM and strengthening. He has not done HEP yet. He has co-morbid R rotator cuff pathology limiting functional use of his dominant arm, and anticipates shoulder arthroplasty on this in the near future. he also has co-morbid lumbar pathology with L L4-5 myotome weakness resulting in L foot drop. He lives alone and needs to be able to manage all his ADL's independently. Past Medical History/Comorbidities: Mr. Bibi Brown  has a past medical history of Anemia (fall 2016); Arthritis; CAD (coronary artery disease) (1997); Carotid stenosis (04/2017); Diabetes ([address] (05/1997); Diverticulosis (12/2011); GERD (gastroesophageal reflux disease); Glaucoma; Heart murmur; Hiatal hernia; History of kidney stones; Hypercholesteremia; Hypertension; Hypothyroid; MI (myocardial infarction); TIA (transient ischemic attack) (07/2016); Unspecified sleep apnea; Urethral stenosis; and Vasovagal syncope.  Mr. Bibi Brown  has a past surgical history that includes heart catheterization ([id number],[id number], 2012, 2013); tonsillectomy (1938); cataract removal (1987 and 1990); lap cholecystectomy (2003); hernia repair (1944); urological (1996); orthopaedic (Left); orthopaedic ([id number]); shoulder arthroscopy (Left, 2015); knee replacement (Bilateral, 2002, 2008); hemorrhoidectomy (1987); heent (Right); carpal tunnel release (Right, 1968); carpal tunnel release (Left, 2012); bunionectomy (Left); rotator cuff repair (Right, 1995); rotator cuff repair (Left, 2000); rotator cuff repair (Right, 2007); and colectomy (01/13/2017). Social History/Living Environment:  Lives alone. Has supportive children and grandchildren living in town. Prior Level of Function/Work/Activity: Limited overhead use of L UE prior to this surgery. Limited use of R UE currently secondary to cuff pathology. Dominant Side: RIGHT  Current Medications:       simethicone (GAS-X) 125 mg capsule, Take 125 mg by mouth four (4) times daily as needed for Flatulence. , Disp: , Rfl:     loperamide (IMODIUM) 2 mg capsule, Take 2 mg by mouth four (4) times daily as needed for Diarrhea., Disp: , Rfl:     diphenoxylate-atropine (LOMOTIL) 2.5-0.025 mg per tablet, Take 1 Tab by mouth two (2) times daily as needed for Diarrhea., Disp: , Rfl:     atorvastatin (LIPITOR) 40 mg tablet, Take 40 mg by mouth nightly., Disp: , Rfl:     levothyroxine (SYNTHROID) 50 mcg tablet, Take 50 mcg by mouth Daily (before breakfast). , Disp: , Rfl:     Psyllium Husk-Sucrose 3.4 gram/7 gram powd, Take 3 Caps by mouth two (2) times a day., Disp: , Rfl:     omega-3 fatty acids-vitamin e (FISH OIL) 1,000 mg cap, Take 2 Caps by mouth daily. , Disp: , Rfl:     bimatoprost (LUMIGAN) 0.01 % ophthalmic drops, Administer 1 Drop to right eye nightly., Disp: , Rfl:     timolol (TIMOPTIC) 0.5 % ophthalmic solution, Administer 1 Drop to right eye every morning., Disp: , Rfl:     Omeprazole delayed release (PRILOSEC D/R) 20 mg tablet, Take 20 mg by mouth Daily (before dinner). , Disp: , Rfl:     meloxicam (MOBIC) 15 mg tablet, Take 15 mg by mouth daily. Indications: OSTEOARTHRITIS, Disp: , Rfl:     brimonidine (ALPHAGAN P) 0.1 % ophthalmic solution, Administer 1 Drop to right eye two (2) times a day. Take / use AM day of surgery  per anesthesia protocols. , Disp: , Rfl:     glimepiride (AMARYL) 2 mg tablet, Take 1 mg by mouth daily.  Indications: type 2 diabetes mellitus, Disp: , Rfl:     b complex vitamins tablet, Take 1 Tab by mouth daily. , Disp: , Rfl:     aspirin 81 mg Tab, Take 81 mg by mouth daily. Take / use 81 mg AM day of surgery  per anesthesia protocols. , Disp: , Rfl:    Date Last Reviewed: 3-13-18   EXAMINATION:   Observation/Orthostatic Postural Assessment: unremarkable. Palpation: Unremarkable. ROM: Not measured. Strength: Not measured. CLINICAL DECISION MAKING:   Outcome Measure: Tool Used: Disabilities of the Arm, Shoulder and Hand (DASH) Questionnaire - Quick Version  Score:  Initial: 25/55 or 32% disability 24/55 or30% disability (1/12/18) Most Recent: 20/55 or 20% disability (3-5-18)   Interpretation of Score: The DASH is designed to measure the activities of daily living in person's with upper extremity dysfunction or pain. Each section is scored on a 1-5 scale, 5 representing the greatest disability. The scores of each section are added together for a total score of 55. This number is divided by 11, followed by subtracting 1 and multiplying by 25 to get a percent score of disability. This value represents the percentage disability: 0-20% minimal disability; 20-40% moderate disability; 40-60% severe disability; % dependent for care or exaggerated symptom behavior. Minimal detectable change is 12%. Score 11 12-19 20-28 29-37 38-45 46-54 55   Modifier CH CI CJ CK CL CM CN ? Carrying, Moving, and Handling Objects:     - CURRENT STATUS: CJ - 20%-39% impaired, limited or restricted (3/5/18)    - GOAL STATUS: CI - 1%-19% impaired, limited or restricted    - D/C STATUS:  ---------------To be determined---------------    Medical Necessity:   · Patient is expected to demonstrate progress in strength and range of motion to promote increased functional use of his L/non-dominant UE. · Co-morbid R rotator cuff pathology and lumbar spine pathology will most likely complicate progress.    · Lives alone and will need to be able function independently. Reason for Services/Other Comments:  · Patient continues to require skilled intervention due to the complexity of his history of shoulder pathology and the complexity of this revision reverse TSA. · He will need safe progression of post-op rehab to maximize return to functional use of his L UE.  · He is pending to have R shoulder arthroplasty in the future          TREATMENT:   (In addition to Assessment/Re-Assessment sessions the following treatments were rendered)  3/13/2018   Pre-treatment Symptoms/Complaints: Continues to feel fatigued and low on activity endurance. No significant pain to the L shoulder to report. Pain: Initial:    No pain to report. Post Session:  No more than muscle fatigue reported. UBE for active warm up. Therapeutic Exercise (40 Minutes): Performed exercise for L shoulder ROM with strength exercises as per grid below with emphasis on deltoid and whole arm moves as indicated. Manual guiding assist with active hold and returns. HEP: He is to continue with existing HEP. He verbalizes understanding. Date  1-16-18 Date  1-18-18 Date  1-19-18 Date  1-22-18 Date  1-24-18 Date  1-25-18 Date  1-31-18 Date  3-5-18 Date  3-6-18 Date  3-7-18 Date  3-13-18   Activity/Exercise              UBE L. 1 x10' L. 1 x10' L. 1 x10' L. 2 x10' L. 2 x10' L. 2 x 10' (5 min fwd, 5 min bwd) L. 2 x10' L. 2 x10' U/LBE  L. 4 x15' (on own) U/LBE  L. 4 x15' (on own) U/LBE  L. 5 x15'   Shoulder ROM Assisted as above - Supine AROM flex, horiz abd, D1 and D2 x10 ea - Assisted as above Assisted as above Assisted as above; Active extension, ext/add Assisted as above Assisted as above - -   Rhythmic Stabilization - - Alt iso  Supine 0-deg -deg flex, side-lying 90-deg abd 3x10 each Ball oscillation on wall 90-deg flex, scap, abd  2x30 ea -  - - Alt iso to rhyth stab in supine 0-deg ER, 100-deg flex;   Side-lying 90-deg abd  3x30\" each - -   Mid and lower trap Side-lying  1# 1x10 ea Side-lying  2# 2x10 ea - - -  - - - - -   Push up + Supine press  3# 1x15;  Standing tubing press downward  Blue 1x15,  Upward   Red 1x10 Supine press  4# 2x15;  Standing tubing press   Upward   Red 1x10 Manual resisted supine press 3x10 Standing tubing press downward red 3x10,   Upward   Red 3x10 -  - Standing tubing dynamic hugs  Green x8  Red x8  Yellow x8 - Dynamic hugs downward  grenn 1x30, 1x20;  Upward   yellow 3x10 assisted Dynamic hugs downward  Red 2x15;  Upward   yellow 1x10, 1x7, 1x10, 1x10  assisted   Side lying shoulder ABD 2# x10 2# 2x10 Side-lying manual resisted 3x10 - Side-lying manual resisted 3x10 Side-lying 3 x 10 - - Light manual resisted to 90-deg  3x10 - Standing  Abduction to 90-deg,  light manual resisted   2x10   Shoulder flexion 40-deg beach chair press  1# 5x5;  UE [place] 1x10; Wall slide to 12 and 10 o'clock x5 ea Standing AAROM with active eccentrics 3x10 55-deg beach chair AROM to AAROM concentrics with active eccentrics 3x10  UE [place] 3x10;   Wall slide 3x10 Supine manual resisted 3x10;  55-deg beach chair AROM to AAROM concentrics with active eccentrics 2x10  Supine manual resisted 3 x 10    UE [place] x 10 L UE     [address] chair press overhead at 40-deg x10 and 55-deg 3x5-7 45-60 deg [address] chair overhead press combo active eccentrics  1# x10  2# 3x6 UE [place], standing 3x10, and with manual resisted stabilization Standing scaption to 90-deg  1# 3x5 AAROM,  Abduction to 90-deg   1# 3x6-8 [id number] [facility] chair overhead press eccentrics  X6,   1# 3x5-6  Standing scaption to 60-deg  3x5 AAROM,     Horz ABD Supine manual resisted abd/add x10 ea Standing   Red 3x10 - Red   3x10;  Horiz add red 3x10 Supine horiz abd add manual resisted 3x10 Supine horiz ABD/ADD manual resistance 3 x 10 Supine horiz ABD/ADD manual resistance 3 x 10 - - - -   Shoulder EXT - Standing bilat  Blue 2x15 - Standing  bilat   Black 2x15 -  Isometric wall push to ext and ext/add 5\"x5-6 each Standing  bilat   Blue 1x30 - Standing  bilat   Blue 1x30 Standing  bilat   Black 2x15   Row/pulling Blue tubing 1x15 Standing   Blue 2x15 - Black  2x15 - Black 2 x 15 - - - Standing   Double blue  x30 total Standing  Black double 2x15   Elbow EXT cable press down  7# x20 - - - -  - - -- - -   Side lying ER Static hold 1# 6\"x10 - Manual resisted hold/eccentric 3x10 - Manual resisted hold/eccentric 3x10  3 x 10 w/o weight - - Side-lying manual resisted eccentrics  3x10 - -   IR with band  - - - Single yellow  1x10 - Yellow 1 x 15 - - Supine manual resisted, light 2x10 - -   ER with band  - - - single yellow  1x10 - Yellow 1  X 15 - - Supine manual resisted, light 2x10; - -   B bicep curls 4# 1x20 - - - -  - - - - 1# x30 total   Diagonal Flex Supine manual resisted D1 and D2 flex and ext x10 ea - 55-deg beach chair D1 flex to top of head x5 - Supine  D1 and D2 flex, manual resisted 3x10 ea  Supine  D1 and D2 flex and ext, manual resisted 3x10 ea - - - -     Treatment/Session Assessment:    · Response to Treatment: Good effort and performance of the exercises. · Compliance with Program/Exercises: Appear compliant. · Recommendations/Intent for next treatment session: We will continue with shoulder ROM and progressive strengthening.    Total Treatment Duration: 40 minutes  PT Patient Time In/Time Out  Time In: 1110  Time Out: 1814 Luiza Allen, PT, MSPT, OCS

## 2018-03-14 ENCOUNTER — HOSPITAL ENCOUNTER (OUTPATIENT)
Dept: PHYSICAL THERAPY | Age: 83
Discharge: HOME | End: 2018-03-14
Payer: MEDICARE

## 2018-03-14 PROCEDURE — 97110 THERAPEUTIC EXERCISES: CPT

## 2018-03-14 NOTE — PROGRESS NOTES
Jaylene Vasques  : 1933  [place] / [place] / Product Type: Medicare /  [address]  at [facility] [employer]  [address] [facility], [address] [place] [address],[address], [facility] [address].  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 Fall Risk Score: 2 (? 5 = High Risk)  MD Orders: Eval and Treat; HEP; ROM; Strength; \"full motion/full strength\" (18) MEDICAL/REFERRING DIAGNOSIS:Rem Implant Lt Shldr/Revision TSA    DATE OF ONSET: 10-13-17  REFERRING PHYSICIAN: Loretta Herron MD  RETURN PHYSICIAN APPOINTMENT: 3-21-18     RE-EVALUATION ASSESSMENT (3-5-18):  Mr. Bibi Brown has attended 18 total visits to date. He is now nearly 5 months s/p removal of implant of L shoulder hemiarthroplasty, and revision L TSA with a reverse Delta Xtend prosthesis, and latissimus dorsi and teres major tendon transfer. He returns today after being away from PT secondary to a bowel surgery. This is doing better now, and he has been released from his abdominal physician. He continues to make slow progress. He has no pain reports. L shoulder PROM is a little stiff, but in functional ranges. Strength and functional use are continue to progress. He is still weak to the deltoid limiting active forward elevation against gravity. DASH score is improved. Again, his R/dominant UE has known rotator cuff pathology, which is severely limiting functional use of this UE. He also has co-morbid lumbar spine pathology with radicular weakness to L4-5 levels that affects his gait and stability.  He lives alone, and needs to be able to perform basic personal care and household ADL's independently. He will benefit from continued PT to further progress reverse TSA rehab to promote safe return to functional use of the L UE, and to prep for pending R shoulder surgery. PROBLEM LIST (Impacting functional limitations):  1. Decreased L shoulder ROM   2. Weakness L shoulder INTERVENTIONS PLANNED:  1. Manual therapies, therapeutic exercises, HEP for ROM    2. Therapeutic exercises and HEP for strength   TREATMENT PLAN:  Effective Dates: 3/5/2018 to 5/18/2018. Frequency/Duration: 3 times a week for an additional 8 weeks to further progress strengthening for functional independence. GOALS: (Goals have been discussed and agreed upon with patient.)  Short-Term Functional Goals: Time Frame: 6 weeks   1. L shoulder AROM forward elevation greater than 120 degrees to progress into functional ranges. Progressing, ongoing 3/5/18  2. Demonstrate good L shoulder forward elevation strength for reach to head with grooming ADL's and reaching to shoulder height with household ADL's. Progressing, ongoing 3/5/18  3. Independent with initial HEP. Met 1/12/18  Discharge Goals: Time Frame: 12 weeks  1. Score less than 20% on the DASH. Nearly met 3/5/18  2. L shoulder AROM forward elevation greater than 135 degrees for improved use with household activities reaching overhead. Ongoing 3/5/18  3. Demonstrate good functional L shoulder strength and endurance for improved use of his L UE with his normalized daily activities. Ongoing 3/5/18  4. Independent with advanced shoulder HEP for continued self-management. Ongoing 3/5/18  Rehabilitation Potential For Stated Goals: Good              The information in this section was collected on 12/18/17 (except where otherwise noted). HISTORY:   History of Present Injury/Illness (Reason for Referral): Long, complex history of L shoulder pain and dysfunction with rotator cuff pathology. He was s/p L rotator cuff repair on 8/28/15.  He had a fall to both hands when walking up steps at his house in June 2016, resulting complete cuff tears B shoulders. L shoulder hemiarthroplasty done 7/2016. He did rehab, but poor outcome, and was still unable to raise and use L arm over shoulder. This revision Reverse TSA on L was done on 10/13/2017. Was in the hospital 3 days, then [facility] to [facility], and finally [facility] to home on 12/15/17. He had complication of a hemearthrosis with drop in hematocrit and hemoglobin. It was aspirated. Blood values improved. He as been doing OT post-op shoulder rehab when in the nursing home, consisting of shoulder/UE ROM and strengthening. He has not done HEP yet. He has co-morbid R rotator cuff pathology limiting functional use of his dominant arm, and anticipates shoulder arthroplasty on this in the near future. he also has co-morbid lumbar pathology with L L4-5 myotome weakness resulting in L foot drop. He lives alone and needs to be able to manage all his ADL's independently. Past Medical History/Comorbidities: Mr. Tanvi Nielsen  has a past medical history of Anemia (fall 2016); Arthritis; CAD (coronary artery disease) (1997); Carotid stenosis (04/2017); Diabetes ([address] (05/1997); Diverticulosis (12/2011); GERD (gastroesophageal reflux disease); Glaucoma; Heart murmur; Hiatal hernia; History of kidney stones; Hypercholesteremia; Hypertension; Hypothyroid; MI (myocardial infarction); TIA (transient ischemic attack) (07/2016); Unspecified sleep apnea; Urethral stenosis; and Vasovagal syncope.  Mr. Tanvi Nielsen  has a past surgical history that includes heart catheterization (9419,5884, 2012, 2013); tonsillectomy (1938); cataract removal (1987 and 1990); lap cholecystectomy (2003); hernia repair (1944); urological (1996); orthopaedic (Left); orthopaedic ([id number]); shoulder arthroscopy (Left, 2015); knee replacement (Bilateral, 2002, 2008); hemorrhoidectomy (1987); heent (Right); carpal tunnel release (Right, 1968); carpal tunnel release (Left, 2012); bunionectomy (Left); rotator cuff repair (Right, 1995); rotator cuff repair (Left, 2000); rotator cuff repair (Right, 2007); and colectomy (01/13/2017). Social History/Living Environment:  Lives alone. Has supportive children and grandchildren living in town. Prior Level of Function/Work/Activity: Limited overhead use of L UE prior to this surgery. Limited use of R UE currently secondary to cuff pathology. Dominant Side: RIGHT  Current Medications:       simethicone (GAS-X) 125 mg capsule, Take 125 mg by mouth four (4) times daily as needed for Flatulence. , Disp: , Rfl:     loperamide (IMODIUM) 2 mg capsule, Take 2 mg by mouth four (4) times daily as needed for Diarrhea., Disp: , Rfl:     diphenoxylate-atropine (LOMOTIL) 2.5-0.025 mg per tablet, Take 1 Tab by mouth two (2) times daily as needed for Diarrhea., Disp: , Rfl:     atorvastatin (LIPITOR) 40 mg tablet, Take 40 mg by mouth nightly., Disp: , Rfl:     levothyroxine (SYNTHROID) 50 mcg tablet, Take 50 mcg by mouth Daily (before breakfast). , Disp: , Rfl:     Psyllium Husk-Sucrose 3.4 gram/7 gram powd, Take 3 Caps by mouth two (2) times a day., Disp: , Rfl:     omega-3 fatty acids-vitamin e (FISH OIL) 1,000 mg cap, Take 2 Caps by mouth daily. , Disp: , Rfl:     bimatoprost (LUMIGAN) 0.01 % ophthalmic drops, Administer 1 Drop to right eye nightly., Disp: , Rfl:     timolol (TIMOPTIC) 0.5 % ophthalmic solution, Administer 1 Drop to right eye every morning., Disp: , Rfl:     Omeprazole delayed release (PRILOSEC D/R) 20 mg tablet, Take 20 mg by mouth Daily (before dinner). , Disp: , Rfl:     meloxicam (MOBIC) 15 mg tablet, Take 15 mg by mouth daily. Indications: OSTEOARTHRITIS, Disp: , Rfl:     brimonidine (ALPHAGAN P) 0.1 % ophthalmic solution, Administer 1 Drop to right eye two (2) times a day. Take / use AM day of surgery  per anesthesia protocols. , Disp: , Rfl:     glimepiride (AMARYL) 2 mg tablet, Take 1 mg by mouth daily.  Indications: type 2 diabetes mellitus, Disp: , Rfl:     b complex vitamins tablet, Take 1 Tab by mouth daily. , Disp: , Rfl:     aspirin 81 mg Tab, Take 81 mg by mouth daily. Take / use 81 mg AM day of surgery  per anesthesia protocols. , Disp: , Rfl:    Date Last Reviewed: 3-13-18   EXAMINATION:   Observation/Orthostatic Postural Assessment: unremarkable. Palpation: Unremarkable. ROM: Not measured. Strength: Not measured. CLINICAL DECISION MAKING:   Outcome Measure: Tool Used: Disabilities of the Arm, Shoulder and Hand (DASH) Questionnaire - Quick Version  Score:  Initial: 25/55 or 32% disability 24/55 or30% disability (1/12/18) Most Recent: 20/55 or 20% disability (3-5-18)   Interpretation of Score: The DASH is designed to measure the activities of daily living in person's with upper extremity dysfunction or pain. Each section is scored on a 1-5 scale, 5 representing the greatest disability. The scores of each section are added together for a total score of 55. This number is divided by 11, followed by subtracting 1 and multiplying by 25 to get a percent score of disability. This value represents the percentage disability: 0-20% minimal disability; 20-40% moderate disability; 40-60% severe disability; % dependent for care or exaggerated symptom behavior. Minimal detectable change is 12%. Score 11 12-19 20-28 29-37 38-45 46-54 55   Modifier CH CI CJ CK CL CM CN ? Carrying, Moving, and Handling Objects:     - CURRENT STATUS: CJ - 20%-39% impaired, limited or restricted (3/5/18)    - GOAL STATUS: CI - 1%-19% impaired, limited or restricted    - D/C STATUS:  ---------------To be determined---------------    Medical Necessity:   · Patient is expected to demonstrate progress in strength and range of motion to promote increased functional use of his L/non-dominant UE. · Co-morbid R rotator cuff pathology and lumbar spine pathology will most likely complicate progress.    · Lives alone and will need to be able function independently. Reason for Services/Other Comments:  · Patient continues to require skilled intervention due to the complexity of his history of shoulder pathology and the complexity of this revision reverse TSA. · He will need safe progression of post-op rehab to maximize return to functional use of his L UE.  · He is pending to have R shoulder arthroplasty in the future          TREATMENT:   (In addition to Assessment/Re-Assessment sessions the following treatments were rendered)  3/14/2018   Pre-treatment Symptoms/Complaints: Continues to feel fatigued and low on activity endurance. No significant pain to the L shoulder to report. Pain: Initial:    No pain to report. Post Session:  No more than muscle fatigue reported. UBE for active warm up. Therapeutic Exercise (45 Minutes): Performed exercise for L shoulder ROM with assisted stretch to forward elevation in supine, 3\"x10. Performed exercises with emphasis on shoulder and shoulder girdle stabilization as per grid. Manual resisted alternating isometrics and rhythmic stabilization in static positions and with active movement as indicated. Added closed chain UE in plantigrade with weight shifting and manual resisted alternating isometrics and rhythmic stab as indicated. HEP: No new HEP. He is to continue with existing HEP. He verbalizes understanding. Date  1-16-18 Date  1-18-18 Date  1-19-18 Date  1-22-18 Date  1-24-18 Date  1-25-18 Date  1-31-18 Date  3-5-18 Date  3-6-18 Date  3-7-18 Date  3-13-18 Date  3-14-18   Activity/Exercise               UBE L. 1 x10' L. 1 x10' L. 1 x10' L. 2 x10' L. 2 x10' L. 2 x 10' (5 min fwd, 5 min bwd) L. 2 x10' L. 2 x10' U/LBE  L. 4 x15' (on own) U/LBE  L. 4 x15' (on own) U/LBE  L. 5 x15' U/LBE   L.5 x10'   Shoulder ROM Assisted as above - Supine AROM flex, horiz abd, D1 and D2 x10 ea - Assisted as above Assisted as above Assisted as above;   Active extension, ext/add Assisted as above Assisted as above - - Assisted as above   Rhythmic Stabilization - - Alt iso  Supine 0-deg -deg flex, side-lying 90-deg abd 3x10 each Ball oscillation on wall 90-deg flex, scap, abd  2x30 ea -  - - Alt iso to rhyth stab in supine 0-deg ER, 100-deg flex; Side-lying 90-deg abd  3x30\" each - - Alt iso to rhyth stab in supine 0-deg ER, 100-deg flex,  Side-lying 90-deg abd, 500-g ball as above   Mid and lower trap Side-lying  1# 1x10 ea Side-lying  2# 2x10 ea - - -  - - - - - -   Push up + Supine press  3# 1x15;  Standing tubing press downward  Blue 1x15,  Upward   Red 1x10 Supine press  4# 2x15;  Standing tubing press   Upward   Red 1x10 Manual resisted supine press 3x10 Standing tubing press downward red 3x10,   Upward   Red 3x10 -  - Standing tubing dynamic hugs  Green x8  Red x8  Yellow x8 - Dynamic hugs downward  grenn 1x30, 1x20;  Upward   yellow 3x10 assisted Dynamic hugs downward  Red 2x15;  Upward   yellow 1x10, 1x7, 1x10, 1x10  assisted Supine AROM press, 500-g ball x30 total with rhyth stab at finish end every 5th and rhyth stab at start end every 5th   Side lying shoulder ABD 2# x10 2# 2x10 Side-lying manual resisted 3x10 - Side-lying manual resisted 3x10 Side-lying 3 x 10 - - Light manual resisted to 90-deg  3x10 - Standing  Abduction to 90-deg,  light manual resisted   2x10 Side-lying 1x15 with rhyth stab x15\" every 5th   Shoulder flexion 40-deg beach chair press  1# 5x5;  UE [place] 1x10; Wall slide to 12 and 10 o'clock x5 ea Standing AAROM with active eccentrics 3x10 55-deg beach chair AROM to AAROM concentrics with active eccentrics 3x10  UE [place] 3x10;   Wall slide 3x10 Supine manual resisted 3x10;  55-deg beach chair AROM to AAROM concentrics with active eccentrics 2x10  Supine manual resisted 3 x 10    UE [place] x 10 L UE     Beach chair press overhead at 40-deg x10 and 55-deg 3x5-7 45-60 deg Beach chair overhead press combo active eccentrics  1# x10  2# 3x6 UE [place], standing 3x10, and with manual resisted stabilization Standing scaption to 90-deg  1# 3x5 AAROM,  Abduction to 90-deg   1# 3x6-8 [id number] [facility] [address] chair overhead press eccentrics  X6,   1# 3x5-6  Standing scaption to 60-deg  3x5 AAROM,   Standing active eccentrics  3x3   Horz ABD Supine manual resisted abd/add x10 ea Standing   Red 3x10 - Red   3x10;  Horiz add red 3x10 Supine horiz abd add manual resisted 3x10 Supine horiz ABD/ADD manual resistance 3 x 10 Supine horiz ABD/ADD manual resistance 3 x 10 - - - - -   Shoulder EXT - Standing bilat  Blue 2x15 - Standing  bilat   Black 2x15 -  Isometric wall push to ext and ext/add 5\"x5-6 each Standing  bilat   Blue 1x30 - Standing  bilat   Blue 1x30 Standing  bilat   Black 2x15 -   Row/pulling Blue tubing 1x15 Standing   Blue 2x15 - Black  2x15 - Black 2 x 15 - - - Standing   Double blue  x30 total Standing  Black double 2x15 -   Elbow EXT cable press down  7# x20 - - - -  - - -- - - -   Side lying ER Static hold 1# 6\"x10 - Manual resisted hold/eccentric 3x10 - Manual resisted hold/eccentric 3x10  3 x 10 w/o weight - - Side-lying manual resisted eccentrics  3x10 - - Side-lying   place and hold/active eccentrics  3x10   IR with band  - - - Single yellow  1x10 - Yellow 1 x 15 - - Supine manual resisted, light 2x10 - - -   ER with band  - - - single yellow  1x10 - Yellow 1  X 15 - - Supine manual resisted, light 2x10; - - -   B bicep curls 4# 1x20 - - - -  - - - - 1# x30 total -   Diagonal Flex Supine manual resisted D1 and D2 flex and ext x10 ea - 55-deg beach chair D1 flex to top of head x5 - Supine  D1 and D2 flex, manual resisted 3x10 ea  Supine  D1 and D2 flex and ext, manual resisted 3x10 ea - - - - -   CKC UE            Plantigrade  Frontal plane shift,   Sagittal plane shift, x10-15 ea; Alt iso to rhyth stab at shoulder and pelvis level 3x15-20\" ea     Treatment/Session Assessment:    · Response to Treatment: Good effort and performance of the exercises.   Able to produce stabilizing force with resisted ER/IR, but slower on the activation. No significant discomfort to the L shoulder with these exercises done. · Compliance with Program/Exercises: Appear compliant. · Recommendations/Intent for next treatment session: We will continue with shoulder ROM and progressive strengthening for functional use of the L UE.    Total Treatment Duration: 45 minutes  PT Patient Time In/Time Out  Time In: 1040  Time Out: 1644 [facility], PT, [facility], OCS

## 2018-03-16 ENCOUNTER — HOSPITAL ENCOUNTER (OUTPATIENT)
Dept: PHYSICAL THERAPY | Age: 83
Discharge: HOME | End: 2018-03-16
Payer: MEDICARE

## 2018-03-16 PROCEDURE — 97110 THERAPEUTIC EXERCISES: CPT

## 2018-03-16 NOTE — PROGRESS NOTES
Joyce Medina  : 1933  [place] / [place] / Product Type: Medicare /  [address]  at [facility] [employer]  [address] [facility], [address] [place] [address],[address], [facility] [address].  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 Fall Risk Score: 2 (? 5 = High Risk)  MD Orders: Eval and Treat; HEP; ROM; Strength; \"full motion/full strength\" (18) MEDICAL/REFERRING DIAGNOSIS:Rem Implant Lt Shldr/Revision TSA    DATE OF ONSET: 10-13-17  REFERRING PHYSICIAN: Adrienne Herron MD  RETURN PHYSICIAN APPOINTMENT: 3-21-18     RE-EVALUATION ASSESSMENT (3-5-18):  Mr. Nicolasa Cee has attended 18 total visits to date. He is now nearly 5 months s/p removal of implant of L shoulder hemiarthroplasty, and revision L TSA with a reverse Delta Xtend prosthesis, and latissimus dorsi and teres major tendon transfer. He returns today after being away from PT secondary to a bowel surgery. This is doing better now, and he has been released from his abdominal physician. He continues to make slow progress. He has no pain reports. L shoulder PROM is a little stiff, but in functional ranges. Strength and functional use are continue to progress. He is still weak to the deltoid limiting active forward elevation against gravity. DASH score is improved. Again, his R/dominant UE has known rotator cuff pathology, which is severely limiting functional use of this UE. He also has co-morbid lumbar spine pathology with radicular weakness to L4-5 levels that affects his gait and stability.  He lives alone, and needs to be able to perform basic personal care and household ADL's independently. He will benefit from continued PT to further progress reverse TSA rehab to promote safe return to functional use of the L UE, and to prep for pending R shoulder surgery. PROBLEM LIST (Impacting functional limitations):  1. Decreased L shoulder ROM   2. Weakness L shoulder INTERVENTIONS PLANNED:  1. Manual therapies, therapeutic exercises, HEP for ROM    2. Therapeutic exercises and HEP for strength   TREATMENT PLAN:  Effective Dates: 3/5/2018 to 5/18/2018. Frequency/Duration: 3 times a week for an additional 8 weeks to further progress strengthening for functional independence. GOALS: (Goals have been discussed and agreed upon with patient.)  Short-Term Functional Goals: Time Frame: 6 weeks   1. L shoulder AROM forward elevation greater than 120 degrees to progress into functional ranges. Progressing, ongoing 3/5/18  2. Demonstrate good L shoulder forward elevation strength for reach to head with grooming ADL's and reaching to shoulder height with household ADL's. Progressing, ongoing 3/5/18  3. Independent with initial HEP. Met 1/12/18  Discharge Goals: Time Frame: 12 weeks  1. Score less than 20% on the DASH. Nearly met 3/5/18  2. L shoulder AROM forward elevation greater than 135 degrees for improved use with household activities reaching overhead. Ongoing 3/5/18  3. Demonstrate good functional L shoulder strength and endurance for improved use of his L UE with his normalized daily activities. Ongoing 3/5/18  4. Independent with advanced shoulder HEP for continued self-management. Ongoing 3/5/18  Rehabilitation Potential For Stated Goals: Good              The information in this section was collected on 12/18/17 (except where otherwise noted). HISTORY:   History of Present Injury/Illness (Reason for Referral): Long, complex history of L shoulder pain and dysfunction with rotator cuff pathology. He was s/p L rotator cuff repair on 8/28/15.  He had a fall to both hands when walking up steps at his house in June 2016, resulting complete cuff tears B shoulders. L shoulder hemiarthroplasty done 7/2016. He did rehab, but poor outcome, and was still unable to raise and use L arm over shoulder. This revision Reverse TSA on L was done on 10/13/2017. Was in the hospital 3 days, then [facility] to [facility], and finally [facility] to home on 12/15/17. He had complication of a hemearthrosis with drop in hematocrit and hemoglobin. It was aspirated. Blood values improved. He as been doing OT post-op shoulder rehab when in the nursing home, consisting of shoulder/UE ROM and strengthening. He has not done HEP yet. He has co-morbid R rotator cuff pathology limiting functional use of his dominant arm, and anticipates shoulder arthroplasty on this in the near future. he also has co-morbid lumbar pathology with L L4-5 myotome weakness resulting in L foot drop. He lives alone and needs to be able to manage all his ADL's independently. Past Medical History/Comorbidities: Mr. Leyla Alex  has a past medical history of Anemia (fall 2016); Arthritis; CAD (coronary artery disease) (1997); Carotid stenosis (04/2017); Diabetes ([address] (05/1997); Diverticulosis (12/2011); GERD (gastroesophageal reflux disease); Glaucoma; Heart murmur; Hiatal hernia; History of kidney stones; Hypercholesteremia; Hypertension; Hypothyroid; MI (myocardial infarction); TIA (transient ischemic attack) (07/2016); Unspecified sleep apnea; Urethral stenosis; and Vasovagal syncope.  Mr. Leyla Alex  has a past surgical history that includes heart catheterization (3160,[id number], 2012, 2013); tonsillectomy (1938); cataract removal (1987 and 1990); lap cholecystectomy (2003); hernia repair (1944); urological (1996); orthopaedic (Left); orthopaedic ([id number]); shoulder arthroscopy (Left, 2015); knee replacement (Bilateral, 2002, 2008); hemorrhoidectomy (1987); heent (Right); carpal tunnel release (Right, 1968); carpal tunnel release (Left, 2012); bunionectomy (Left); rotator cuff repair (Right, 1995); rotator cuff repair (Left, 2000); rotator cuff repair (Right, 2007); and colectomy (01/13/2017). Social History/Living Environment:  Lives alone. Has supportive children and grandchildren living in town. Prior Level of Function/Work/Activity: Limited overhead use of L UE prior to this surgery. Limited use of R UE currently secondary to cuff pathology. Dominant Side: RIGHT  Current Medications:       simethicone (GAS-X) 125 mg capsule, Take 125 mg by mouth four (4) times daily as needed for Flatulence. , Disp: , Rfl:     loperamide (IMODIUM) 2 mg capsule, Take 2 mg by mouth four (4) times daily as needed for Diarrhea., Disp: , Rfl:     diphenoxylate-atropine (LOMOTIL) 2.5-0.025 mg per tablet, Take 1 Tab by mouth two (2) times daily as needed for Diarrhea., Disp: , Rfl:     atorvastatin (LIPITOR) 40 mg tablet, Take 40 mg by mouth nightly., Disp: , Rfl:     levothyroxine (SYNTHROID) 50 mcg tablet, Take 50 mcg by mouth Daily (before breakfast). , Disp: , Rfl:     Psyllium Husk-Sucrose 3.4 gram/7 gram powd, Take 3 Caps by mouth two (2) times a day., Disp: , Rfl:     omega-3 fatty acids-vitamin e (FISH OIL) 1,000 mg cap, Take 2 Caps by mouth daily. , Disp: , Rfl:     bimatoprost (LUMIGAN) 0.01 % ophthalmic drops, Administer 1 Drop to right eye nightly., Disp: , Rfl:     timolol (TIMOPTIC) 0.5 % ophthalmic solution, Administer 1 Drop to right eye every morning., Disp: , Rfl:     Omeprazole delayed release (PRILOSEC D/R) 20 mg tablet, Take 20 mg by mouth Daily (before dinner). , Disp: , Rfl:     meloxicam (MOBIC) 15 mg tablet, Take 15 mg by mouth daily. Indications: OSTEOARTHRITIS, Disp: , Rfl:     brimonidine (ALPHAGAN P) 0.1 % ophthalmic solution, Administer 1 Drop to right eye two (2) times a day. Take / use AM day of surgery  per anesthesia protocols. , Disp: , Rfl:     glimepiride (AMARYL) 2 mg tablet, Take 1 mg by mouth daily.  Indications: type 2 diabetes mellitus, Disp: , Rfl:     b complex vitamins tablet, Take 1 Tab by mouth daily. , Disp: , Rfl:     aspirin 81 mg Tab, Take 81 mg by mouth daily. Take / use 81 mg AM day of surgery  per anesthesia protocols. , Disp: , Rfl:    Date Last Reviewed: 3-13-18   EXAMINATION:   Observation/Orthostatic Postural Assessment: unremarkable. Palpation: Unremarkable. ROM: Not measured. Strength: Not measured. CLINICAL DECISION MAKING:   Outcome Measure: Tool Used: Disabilities of the Arm, Shoulder and Hand (DASH) Questionnaire - Quick Version  Score:  Initial: 25/55 or 32% disability 24/55 or30% disability (1/12/18) Most Recent: 20/55 or 20% disability (3-5-18)   Interpretation of Score: The DASH is designed to measure the activities of daily living in person's with upper extremity dysfunction or pain. Each section is scored on a 1-5 scale, 5 representing the greatest disability. The scores of each section are added together for a total score of 55. This number is divided by 11, followed by subtracting 1 and multiplying by 25 to get a percent score of disability. This value represents the percentage disability: 0-20% minimal disability; 20-40% moderate disability; 40-60% severe disability; % dependent for care or exaggerated symptom behavior. Minimal detectable change is 12%. Score 11 12-19 20-28 29-37 38-45 46-54 55   Modifier CH CI CJ CK CL CM CN ? Carrying, Moving, and Handling Objects:     - CURRENT STATUS: CJ - 20%-39% impaired, limited or restricted (3/5/18)    - GOAL STATUS: CI - 1%-19% impaired, limited or restricted    - D/C STATUS:  ---------------To be determined---------------    Medical Necessity:   · Patient is expected to demonstrate progress in strength and range of motion to promote increased functional use of his L/non-dominant UE. · Co-morbid R rotator cuff pathology and lumbar spine pathology will most likely complicate progress.    · Lives alone and will need to be able function independently. Reason for Services/Other Comments:  · Patient continues to require skilled intervention due to the complexity of his history of shoulder pathology and the complexity of this revision reverse TSA. · He will need safe progression of post-op rehab to maximize return to functional use of his L UE.  · He is pending to have R shoulder arthroplasty in the future          TREATMENT:   (In addition to Assessment/Re-Assessment sessions the following treatments were rendered)  3/16/2018   Pre-treatment Symptoms/Complaints: Continues to have a log of fatigued with doing basic ADL's. Said he had to lie down for 15 minutes after shampooing and shaving this morning. Has set an appointment with his cardiologist for Monday to make sure it is not his heart causing this. Shoulder is doing well. Pain: Initial:    No pain to report. Post Session:  No more than muscle fatigue reported. UBE for active warm up. Therapeutic Exercise (40 Minutes): Performed exercise for L shoulder ROM with assisted stretch to forward elevation, horizontal abd, and horizontal adduction in supine, 3\"x10 each. Performed exercises with emphasis on shoulder and shoulder girdle stabilization as per grid. HEP: No new HEP. He is to continue with existing HEP. He verbalizes understanding.    Date  3-13-18 Date  3-14-18 Date  3-16-18   Activity/Exercise      UBE U/LBE  L. 5 x15' U/LBE   L.5 x10' U/LBE   L.4 x10'   Shoulder ROM - Assisted as above Assisted as above   Rhythmic Stabilization - Alt iso to rhyth stab in supine 0-deg ER, 100-deg flex,  Side-lying 90-deg abd, 500-g ball as above Alt iso to rhyth stab in supine 0-deg ER, 100-deg flex,  Side-lying 90-deg abd 2x30\" e   Mid and lower trap - - Side-lying 1# 3x5-7 each   Push up + Dynamic hugs downward  Red 2x15;  Upward   yellow 1x10, 1x7, 1x10, 1x10  assisted Supine AROM press, 500-g ball x30 total with rhyth stab at finish end every 5th and rhyth stab at start end every 5th -   Side lying shoulder ABD Standing  Abduction to 90-deg,  light manual resisted   2x10 Side-lying 1x15 with rhyth stab x15\" every 5th Side-lying 1# 3x5-7   Shoulder flexion [id number] chair overhead press eccentrics  X6,   1# 3x5-6  Standing scaption to 60-deg  3x5 AAROM,   Standing active eccentrics  3x3 Standing active eccentrics  3x5   Horz ABD - - -   Shoulder EXT Standing  bilat   Black 2x15 - -   Row/pulling Standing  Black double 2x15 - -   Elbow EXT - - -   Side lying ER - Side-lying   place and hold/active eccen  3x10 Side-lying   place and hold/active eccen  1# 3x6-8   IR with band  - - -   ER with band  - - -   B bicep curls 1# x30 total - -   Diagonal Flex - - -   CKC UE  Plantigrade  Frontal plane shift,   Sagittal plane shift, x10-15 ea; Alt iso to rhyth stab at shoulder and pelvis level 3x15-20\" ea -     Treatment/Session Assessment:    · Response to Treatment: Good effort and performance of the exercises. Required frequent rest between exercises today. · Compliance with Program/Exercises: Appear compliant. · Recommendations/Intent for next treatment session: We will continue with shoulder progressive strengthening for functional use of the L UE.    Total Treatment Duration: 40d minutes  PT Patient Time In/Time Out  Time In: 1205  Time Out: [address], PT, MSPT, OCS

## 2018-03-19 ENCOUNTER — HOSPITAL ENCOUNTER (OUTPATIENT)
Dept: PHYSICAL THERAPY | Age: 83
Discharge: HOME | End: 2018-03-19
Payer: MEDICARE

## 2018-03-19 PROCEDURE — 97110 THERAPEUTIC EXERCISES: CPT

## 2018-03-19 NOTE — PROGRESS NOTES
Fish Abel  : 1933  [place] / [place] / Product Type: Medicare /  [address]  at [facility] [employer]  [address] [facility], [address] [place] [address],[address], [facility] [address].  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 Fall Risk Score: 2 (? 5 = High Risk)  MD Orders: Eval and Treat; HEP; ROM; Strength; \"full motion/full strength\" (18) MEDICAL/REFERRING DIAGNOSIS:Rem Implant Lt Shldr/Revision TSA    DATE OF ONSET: 10-13-17  REFERRING PHYSICIAN: Sindy Herron., MD  RETURN PHYSICIAN APPOINTMENT: 3-21-18     RE-EVALUATION ASSESSMENT (3-5-18):  Mr. Henson  has attended 18 total visits to date. He is now nearly 5 months s/p removal of implant of L shoulder hemiarthroplasty, and revision L TSA with a reverse Delta Xtend prosthesis, and latissimus dorsi and teres major tendon transfer. He returns today after being away from PT secondary to a bowel surgery. This is doing better now, and he has been released from his abdominal physician. He continues to make slow progress. He has no pain reports. L shoulder PROM is a little stiff, but in functional ranges. Strength and functional use are continue to progress. He is still weak to the deltoid limiting active forward elevation against gravity. DASH score is improved. Again, his R/dominant UE has known rotator cuff pathology, which is severely limiting functional use of this UE. He also has co-morbid lumbar spine pathology with radicular weakness to L4-5 levels that affects his gait and stability.  He lives alone, and needs to be able to perform basic personal care and household ADL's independently. He will benefit from continued PT to further progress reverse TSA rehab to promote safe return to functional use of the L UE, and to prep for pending R shoulder surgery. PROBLEM LIST (Impacting functional limitations):  1. Decreased L shoulder ROM   2. Weakness L shoulder INTERVENTIONS PLANNED:  1. Manual therapies, therapeutic exercises, HEP for ROM    2. Therapeutic exercises and HEP for strength   TREATMENT PLAN:  Effective Dates: 3/5/2018 to 5/18/2018. Frequency/Duration: 3 times a week for an additional 8 weeks to further progress strengthening for functional independence. GOALS: (Goals have been discussed and agreed upon with patient.)  Short-Term Functional Goals: Time Frame: 6 weeks   1. L shoulder AROM forward elevation greater than 120 degrees to progress into functional ranges. Progressing, ongoing 3/5/18  2. Demonstrate good L shoulder forward elevation strength for reach to head with grooming ADL's and reaching to shoulder height with household ADL's. Progressing, ongoing 3/5/18  3. Independent with initial HEP. Met 1/12/18  Discharge Goals: Time Frame: 12 weeks  1. Score less than 20% on the DASH. Nearly met 3/5/18  2. L shoulder AROM forward elevation greater than 135 degrees for improved use with household activities reaching overhead. Ongoing 3/5/18  3. Demonstrate good functional L shoulder strength and endurance for improved use of his L UE with his normalized daily activities. Ongoing 3/5/18  4. Independent with advanced shoulder HEP for continued self-management. Ongoing 3/5/18  Rehabilitation Potential For Stated Goals: Good              The information in this section was collected on 12/18/17 (except where otherwise noted). HISTORY:   History of Present Injury/Illness (Reason for Referral): Long, complex history of L shoulder pain and dysfunction with rotator cuff pathology. He was s/p L rotator cuff repair on 8/28/15.  He had a fall to both hands when walking up steps at his house in June 2016, resulting complete cuff tears B shoulders. L shoulder hemiarthroplasty done 7/2016. He did rehab, but poor outcome, and was still unable to raise and use L arm over shoulder. This revision Reverse TSA on L was done on 10/13/2017. Was in the hospital 3 days, then [facility] to [facility], and finally [facility] to home on 12/15/17. He had complication of a hemearthrosis with drop in hematocrit and hemoglobin. It was aspirated. Blood values improved. He as been doing OT post-op shoulder rehab when in the nursing home, consisting of shoulder/UE ROM and strengthening. He has not done HEP yet. He has co-morbid R rotator cuff pathology limiting functional use of his dominant arm, and anticipates shoulder arthroplasty on this in the near future. he also has co-morbid lumbar pathology with L L4-5 myotome weakness resulting in L foot drop. He lives alone and needs to be able to manage all his ADL's independently. Past Medical History/Comorbidities: Mr. Akash Saunders  has a past medical history of Anemia (fall 2016); Arthritis; CAD (coronary artery disease) (1997); Carotid stenosis (04/2017); Diabetes ([address] (05/1997); Diverticulosis (12/2011); GERD (gastroesophageal reflux disease); Glaucoma; Heart murmur; Hiatal hernia; History of kidney stones; Hypercholesteremia; Hypertension; Hypothyroid; MI (myocardial infarction); TIA (transient ischemic attack) (07/2016); Unspecified sleep apnea; Urethral stenosis; and Vasovagal syncope.  Mr. Akash Saunders  has a past surgical history that includes heart catheterization (8039,4073, 2012, 2013); tonsillectomy (1938); cataract removal (1987 and 1990); lap cholecystectomy (2003); hernia repair (1944); urological (1996); orthopaedic (Left); orthopaedic (3166-1773); shoulder arthroscopy (Left, 2015); knee replacement (Bilateral, 2002, 2008); hemorrhoidectomy (1987); heent (Right); carpal tunnel release (Right, 1968); carpal tunnel release (Left, 2012); bunionectomy (Left); rotator cuff repair (Right, 1995); rotator cuff repair (Left, 2000); rotator cuff repair (Right, 2007); and colectomy (01/13/2017). Social History/Living Environment:  Lives alone. Has supportive children and grandchildren living in town. Prior Level of Function/Work/Activity: Limited overhead use of L UE prior to this surgery. Limited use of R UE currently secondary to cuff pathology. Dominant Side: RIGHT  Current Medications:       simethicone (GAS-X) 125 mg capsule, Take 125 mg by mouth four (4) times daily as needed for Flatulence. , Disp: , Rfl:     loperamide (IMODIUM) 2 mg capsule, Take 2 mg by mouth four (4) times daily as needed for Diarrhea., Disp: , Rfl:     diphenoxylate-atropine (LOMOTIL) 2.5-0.025 mg per tablet, Take 1 Tab by mouth two (2) times daily as needed for Diarrhea., Disp: , Rfl:     atorvastatin (LIPITOR) 40 mg tablet, Take 40 mg by mouth nightly., Disp: , Rfl:     levothyroxine (SYNTHROID) 50 mcg tablet, Take 50 mcg by mouth Daily (before breakfast). , Disp: , Rfl:     Psyllium Husk-Sucrose 3.4 gram/7 gram powd, Take 3 Caps by mouth two (2) times a day., Disp: , Rfl:     omega-3 fatty acids-vitamin e (FISH OIL) 1,000 mg cap, Take 2 Caps by mouth daily. , Disp: , Rfl:     bimatoprost (LUMIGAN) 0.01 % ophthalmic drops, Administer 1 Drop to right eye nightly., Disp: , Rfl:     timolol (TIMOPTIC) 0.5 % ophthalmic solution, Administer 1 Drop to right eye every morning., Disp: , Rfl:     Omeprazole delayed release (PRILOSEC D/R) 20 mg tablet, Take 20 mg by mouth Daily (before dinner). , Disp: , Rfl:     meloxicam (MOBIC) 15 mg tablet, Take 15 mg by mouth daily. Indications: OSTEOARTHRITIS, Disp: , Rfl:     brimonidine (ALPHAGAN P) 0.1 % ophthalmic solution, Administer 1 Drop to right eye two (2) times a day. Take / use AM day of surgery  per anesthesia protocols. , Disp: , Rfl:     glimepiride (AMARYL) 2 mg tablet, Take 1 mg by mouth daily.  Indications: type 2 diabetes mellitus, Disp: , Rfl:     b complex vitamins tablet, Take 1 Tab by mouth daily. , Disp: , Rfl:     aspirin 81 mg Tab, Take 81 mg by mouth daily. Take / use 81 mg AM day of surgery  per anesthesia protocols. , Disp: , Rfl:    Date Last Reviewed: 3-19-18   EXAMINATION:   Observation/Orthostatic Postural Assessment: unremarkable. Palpation: Tender to L anterior deltoid, mid deltoid. .     ROM:   LUE AROM  L Shoulder Flexion: 100 (forward elevation)  LUE PROM  L Shoulder Flexion: 140 (forward elevation with a push)  L Shoulder ABduction: 95 (with scapula stabilized)  L Shoulder Internal Rotation: 60 (stabilized at 45 adn 60 deg abd)  L Shoulder External Rotation: 80 (in shoulder neutral, 45, adn 80 deg abd)        Strength: Not measured. CLINICAL DECISION MAKING:   Outcome Measure: Tool Used: Disabilities of the Arm, Shoulder and Hand (DASH) Questionnaire - Quick Version  Score:  Initial: 25/55 or 32% disability 24/55 or30% disability (1/12/18) Most Recent: 20/55 or 20% disability (3-5-18)   Interpretation of Score: The DASH is designed to measure the activities of daily living in person's with upper extremity dysfunction or pain. Each section is scored on a 1-5 scale, 5 representing the greatest disability. The scores of each section are added together for a total score of 55. This number is divided by 11, followed by subtracting 1 and multiplying by 25 to get a percent score of disability. This value represents the percentage disability: 0-20% minimal disability; 20-40% moderate disability; 40-60% severe disability; % dependent for care or exaggerated symptom behavior. Minimal detectable change is 12%. Score 11 12-19 20-28 29-37 38-45 46-54 55   Modifier CH CI CJ CK CL CM CN ?    Carrying, Moving, and Handling Objects:     - CURRENT STATUS: CJ - 20%-39% impaired, limited or restricted (3/5/18)    - GOAL STATUS: CI - 1%-19% impaired, limited or restricted    - D/C STATUS:  ---------------To be determined---------------    Medical Necessity:   · Patient is expected to demonstrate progress in strength and range of motion to promote increased functional use of his L/non-dominant UE. · Co-morbid R rotator cuff pathology and lumbar spine pathology will most likely complicate progress. · Lives alone and will need to be able function independently. Reason for Services/Other Comments:  · Patient continues to require skilled intervention due to the complexity of his history of shoulder pathology and the complexity of this revision reverse TSA. · He will need safe progression of post-op rehab to maximize return to functional use of his L UE.  · He is pending to have R shoulder arthroplasty in the future          TREATMENT:   (In addition to Assessment/Re-Assessment sessions the following treatments were rendered)  3/19/2018   Pre-treatment Symptoms/Complaints: Says he had a fall at home on Saturday when trying to push open a stuck door. He was pushing with his L shoulder and fell to the L arm. Has a bruise to the mid to upper humerus, but doesn't feel he has hurt the shoulder. Pain: Initial:    No pain to report. Post Session:  No more than muscle fatigue reported. UBE for active warm up. Therapeutic Exercise (45 Minutes): Performed exercise for L shoulder ROM with assisted stretch to ER, IR, forward elevation, horizontal abd, and horizontal adduction in supine, 3\"x10 each. Performed exercises with emphasis on deltoid and shoulder girdle stabilization as per grid. Manual guiding, placing, and stabilization as needed for each. Emphasis on eccentrics with deltoid exercises. HEP: No new HEP. He is to continue with existing HEP. He verbalizes understanding.    Date  3-13-18 Date  3-14-18 Date  3-16-18 Date  3-19-18   Activity/Exercise       UBE U/LBE  L. 5 x15' U/LBE   L.5 x10' U/LBE   L.4 x10' U/LBE  L. 4 x15'   Shoulder ROM - Assisted as above Assisted as above Assisted as above   Rhythmic Stabilization - Alt iso to rhyth stab in supine 0-deg ER, 100-deg flex,  Side-lying 90-deg abd, 500-g ball as above Alt iso to rhyth stab in supine 0-deg ER, 100-deg flex,  Side-lying 90-deg abd 2x30\" e rhyth stab in supine 0-deg ER, 100-deg flex,  Side-lying 90-deg abd 3x30\"   Mid and lower trap - - Side-lying 1# 3x5-7 each Side-lying 1# 1x8 ea   Push up + Dynamic hugs downward  Red 2x15;  Upward   yellow 1x10, 1x7, 1x10, 1x10  assisted Supine AROM press, 500-g ball x30 total with rhyth stab at finish end every 5th and rhyth stab at start end every 5th - Supine press  4# 2x10   Side lying shoulder ABD Standing  Abduction to 90-deg,  light manual resisted   2x10 Side-lying 1x15 with rhyth stab x15\" every 5th Side-lying 1# 3x5-7 Side-lying mid delt eccnetrics elbow flexed  2# 2x10,  Elbow extended 2# 1x10   Shoulder flexion [id number] chair overhead press eccentrics  X6,   1# 3x5-6  Standing scaption to 60-deg  3x5 AAROM,   Standing active eccentrics  3x3 Standing active eccentrics  3x5 Supine short arc deltoid  2# x15  3# x10  4# 2x10;  Standing   active eccentrics  1# 3x3   Horz ABD - - - -   Shoulder EXT Standing  bilat   Black 2x15 - - -   Row/pulling Standing  Black double 2x15 - - -   Elbow EXT - - - _   Side lying ER - Side-lying   place and hold/active eccen  3x10 Side-lying   place and hold/active eccen  1# 3x6-8 Side-lying   place and hold/active eccen  1# 10\" 1x6   IR with band  - - - -   ER with band  - - - -   B bicep curls 1# x30 total - - -   Diagonal Flex - - - -   CKC UE  Plantigrade  Frontal plane shift,   Sagittal plane shift, x10-15 ea; Alt iso to rhyth stab at shoulder and pelvis level 3x15-20\" ea - -     Treatment/Session Assessment:    · Response to Treatment: Recent fall to L UE, but does not appear to have injured it. Still status quo with pain, ROM, and strength. Good effort and performance of the exercises. Weakness to humeral elevation against gravity persists.   · Compliance with Program/Exercises: Appear compliant. · Recommendations/Intent for next treatment session: We will continue with shoulder progressive strengthening for functional use of the L UE.    Total Treatment Duration: 45 minutes  PT Patient Time In/Time Out  Time In: 1125  Time Out: [address], PT, [facility]SPT, OCS

## 2018-03-21 ENCOUNTER — HOSPITAL ENCOUNTER (OUTPATIENT)
Dept: PHYSICAL THERAPY | Age: 83
Discharge: HOME | End: 2018-03-21
Payer: MEDICARE

## 2018-03-21 PROCEDURE — 97110 THERAPEUTIC EXERCISES: CPT

## 2018-03-21 NOTE — PROGRESS NOTES
Fish Roman  : 1933  [place] / Product Type: Medicare /  [address]  at [address] [place] [address] Rd  [address] [facility], [address], [place].  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 Fall Risk Score: 2 (? 5 = High Risk)  MD Orders: Eval and Treat; HEP; ROM; Strength; \"full motion/full strength\" (3-21-18) MEDICAL/REFERRING DIAGNOSIS:Rem Implant Lt Shldr/Revision TSA    DATE OF ONSET: 10-13-17  REFERRING PHYSICIAN: Sindy Herron MD  RETURN PHYSICIAN APPOINTMENT:~18     RE-EVALUATION ASSESSMENT (3-5-18):  Mr. Sixto Hernandez has attended 18 total visits to date. He is now nearly 5 months s/p removal of implant of L shoulder hemiarthroplasty, and revision L TSA with a reverse Delta Xtend prosthesis, and latissimus dorsi and teres major tendon transfer. He returns today after being away from PT secondary to a bowel surgery. This is doing better now, and he has been released from his abdominal physician. He continues to make slow progress. He has no pain reports. L shoulder PROM is a little stiff, but in functional ranges. Strength and functional use are continue to progress. He is still weak to the deltoid limiting active forward elevation against gravity. DASH score is improved. Again, his R/dominant UE has known rotator cuff pathology, which is severely limiting functional use of this UE. He also has co-morbid lumbar spine pathology with radicular weakness to L4-5 levels that affects his gait and stability.  He lives alone, and needs to be able to perform basic personal care and household ADL's independently. He will benefit from continued PT to further progress reverse TSA rehab to promote safe return to functional use of the L UE, and to prep for pending R shoulder surgery. PROBLEM LIST (Impacting functional limitations):  1. Decreased L shoulder ROM   2. Weakness L shoulder INTERVENTIONS PLANNED:  1. Manual therapies, therapeutic exercises, HEP for ROM    2. Therapeutic exercises and HEP for strength   TREATMENT PLAN:  Effective Dates: 3/5/2018 to 5/18/2018. Frequency/Duration: 3 times a week for an additional 8 weeks to further progress strengthening for functional independence. GOALS: (Goals have been discussed and agreed upon with patient.)  Short-Term Functional Goals: Time Frame: 6 weeks   1. L shoulder AROM forward elevation greater than 120 degrees to progress into functional ranges. Progressing, ongoing 3/5/18  2. Demonstrate good L shoulder forward elevation strength for reach to head with grooming ADL's and reaching to shoulder height with household ADL's. Progressing, ongoing 3/5/18  3. Independent with initial HEP. Met 1/12/18  Discharge Goals: Time Frame: 12 weeks  1. Score less than 20% on the DASH. Nearly met 3/5/18  2. L shoulder AROM forward elevation greater than 135 degrees for improved use with household activities reaching overhead. Ongoing 3/5/18  3. Demonstrate good functional L shoulder strength and endurance for improved use of his L UE with his normalized daily activities. Ongoing 3/5/18  4. Independent with advanced shoulder HEP for continued self-management. Ongoing 3/5/18  Rehabilitation Potential For Stated Goals: Good              The information in this section was collected on 12/18/17 (except where otherwise noted). HISTORY:   History of Present Injury/Illness (Reason for Referral): Long, complex history of L shoulder pain and dysfunction with rotator cuff pathology. He was s/p L rotator cuff repair on 8/28/15.  He had a fall to both hands when walking up steps at his house in June 2016, resulting complete cuff tears B shoulders. L shoulder hemiarthroplasty done 7/2016. He did rehab, but poor outcome, and was still unable to raise and use L arm over shoulder. This revision Reverse TSA on L was done on 10/13/2017. Was in the hospital 3 days, then [facility] to [facility], and finally [facility] to home on 12/15/17. He had complication of a hemearthrosis with drop in hematocrit and hemoglobin. It was aspirated. Blood values improved. He as been doing OT post-op shoulder rehab when in the nursing home, consisting of shoulder/UE ROM and strengthening. He has not done HEP yet. He has co-morbid R rotator cuff pathology limiting functional use of his dominant arm, and anticipates shoulder arthroplasty on this in the near future. he also has co-morbid lumbar pathology with L L4-5 myotome weakness resulting in L foot drop. He lives alone and needs to be able to manage all his ADL's independently. Past Medical History/Comorbidities: Mr. Marianna Schlatter  has a past medical history of Anemia (fall 2016); Arthritis; CAD (coronary artery disease) (1997); Carotid stenosis (04/2017); Diabetes ([facility] [address] (05/1997); Diverticulosis (12/2011); GERD (gastroesophageal reflux disease); Glaucoma; Heart murmur; Hiatal hernia; History of kidney stones; Hypercholesteremia; Hypertension; Hypothyroid; MI (myocardial infarction); TIA (transient ischemic attack) (07/2016); Unspecified sleep apnea; Urethral stenosis; and Vasovagal syncope.  Mr. Marianna Schlatter  has a past surgical history that includes heart catheterization (1047,6903, 2012, 2013); tonsillectomy (1938); cataract removal (1987 and 1990); lap cholecystectomy (2003); hernia repair (1944); urological (1996); orthopaedic (Left); orthopaedic (9710-6940); shoulder arthroscopy (Left, 2015); knee replacement (Bilateral, 2002, 2008); hemorrhoidectomy (1987); heent (Right); carpal tunnel release (Right, 1968); carpal tunnel release (Left, 2012); bunionectomy (Left); rotator cuff repair (Right, 1995); rotator cuff repair (Left, 2000); rotator cuff repair (Right, 2007); and colectomy (01/13/2017). Social History/Living Environment:  Lives alone. Has supportive children and grandchildren living in town. Prior Level of Function/Work/Activity: Limited overhead use of L UE prior to this surgery. Limited use of R UE currently secondary to cuff pathology. Dominant Side: RIGHT  Current Medications:       simethicone (GAS-X) 125 mg capsule, Take 125 mg by mouth four (4) times daily as needed for Flatulence. , Disp: , Rfl:     loperamide (IMODIUM) 2 mg capsule, Take 2 mg by mouth four (4) times daily as needed for Diarrhea., Disp: , Rfl:     diphenoxylate-atropine (LOMOTIL) 2.5-0.025 mg per tablet, Take 1 Tab by mouth two (2) times daily as needed for Diarrhea., Disp: , Rfl:     atorvastatin (LIPITOR) 40 mg tablet, Take 40 mg by mouth nightly., Disp: , Rfl:     levothyroxine (SYNTHROID) 50 mcg tablet, Take 50 mcg by mouth Daily (before breakfast). , Disp: , Rfl:     Psyllium Husk-Sucrose 3.4 gram/7 gram powd, Take 3 Caps by mouth two (2) times a day., Disp: , Rfl:     omega-3 fatty acids-vitamin e (FISH OIL) 1,000 mg cap, Take 2 Caps by mouth daily. , Disp: , Rfl:     bimatoprost (LUMIGAN) 0.01 % ophthalmic drops, Administer 1 Drop to right eye nightly., Disp: , Rfl:     timolol (TIMOPTIC) 0.5 % ophthalmic solution, Administer 1 Drop to right eye every morning., Disp: , Rfl:     Omeprazole delayed release (PRILOSEC D/R) 20 mg tablet, Take 20 mg by mouth Daily (before dinner). , Disp: , Rfl:     meloxicam (MOBIC) 15 mg tablet, Take 15 mg by mouth daily. Indications: OSTEOARTHRITIS, Disp: , Rfl:     brimonidine (ALPHAGAN P) 0.1 % ophthalmic solution, Administer 1 Drop to right eye two (2) times a day. Take / use AM day of surgery  per anesthesia protocols. , Disp: , Rfl:     glimepiride (AMARYL) 2 mg tablet, Take 1 mg by mouth daily.  Indications: type 2 diabetes mellitus, Disp: , Rfl:     b complex vitamins tablet, Take 1 Tab by mouth daily. , Disp: , Rfl:     aspirin 81 mg Tab, Take 81 mg by mouth daily. Take / use 81 mg AM day of surgery  per anesthesia protocols. , Disp: , Rfl:    Date Last Reviewed: 3-19-18   EXAMINATION:   Observation/Orthostatic Postural Assessment: unremarkable. Palpation: Tender to L anterior deltoid, mid deltoid. .     ROM:            Strength: Not measured. CLINICAL DECISION MAKING:   Outcome Measure: Tool Used: Disabilities of the Arm, Shoulder and Hand (DASH) Questionnaire - Quick Version  Score:  Initial: 25/55 or 32% disability 24/55 or30% disability (1/12/18) Most Recent: 20/55 or 20% disability (3-5-18)   Interpretation of Score: The DASH is designed to measure the activities of daily living in person's with upper extremity dysfunction or pain. Each section is scored on a 1-5 scale, 5 representing the greatest disability. The scores of each section are added together for a total score of 55. This number is divided by 11, followed by subtracting 1 and multiplying by 25 to get a percent score of disability. This value represents the percentage disability: 0-20% minimal disability; 20-40% moderate disability; 40-60% severe disability; % dependent for care or exaggerated symptom behavior. Minimal detectable change is 12%. Score 11 12-19 20-28 29-37 38-45 46-54 55   Modifier CH CI CJ CK CL CM CN ? Carrying, Moving, and Handling Objects:     - CURRENT STATUS: CJ - 20%-39% impaired, limited or restricted (3/5/18)    - GOAL STATUS: CI - 1%-19% impaired, limited or restricted    - D/C STATUS:  ---------------To be determined---------------    Medical Necessity:   · Patient is expected to demonstrate progress in strength and range of motion to promote increased functional use of his L/non-dominant UE. · Co-morbid R rotator cuff pathology and lumbar spine pathology will most likely complicate progress.    · Lives alone and will need to be able function independently. Reason for Services/Other Comments:  · Patient continues to require skilled intervention due to the complexity of his history of shoulder pathology and the complexity of this revision reverse TSA. · He will need safe progression of post-op rehab to maximize return to functional use of his L UE.  · He is pending to have R shoulder arthroplasty in the future          TREATMENT:   (In addition to Assessment/Re-Assessment sessions the following treatments were rendered)  3/21/2018   Reports having his follow up with Dr. Brendalyn Koyanagi morning. X-rays done without remarks. He brings in a new order to continue PT for 6 more weeks, then follow up again. Pre-treatment Symptoms/Complaints: Says his L shoulder is still a little sore to the deltoid area where he fell to it. Pain: Initial:    No more than mild soreness Post Session:  No more than muscle fatigue reported. U/LBE for active warm up. Therapeutic Exercise (40 Minutes): Performed exercise for L shoulder ROM with assisted stretch to ER, IR, forward elevation in supine, 3\"x10 each; then active motion control to ER/IR, forward elevation, horizontal abd/add, D1 and D2 ffex/ext in supine, 1# x10 each. Performed exercises with emphasis on deltoid and shoulder girdle stabilization as per grid. Manual guiding, placing, and stabilization as needed for each. Emphasis on eccentrics with deltoid exercises. HEP: No new HEP. He is to continue with existing HEP. He verbalizes understanding.    Date  3-13-18 Date  3-14-18 Date  3-16-18 Date  3-19-18 Date  3-21-18   Activity/Exercise        UBE U/LBE  L. 5 x15' U/LBE   L.5 x10' U/LBE   L.4 x10' U/LBE  L. 4 x15' U/LBE  L. 4.5 x15'   Shoulder ROM - Assisted as above Assisted as above Assisted as above Asistted stretch, active motion control 1# x10 ea above   Rhythmic Stabilization - Alt iso to rhyth stab in supine 0-deg ER, 100-deg flex,  Side-lying 90-deg abd, 500-g ball as above Alt iso to rhyth stab in supine 0-deg ER, 100-deg flex,  Side-lying 90-deg abd 2x30\" e rhyth stab in supine 0-deg ER, 100-deg flex,  Side-lying 90-deg abd 3x30\"    Mid and lower trap - - Side-lying 1# 3x5-7 each Side-lying 1# 1x8 ea Side-lying  2# 2x5-6 ea   Push up + Dynamic hugs downward  Red 2x15;  Upward   yellow 1x10, 1x7, 1x10, 1x10  assisted Supine AROM press, 500-g ball x30 total with rhyth stab at finish end every 5th and rhyth stab at start end every 5th - Supine press  4# 2x10 Supine AROM press, 2#l x30 total with rhyth stab at finish end every 5th and rhyth stab at start end every 5th   Side lying shoulder ABD Standing  Abduction to 90-deg,  light manual resisted   2x10 Side-lying 1x15 with rhyth stab x15\" every 5th Side-lying 1# 3x5-7 Side-lying mid delt eccnetrics elbow flexed  2# 2x10,  Elbow extended 2# 1x10 Side-lying   Elbow extended 2# 3x5 with manual resisted rhyth stab   Shoulder flexion [id number] chair overhead press eccentrics  X6,   1# 3x5-6  Standing scaption to 60-deg  3x5 AAROM,   Standing active eccentrics  3x3 Standing active eccentrics  3x5 Supine short arc deltoid  2# x15  3# x10  4# 2x10;  Standing   active eccentrics  1# 3x3 Standing active eccentrics  3x5   Horz ABD - - - - Supine AROM for control  1 #1x10   Shoulder EXT Standing  bilat   Black 2x15 - - - -   Row/pulling Standing  Black double 2x15 - - - -   Elbow EXT - - - _ -   Side lying ER - Side-lying   place and hold/active eccen  3x10 Side-lying   place and hold/active eccen  1# 3x6-8 Side-lying   place and hold/active eccen  1# 10\" 1x6 -   IR with band  - - - - -   ER with band  - - - - -   B bicep curls 1# x30 total - - - -   Diagonal Flex - - - - Supine AROM for control  1 #1x10 ea   CKC UE  Plantigrade  Frontal plane shift,   Sagittal plane shift, x10-15 ea;   Alt iso to rhyth stab at shoulder and pelvis level 3x15-20\" ea - - -     Treatment/Session Assessment:    · Response to Treatment: Good effort with the exercises done.  · Compliance with Program/Exercises: Appear compliant. · Recommendations/Intent for next treatment session: We will continue with shoulder progressive strengthening for functional use of the L UE.    Total Treatment Duration: 40 minutes  PT Patient Time In/Time Out  Time In: 1568  Time Out: [address], PT, MSPT, OCS

## 2018-03-28 ENCOUNTER — HOSPITAL ENCOUNTER (OUTPATIENT)
Dept: PHYSICAL THERAPY | Age: 83
Discharge: HOME | End: 2018-03-28
Payer: MEDICARE

## 2018-03-28 NOTE — PROGRESS NOTES
Nils Colon  : 1933  [place] / Product Type: Medicare /  [address]  at [facility]  [address], [address], [place] [address].  [phone]   [phone]       OUTPATIENT PHYSICAL THERAPY:No Show Note 3/28/2018      ICD-10: Treatment Diagnosis: Stiffness of left shoulder, not elsewhere classified (M25.612); Muscle wasting and atrophy, not elsewhere classified, left shoulder (M62.512); Presence of left artificial shoulder joint (L94.548)  Precautions/Allergies: Reverse TSA precautions; R RC deficient shoulder; lumbar stenosis. Fall Risk Score: 2 (? 5 = High Risk)  MD Orders: Eval and Treat; HEP; ROM; Strength; \"full motion/full strength\" (3-21-18) MEDICAL/REFERRING DIAGNOSIS:REM Implant Lt Shldr/ Revision TSA    DATE OF ONSET: 10-13-17  REFERRING PHYSICIAN: Isabel Herron MD  RETURN PHYSICIAN APPOINTMENT:~18      Mr. Braden dd not show for his appointment today. We will plan to continue with his existing treatment plan on his return.      Pablito Lu, PT, MSPT, OCS

## 2018-03-29 ENCOUNTER — HOSPITAL ENCOUNTER (OUTPATIENT)
Dept: PHYSICAL THERAPY | Age: 83
Discharge: HOME | End: 2018-03-29
Payer: MEDICARE

## 2018-03-29 PROCEDURE — 97110 THERAPEUTIC EXERCISES: CPT

## 2018-03-29 NOTE — PROGRESS NOTES
Toyin Julian  : 1933  [place] / [place] / Product Type: Medicare /  [address] [facility] [place]  [address] [facility], [address], [facility] [place].  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 Fall Risk Score: 2 (? 5 = High Risk)  MD Orders: Eval and Treat; HEP; ROM; Strength; \"full motion/full strength\" (3-21-18) MEDICAL/REFERRING DIAGNOSIS:REM Implant Lt Shldr/ Revision TSA    DATE OF ONSET: 10-13-17  REFERRING PHYSICIAN: Charis Herron MD  RETURN PHYSICIAN APPOINTMENT:~18     RE-EVALUATION ASSESSMENT (3-5-18):  Mr. Titi Mcgrath has attended 18 total visits to date. He is now nearly 5 months s/p removal of implant of L shoulder hemiarthroplasty, and revision L TSA with a reverse Delta Xtend prosthesis, and latissimus dorsi and teres major tendon transfer. He returns today after being away from PT secondary to a bowel surgery. This is doing better now, and he has been released from his abdominal physician. He continues to make slow progress. He has no pain reports. L shoulder PROM is a little stiff, but in functional ranges. Strength and functional use are continue to progress. He is still weak to the deltoid limiting active forward elevation against gravity. DASH score is improved. Again, his R/dominant UE has known rotator cuff pathology, which is severely limiting functional use of this UE. He also has co-morbid lumbar spine pathology with radicular weakness to L4-5 levels that affects his gait and stability.  He lives alone, and needs to be able to perform basic personal care and household ADL's independently. He will benefit from continued PT to further progress reverse TSA rehab to promote safe return to functional use of the L UE, and to prep for pending R shoulder surgery. PROBLEM LIST (Impacting functional limitations):  1. Decreased L shoulder ROM   2. Weakness L shoulder INTERVENTIONS PLANNED:  1. Manual therapies, therapeutic exercises, HEP for ROM    2. Therapeutic exercises and HEP for strength   TREATMENT PLAN:  Effective Dates: 3/5/2018 to 5/18/2018. Frequency/Duration: 3 times a week for an additional 8 weeks to further progress strengthening for functional independence. GOALS: (Goals have been discussed and agreed upon with patient.)  Short-Term Functional Goals: Time Frame: 6 weeks   1. L shoulder AROM forward elevation greater than 120 degrees to progress into functional ranges. Progressing, ongoing 3/5/18  2. Demonstrate good L shoulder forward elevation strength for reach to head with grooming ADL's and reaching to shoulder height with household ADL's. Progressing, ongoing 3/5/18  3. Independent with initial HEP. Met 1/12/18  Discharge Goals: Time Frame: 12 weeks  1. Score less than 20% on the DASH. Nearly met 3/5/18  2. L shoulder AROM forward elevation greater than 135 degrees for improved use with household activities reaching overhead. Ongoing 3/5/18  3. Demonstrate good functional L shoulder strength and endurance for improved use of his L UE with his normalized daily activities. Ongoing 3/5/18  4. Independent with advanced shoulder HEP for continued self-management. Ongoing 3/5/18  Rehabilitation Potential For Stated Goals: Good              The information in this section was collected on 12/18/17 (except where otherwise noted). HISTORY:   History of Present Injury/Illness (Reason for Referral): Long, complex history of L shoulder pain and dysfunction with rotator cuff pathology. He was s/p L rotator cuff repair on 8/28/15.  He had a fall to both hands when walking up steps at his house in June 2016, resulting complete cuff tears B shoulders. L shoulder hemiarthroplasty done 7/2016. He did rehab, but poor outcome, and was still unable to raise and use L arm over shoulder. This revision Reverse TSA on L was done on 10/13/2017. Was in the hospital 3 days, then [facility] to [facility], and finally [facility] to home on 12/15/17. He had complication of a hemearthrosis with drop in hematocrit and hemoglobin. It was aspirated. Blood values improved. He as been doing OT post-op shoulder rehab when in the nursing home, consisting of shoulder/UE ROM and strengthening. He has not done HEP yet. He has co-morbid R rotator cuff pathology limiting functional use of his dominant arm, and anticipates shoulder arthroplasty on this in the near future. he also has co-morbid lumbar pathology with L L4-5 myotome weakness resulting in L foot drop. He lives alone and needs to be able to manage all his ADL's independently. Past Medical History/Comorbidities: Mr. Bhanu Gill  has a past medical history of Anemia (fall 2016); Arthritis; CAD (coronary artery disease) (1997); Carotid stenosis (04/2017); Diabetes ([address] (05/1997); Diverticulosis (12/2011); GERD (gastroesophageal reflux disease); Glaucoma; Heart murmur; Hiatal hernia; History of kidney stones; Hypercholesteremia; Hypertension; Hypothyroid; MI (myocardial infarction); TIA (transient ischemic attack) (07/2016); Unspecified sleep apnea; Urethral stenosis; and Vasovagal syncope.  Mr. Bhanu Gill  has a past surgical history that includes heart catheterization (0503,3417, 2012, 2013); tonsillectomy (1938); cataract removal (1987 and 1990); lap cholecystectomy (2003); hernia repair (1944); urological (1996); orthopaedic (Left); orthopaedic ([id number]); shoulder arthroscopy (Left, 2015); knee replacement (Bilateral, 2002, 2008); hemorrhoidectomy (1987); heent (Right); carpal tunnel release (Right, 1968); carpal tunnel release (Left, 2012); bunionectomy (Left); rotator cuff repair (Right, 1995); rotator cuff repair (Left, 2000); rotator cuff repair (Right, 2007); and colectomy (01/13/2017). Social History/Living Environment:  Lives alone. Has supportive children and grandchildren living in town. Prior Level of Function/Work/Activity: Limited overhead use of L UE prior to this surgery. Limited use of R UE currently secondary to cuff pathology. Dominant Side: RIGHT  Current Medications:       simethicone (GAS-X) 125 mg capsule, Take 125 mg by mouth four (4) times daily as needed for Flatulence. , Disp: , Rfl:     loperamide (IMODIUM) 2 mg capsule, Take 2 mg by mouth four (4) times daily as needed for Diarrhea., Disp: , Rfl:     diphenoxylate-atropine (LOMOTIL) 2.5-0.025 mg per tablet, Take 1 Tab by mouth two (2) times daily as needed for Diarrhea., Disp: , Rfl:     atorvastatin (LIPITOR) 40 mg tablet, Take 40 mg by mouth nightly., Disp: , Rfl:     levothyroxine (SYNTHROID) 50 mcg tablet, Take 50 mcg by mouth Daily (before breakfast). , Disp: , Rfl:     Psyllium Husk-Sucrose 3.4 gram/7 gram powd, Take 3 Caps by mouth two (2) times a day., Disp: , Rfl:     omega-3 fatty acids-vitamin e (FISH OIL) 1,000 mg cap, Take 2 Caps by mouth daily. , Disp: , Rfl:     bimatoprost (LUMIGAN) 0.01 % ophthalmic drops, Administer 1 Drop to right eye nightly., Disp: , Rfl:     timolol (TIMOPTIC) 0.5 % ophthalmic solution, Administer 1 Drop to right eye every morning., Disp: , Rfl:     Omeprazole delayed release (PRILOSEC D/R) 20 mg tablet, Take 20 mg by mouth Daily (before dinner). , Disp: , Rfl:     meloxicam (MOBIC) 15 mg tablet, Take 15 mg by mouth daily. Indications: OSTEOARTHRITIS, Disp: , Rfl:     brimonidine (ALPHAGAN P) 0.1 % ophthalmic solution, Administer 1 Drop to right eye two (2) times a day. Take / use AM day of surgery  per anesthesia protocols. , Disp: , Rfl:     glimepiride (AMARYL) 2 mg tablet, Take 1 mg by mouth daily.  Indications: type 2 diabetes mellitus, Disp: , Rfl:     b complex vitamins tablet, Take 1 Tab by mouth daily. , Disp: , Rfl:     aspirin 81 mg Tab, Take 81 mg by mouth daily. Take / use 81 mg AM day of surgery  per anesthesia protocols. , Disp: , Rfl:    Date Last Reviewed: 3-29-18   EXAMINATION:   Observation/Orthostatic Postural Assessment: unremarkable. Palpation: Tender to L anterior deltoid, mid deltoid. .     ROM: Not measured. Strength: Not measured. CLINICAL DECISION MAKING:   Outcome Measure: Tool Used: Disabilities of the Arm, Shoulder and Hand (DASH) Questionnaire - Quick Version  Score:  Initial: 25/55 or 32% disability 24/55 or30% disability (1/12/18) Most Recent: 20/55 or 20% disability (3-5-18)   Interpretation of Score: The DASH is designed to measure the activities of daily living in person's with upper extremity dysfunction or pain. Each section is scored on a 1-5 scale, 5 representing the greatest disability. The scores of each section are added together for a total score of 55. This number is divided by 11, followed by subtracting 1 and multiplying by 25 to get a percent score of disability. This value represents the percentage disability: 0-20% minimal disability; 20-40% moderate disability; 40-60% severe disability; % dependent for care or exaggerated symptom behavior. Minimal detectable change is 12%. Score 11 12-19 20-28 29-37 38-45 46-54 55   Modifier CH CI CJ CK CL CM CN ? Carrying, Moving, and Handling Objects:     - CURRENT STATUS: CJ - 20%-39% impaired, limited or restricted (3/5/18)    - GOAL STATUS: CI - 1%-19% impaired, limited or restricted    - D/C STATUS:  ---------------To be determined---------------    Medical Necessity:   · Patient is expected to demonstrate progress in strength and range of motion to promote increased functional use of his L/non-dominant UE. · Co-morbid R rotator cuff pathology and lumbar spine pathology will most likely complicate progress.    · Lives alone and will need to be able function independently. Reason for Services/Other Comments:  · Patient continues to require skilled intervention due to the complexity of his history of shoulder pathology and the complexity of this revision reverse TSA. · He will need safe progression of post-op rehab to maximize return to functional use of his L UE.  · He is pending to have R shoulder arthroplasty in the future          TREATMENT:   (In addition to Assessment/Re-Assessment sessions the following treatments were rendered)  3/29/2018   Pre-treatment Symptoms/Complaints: Says his L shoulder is doing good. Has had issue at his home where his furnace was not working correctly and putting out a very high level of carbon monoxide. He attributes some of his fatigue to this it is better now. Pain: Initial:    No more than mild soreness Post Session:  No more than muscle fatigue reported. U/LBE for active warm up. Therapeutic Exercise (40 Minutes): Performed exercise for L shoulder ROM with assisted stretch to ER, IR, forward elevation in supine, 3\"x10 each; then active motion control to ER/IR, forward elevation, horizontal abd/add, D1 and D2 ffex/ext in supine, holding a 500-g ball. Performed exercises with emphasis on deltoid and shoulder girdle stabilization as per grid. Manual guiding, placing, and stabilization as needed for each. Emphasis on eccentrics with deltoid exercises. HEP: No new HEP. He is to continue with existing HEP. He verbalizes understanding.    Date  3-13-18 Date  3-14-18 Date  3-16-18 Date  3-19-18 Date  3-21-18 Date  3-29-18   Activity/Exercise         UBE U/LBE  L. 5 x15' U/LBE   L.5 x10' U/LBE   L.4 x10' U/LBE  L. 4 x15' U/LBE  L. 4.5 x15' U/LBE  L. 4.5 x15'   Shoulder ROM - Assisted as above Assisted as above Assisted as above Asistted stretch, active motion control 1# x10 ea above Asistted stretch   Rhythmic Stabilization - Alt iso to rhyth stab in supine 0-deg ER, 100-deg flex, Side-lying 90-deg abd, 500-g ball as above Alt iso to rhyth stab in supine 0-deg ER, 100-deg flex,  Side-lying 90-deg abd 2x30\" e rhyth stab in supine 0-deg ER, 100-deg flex,  Side-lying 90-deg abd 3x30\"  rhyth stab in supine 0-deg ER, 100-deg flex,  Side-lying 90-deg abd holdign 500-g ball 3x30\" ea   Mid and lower trap - - Side-lying 1# 3x5-7 each Side-lying 1# 1x8 ea Side-lying  2# 2x5-6 ea Side-lying  500-g 2x5-6 ea   Push up + Dynamic hugs downward  Red 2x15;  Upward   yellow 1x10, 1x7, 1x10, 1x10  assisted Supine AROM press, 500-g ball x30 total with rhyth stab at finish end every 5th and rhyth stab at start end every 5th - Supine press  4# 2x10 Supine AROM press, 2#l x30 total with rhyth stab at finish end every 5th and rhyth stab at start end every 5th -   Side lying shoulder ABD Standing  Abduction to 90-deg,  light manual resisted   2x10 Side-lying 1x15 with rhyth stab x15\" every 5th Side-lying 1# 3x5-7 Side-lying mid delt eccnetrics elbow flexed  2# 2x10,  Elbow extended 2# 1x10 Side-lying   Elbow extended 2# 3x5 with manual resisted rhyth stab Side-lying   Elbow extended 500-g ball  3x5, 1x12   Shoulder flexion [id number] chair overhead press eccentrics  X6,   1# 3x5-6  Standing scaption to 60-deg  3x5 AAROM,   Standing active eccentrics  3x3 Standing active eccentrics  3x5 Supine short arc deltoid  2# x15  3# x10  4# 2x10;  Standing   active eccentrics  1# 3x3 Standing active eccentrics  3x5 UE wall slide  4x5   Horz ABD - - - - Supine AROM for control  1 #1x10 -   Shoulder EXT Standing  bilat   Black 2x15 - - - - -   Row/pulling Standing  Black double 2x15 - - - - -   Elbow EXT - - - _ - -   Side lying ER - Side-lying   place and hold/active eccen  3x10 Side-lying   place and hold/active eccen  1# 3x6-8 Side-lying   place and hold/active eccen  1# 10\" 1x6 - Side-lying   place and hold/active eccen  500-g ball 10\" 3x5, 1x28   IR with band  - - - - - -   ER with band  - - - - - -   B laya christie 1# x30 total - - - - -   Diagonal Flex - - - - Supine AROM for control  1 #1x10 ea -   CK[facility] UE  Plantigrade  Frontal plane shift,   Sagittal plane shift, x10-15 ea; Alt iso to rhyth stab at shoulder and pelvis level 3x15-20\" ea - - - -   -  Treatment/Session Assessment:    · Response to Treatment: Good effort with the exercises done. · Compliance with Program/Exercises: Appear compliant. · Recommendations/Intent for next treatment session: We will continue with shoulder progressive strengthening for functional use of the L UE.    Total Treatment Duration: 40 minutes  PT Patient Time In/Time Out  Time In: 1345  Time Out: [address], PT, [facility], [facility]

## 2018-03-30 ENCOUNTER — HOSPITAL ENCOUNTER (OUTPATIENT)
Dept: PHYSICAL THERAPY | Age: 83
Discharge: HOME | End: 2018-03-30
Payer: MEDICARE

## 2018-03-30 PROCEDURE — 97110 THERAPEUTIC EXERCISES: CPT

## 2018-03-30 NOTE — PROGRESS NOTES
Kush Gale  : 1933  [place] / [place] / Product Type: Medicare /  [address]  at [address] [place] [address] Rd  [facility], [address], [place] [address].  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 Fall Risk Score: 2 (? 5 = High Risk)  MD Orders: Eval and Treat; HEP; ROM; Strength; \"full motion/full strength\" (3-21-18) MEDICAL/REFERRING DIAGNOSIS:REM Implant Lt Shldr/ Revision TSA    DATE OF ONSET: 10-13-17  REFERRING PHYSICIAN: Bijan Herron MD  RETURN PHYSICIAN APPOINTMENT:~18     RE-EVALUATION ASSESSMENT (3-5-18):  Mr. Jeanne Shell has attended 18 total visits to date. He is now nearly 5 months s/p removal of implant of L shoulder hemiarthroplasty, and revision L TSA with a reverse Delta Xtend prosthesis, and latissimus dorsi and teres major tendon transfer. He returns today after being away from PT secondary to a bowel surgery. This is doing better now, and he has been released from his abdominal physician. He continues to make slow progress. He has no pain reports. L shoulder PROM is a little stiff, but in functional ranges. Strength and functional use are continue to progress. He is still weak to the deltoid limiting active forward elevation against gravity. DASH score is improved. Again, his R/dominant UE has known rotator cuff pathology, which is severely limiting functional use of this UE. He also has co-morbid lumbar spine pathology with radicular weakness to L4-5 levels that affects his gait and stability.  He lives alone, and needs to be able to perform basic personal care and household ADL's independently. He will benefit from continued PT to further progress reverse TSA rehab to promote safe return to functional use of the L UE, and to prep for pending R shoulder surgery. PROBLEM LIST (Impacting functional limitations):  1. Decreased L shoulder ROM   2. Weakness L shoulder INTERVENTIONS PLANNED:  1. Manual therapies, therapeutic exercises, HEP for ROM    2. Therapeutic exercises and HEP for strength   TREATMENT PLAN:  Effective Dates: 3/5/2018 to 5/18/2018. Frequency/Duration: 3 times a week for an additional 8 weeks to further progress strengthening for functional independence. GOALS: (Goals have been discussed and agreed upon with patient.)  Short-Term Functional Goals: Time Frame: 6 weeks   1. L shoulder AROM forward elevation greater than 120 degrees to progress into functional ranges. Progressing, ongoing 3/5/18  2. Demonstrate good L shoulder forward elevation strength for reach to head with grooming ADL's and reaching to shoulder height with household ADL's. Progressing, ongoing 3/5/18  3. Independent with initial HEP. Met 1/12/18  Discharge Goals: Time Frame: 12 weeks  1. Score less than 20% on the DASH. Nearly met 3/5/18  2. L shoulder AROM forward elevation greater than 135 degrees for improved use with household activities reaching overhead. Ongoing 3/5/18  3. Demonstrate good functional L shoulder strength and endurance for improved use of his L UE with his normalized daily activities. Ongoing 3/5/18  4. Independent with advanced shoulder HEP for continued self-management. Ongoing 3/5/18  Rehabilitation Potential For Stated Goals: Good              The information in this section was collected on 12/18/17 (except where otherwise noted). HISTORY:   History of Present Injury/Illness (Reason for Referral): Long, complex history of L shoulder pain and dysfunction with rotator cuff pathology. He was s/p L rotator cuff repair on 8/28/15.  He had a fall to both hands when walking up steps at his house in June 2016, resulting complete cuff tears B shoulders. L shoulder hemiarthroplasty done 7/2016. He did rehab, but poor outcome, and was still unable to raise and use L arm over shoulder. This revision Reverse TSA on L was done on 10/13/2017. Was in the hospital 3 days, then [facility] to [facility], and finally [facility] to home on 12/15/17. He had complication of a hemearthrosis with drop in hematocrit and hemoglobin. It was aspirated. Blood values improved. He as been doing OT post-op shoulder rehab when in the nursing home, consisting of shoulder/UE ROM and strengthening. He has not done HEP yet. He has co-morbid R rotator cuff pathology limiting functional use of his dominant arm, and anticipates shoulder arthroplasty on this in the near future. he also has co-morbid lumbar pathology with L L4-5 myotome weakness resulting in L foot drop. He lives alone and needs to be able to manage all his ADL's independently. Past Medical History/Comorbidities: Mr. Becky Horner  has a past medical history of Anemia (fall 2016); Arthritis; CAD (coronary artery disease) (1997); Carotid stenosis (04/2017); Diabetes ([address] (05/1997); Diverticulosis (12/2011); GERD (gastroesophageal reflux disease); Glaucoma; Heart murmur; Hiatal hernia; History of kidney stones; Hypercholesteremia; Hypertension; Hypothyroid; MI (myocardial infarction); TIA (transient ischemic attack) (07/2016); Unspecified sleep apnea; Urethral stenosis; and Vasovagal syncope.  Mr. Becky Horner  has a past surgical history that includes heart catheterization ([id number], 2012, 2013); tonsillectomy (1938); cataract removal (1987 and 1990); lap cholecystectomy (2003); hernia repair (1944); urological (1996); orthopaedic (Left); orthopaedic ([id number]); shoulder arthroscopy (Left, 2015); knee replacement (Bilateral, 2002, 2008); hemorrhoidectomy (1987); heent (Right); carpal tunnel release (Right, 1968); carpal tunnel release (Left, 2012); bunionectomy (Left); rotator cuff repair (Right, 1995); rotator cuff repair (Left, 2000); rotator cuff repair (Right, 2007); and colectomy (01/13/2017). Social History/Living Environment:  Lives alone. Has supportive children and grandchildren living in town. Prior Level of Function/Work/Activity: Limited overhead use of L UE prior to this surgery. Limited use of R UE currently secondary to cuff pathology. Dominant Side: RIGHT  Current Medications:       simethicone (GAS-X) 125 mg capsule, Take 125 mg by mouth four (4) times daily as needed for Flatulence. , Disp: , Rfl:     loperamide (IMODIUM) 2 mg capsule, Take 2 mg by mouth four (4) times daily as needed for Diarrhea., Disp: , Rfl:     diphenoxylate-atropine (LOMOTIL) 2.5-0.025 mg per tablet, Take 1 Tab by mouth two (2) times daily as needed for Diarrhea., Disp: , Rfl:     atorvastatin (LIPITOR) 40 mg tablet, Take 40 mg by mouth nightly., Disp: , Rfl:     levothyroxine (SYNTHROID) 50 mcg tablet, Take 50 mcg by mouth Daily (before breakfast). , Disp: , Rfl:     Psyllium Husk-Sucrose 3.4 gram/7 gram powd, Take 3 Caps by mouth two (2) times a day., Disp: , Rfl:     omega-3 fatty acids-vitamin e (FISH OIL) 1,000 mg cap, Take 2 Caps by mouth daily. , Disp: , Rfl:     bimatoprost (LUMIGAN) 0.01 % ophthalmic drops, Administer 1 Drop to right eye nightly., Disp: , Rfl:     timolol (TIMOPTIC) 0.5 % ophthalmic solution, Administer 1 Drop to right eye every morning., Disp: , Rfl:     Omeprazole delayed release (PRILOSEC D/R) 20 mg tablet, Take 20 mg by mouth Daily (before dinner). , Disp: , Rfl:     meloxicam (MOBIC) 15 mg tablet, Take 15 mg by mouth daily. Indications: OSTEOARTHRITIS, Disp: , Rfl:     brimonidine (ALPHAGAN P) 0.1 % ophthalmic solution, Administer 1 Drop to right eye two (2) times a day. Take / use AM day of surgery  per anesthesia protocols. , Disp: , Rfl:     glimepiride (AMARYL) 2 mg tablet, Take 1 mg by mouth daily.  Indications: type 2 diabetes mellitus, Disp: , Rfl:     b complex vitamins tablet, Take 1 Tab by mouth daily. , Disp: , Rfl:     aspirin 81 mg Tab, Take 81 mg by mouth daily. Take / use 81 mg AM day of surgery  per anesthesia protocols. , Disp: , Rfl:    Date Last Reviewed: 3-29-18   EXAMINATION:   Observation/Orthostatic Postural Assessment: unremarkable. Palpation: Tender to L anterior deltoid, mid deltoid. .     ROM: Not measured. Strength: Not measured. CLINICAL DECISION MAKING:   Outcome Measure: Tool Used: Disabilities of the Arm, Shoulder and Hand (DASH) Questionnaire - Quick Version  Score:  Initial: 25/55 or 32% disability 24/55 or30% disability (1/12/18) Most Recent: 20/55 or 20% disability (3-5-18)   Interpretation of Score: The DASH is designed to measure the activities of daily living in person's with upper extremity dysfunction or pain. Each section is scored on a 1-5 scale, 5 representing the greatest disability. The scores of each section are added together for a total score of 55. This number is divided by 11, followed by subtracting 1 and multiplying by 25 to get a percent score of disability. This value represents the percentage disability: 0-20% minimal disability; 20-40% moderate disability; 40-60% severe disability; % dependent for care or exaggerated symptom behavior. Minimal detectable change is 12%. Score 11 12-19 20-28 29-37 38-45 46-54 55   Modifier CH CI CJ CK CL CM CN ? Carrying, Moving, and Handling Objects:     - CURRENT STATUS: CJ - 20%-39% impaired, limited or restricted (3/5/18)    - GOAL STATUS: CI - 1%-19% impaired, limited or restricted    - D/C STATUS:  ---------------To be determined---------------    Medical Necessity:   · Patient is expected to demonstrate progress in strength and range of motion to promote increased functional use of his L/non-dominant UE. · Co-morbid R rotator cuff pathology and lumbar spine pathology will most likely complicate progress.    · Lives alone and will need to be able function independently. Reason for Services/Other Comments:  · Patient continues to require skilled intervention due to the complexity of his history of shoulder pathology and the complexity of this revision reverse TSA. · He will need safe progression of post-op rehab to maximize return to functional use of his L UE.  · He is pending to have R shoulder arthroplasty in the future          TREATMENT:   (In addition to Assessment/Re-Assessment sessions the following treatments were rendered)  3/30/2018   Pre-treatment Symptoms/Complaints: No L shoulder pain or soreness to report. Pain: Initial:    No more than mild soreness Post Session:  No more than muscle fatigue reported. U/LBE for active warm up. Therapeutic Exercise (40 Minutes): Performed exercise for L shoulder strength with emphasis on deltoid and shoulder girdle stabilization as per grid. Manual guiding, placing, and stabilization as needed for each. Emphasis on eccentrics with deltoid exercises. HEP: No new HEP. He is to continue with existing HEP. He verbalizes understanding.    Date  3-13-18 Date  3-14-18 Date  3-16-18 Date  3-19-18 Date  3-21-18 Date  3-29-18 Date  3-30-18   Activity/Exercise          UBE U/LBE  L. 5 x15' U/LBE   L.5 x10' U/LBE   L.4 x10' U/LBE  L. 4 x15' U/LBE  L. 4.5 x15' U/LBE  L. 4.5 x15' UBE  L. 3 x15'   Shoulder ROM - Assisted as above Assisted as above Assisted as above Asistted stretch, active motion control 1# x10 ea above Asistted stretch -   Rhythmic Stabilization - Alt iso to rhyth stab in supine 0-deg ER, 100-deg flex,  Side-lying 90-deg abd, 500-g ball as above Alt iso to rhyth stab in supine 0-deg ER, 100-deg flex,  Side-lying 90-deg abd 2x30\" e rhyth stab in supine 0-deg ER, 100-deg flex,  Side-lying 90-deg abd 3x30\"  rhyth stab in supine 0-deg ER, 100-deg flex,  Side-lying 90-deg abd holdign 500-g ball 3x30\" ea rhyth stab in supine 0-deg ER, 100-deg flex,  Side-lying 90-deg abd holdign 500-g ball 3x30\" ea   Mid and lower trap - - Side-lying 1# 3x5-7 each Side-lying 1# 1x8 ea Side-lying  2# 2x5-6 ea Side-lying  500-g 2x5-6 ea Side-lying  500-g ball  2x6 ea   Push up + Dynamic hugs downward  Red 2x15;  Upward   yellow 1x10, 1x7, 1x10, 1x10  assisted Supine AROM press, 500-g ball x30 total with rhyth stab at finish end every 5th and rhyth stab at start end every 5th - Supine press  4# 2x10 Supine AROM press, 2#l x30 total with rhyth stab at finish end every 5th and rhyth stab at start end every 5th - Supine AROM press   5# 1x12, 1x20   Side lying shoulder ABD Standing  Abduction to 90-deg,  light manual resisted   2x10 Side-lying 1x15 with rhyth stab x15\" every 5th Side-lying 1# 3x5-7 Side-lying mid delt eccnetrics elbow flexed  2# 2x10,  Elbow extended 2# 1x10 Side-lying   Elbow extended 2# 3x5 with manual resisted rhyth stab Side-lying   Elbow extended 500-g ball  3x5, 1x12 To 90-deg with elbow extended   500-g ball    Shoulder flexion [id number] chair overhead press eccentrics  X6,   1# 3x5-6  Standing scaption to 60-deg  3x5 AAROM,   Standing active eccentrics  3x3 Standing active eccentrics  3x5 Supine short arc deltoid  2# x15  3# x10  4# 2x10;  Standing   active eccentrics  1# 3x3 Standing active eccentrics  3x5 UE wall slide  4x5 Supine short arc deltoid  3# x10  4# 2x10;  UE wall slide  3x6   Horz ABD - - - - Supine AROM for control  1 #1x10 - -   Shoulder EXT Standing  bilat   Black 2x15 - - - - - -   Row/pulling Standing  Black double 2x15 - - - - - -   Elbow EXT - - - _ - - -   Side lying ER - Side-lying   place and hold/active eccen  3x10 Side-lying   place and hold/active eccen  1# 3x6-8 Side-lying   place and hold/active eccen  1# 10\" 1x6 - Side-lying   place and hold/active eccen  500-g ball 10\" 3x5, 1x28 -   IR with band  - - - - - - -   ER with band  - - - - - - -   B bicep curls 1# x30 total - - - - - -   Diagonal Flex - - - - Supine AROM for control  1 #1x10 ea - - CKC UE  Plantigrade  Frontal plane shift,   Sagittal plane shift, x10-15 ea; Alt iso to rhyth stab at shoulder and pelvis level 3x15-20\" ea - - - - -   -  Treatment/Session Assessment:    · Response to Treatment: Good effort with the exercises done. · Compliance with Program/Exercises: Appear compliant. · Recommendations/Intent for next treatment session: We will continue with shoulder progressive strengthening for functional use of the L UE. Will do a monthly progress update next time.    Total Treatment Duration: 40 minutes  PT Patient Time In/Time Out  Time In: 1210  Time Out: [address], PT, [facility], OCS

## 2018-04-02 ENCOUNTER — HOSPITAL ENCOUNTER (OUTPATIENT)
Dept: PHYSICAL THERAPY | Age: 83
Discharge: HOME | End: 2018-04-02
Payer: MEDICARE

## 2018-04-02 PROCEDURE — 97110 THERAPEUTIC EXERCISES: CPT

## 2018-04-02 NOTE — PROGRESS NOTES
Christina Farnsworth  : 1933  [place] / [place] PART [place] B / Product Type: Medicare /  [address] at [facility] [place]  [address], [address], [place] [place] [facility] [place].  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 Fall Risk Score: 2 (? 5 = High Risk)  MD Orders: Eval and Treat; HEP; ROM; Strength; \"full motion/full strength\" (3-21-18) MEDICAL/REFERRING DIAGNOSIS:REM Implant Lt Shldr/ Revision TSA    DATE OF ONSET: 10-13-17  REFERRING PHYSICIAN: Sebas Herron MD  RETURN PHYSICIAN APPOINTMENT:~18     RE-EVALUATION ASSESSMENT (3-5-18):  Mr. Camden Dietrich has attended 18 total visits to date. He is now nearly 5 months s/p removal of implant of L shoulder hemiarthroplasty, and revision L TSA with a reverse Delta Xtend prosthesis, and latissimus dorsi and teres major tendon transfer. He returns today after being away from PT secondary to a bowel surgery. This is doing better now, and he has been released from his abdominal physician. He continues to make slow progress. He has no pain reports. L shoulder PROM is a little stiff, but in functional ranges. Strength and functional use are continue to progress. He is still weak to the deltoid limiting active forward elevation against gravity. DASH score is improved. Again, his R/dominant UE has known rotator cuff pathology, which is severely limiting functional use of this UE. He also has co-morbid lumbar spine pathology with radicular weakness to L4-5 levels that affects his gait and stability.  He lives alone, and needs to be able to perform basic personal care and household ADL's independently. He will benefit from continued PT to further progress reverse TSA rehab to promote safe return to functional use of the L UE, and to prep for pending R shoulder surgery. PROBLEM LIST (Impacting functional limitations):  1. Decreased L shoulder ROM   2. Weakness L shoulder INTERVENTIONS PLANNED:  1. Manual therapies, therapeutic exercises, HEP for ROM    2. Therapeutic exercises and HEP for strength   TREATMENT PLAN:  Effective Dates: 3/5/2018 to 5/18/2018. Frequency/Duration: 3 times a week for an additional 8 weeks to further progress strengthening for functional independence. GOALS: (Goals have been discussed and agreed upon with patient.)  Short-Term Functional Goals: Time Frame: 6 weeks   1. L shoulder AROM forward elevation greater than 120 degrees to progress into functional ranges. Progressing, ongoing 3/5/18  2. Demonstrate good L shoulder forward elevation strength for reach to head with grooming ADL's and reaching to shoulder height with household ADL's. Progressing, ongoing 3/5/18  3. Independent with initial HEP. Met 1/12/18  Discharge Goals: Time Frame: 12 weeks  1. Score less than 20% on the DASH. Nearly met 3/5/18  2. L shoulder AROM forward elevation greater than 135 degrees for improved use with household activities reaching overhead. Ongoing 3/5/18  3. Demonstrate good functional L shoulder strength and endurance for improved use of his L UE with his normalized daily activities. Ongoing 3/5/18  4. Independent with advanced shoulder HEP for continued self-management. Ongoing 3/5/18  Rehabilitation Potential For Stated Goals: Good              The information in this section was collected on 12/18/17 (except where otherwise noted). HISTORY:   History of Present Injury/Illness (Reason for Referral): Long, complex history of L shoulder pain and dysfunction with rotator cuff pathology. He was s/p L rotator cuff repair on 8/28/15.  He had a fall to both hands when walking up steps at his house in June 2016, resulting complete cuff tears B shoulders. L shoulder hemiarthroplasty done 7/2016. He did rehab, but poor outcome, and was still unable to raise and use L arm over shoulder. This revision Reverse TSA on L was done on 10/13/2017. Was in the hospital 3 days, then [facility] to [facility], and finally [facility] to home on 12/15/17. He had complication of a hemearthrosis with drop in hematocrit and hemoglobin. It was aspirated. Blood values improved. He as been doing OT post-op shoulder rehab when in the nursing home, consisting of shoulder/UE ROM and strengthening. He has not done HEP yet. He has co-morbid R rotator cuff pathology limiting functional use of his dominant arm, and anticipates shoulder arthroplasty on this in the near future. he also has co-morbid lumbar pathology with L L4-5 myotome weakness resulting in L foot drop. He lives alone and needs to be able to manage all his ADL's independently. Past Medical History/Comorbidities: Mr. Shadia Michel  has a past medical history of Anemia (fall 2016); Arthritis; CAD (coronary artery disease) (1997); Carotid stenosis (04/2017); Diabetes ([address] (05/1997); Diverticulosis (12/2011); GERD (gastroesophageal reflux disease); Glaucoma; Heart murmur; Hiatal hernia; History of kidney stones; Hypercholesteremia; Hypertension; Hypothyroid; MI (myocardial infarction); TIA (transient ischemic attack) (07/2016); Unspecified sleep apnea; Urethral stenosis; and Vasovagal syncope.  Mr. Shadia Michel  has a past surgical history that includes heart catheterization (6499,[id number], 2012, 2013); tonsillectomy (1938); cataract removal (1987 and 1990); lap cholecystectomy (2003); hernia repair (1944); urological (1996); orthopaedic (Left); orthopaedic ([id number]); shoulder arthroscopy (Left, 2015); knee replacement (Bilateral, 2002, 2008); hemorrhoidectomy (1987); heent (Right); carpal tunnel release (Right, 1968); carpal tunnel release (Left, 2012); bunionectomy (Left); rotator cuff repair (Right, 1995); rotator cuff repair (Left, 2000); rotator cuff repair (Right, 2007); and colectomy (01/13/2017). Social History/Living Environment:  Lives alone. Has supportive children and grandchildren living in town. Prior Level of Function/Work/Activity: Limited overhead use of L UE prior to this surgery. Limited use of R UE currently secondary to cuff pathology. Dominant Side: RIGHT  Current Medications:       simethicone (GAS-X) 125 mg capsule, Take 125 mg by mouth four (4) times daily as needed for Flatulence. , Disp: , Rfl:     loperamide (IMODIUM) 2 mg capsule, Take 2 mg by mouth four (4) times daily as needed for Diarrhea., Disp: , Rfl:     diphenoxylate-atropine (LOMOTIL) 2.5-0.025 mg per tablet, Take 1 Tab by mouth two (2) times daily as needed for Diarrhea., Disp: , Rfl:     atorvastatin (LIPITOR) 40 mg tablet, Take 40 mg by mouth nightly., Disp: , Rfl:     levothyroxine (SYNTHROID) 50 mcg tablet, Take 50 mcg by mouth Daily (before breakfast). , Disp: , Rfl:     Psyllium Husk-Sucrose 3.4 gram/7 gram powd, Take 3 Caps by mouth two (2) times a day., Disp: , Rfl:     omega-3 fatty acids-vitamin e (FISH OIL) 1,000 mg cap, Take 2 Caps by mouth daily. , Disp: , Rfl:     bimatoprost (LUMIGAN) 0.01 % ophthalmic drops, Administer 1 Drop to right eye nightly., Disp: , Rfl:     timolol (TIMOPTIC) 0.5 % ophthalmic solution, Administer 1 Drop to right eye every morning., Disp: , Rfl:     Omeprazole delayed release (PRILOSEC D/R) 20 mg tablet, Take 20 mg by mouth Daily (before dinner). , Disp: , Rfl:     meloxicam (MOBIC) 15 mg tablet, Take 15 mg by mouth daily. Indications: OSTEOARTHRITIS, Disp: , Rfl:     brimonidine (ALPHAGAN P) 0.1 % ophthalmic solution, Administer 1 Drop to right eye two (2) times a day. Take / use AM day of surgery  per anesthesia protocols. , Disp: , Rfl:     glimepiride (AMARYL) 2 mg tablet, Take 1 mg by mouth daily.  Indications: type 2 diabetes mellitus, Disp: , Rfl:     b complex vitamins tablet, Take 1 Tab by mouth daily. , Disp: , Rfl:     aspirin 81 mg Tab, Take 81 mg by mouth daily. Take / use 81 mg AM day of surgery  per anesthesia protocols. , Disp: , Rfl:    Date Last Reviewed: 3-29-18   EXAMINATION:   4-2-18  Observation/Orthostatic Postural Assessment: unremarkable. Palpation: unremarkable. ROM:    Forward elevation L 110 degrees actively in standing        Strength: Not measured. CLINICAL DECISION MAKING:   Outcome Measure: Tool Used: Disabilities of the Arm, Shoulder and Hand (DASH) Questionnaire - Quick Version  Score:  Initial: 25/55 or 32% disability 24/55 or30% disability (1/12/18) 20/55 or 20% disability (3-5-18) Most recent: 22/55 or 25% disability (4-2-18)   Interpretation of Score: The DASH is designed to measure the activities of daily living in person's with upper extremity dysfunction or pain. Each section is scored on a 1-5 scale, 5 representing the greatest disability. The scores of each section are added together for a total score of 55. This number is divided by 11, followed by subtracting 1 and multiplying by 25 to get a percent score of disability. This value represents the percentage disability: 0-20% minimal disability; 20-40% moderate disability; 40-60% severe disability; % dependent for care or exaggerated symptom behavior. Minimal detectable change is 12%. Score 11 12-19 20-28 29-37 38-45 46-54 55   Modifier CH CI CJ CK CL CM CN ? Carrying, Moving, and Handling Objects:     - CURRENT STATUS: CJ - 20%-39% impaired, limited or restricted (3/5/18)    - GOAL STATUS: CI - 1%-19% impaired, limited or restricted    - D/C STATUS:  ---------------To be determined---------------    Medical Necessity:   · Patient is expected to demonstrate progress in strength and range of motion to promote increased functional use of his L/non-dominant UE.    · Co-morbid R rotator cuff pathology and lumbar spine pathology will most likely complicate progress. · Lives alone and will need to be able function independently. Reason for Services/Other Comments:  · Patient continues to require skilled intervention due to the complexity of his history of shoulder pathology and the complexity of this revision reverse TSA. · He will need safe progression of post-op rehab to maximize return to functional use of his L UE.  · He is pending to have R shoulder arthroplasty in the future          TREATMENT:   (In addition to Assessment/Re-Assessment sessions the following treatments were rendered)  4/2/2018   Pre-treatment Symptoms/Complaints: Patient reports mild soreness to L shoulder today but no pain. Pain: Initial:    No pain, just soreness Post Session:  No pain     U/LBE for active warm up per below. Therapeutic Exercise ( 45 minutes): Performed exercise for L shoulder strength with emphasis on deltoid and shoulder girdle stabilization as per grid. Patient cued to downwardly rotate and retract scapula prior to performing middle trapezius strengthening. HEP: No new HEP. He is to continue with existing HEP. He verbalizes understanding.      Date  3-13-18 Date  3-14-18 Date  3-16-18 Date  3-19-18 Date  3-21-18 Date  3-29-18 Date  3-30-18 Date  4-2-18   Activity/Exercise           UBE U/LBE  L. 5 x15' U/LBE   L.5 x10' U/LBE   L.4 x10' U/LBE  L. 4 x15' U/LBE  L. 4.5 x15' U/LBE  L. 4.5 x15' UBE  L. 3 x15' UBE   L. 3 x 15'   Shoulder ROM - Assisted as above Assisted as above Assisted as above Asistted stretch, active motion control 1# x10 ea above Asistted stretch - Assisted stretch   Rhythmic Stabilization - Alt iso to rhyth stab in supine 0-deg ER, 100-deg flex,  Side-lying 90-deg abd, 500-g ball as above Alt iso to rhyth stab in supine 0-deg ER, 100-deg flex,  Side-lying 90-deg abd 2x30\" e rhyth stab in supine 0-deg ER, 100-deg flex,  Side-lying 90-deg abd 3x30\"  rhyth stab in supine 0-deg ER, 100-deg flex,  Side-lying 90-deg abd holdign 500-g ball 3x30\" ea rhyth stab in supine 0-deg ER, 100-deg flex,  Side-lying 90-deg abd holdign 500-g ball 3x30\" ea Rhyth stab in supine at 100 deg flexion, side-lying 90 deg abd holding 500g ball    Mid and lower trap - - Side-lying 1# 3x5-7 each Side-lying 1# 1x8 ea Side-lying  2# 2x5-6 ea Side-lying  500-g 2x5-6 ea Side-lying  500-g ball  2x6 ea Side-lying 500g ball  2 x 6 each   Push up + Dynamic hugs downward  Red 2x15;  Upward   yellow 1x10, 1x7, 1x10, 1x10  assisted Supine AROM press, 500-g ball x30 total with rhyth stab at finish end every 5th and rhyth stab at start end every 5th - Supine press  4# 2x10 Supine AROM press, 2#l x30 total with rhyth stab at finish end every 5th and rhyth stab at start end every 5th - Supine AROM press   5# 1x12, 1x20 Supine press 5# 2 x 10   Side lying shoulder ABD Standing  Abduction to 90-deg,  light manual resisted   2x10 Side-lying 1x15 with rhyth stab x15\" every 5th Side-lying 1# 3x5-7 Side-lying mid delt eccnetrics elbow flexed  2# 2x10,  Elbow extended 2# 1x10 Side-lying   Elbow extended 2# 3x5 with manual resisted rhyth stab Side-lying   Elbow extended 500-g ball  3x5, 1x12 To 90-deg with elbow extended   500-g ball  To 90 deg w/elbow ext 500 g ball, 2 x 10   Shoulder flexion [id number] chair overhead press eccentrics  X6,   1# 3x5-6  Standing scaption to 60-deg  3x5 AAROM,   Standing active eccentrics  3x3 Standing active eccentrics  3x5 Supine short arc deltoid  2# x15  3# x10  4# 2x10;  Standing   active eccentrics  1# 3x3 Standing active eccentrics  3x5 UE wall slide  4x5 Supine short arc deltoid  3# x10  4# 2x10;  UE wall slide  3x6 Supine short arc deltoid 3# 2 x 10     Wall slide 2 x 6   Horz ABD - - - - Supine AROM for control  1 #1x10 - -    Shoulder EXT Standing  bilat   Black 2x15 - - - - - -    Row/pulling Standing  Black double 2x15 - - - - - -    Elbow EXT - - - _ - - -    Side lying ER - Side-lying   place and hold/active eccen  3x10 Side-lying place and hold/active eccen  1# 3x6-8 Side-lying   place and hold/active eccen  1# 10\" 1x6 - Side-lying   place and hold/active eccen  500-g ball 10\" 3x5, 1x28 - Side-lying eccentric 2 x 10 through available ROM   IR with band  - - - - - - -    ER with band  - - - - - - -    B bicep curls 1# x30 total - - - - - -    Diagonal Flex - - - - Supine AROM for control  1 #1x10 ea - -    CKC UE  Plantigrade  Frontal plane shift,   Sagittal plane shift, x10-15 ea; Alt iso to rhyth stab at shoulder and pelvis level 3x15-20\" ea - - - - -    -  Treatment/Session Assessment:    · Response to Treatment: Pt works hard and has good passive ROM with flexion. Able to perform resisted middle trap exercises in side-lying. Improving with ROM but has difficulty with wall slides using L UE.  · Compliance with Program/Exercises: Appear compliant. · Recommendations/Intent for next treatment session: We will continue with shoulder progressive strengthening for functional use of the L UE.     Total Treatment Duration: 45 minutes  PT Patient Time In/Time Out  Time In: 0230  Time Out: 0330    Bette Oviedo, PT, DPT

## 2018-04-04 ENCOUNTER — HOSPITAL ENCOUNTER (OUTPATIENT)
Dept: PHYSICAL THERAPY | Age: 83
Discharge: HOME | End: 2018-04-04
Payer: MEDICARE

## 2018-04-04 PROCEDURE — G8985 CARRY GOAL STATUS: HCPCS

## 2018-04-04 PROCEDURE — 97110 THERAPEUTIC EXERCISES: CPT

## 2018-04-04 PROCEDURE — G8984 CARRY CURRENT STATUS: HCPCS

## 2018-04-04 NOTE — PROGRESS NOTES
Ruma Turcios  : 1933  [place] / [place] / Product Type: Medicare /  [address]  at [address] [place] [address] Rd  [address] [facility], [address], [place] T[place].  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 Fall Risk Score: 2 (? 5 = High Risk)  MD Orders: Eval and Treat; HEP; ROM; Strength; \"full motion/full strength\" (3-21-18) MEDICAL/REFERRING DIAGNOSIS:REM Implant Lt Shldr/ Revision TSA    DATE OF ONSET: 10-13-17  REFERRING PHYSICIAN: Rosaura Herron MD  RETURN PHYSICIAN APPOINTMENT:~18     PROGRESS ASSESSMENT (18):  Mr. Nasreen Ramos has now attended 26 total visits to date. He is about 6 months s/p removal of implant of L shoulder hemiarthroplasty, and revision L TSA with a reverse Delta Xtend prosthesis, and latissimus dorsi and teres major tendon transfer. He continues to have PROM in functional ranges. Weakness continues to be his primary impairment. He is slowly progressiig strength and endurance, but still weak to humeral elevation against gravity. Again, his R/dominant UE has known rotator cuff pathology, which is severely limiting functional use of this UE. He also has co-morbid lumbar spine pathology with radicular weakness to L4-5 levels that affects his gait and stability. He lives alone, and needs to be able to perform basic personal care and household ADL's independently. He will benefit from continued PT to further progress reverse TSA rehab to promote safe return to functional use of the L UE, and to prep for pending R shoulder surgery.    PROBLEM LIST (Impacting functional limitations):  1. Decreased L shoulder ROM   2. Weakness L shoulder INTERVENTIONS PLANNED:  1. Manual therapies, therapeutic exercises, HEP for ROM    2. Therapeutic exercises and HEP for strength   TREATMENT PLAN:  Effective Dates: 3/5/2018 to 5/18/2018. Frequency/Duration: 3 times a week for an additional 8 weeks to further progress strengthening for functional independence. GOALS: (Goals have been discussed and agreed upon with patient.)  Short-Term Functional Goals: Time Frame: 6 weeks   1. L shoulder AROM forward elevation greater than 120 degrees to progress into functional ranges. Progressing, ongoing 4/4/18  2. Demonstrate good L shoulder forward elevation strength for reach to head with grooming ADL's and reaching to shoulder height with household ADL's. Met with compensation 4-4-18  3. Independent with initial HEP. Met 1/12/18  Discharge Goals: Time Frame: 12 weeks  1. Score less than 20% on the DASH. Ongoing 4/4/18  2. L shoulder AROM forward elevation greater than 135 degrees for improved use with household activities reaching overhead. Ongoing 4/4/18  3. Demonstrate good functional L shoulder strength and endurance for improved use of his L UE with his normalized daily activities. Ongoing 4/4/18  4. Independent with advanced shoulder HEP for continued self-management. Ongoing 4/4/18  Rehabilitation Potential For Stated Goals: Good              The information in this section was collected on 12/18/17 (except where otherwise noted). HISTORY:   History of Present Injury/Illness (Reason for Referral): Long, complex history of L shoulder pain and dysfunction with rotator cuff pathology. He was s/p L rotator cuff repair on 8/28/15. He had a fall to both hands when walking up steps at his house in June 2016, resulting complete cuff tears B shoulders. L shoulder hemiarthroplasty done 7/2016. He did rehab, but poor outcome, and was still unable to raise and use L arm over shoulder.  This revision Reverse TSA on L was done on 10/13/2017. Was in the hospital 3 days, then [facility] to [facility], and finally [facility] to home on 12/15/17. He had complication of a hemearthrosis with drop in hematocrit and hemoglobin. It was aspirated. Blood values improved. He as been doing OT post-op shoulder rehab when in the nursing home, consisting of shoulder/UE ROM and strengthening. He has not done HEP yet. He has co-morbid R rotator cuff pathology limiting functional use of his dominant arm, and anticipates shoulder arthroplasty on this in the near future. he also has co-morbid lumbar pathology with L L4-5 myotome weakness resulting in L foot drop. He lives alone and needs to be able to manage all his ADL's independently. Past Medical History/Comorbidities: Mr. Nasreen Ramos  has a past medical history of Anemia (fall 2016); Arthritis; CAD (coronary artery disease) (1997); Carotid stenosis (04/2017); Diabetes ([facility] [address] (05/1997); Diverticulosis (12/2011); GERD (gastroesophageal reflux disease); Glaucoma; Heart murmur; Hiatal hernia; History of kidney stones; Hypercholesteremia; Hypertension; Hypothyroid; MI (myocardial infarction); TIA (transient ischemic attack) (07/2016); Unspecified sleep apnea; Urethral stenosis; and Vasovagal syncope. Mr. Nasreen Ramos  has a past surgical history that includes heart catheterization (3656,9514, 2012, 2013); tonsillectomy (1938); cataract removal (1987 and 1990); lap cholecystectomy (2003); hernia repair (1944); urological (1996); orthopaedic (Left); orthopaedic ([id number]); shoulder arthroscopy (Left, 2015); knee replacement (Bilateral, 2002, 2008); hemorrhoidectomy (1987); heent (Right); carpal tunnel release (Right, 1968); carpal tunnel release (Left, 2012); bunionectomy (Left); rotator cuff repair (Right, 1995); rotator cuff repair (Left, 2000); rotator cuff repair (Right, 2007); and colectomy (01/13/2017). Social History/Living Environment:  Lives alone. Has supportive children and grandchildren living in town. Prior Level of Function/Work/Activity: Limited overhead use of L UE prior to this surgery. Limited use of R UE currently secondary to cuff pathology. Dominant Side: RIGHT  Current Medications:       simethicone (GAS-X) 125 mg capsule, Take 125 mg by mouth four (4) times daily as needed for Flatulence. , Disp: , Rfl:     loperamide (IMODIUM) 2 mg capsule, Take 2 mg by mouth four (4) times daily as needed for Diarrhea., Disp: , Rfl:     diphenoxylate-atropine (LOMOTIL) 2.5-0.025 mg per tablet, Take 1 Tab by mouth two (2) times daily as needed for Diarrhea., Disp: , Rfl:     atorvastatin (LIPITOR) 40 mg tablet, Take 40 mg by mouth nightly., Disp: , Rfl:     levothyroxine (SYNTHROID) 50 mcg tablet, Take 50 mcg by mouth Daily (before breakfast). , Disp: , Rfl:     Psyllium Husk-Sucrose 3.4 gram/7 gram powd, Take 3 Caps by mouth two (2) times a day., Disp: , Rfl:     omega-3 fatty acids-vitamin e (FISH OIL) 1,000 mg cap, Take 2 Caps by mouth daily. , Disp: , Rfl:     bimatoprost (LUMIGAN) 0.01 % ophthalmic drops, Administer 1 Drop to right eye nightly., Disp: , Rfl:     timolol (TIMOPTIC) 0.5 % ophthalmic solution, Administer 1 Drop to right eye every morning., Disp: , Rfl:     Omeprazole delayed release (PRILOSEC D/R) 20 mg tablet, Take 20 mg by mouth Daily (before dinner). , Disp: , Rfl:     meloxicam (MOBIC) 15 mg tablet, Take 15 mg by mouth daily. Indications: OSTEOARTHRITIS, Disp: , Rfl:     brimonidine (ALPHAGAN P) 0.1 % ophthalmic solution, Administer 1 Drop to right eye two (2) times a day. Take / use AM day of surgery  per anesthesia protocols. , Disp: , Rfl:     glimepiride (AMARYL) 2 mg tablet, Take 1 mg by mouth daily. Indications: type 2 diabetes mellitus, Disp: , Rfl:     b complex vitamins tablet, Take 1 Tab by mouth daily. , Disp: , Rfl:     aspirin 81 mg Tab, Take 81 mg by mouth daily. Take / use 81 mg AM day of surgery  per anesthesia protocols. , Disp: , Rfl:    Date Last Reviewed: 4-4-18 EXAMINATION:   4-2-18  Observation/Orthostatic Postural Assessment: unremarkable. Palpation: unremarkable. ROM:   LUE AROM  L Shoulder Flexion: 110 (forward elevation; place and hold at 120)  LUE PROM  L Shoulder Flexion: 140 (forward elevation)  L Shoulder ABduction: 95        Strength:   L Shoulder Forward elevation, abd: 3- in standing             CLINICAL DECISION MAKING:   Outcome Measure: Tool Used: Disabilities of the Arm, Shoulder and Hand (DASH) Questionnaire - Quick Version  Score:  Initial: 25/55 or 32% disability 24/55 or30% disability (1/12/18) 20/55 or 20% disability (3-5-18) Most recent: 22/55 or 25% disability (4-2-18)   Interpretation of Score: The DASH is designed to measure the activities of daily living in person's with upper extremity dysfunction or pain. Each section is scored on a 1-5 scale, 5 representing the greatest disability. The scores of each section are added together for a total score of 55. This number is divided by 11, followed by subtracting 1 and multiplying by 25 to get a percent score of disability. This value represents the percentage disability: 0-20% minimal disability; 20-40% moderate disability; 40-60% severe disability; % dependent for care or exaggerated symptom behavior. Minimal detectable change is 12%. Score 11 12-19 20-28 29-37 38-45 46-54 55   Modifier CH CI CJ CK CL CM CN ? Carrying, Moving, and Handling Objects:     - CURRENT STATUS: CJ - 20%-39% impaired, limited or restricted (4/4/18)     - GOAL STATUS: CI - 1%-19% impaired, limited or restricted    - D/C STATUS:  ---------------To be determined---------------    Medical Necessity:   · Patient is expected to demonstrate progress in strength and range of motion to promote increased functional use of his L/non-dominant UE. · Co-morbid R rotator cuff pathology and lumbar spine pathology will most likely complicate progress.    · Lives alone and will need to be able function independently. Reason for Services/Other Comments:  · Patient continues to require skilled intervention due to the complexity of his history of shoulder pathology and the complexity of this revision reverse TSA. · He will need safe progression of post-op rehab to maximize return to functional use of his L UE.  · He is pending to have R shoulder arthroplasty in the future          TREATMENT:   (In addition to Assessment/Re-Assessment sessions the following treatments were rendered)  4/4/2018   Pre-treatment Symptoms/Complaints: Says he is doing \"pretty good\". Worked a 10 hour day in the clinic yesterday. Works again tomorrow. Pain: Initial:    No pain, just soreness Post Session:  No pain     U/LBE for active warm up per below. Therapeutic Exercise (35 Minutes): L shoulder assisted stretch to forward elevation and horizontal abd and adduction. Performed exercise for L shoulder strength with emphasis on deltoid and shoulder girdle stabilization as per grid. HEP: No new HEP. He is to continue with existing HEP. He verbalizes understanding.    Date  3-13-18 Date  3-14-18 Date  3-16-18 Date  3-19-18 Date  3-21-18 Date  3-29-18 Date  3-30-18 Date  4-2-18 Date  4-4-18   Activity/Exercise            UBE U/LBE  L. 5 x15' U/LBE   L.5 x10' U/LBE   L.4 x10' U/LBE  L. 4 x15' U/LBE  L. 4.5 x15' U/LBE  L. 4.5 x15' UBE  L. 3 x15' UBE   L. 3 x 15' UBE L. 3 x15'   Shoulder ROM - Assisted as above Assisted as above Assisted as above Asistted stretch, active motion control 1# x10 ea above Asistted stretch - Assisted stretch Assisted stretch to forward elevation, horiz abd and add   Rhythmic Stabilization - Alt iso to rhyth stab in supine 0-deg ER, 100-deg flex,  Side-lying 90-deg abd, 500-g ball as above Alt iso to rhyth stab in supine 0-deg ER, 100-deg flex,  Side-lying 90-deg abd 2x30\" e rhyth stab in supine 0-deg ER, 100-deg flex,  Side-lying 90-deg abd 3x30\"  rhyth stab in supine 0-deg ER, 100-deg flex,  Side-lying 90-deg abd holdign 500-g ball 3x30\" ea rhyth stab in supine 0-deg ER, 100-deg flex,  Side-lying 90-deg abd holdign 500-g ball 3x30\" ea Rhyth stab in supine at 100 deg flexion, side-lying 90 deg abd holding 500g ball  Rhyth stab in supine at 100 deg flexion holding 1-kg ball   Mid and lower trap - - Side-lying 1# 3x5-7 each Side-lying 1# 1x8 ea Side-lying  2# 2x5-6 ea Side-lying  500-g 2x5-6 ea Side-lying  500-g ball  2x6 ea Side-lying 500g ball  2 x 6 each -   Push up + Dynamic hugs downward  Red 2x15;  Upward   yellow 1x10, 1x7, 1x10, 1x10  assisted Supine AROM press, 500-g ball x30 total with rhyth stab at finish end every 5th and rhyth stab at start end every 5th - Supine press  4# 2x10 Supine AROM press, 2#l x30 total with rhyth stab at finish end every 5th and rhyth stab at start end every 5th - Supine AROM press   5# 1x12, 1x20 Supine press 5# 2 x 10 Supine press 5# 3x10   Side lying shoulder ABD Standing  Abduction to 90-deg,  light manual resisted   2x10 Side-lying 1x15 with rhyth stab x15\" every 5th Side-lying 1# 3x5-7 Side-lying mid delt eccnetrics elbow flexed  2# 2x10,  Elbow extended 2# 1x10 Side-lying   Elbow extended 2# 3x5 with manual resisted rhyth stab Side-lying   Elbow extended 500-g ball  3x5, 1x12 To 90-deg with elbow extended   500-g ball  To 90 deg w/elbow ext 500 g ball, 2 x 10 To 90 deg w/elbow ext  3# 2x9   Shoulder flexion [id number] chair overhead press eccentrics  X6,   1# 3x5-6  Standing scaption to 60-deg  3x5 AAROM,   Standing active eccentrics  3x3 Standing active eccentrics  3x5 Supine short arc deltoid  2# x15  3# x10  4# 2x10;  Standing   active eccentrics  1# 3x3 Standing active eccentrics  3x5 UE wall slide  4x5 Supine short arc deltoid  3# x10  4# 2x10;  UE wall slide  3x6 Supine short arc deltoid 3# 2 x 10     Wall slide 2 x 6 Supine short arc deltoid   4# 3x10;  Standing AAROM with active eccnetrics 2x5   Horz ABD - - - - Supine AROM for control  1 #1x10 - -  -   Shoulder EXT Standing  bilat   Black 2x15 - - - - - -  -   Row/pulling Standing  Black double 2x15 - - - - - -  -   Elbow EXT - - - _ - - -  -   Side lying ER - Side-lying   place and hold/active eccen  3x10 Side-lying   place and hold/active eccen  1# 3x6-8 Side-lying   place and hold/active eccen  1# 10\" 1x6 - Side-lying   place and hold/active eccen  500-g ball 10\" 3x5, 1x28 - Side-lyingeccentric 2 x 10 through available ROM ssupine ER/IR short arc  3# 2x10   IR with band  - - - - - - -  -   ER with band  - - - - - - -  -   B bicep curls 1# x30 total - - - - - -  -   Diagonal Flex - - - - Supine AROM for control  1 #1x10 ea - -  -   CKC UE  Plantigrade  Frontal plane shift,   Sagittal plane shift, x10-15 ea; Alt iso to rhyth stab at shoulder and pelvis level 3x15-20\" ea - - - - -  -     Treatment/Session Assessment:    · Response to Treatment: Good effort with the exercises. Progressing with downgraded deltoid work. Difficulty and weak with humeral rotational control. Good ER/IR in short arc in supine. · Compliance with Program/Exercises: Appear compliant. · Recommendations/Intent for next treatment session: We will continue with shoulder progressive strengthening for functional use of the L UE.     Total Treatment Duration: 35 minutes  PT Patient Time In/Time Out  Time In: 1145  Time Out: [address], PT, [facility]T, OCS

## 2018-04-09 ENCOUNTER — HOSPITAL ENCOUNTER (OUTPATIENT)
Dept: PHYSICAL THERAPY | Age: 83
Discharge: HOME | End: 2018-04-09
Payer: MEDICARE

## 2018-04-09 PROCEDURE — 97110 THERAPEUTIC EXERCISES: CPT

## 2018-04-09 NOTE — PROGRESS NOTES
Bobbi Adams  : 1933  [place] / [place] / Product Type: Medicare /  [address] at [facility] [place]  [address], [address], [place] [address].  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 Fall Risk Score: 2 (? 5 = High Risk)  MD Orders: Eval and Treat; HEP; ROM; Strength; \"full motion/full strength\" (3-21-18) MEDICAL/REFERRING DIAGNOSIS:REM Implant Lt Shldr/ Revision TSA    DATE OF ONSET: 10-13-17  REFERRING PHYSICIAN: Evy Herron., MD  RETURN PHYSICIAN APPOINTMENT:~18     PROGRESS ASSESSMENT (18):  Mr. Fredi Billings has now attended 26 total visits to date. He is about 6 months s/p removal of implant of L shoulder hemiarthroplasty, and revision L TSA with a reverse Delta Xtend prosthesis, and latissimus dorsi and teres major tendon transfer. He continues to have PROM in functional ranges. Weakness continues to be his primary impairment. He is slowly progressiig strength and endurance, but still weak to humeral elevation against gravity. Again, his R/dominant UE has known rotator cuff pathology, which is severely limiting functional use of this UE. He also has co-morbid lumbar spine pathology with radicular weakness to L4-5 levels that affects his gait and stability. He lives alone, and needs to be able to perform basic personal care and household ADL's independently. He will benefit from continued PT to further progress reverse TSA rehab to promote safe return to functional use of the L UE, and to prep for pending R shoulder surgery.    PROBLEM LIST (Impacting functional limitations):  1. Decreased L shoulder ROM   2. Weakness L shoulder INTERVENTIONS PLANNED:  1. Manual therapies, therapeutic exercises, HEP for ROM    2. Therapeutic exercises and HEP for strength   TREATMENT PLAN:  Effective Dates: 3/5/2018 to 5/18/2018. Frequency/Duration: 3 times a week for an additional 8 weeks to further progress strengthening for functional independence. GOALS: (Goals have been discussed and agreed upon with patient.)  Short-Term Functional Goals: Time Frame: 6 weeks   1. L shoulder AROM forward elevation greater than 120 degrees to progress into functional ranges. Progressing, ongoing 4/4/18  2. Demonstrate good L shoulder forward elevation strength for reach to head with grooming ADL's and reaching to shoulder height with household ADL's. Met with compensation 4-4-18  3. Independent with initial HEP. Met 1/12/18  Discharge Goals: Time Frame: 12 weeks  1. Score less than 20% on the DASH. Ongoing 4/4/18  2. L shoulder AROM forward elevation greater than 135 degrees for improved use with household activities reaching overhead. Ongoing 4/4/18  3. Demonstrate good functional L shoulder strength and endurance for improved use of his L UE with his normalized daily activities. Ongoing 4/4/18  4. Independent with advanced shoulder HEP for continued self-management. Ongoing 4/4/18  Rehabilitation Potential For Stated Goals: Good              The information in this section was collected on 12/18/17 (except where otherwise noted). HISTORY:   History of Present Injury/Illness (Reason for Referral): Long, complex history of L shoulder pain and dysfunction with rotator cuff pathology. He was s/p L rotator cuff repair on 8/28/15. He had a fall to both hands when walking up steps at his house in June 2016, resulting complete cuff tears B shoulders. L shoulder hemiarthroplasty done 7/2016. He did rehab, but poor outcome, and was still unable to raise and use L arm over shoulder.  This revision Reverse TSA on L was done on 10/13/2017. Was in the hospital 3 days, then [facility] to [facility], and finally [facility] to home on 12/15/17. He had complication of a hemearthrosis with drop in hematocrit and hemoglobin. It was aspirated. Blood values improved. He as been doing OT post-op shoulder rehab when in the nursing home, consisting of shoulder/UE ROM and strengthening. He has not done HEP yet. He has co-morbid R rotator cuff pathology limiting functional use of his dominant arm, and anticipates shoulder arthroplasty on this in the near future. he also has co-morbid lumbar pathology with L L4-5 myotome weakness resulting in L foot drop. He lives alone and needs to be able to manage all his ADL's independently. Past Medical History/Comorbidities: Mr. Bhanu Gill  has a past medical history of Anemia (fall 2016); Arthritis; CAD (coronary artery disease) (1997); Carotid stenosis (04/2017); Diabetes ([facility] [address] (05/1997); Diverticulosis (12/2011); GERD (gastroesophageal reflux disease); Glaucoma; Heart murmur; Hiatal hernia; History of kidney stones; Hypercholesteremia; Hypertension; Hypothyroid; MI (myocardial infarction); TIA (transient ischemic attack) (07/2016); Unspecified sleep apnea; Urethral stenosis; and Vasovagal syncope. Mr. Bhanu Gill  has a past surgical history that includes heart catheterization (8419,3922, 2012, 2013); tonsillectomy (1938); cataract removal (1987 and 1990); lap cholecystectomy (2003); hernia repair (1944); urological (1996); orthopaedic (Left); orthopaedic ([id number]); shoulder arthroscopy (Left, 2015); knee replacement (Bilateral, 2002, 2008); hemorrhoidectomy (1987); heent (Right); carpal tunnel release (Right, 1968); carpal tunnel release (Left, 2012); bunionectomy (Left); rotator cuff repair (Right, 1995); rotator cuff repair (Left, 2000); rotator cuff repair (Right, 2007); and colectomy (01/13/2017). Social History/Living Environment:  Lives alone. Has supportive children and grandchildren living in town.    Prior Level of Function/Work/Activity: Limited overhead use of L UE prior to this surgery. Limited use of R UE currently secondary to cuff pathology. Dominant Side: RIGHT  Current Medications:       simethicone (GAS-X) 125 mg capsule, Take 125 mg by mouth four (4) times daily as needed for Flatulence. , Disp: , Rfl:     loperamide (IMODIUM) 2 mg capsule, Take 2 mg by mouth four (4) times daily as needed for Diarrhea., Disp: , Rfl:     diphenoxylate-atropine (LOMOTIL) 2.5-0.025 mg per tablet, Take 1 Tab by mouth two (2) times daily as needed for Diarrhea., Disp: , Rfl:     atorvastatin (LIPITOR) 40 mg tablet, Take 40 mg by mouth nightly., Disp: , Rfl:     levothyroxine (SYNTHROID) 50 mcg tablet, Take 50 mcg by mouth Daily (before breakfast). , Disp: , Rfl:     Psyllium Husk-Sucrose 3.4 gram/7 gram powd, Take 3 Caps by mouth two (2) times a day., Disp: , Rfl:     omega-3 fatty acids-vitamin e (FISH OIL) 1,000 mg cap, Take 2 Caps by mouth daily. , Disp: , Rfl:     bimatoprost (LUMIGAN) 0.01 % ophthalmic drops, Administer 1 Drop to right eye nightly., Disp: , Rfl:     timolol (TIMOPTIC) 0.5 % ophthalmic solution, Administer 1 Drop to right eye every morning., Disp: , Rfl:     Omeprazole delayed release (PRILOSEC D/R) 20 mg tablet, Take 20 mg by mouth Daily (before dinner). , Disp: , Rfl:     meloxicam (MOBIC) 15 mg tablet, Take 15 mg by mouth daily. Indications: OSTEOARTHRITIS, Disp: , Rfl:     brimonidine (ALPHAGAN P) 0.1 % ophthalmic solution, Administer 1 Drop to right eye two (2) times a day. Take / use AM day of surgery  per anesthesia protocols. , Disp: , Rfl:     glimepiride (AMARYL) 2 mg tablet, Take 1 mg by mouth daily. Indications: type 2 diabetes mellitus, Disp: , Rfl:     b complex vitamins tablet, Take 1 Tab by mouth daily. , Disp: , Rfl:     aspirin 81 mg Tab, Take 81 mg by mouth daily. Take / use 81 mg AM day of surgery  per anesthesia protocols. , Disp: , Rfl:    Date Last Reviewed: 4-9-18 EXAMINATION:   4/9/2018   Observation/Orthostatic Postural Assessment: unremarkable. Palpation: unremarkable. ROM: No new measure. Strength:   L Shoulder Forward elevation, abd: 3- in standing             CLINICAL DECISION MAKING:   Outcome Measure: Tool Used: Disabilities of the Arm, Shoulder and Hand (DASH) Questionnaire - Quick Version  Score:  Initial: 25/55 or 32% disability 24/55 or30% disability (1/12/18) 20/55 or 20% disability (3-5-18) Most recent: 22/55 or 25% disability (4-2-18)   Interpretation of Score: The DASH is designed to measure the activities of daily living in person's with upper extremity dysfunction or pain. Each section is scored on a 1-5 scale, 5 representing the greatest disability. The scores of each section are added together for a total score of 55. This number is divided by 11, followed by subtracting 1 and multiplying by 25 to get a percent score of disability. This value represents the percentage disability: 0-20% minimal disability; 20-40% moderate disability; 40-60% severe disability; % dependent for care or exaggerated symptom behavior. Minimal detectable change is 12%. Score 11 12-19 20-28 29-37 38-45 46-54 55   Modifier CH CI CJ CK CL CM CN ? Carrying, Moving, and Handling Objects:     - CURRENT STATUS: CJ - 20%-39% impaired, limited or restricted (4/4/18)     - GOAL STATUS: CI - 1%-19% impaired, limited or restricted    - D/C STATUS:  ---------------To be determined---------------    Medical Necessity:   · Patient is expected to demonstrate progress in strength and range of motion to promote increased functional use of his L/non-dominant UE. · Co-morbid R rotator cuff pathology and lumbar spine pathology will most likely complicate progress. · Lives alone and will need to be able function independently.     Reason for Services/Other Comments:  · Patient continues to require skilled intervention due to the complexity of his history of shoulder pathology and the complexity of this revision reverse TSA. · He will need safe progression of post-op rehab to maximize return to functional use of his L UE.  · He is pending to have R shoulder arthroplasty in the future          TREATMENT:   (In addition to Assessment/Re-Assessment sessions the following treatments were rendered)  4/9/2018   Pre-treatment Symptoms/Complaints: Says he is having trouble reaching across body and behind back for scratching, washing, toileting ADL's. He is concerned that if he cannot do these with the L arm, he wont' be able to do these with either is he has the R shoulder replaced as well. Pain: Initial:    No pain, just soreness Post Session:  No pain     U/LBE for active warm up per below. Therapeutic Exercise (40 Minutes): Exercises for functional UE strength with emphasis on reaching across body and behind back at different planes. Assistance through end range lag with quick stretch, and active hold and returns to all. Manual guiding for controlled ranges and form. HEP:He is to continue with existing HEP, and is to continue work on these functional reaches with self assistance. He verbalizes understanding.    Date  3-13-18 Date  3-14-18 Date  3-16-18 Date  3-19-18 Date  3-21-18 Date  3-29-18 Date  3-30-18 Date  4-2-18 Date  4-4-18 Date  4-9-18   Activity/Exercise             UBE U/LBE  L. 5 x15' U/LBE   L.5 x10' U/LBE   L.4 x10' U/LBE  L. 4 x15' U/LBE  L. 4.5 x15' U/LBE  L. 4.5 x15' UBE  L. 3 x15' UBE   L. 3 x 15' UBE L. 3 x15' UBE L. 3 x15'   Shoulder ROM - Assisted as above Assisted as above Assisted as above Asistted stretch, active motion control 1# x10 ea above Asistted stretch - Assisted stretch Assisted stretch to forward elevation, horiz abd and add -   Rhythmic Stabilization - Alt iso to rhyth stab in supine 0-deg ER, 100-deg flex,  Side-lying 90-deg abd, 500-g ball as above Alt iso to rhyth stab in supine 0-deg ER, 100-deg flex,  Side-lying 90-deg abd 2x30\" e rhyth stab in supine 0-deg ER, 100-deg flex,  Side-lying 90-deg abd 3x30\"  rhyth stab in supine 0-deg ER, 100-deg flex,  Side-lying 90-deg abd holdign 500-g ball 3x30\" ea rhyth stab in supine 0-deg ER, 100-deg flex,  Side-lying 90-deg abd holdign 500-g ball 3x30\" ea Rhyth stab in supine at 100 deg flexion, side-lying 90 deg abd holding 500g ball  Rhyth stab in supine at 100 deg flexion holding 1-kg ball -   Mid and lower trap - - Side-lying 1# 3x5-7 each Side-lying 1# 1x8 ea Side-lying  2# 2x5-6 ea Side-lying  500-g 2x5-6 ea Side-lying  500-g ball  2x6 ea Side-lying 500g ball  2 x 6 each - -   Push up + Dynamic hugs downward  Red 2x15;  Upward   yellow 1x10, 1x7, 1x10, 1x10  assisted Supine AROM press, 500-g ball x30 total with rhyth stab at finish end every 5th and rhyth stab at start end every 5th - Supine press  4# 2x10 Supine AROM press, 2#l x30 total with rhyth stab at finish end every 5th and rhyth stab at start end every 5th - Supine AROM press   5# 1x12, 1x20 Supine press 5# 2 x 10 Supine press 5# 3x10 -   Side lying shoulder ABD Standing  Abduction to 90-deg,  light manual resisted   2x10 Side-lying 1x15 with rhyth stab x15\" every 5th Side-lying 1# 3x5-7 Side-lying mid delt eccnetrics elbow flexed  2# 2x10,  Elbow extended 2# 1x10 Side-lying   Elbow extended 2# 3x5 with manual resisted rhyth stab Side-lying   Elbow extended 500-g ball  3x5, 1x12 To 90-deg with elbow extended   500-g ball  To 90 deg w/elbow ext 500 g ball, 2 x 10 To 90 deg w/elbow ext  3# 2x9 -   Shoulder flexion 60-deg Beach chair overhead press eccentrics  X6,   1# 3x5-6  Standing scaption to 60-deg  3x5 AAROM,   Standing active eccentrics  3x3 Standing active eccentrics  3x5 Supine short arc deltoid  2# x15  3# x10  4# 2x10;  Standing   active eccentrics  1# 3x3 Standing active eccentrics  3x5 UE wall slide  4x5 Supine short arc deltoid  3# x10  4# 2x10;  UE wall slide  3x6 Supine short arc deltoid 3# 2 x 10     Wall slide 2 x 6 Supine short arc deltoid   4# 3x10;  Standing AAROM with active eccnetrics 2x5 Wall slide  3x5   Horz ABD - - - - Supine AROM for control  1 #1x10 - -  - standing Horiz Add  3x5-6     Shoulder EXT Standing  bilat   Black 2x15 - - - - - -  - Ext/add behind back standing AAROM  3x5,   Yellow tubing eccentrics  3x5   Row/pulling Standing  Black double 2x15 - - - - - -  - -   Elbow EXT - - - _ - - -  - -   Side lying ER - Side-lying   place and hold/active eccen  3x10 Side-lying   place and hold/active eccen  1# 3x6-8 Side-lying   place and hold/active eccen  1# 10\" 1x6 - Side-lying   place and hold/active eccen  500-g ball 10\" 3x5, 1x28 - Side-lyingeccentric 2 x 10 through available ROM ssupine ER/IR short arc  3# 2x10 -   IR with band  - - - - - - -  - -   ER with band  - - - - - - -  - -   B bicep curls 1# x30 total - - - - - -  - -   Diagonal Flex - - - - Supine AROM for control  1 #1x10 ea - -  - Standing  AAROM D1 toward R shoulder  3x5,  Yellow tubing active eccentrics  3x5   Diagonal Ext          Standing   AAROM toward R hip, active eccentrics   yellow  3x5-6   CKC UE  Plantigrade  Frontal plane shift,   Sagittal plane shift, x10-15 ea; Alt iso to rhyth stab at shoulder and pelvis level 3x15-20\" ea - - - - -  - -     Treatment/Session Assessment:    · Response to Treatment: Concerns with limited functional use of his L UE with reaching across body to wash/dry under R arm, reach behind back. Emphasis on ranging and compensatory muscle action to hold and move into these directions. These will be limited secondary to the reverse TSA on the L. R shoulder rotator cuff pathology limits the R UE.   · Compliance with Program/Exercises: Appear compliant. · Recommendations/Intent for next treatment session: We will continue with shoulder progressive strengthening for functional use of the L UE.     Total Treatment Duration: 40 minutes  PT Patient Time In/Time Out  Time In: 1345  Time Out: Emily [address] Cayetano Bernal, PT, MSPT, OCS

## 2018-04-11 ENCOUNTER — HOSPITAL ENCOUNTER (OUTPATIENT)
Dept: PHYSICAL THERAPY | Age: 83
Discharge: HOME | End: 2018-04-11
Payer: MEDICARE

## 2018-04-11 PROCEDURE — 97110 THERAPEUTIC EXERCISES: CPT

## 2018-04-11 NOTE — PROGRESS NOTES
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 Fall Risk Score: 2 (? 5 = High Risk)  MD Orders: Eval and Treat; HEP; ROM; Strength; \"full motion/full strength\" (3-21-18) MEDICAL/REFERRING DIAGNOSIS:REM Implant Lt Shldr/ Revision TSA    DATE OF ONSET: 10-13-17  REFERRING PHYSICIAN: Cynthia Herron MD  RETURN PHYSICIAN APPOINTMENT:~18     PROGRESS ASSESSMENT (18):  Mr. Augustine Crum has now attended 26 total visits to date. He is about 6 months s/p removal of implant of L shoulder hemiarthroplasty, and revision L TSA with a reverse Delta Xtend prosthesis, and latissimus dorsi and teres major tendon transfer. He continues to have PROM in functional ranges. Weakness continues to be his primary impairment. He is slowly progressiig strength and endurance, but still weak to humeral elevation against gravity. Again, his R/dominant UE has known rotator cuff pathology, which is severely limiting functional use of this UE. He also has co-morbid lumbar spine pathology with radicular weakness to L4-5 levels that affects his gait and stability. He lives alone, and needs to be able to perform basic personal care and household ADL's independently. He will benefit from continued PT to further progress reverse TSA rehab to promote safe return to functional use of the L UE, and to prep for pending R shoulder surgery.    PROBLEM LIST (Impacting functional limitations):  1. Decreased L shoulder ROM   2. Weakness L shoulder INTERVENTIONS PLANNED:  1. Manual therapies, therapeutic exercises, HEP for ROM    2. Therapeutic exercises and HEP for strength   TREATMENT PLAN:  Effective Dates: 3/5/2018 to 5/18/2018. Frequency/Duration: 3 times a week for an additional 8 weeks to further progress strengthening for functional independence. GOALS: (Goals have been discussed and agreed upon with patient.)  Short-Term Functional Goals: Time Frame: 6 weeks   1. L shoulder AROM forward elevation greater than 120 degrees to progress into functional ranges. Progressing, ongoing 4/4/18  2. Demonstrate good L shoulder forward elevation strength for reach to head with grooming ADL's and reaching to shoulder height with household ADL's. Met with compensation 4-4-18  3. Independent with initial HEP. Met 1/12/18  Discharge Goals: Time Frame: 12 weeks  1. Score less than 20% on the DASH. Ongoing 4/4/18  2. L shoulder AROM forward elevation greater than 135 degrees for improved use with household activities reaching overhead. Ongoing 4/4/18  3. Demonstrate good functional L shoulder strength and endurance for improved use of his L UE with his normalized daily activities. Ongoing 4/4/18  4. Independent with advanced shoulder HEP for continued self-management. Ongoing 4/4/18  Rehabilitation Potential For Stated Goals: Good              The information in this section was collected on 12/18/17 (except where otherwise noted). HISTORY:   History of Present Injury/Illness (Reason for Referral): Long, complex history of L shoulder pain and dysfunction with rotator cuff pathology. He was s/p L rotator cuff repair on 8/28/15. He had a fall to both hands when walking up steps at his house in June 2016, resulting complete cuff tears B shoulders. L shoulder hemiarthroplasty done 7/2016. He did rehab, but poor outcome, and was still unable to raise and use L arm over shoulder.  This revision Reverse TSA on L was done on 10/13/2017. Was in the hospital 3 days, then [facility] to [facility], and finally [facility] to home on 12/15/17. He had complication of a hemearthrosis with drop in hematocrit and hemoglobin. It was aspirated. Blood values improved. He as been doing OT post-op shoulder rehab when in the nursing home, consisting of shoulder/UE ROM and strengthening. He has not done HEP yet. He has co-morbid R rotator cuff pathology limiting functional use of his dominant arm, and anticipates shoulder arthroplasty on this in the near future. he also has co-morbid lumbar pathology with L L4-5 myotome weakness resulting in L foot drop. He lives alone and needs to be able to manage all his ADL's independently. Past Medical History/Comorbidities: Mr. Len Prescott  has a past medical history of Anemia (fall 2016); Arthritis; CAD (coronary artery disease) (1997); Carotid stenosis (04/2017); Diabetes ([facility] [address] (05/1997); Diverticulosis (12/2011); GERD (gastroesophageal reflux disease); Glaucoma; Heart murmur; Hiatal hernia; History of kidney stones; Hypercholesteremia; Hypertension; Hypothyroid; MI (myocardial infarction); TIA (transient ischemic attack) (07/2016); Unspecified sleep apnea; Urethral stenosis; and Vasovagal syncope. Mr. Len Prescott  has a past surgical history that includes heart catheterization (8299,2943, 2012, 2013); tonsillectomy (1938); cataract removal (1987 and 1990); lap cholecystectomy (2003); hernia repair (1944); urological (1996); orthopaedic (Left); orthopaedic ([id number]); shoulder arthroscopy (Left, 2015); knee replacement (Bilateral, 2002, 2008); hemorrhoidectomy (1987); heent (Right); carpal tunnel release (Right, 1968); carpal tunnel release (Left, 2012); bunionectomy (Left); rotator cuff repair (Right, 1995); rotator cuff repair (Left, 2000); rotator cuff repair (Right, 2007); and colectomy (01/13/2017). Social History/Living Environment:  Lives alone. Has supportive children and grandchildren living in town.    Prior Level of Function/Work/Activity: Limited overhead use of L UE prior to this surgery. Limited use of R UE currently secondary to cuff pathology. Dominant Side: RIGHT  Current Medications:       simethicone (GAS-X) 125 mg capsule, Take 125 mg by mouth four (4) times daily as needed for Flatulence. , Disp: , Rfl:     loperamide (IMODIUM) 2 mg capsule, Take 2 mg by mouth four (4) times daily as needed for Diarrhea., Disp: , Rfl:     diphenoxylate-atropine (LOMOTIL) 2.5-0.025 mg per tablet, Take 1 Tab by mouth two (2) times daily as needed for Diarrhea., Disp: , Rfl:     atorvastatin (LIPITOR) 40 mg tablet, Take 40 mg by mouth nightly., Disp: , Rfl:     levothyroxine (SYNTHROID) 50 mcg tablet, Take 50 mcg by mouth Daily (before breakfast). , Disp: , Rfl:     Psyllium Husk-Sucrose 3.4 gram/7 gram powd, Take 3 Caps by mouth two (2) times a day., Disp: , Rfl:     omega-3 fatty acids-vitamin e (FISH OIL) 1,000 mg cap, Take 2 Caps by mouth daily. , Disp: , Rfl:     bimatoprost (LUMIGAN) 0.01 % ophthalmic drops, Administer 1 Drop to right eye nightly., Disp: , Rfl:     timolol (TIMOPTIC) 0.5 % ophthalmic solution, Administer 1 Drop to right eye every morning., Disp: , Rfl:     Omeprazole delayed release (PRILOSEC D/R) 20 mg tablet, Take 20 mg by mouth Daily (before dinner). , Disp: , Rfl:     meloxicam (MOBIC) 15 mg tablet, Take 15 mg by mouth daily. Indications: OSTEOARTHRITIS, Disp: , Rfl:     brimonidine (ALPHAGAN P) 0.1 % ophthalmic solution, Administer 1 Drop to right eye two (2) times a day. Take / use AM day of surgery  per anesthesia protocols. , Disp: , Rfl:     glimepiride (AMARYL) 2 mg tablet, Take 1 mg by mouth daily. Indications: type 2 diabetes mellitus, Disp: , Rfl:     b complex vitamins tablet, Take 1 Tab by mouth daily. , Disp: , Rfl:     aspirin 81 mg Tab, Take 81 mg by mouth daily. Take / use 81 mg AM day of surgery  per anesthesia protocols. , Disp: , Rfl:    Date Last Reviewed: 4-9-18 EXAMINATION:   4/11/2018   Observation/Orthostatic Postural Assessment: unremarkable. Palpation: unremarkable. ROM: No new measure. Strength:   L Shoulder Forward elevation, abd: 3- in standing             CLINICAL DECISION MAKING:   Outcome Measure: Tool Used: Disabilities of the Arm, Shoulder and Hand (DASH) Questionnaire - Quick Version  Score:  Initial: 25/55 or 32% disability 24/55 or30% disability (1/12/18) 20/55 or 20% disability (3-5-18) Most recent: 22/55 or 25% disability (4-2-18)   Interpretation of Score: The DASH is designed to measure the activities of daily living in person's with upper extremity dysfunction or pain. Each section is scored on a 1-5 scale, 5 representing the greatest disability. The scores of each section are added together for a total score of 55. This number is divided by 11, followed by subtracting 1 and multiplying by 25 to get a percent score of disability. This value represents the percentage disability: 0-20% minimal disability; 20-40% moderate disability; 40-60% severe disability; % dependent for care or exaggerated symptom behavior. Minimal detectable change is 12%. Score 11 12-19 20-28 29-37 38-45 46-54 55   Modifier CH CI CJ CK CL CM CN ? Carrying, Moving, and Handling Objects:     - CURRENT STATUS: CJ - 20%-39% impaired, limited or restricted (4/4/18)     - GOAL STATUS: CI - 1%-19% impaired, limited or restricted    - D/C STATUS:  ---------------To be determined---------------    Medical Necessity:   · Patient is expected to demonstrate progress in strength and range of motion to promote increased functional use of his L/non-dominant UE. · Co-morbid R rotator cuff pathology and lumbar spine pathology will most likely complicate progress. · Lives alone and will need to be able function independently.     Reason for Services/Other Comments:  · Patient continues to require skilled intervention due to the complexity of his history of shoulder pathology and the complexity of this revision reverse TSA. · He will need safe progression of post-op rehab to maximize return to functional use of his L UE.  · He is pending to have R shoulder arthroplasty in the future          TREATMENT:   (In addition to Assessment/Re-Assessment sessions the following treatments were rendered)  4/11/2018   Pre-treatment Symptoms/Complaints: Says he did some work in the yard yesterday, so a little fatigued, especially to his back. Pain: Initial:    No pain, just soreness Post Session:  No pain     U/LBE for active warm up per below. Therapeutic Exercise (40 Minutes): Exercises for L UE strength with with review and performance of new HEP. Exercises done as indicated below with verbal and manual guiding cues for exercise set up and performance to be done at home. Sitting   HEP: Provided new written HEP and yellow tubing. He is to go through these on his own before next visit. He can discard his previous strength HPE. He verbalizes understanding.    Date  3-13-18 Date  3-14-18 Date  3-16-18 Date  3-19-18 Date  3-21-18 Date  3-29-18 Date  3-30-18 Date  4-2-18 Date  4-4-18 Date  4-9-18 Date  4-11-18   Activity/Exercise              UBE U/LBE  L. 5 x15' U/LBE   L.5 x10' U/LBE   L.4 x10' U/LBE  L. 4 x15' U/LBE  L. 4.5 x15' U/LBE  L. 4.5 x15' UBE  L. 3 x15' UBE   L. 3 x 15' UBE L. 3 x15' UBE L. 3 x15' UBE L. 3  x15'   Shoulder ROM - Assisted as above Assisted as above Assisted as above Asistted stretch, active motion control 1# x10 ea above Asistted stretch - Assisted stretch Assisted stretch to forward elevation, horiz abd and add - -   Rhythmic Stabilization - Alt iso to rhyth stab in supine 0-deg ER, 100-deg flex,  Side-lying 90-deg abd, 500-g ball as above Alt iso to rhyth stab in supine 0-deg ER, 100-deg flex,  Side-lying 90-deg abd 2x30\" e rhyth stab in supine 0-deg ER, 100-deg flex,  Side-lying 90-deg abd 3x30\"  rhyth stab in supine 0-deg ER, 100-deg flex,  Side-lying 90-deg abd holdign 500-g ball 3x30\" ea rhyth stab in supine 0-deg ER, 100-deg flex,  Side-lying 90-deg abd holdign 500-g ball 3x30\" ea Rhyth stab in supine at 100 deg flexion, side-lying 90 deg abd holding 500g ball  Rhyth stab in supine at 100 deg flexion holding 1-kg ball - -   Mid and lower trap - - Side-lying 1# 3x5-7 each Side-lying 1# 1x8 ea Side-lying  2# 2x5-6 ea Side-lying  500-g 2x5-6 ea Side-lying  500-g ball  2x6 ea Side-lying 500g ball  2 x 6 each - - -   Push up + Dynamic hugs downward  Red 2x15;  Upward   yellow 1x10, 1x7, 1x10, 1x10  assisted Supine AROM press, 500-g ball x30 total with rhyth stab at finish end every 5th and rhyth stab at start end every 5th - Supine press  4# 2x10 Supine AROM press, 2#l x30 total with rhyth stab at finish end every 5th and rhyth stab at start end every 5th - Supine AROM press   5# 1x12, 1x20 Supine press 5# 2 x 10 Supine press 5# 3x10 - -   Side lying shoulder ABD Standing  Abduction to 90-deg,  light manual resisted   2x10 Side-lying 1x15 with rhyth stab x15\" every 5th Side-lying 1# 3x5-7 Side-lying mid delt eccnetrics elbow flexed  2# 2x10,  Elbow extended 2# 1x10 Side-lying   Elbow extended 2# 3x5 with manual resisted rhyth stab Side-lying   Elbow extended 500-g ball  3x5, 1x12 To 90-deg with elbow extended   500-g ball  To 90 deg w/elbow ext 500 g ball, 2 x 10 To 90 deg w/elbow ext  3# 2x9 - Standing unilat  2x10   Shoulder flexion 60-deg Beach chair overhead press eccentrics  X6,   1# 3x5-6  Standing scaption to 60-deg  3x5 AAROM,   Standing active eccentrics  3x3 Standing active eccentrics  3x5 Supine short arc deltoid  2# x15  3# x10  4# 2x10;  Standing   active eccentrics  1# 3x3 Standing active eccentrics  3x5 UE wall slide  4x5 Supine short arc deltoid  3# x10  4# 2x10;  UE wall slide  3x6 Supine short arc deltoid 3# 2 x 10     Wall slide 2 x 6 Supine short arc deltoid   4# 3x10;  Standing AAROM with active eccnetrics 2x5 Wall slide  3x5 Standing scaption 2x5-6   Horz ABD - - - - Supine AROM for control  1 #1x10 - -  - standing Horiz Add  3x5-6   -   Shoulder EXT Standing  bilat   Black 2x15 - - - - - -  - Ext/add behind back standing AAROM  3x5,   Yellow tubing eccentrics  3x5 Standing unilat  Yellow tubing  extension x30,   Ext/add behind back x30   Row/pulling Standing  Black double 2x15 - - - - - -  - - -   Elbow EXT - - - _ - - -  - - -   Side lying ER - Side-lying   place and hold/active eccen  3x10 Side-lying   place and hold/active eccen  1# 3x6-8 Side-lying   place and hold/active eccen  1# 10\" 1x6 - Side-lying   place and hold/active eccen  500-g ball 10\" 3x5, 1x28 - Side-lyingeccentric 2 x 10 through available ROM ssupine ER/IR short arc  3# 2x10 - -   IR with band  - - - - - - -  - - Standing in shoulder neutral,  Single-yellow with stabilization x15   ER with band  - - - - - - -  - - Standing in shoulder neutral,  Single-yellow with stabilization x15   B bicep curls 1# x30 total - - - - - -  - - -   Diagonal Flex - - - - Supine AROM for control  1 #1x10 ea - -  - Standing  AAROM D1 toward R shoulder  3x5,  Yellow tubing active eccentrics  3x5 Standing  short arc  Single yellow   D1 flex 1x10     Diagonal Ext          Standing   AAROM toward R hip, active eccentrics   yellow  3x5-6 Standing  short arc add  Single yellow 1x10,   Short arc D2 ext 1x10   CKC UE  Plantigrade  Frontal plane shift,   Sagittal plane shift, x10-15 ea; Alt iso to rhyth stab at shoulder and pelvis level 3x15-20\" ea - - - - -  - - -     Treatment/Session Assessment:    · Response to Treatment: Good effort with the exercises done today, with good understanding of these new ones. · Compliance with Program/Exercises: Appear compliant. · Recommendations/Intent for next treatment session: We will continue with shoulder progressive strengthening for functional use of the L UE.     Total Treatment Duration: 40 minutes  PT Patient Time In/Time Out  Time In: 1030  Time Out: [address], PT, [facility]SPT, OCS

## 2018-04-12 ENCOUNTER — HOSPITAL ENCOUNTER (OUTPATIENT)
Dept: PHYSICAL THERAPY | Age: 83
Discharge: HOME | End: 2018-04-12
Payer: MEDICARE

## 2018-04-12 PROCEDURE — 97110 THERAPEUTIC EXERCISES: CPT

## 2018-04-12 NOTE — PROGRESS NOTES
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 Fall Risk Score: 2 (? 5 = High Risk)  MD Orders: Eval and Treat; HEP; ROM; Strength; \"full motion/full strength\" (3-21-18) MEDICAL/REFERRING DIAGNOSIS:REM Implant Lt Shldr/ Revision TSA    DATE OF ONSET: 10-13-17  REFERRING PHYSICIAN: Charis Herron MD  RETURN PHYSICIAN APPOINTMENT:~18     PROGRESS ASSESSMENT (18):  Mr. Titi Mcgrath has now attended 26 total visits to date. He is about 6 months s/p removal of implant of L shoulder hemiarthroplasty, and revision L TSA with a reverse Delta Xtend prosthesis, and latissimus dorsi and teres major tendon transfer. He continues to have PROM in functional ranges. Weakness continues to be his primary impairment. He is slowly progressiig strength and endurance, but still weak to humeral elevation against gravity. Again, his R/dominant UE has known rotator cuff pathology, which is severely limiting functional use of this UE. He also has co-morbid lumbar spine pathology with radicular weakness to L4-5 levels that affects his gait and stability. He lives alone, and needs to be able to perform basic personal care and household ADL's independently. He will benefit from continued PT to further progress reverse TSA rehab to promote safe return to functional use of the L UE, and to prep for pending R shoulder surgery.    PROBLEM LIST (Impacting functional limitations):  1. Decreased L shoulder ROM   2. Weakness L shoulder INTERVENTIONS PLANNED:  1. Manual therapies, therapeutic exercises, HEP for ROM    2. Therapeutic exercises and HEP for strength   TREATMENT PLAN:  Effective Dates: 3/5/2018 to 5/18/2018. Frequency/Duration: 3 times a week for an additional 8 weeks to further progress strengthening for functional independence. GOALS: (Goals have been discussed and agreed upon with patient.)  Short-Term Functional Goals: Time Frame: 6 weeks   1. L shoulder AROM forward elevation greater than 120 degrees to progress into functional ranges. Progressing, ongoing 4/4/18  2. Demonstrate good L shoulder forward elevation strength for reach to head with grooming ADL's and reaching to shoulder height with household ADL's. Met with compensation 4-4-18  3. Independent with initial HEP. Met 1/12/18  Discharge Goals: Time Frame: 12 weeks  1. Score less than 20% on the DASH. Ongoing 4/4/18  2. L shoulder AROM forward elevation greater than 135 degrees for improved use with household activities reaching overhead. Ongoing 4/4/18  3. Demonstrate good functional L shoulder strength and endurance for improved use of his L UE with his normalized daily activities. Ongoing 4/4/18  4. Independent with advanced shoulder HEP for continued self-management. Ongoing 4/4/18  Rehabilitation Potential For Stated Goals: Good              The information in this section was collected on 12/18/17 (except where otherwise noted). HISTORY:   History of Present Injury/Illness (Reason for Referral): Long, complex history of L shoulder pain and dysfunction with rotator cuff pathology. He was s/p L rotator cuff repair on 8/28/15. He had a fall to both hands when walking up steps at his house in June 2016, resulting complete cuff tears B shoulders. L shoulder hemiarthroplasty done 7/2016. He did rehab, but poor outcome, and was still unable to raise and use L arm over shoulder.  This revision Reverse TSA on L was done on 10/13/2017. Was in the hospital 3 days, then [facility] to [facility], and finally [facility] to home on 12/15/17. He had complication of a hemearthrosis with drop in hematocrit and hemoglobin. It was aspirated. Blood values improved. He as been doing OT post-op shoulder rehab when in the nursing home, consisting of shoulder/UE ROM and strengthening. He has not done HEP yet. He has co-morbid R rotator cuff pathology limiting functional use of his dominant arm, and anticipates shoulder arthroplasty on this in the near future. he also has co-morbid lumbar pathology with L L4-5 myotome weakness resulting in L foot drop. He lives alone and needs to be able to manage all his ADL's independently. Past Medical History/Comorbidities: Mr. Quang Kathleen  has a past medical history of Anemia (fall 2016); Arthritis; CAD (coronary artery disease) (1997); Carotid stenosis (04/2017); Diabetes ([facility] [address] (05/1997); Diverticulosis (12/2011); GERD (gastroesophageal reflux disease); Glaucoma; Heart murmur; Hiatal hernia; History of kidney stones; Hypercholesteremia; Hypertension; Hypothyroid; MI (myocardial infarction); TIA (transient ischemic attack) (07/2016); Unspecified sleep apnea; Urethral stenosis; and Vasovagal syncope. Mr. Quang Kathleen  has a past surgical history that includes heart catheterization ([id number],8828, 2012, 2013); tonsillectomy (1938); cataract removal (1987 and 1990); lap cholecystectomy (2003); hernia repair (1944); urological (1996); orthopaedic (Left); orthopaedic ([id number]); shoulder arthroscopy (Left, 2015); knee replacement (Bilateral, 2002, 2008); hemorrhoidectomy (1987); heent (Right); carpal tunnel release (Right, 1968); carpal tunnel release (Left, 2012); bunionectomy (Left); rotator cuff repair (Right, 1995); rotator cuff repair (Left, 2000); rotator cuff repair (Right, 2007); and colectomy (01/13/2017). Social History/Living Environment:  Lives alone. Has supportive children and grandchildren living in town.    Prior Level of Function/Work/Activity: Limited overhead use of L UE prior to this surgery. Limited use of R UE currently secondary to cuff pathology. Dominant Side: RIGHT  Current Medications:       simethicone (GAS-X) 125 mg capsule, Take 125 mg by mouth four (4) times daily as needed for Flatulence. , Disp: , Rfl:     loperamide (IMODIUM) 2 mg capsule, Take 2 mg by mouth four (4) times daily as needed for Diarrhea., Disp: , Rfl:     diphenoxylate-atropine (LOMOTIL) 2.5-0.025 mg per tablet, Take 1 Tab by mouth two (2) times daily as needed for Diarrhea., Disp: , Rfl:     atorvastatin (LIPITOR) 40 mg tablet, Take 40 mg by mouth nightly., Disp: , Rfl:     levothyroxine (SYNTHROID) 50 mcg tablet, Take 50 mcg by mouth Daily (before breakfast). , Disp: , Rfl:     Psyllium Husk-Sucrose 3.4 gram/7 gram powd, Take 3 Caps by mouth two (2) times a day., Disp: , Rfl:     omega-3 fatty acids-vitamin e (FISH OIL) 1,000 mg cap, Take 2 Caps by mouth daily. , Disp: , Rfl:     bimatoprost (LUMIGAN) 0.01 % ophthalmic drops, Administer 1 Drop to right eye nightly., Disp: , Rfl:     timolol (TIMOPTIC) 0.5 % ophthalmic solution, Administer 1 Drop to right eye every morning., Disp: , Rfl:     Omeprazole delayed release (PRILOSEC D/R) 20 mg tablet, Take 20 mg by mouth Daily (before dinner). , Disp: , Rfl:     meloxicam (MOBIC) 15 mg tablet, Take 15 mg by mouth daily. Indications: OSTEOARTHRITIS, Disp: , Rfl:     brimonidine (ALPHAGAN P) 0.1 % ophthalmic solution, Administer 1 Drop to right eye two (2) times a day. Take / use AM day of surgery  per anesthesia protocols. , Disp: , Rfl:     glimepiride (AMARYL) 2 mg tablet, Take 1 mg by mouth daily. Indications: type 2 diabetes mellitus, Disp: , Rfl:     b complex vitamins tablet, Take 1 Tab by mouth daily. , Disp: , Rfl:     aspirin 81 mg Tab, Take 81 mg by mouth daily. Take / use 81 mg AM day of surgery  per anesthesia protocols. , Disp: , Rfl:    Date Last Reviewed: 4-9-18 EXAMINATION:   4/12/2018   Observation/Orthostatic Postural Assessment: unremarkable. Palpation: unremarkable. ROM: No new measure. Strength:   L Shoulder Forward elevation, abd: 3- in standing             CLINICAL DECISION MAKING:   Outcome Measure: Tool Used: Disabilities of the Arm, Shoulder and Hand (DASH) Questionnaire - Quick Version  Score:  Initial: 25/55 or 32% disability 24/55 or30% disability (1/12/18) 20/55 or 20% disability (3-5-18) Most recent: 22/55 or 25% disability (4-2-18)   Interpretation of Score: The DASH is designed to measure the activities of daily living in person's with upper extremity dysfunction or pain. Each section is scored on a 1-5 scale, 5 representing the greatest disability. The scores of each section are added together for a total score of 55. This number is divided by 11, followed by subtracting 1 and multiplying by 25 to get a percent score of disability. This value represents the percentage disability: 0-20% minimal disability; 20-40% moderate disability; 40-60% severe disability; % dependent for care or exaggerated symptom behavior. Minimal detectable change is 12%. Score 11 12-19 20-28 29-37 38-45 46-54 55   Modifier CH CI CJ CK CL CM CN ? Carrying, Moving, and Handling Objects:     - CURRENT STATUS: CJ - 20%-39% impaired, limited or restricted (4/4/18)     - GOAL STATUS: CI - 1%-19% impaired, limited or restricted    - D/C STATUS:  ---------------To be determined---------------    Medical Necessity:   · Patient is expected to demonstrate progress in strength and range of motion to promote increased functional use of his L/non-dominant UE. · Co-morbid R rotator cuff pathology and lumbar spine pathology will most likely complicate progress. · Lives alone and will need to be able function independently.     Reason for Services/Other Comments:  · Patient continues to require skilled intervention due to the complexity of his history of shoulder pathology and the complexity of this revision reverse TSA. · He will need safe progression of post-op rehab to maximize return to functional use of his L UE.  · He is pending to have R shoulder arthroplasty in the future          TREATMENT:   (In addition to Assessment/Re-Assessment sessions the following treatments were rendered)  4/12/2018   Pre-treatment Symptoms/Complaints: Tired today. Didn't sleep as well last night. Says he did go over the new HEP at home after yesterday's session. Needs the sheets to remember them, but feels comfortable doing them. Would like to review them. Pain: Initial:    No pain, just soreness Post Session:  No pain     U/LBE for active warm up per below. Therapeutic Exercise (50 Minutes): Exercises for L UE motion in supine with assisted stretch to forward elevation, horizontal abd and add, and D1 and D2 flexion and extension. Strength exercises done as per grid below with manual resisted stabilization in supine at 0-deg ER and 100-deg flex while holding 2# weight. Resistance and exercises modified as indicated. Perfoormed and reviewed HEP. HEP:He is to continue with his  HEP. He verbalizes understanding.    Date  3-13-18 Date  3-14-18 Date  3-16-18 Date  3-19-18 Date  3-21-18 Date  3-29-18 Date  3-30-18 Date  4-2-18 Date  4-4-18 Date  4-9-18 Date  4-11-18 Date  4-12-18   Activity/Exercise               UBE U/LBE  L. 5 x15' U/LBE   L.5 x10' U/LBE   L.4 x10' U/LBE  L. 4 x15' U/LBE  L. 4.5 x15' U/LBE  L. 4.5 x15' UBE  L. 3 x15' UBE   L. 3 x 15' UBE L. 3 x15' UBE L. 3 x15' UBE L. 3  x15' L 3 x10'   Shoulder ROM - Assisted as above Assisted as above Assisted as above Asistted stretch, active motion control 1# x10 ea above Asistted stretch - Assisted stretch Assisted stretch to forward elevation, horiz abd and add - - Assisted stretch as above    Rhythmic Stabilization - Alt iso to rhyth stab in supine 0-deg ER, 100-deg flex,  Side-lying 90-deg abd, 500-g ball as above Alt iso to rhyth stab in supine 0-deg ER, 100-deg flex,  Side-lying 90-deg abd 2x30\" e rhyth stab in supine 0-deg ER, 100-deg flex,  Side-lying 90-deg abd 3x30\"  rhyth stab in supine 0-deg ER, 100-deg flex,  Side-lying 90-deg abd holdign 500-g ball 3x30\" ea rhyth stab in supine 0-deg ER, 100-deg flex,  Side-lying 90-deg abd holdign 500-g ball 3x30\" ea Rhyth stab in supine at 100 deg flexion, side-lying 90 deg abd holding 500g ball  Rhyth stab in supine at 100 deg flexion holding 1-kg ball - - Rhyth stab in supine at 0-deg ER, 100 deg flexion holding 2#  X\" each   Mid and lower trap - - Side-lying 1# 3x5-7 each Side-lying 1# 1x8 ea Side-lying  2# 2x5-6 ea Side-lying  500-g 2x5-6 ea Side-lying  500-g ball  2x6 ea Side-lying 500g ball  2 x 6 each - - - Side-lying  2# 1x5-7 ea   Push up + Dynamic hugs downward  Red 2x15;  Upward   yellow 1x10, 1x7, 1x10, 1x10  assisted Supine AROM press, 500-g ball x30 total with rhyth stab at finish end every 5th and rhyth stab at start end every 5th - Supine press  4# 2x10 Supine AROM press, 2#l x30 total with rhyth stab at finish end every 5th and rhyth stab at start end every 5th - Supine AROM press   5# 1x12, 1x20 Supine press 5# 2 x 10 Supine press 5# 3x10 - - Supine press   2# 1x30  5# 2x10   Side lying shoulder ABD Standing  Abduction to 90-deg,  light manual resisted   2x10 Side-lying 1x15 with rhyth stab x15\" every 5th Side-lying 1# 3x5-7 Side-lying mid delt eccnetrics elbow flexed  2# 2x10,  Elbow extended 2# 1x10 Side-lying   Elbow extended 2# 3x5 with manual resisted rhyth stab Side-lying   Elbow extended 500-g ball  3x5, 1x12 To 90-deg with elbow extended   500-g ball  To 90 deg w/elbow ext 500 g ball, 2 x 10 To 90 deg w/elbow ext  3# 2x9 - Standing unilat  2x10 Side-lying  2# x10;  Standing  1# x30 total   Shoulder flexion 60-deg Beach chair overhead press eccentrics  X6,   1# 3x5-6  Standing scaption to 60-deg  3x5 AAROM,   Standing active eccentrics  3x3 Standing active eccentrics  3x5 Supine short arc deltoid  2# x15  3# x10  4# 2x10;  Standing   active eccentrics  1# 3x3 Standing active eccentrics  3x5 UE wall slide  4x5 Supine short arc deltoid  3# x10  4# 2x10;  UE wall slide  3x6 Supine short arc deltoid 3# 2 x 10     Wall slide 2 x 6 Supine short arc deltoid   4# 3x10;  Standing AAROM with active eccnetrics 2x5 Wall slide  3x5 Standing scaption 2x5-6 Standing scaption  1# 3x10   Horz ABD - - - - Supine AROM for control  1 #1x10 - -  - standing Horiz Add  3x5-6   - -   Shoulder EXT Standing  bilat   Black 2x15 - - - - - -  - Ext/add behind back standing AAROM  3x5,   Yellow tubing eccentrics  3x5 Standing unilat  Yellow tubing  extension x30,   Ext/add behind back x30 Standing unilat  red tubing  extension x30,   Ext/add behind back x30   Row/pulling Standing  Black double 2x15 - - - - - -  - - - -   Elbow EXT - - - _ - - -  - - - -   Side lying ER - Side-lying   place and hold/active eccen  3x10 Side-lying   place and hold/active eccen  1# 3x6-8 Side-lying   place and hold/active eccen  1# 10\" 1x6 - Side-lying   place and hold/active eccen  500-g ball 10\" 3x5, 1x28 - Side-lyingeccentric 2 x 10 through available ROM ssupine ER/IR short arc  3# 2x10 - - -   IR with band  - - - - - - -  - - Standing in shoulder neutral,  Single-yellow with stabilization x15 Standing in shoulder neutral,  Single-yellow with stabilization x30 total   ER with band  - - - - - - -  - - Standing in shoulder neutral,  Single-yellow with stabilization x15 Standing in shoulder neutral,  Single-yellow with stabilization x30 total   B bicep curls 1# x30 total - - - - - -  - - - -   Diagonal Flex - - - - Supine AROM for control  1 #1x10 ea - -  - Standing  AAROM D1 toward R shoulder  3x5,  Yellow tubing active eccentrics  3x5 Standing  short arc  Single yellow   D1 flex 1x10   Standing  short arc  Single yellow   D1 flex 3x10   Diagonal Ext          Standing   AAROM toward R hip, active eccentrics yellow  3x5-6 Standing  short arc add  Single yellow 1x10,   Short arc D2 ext 1x10 Standing  short arc add  Single yellow 3x10,   Short arc D2 ext 3x10   CKC UE  Plantigrade  Frontal plane shift,   Sagittal plane shift, x10-15 ea; Alt iso to rhyth stab at shoulder and pelvis level 3x15-20\" ea - - - - -  - - - -     Treatment/Session Assessment:    · Response to Treatment: Good effort with the exercises done today. Appears to have a good understanding of the new HEP set up and execution. · Compliance with Program/Exercises: Appear compliant. · Recommendations/Intent for next treatment session: We will continue with shoulder progressive strengthening for functional use of the L UE.     Total Treatment Duration: 50 minutes  PT Patient Time In/Time Out  Time In: 1305  Time Out: [address], PT, MSPT, OCS

## 2018-04-16 ENCOUNTER — HOSPITAL ENCOUNTER (OUTPATIENT)
Dept: PHYSICAL THERAPY | Age: 83
Discharge: HOME | End: 2018-04-16
Payer: MEDICARE

## 2018-04-16 PROCEDURE — 97110 THERAPEUTIC EXERCISES: CPT

## 2018-04-16 NOTE — PROGRESS NOTES
Nils Colon  : 1933  [place] / [place] / Product Type: Medicare /  [address] at [facility] [place]  [facility], [address], [place] [address].  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 Fall Risk Score: 2 (? 5 = High Risk)  MD Orders: Eval and Treat; HEP; ROM; Strength; \"full motion/full strength\" (3-21-18) MEDICAL/REFERRING DIAGNOSIS:REM Implant Lt Shldr/ Revision TSA    DATE OF ONSET: 10-13-17  REFERRING PHYSICIAN: Isabel Herron MD  RETURN PHYSICIAN APPOINTMENT:~18     PROGRESS ASSESSMENT (18):  Mr. Lara Thomas has now attended 26 total visits to date. He is about 6 months s/p removal of implant of L shoulder hemiarthroplasty, and revision L TSA with a reverse Delta Xtend prosthesis, and latissimus dorsi and teres major tendon transfer. He continues to have PROM in functional ranges. Weakness continues to be his primary impairment. He is slowly progressiig strength and endurance, but still weak to humeral elevation against gravity. Again, his R/dominant UE has known rotator cuff pathology, which is severely limiting functional use of this UE. He also has co-morbid lumbar spine pathology with radicular weakness to L4-5 levels that affects his gait and stability. He lives alone, and needs to be able to perform basic personal care and household ADL's independently. He will benefit from continued PT to further progress reverse TSA rehab to promote safe return to functional use of the L UE, and to prep for pending R shoulder surgery.    PROBLEM LIST (Impacting functional limitations):  1. Decreased L shoulder ROM   2. Weakness L shoulder INTERVENTIONS PLANNED:  1. Manual therapies, therapeutic exercises, HEP for ROM    2. Therapeutic exercises and HEP for strength   TREATMENT PLAN:  Effective Dates: 3/5/2018 to 5/18/2018. Frequency/Duration: 3 times a week for an additional 8 weeks to further progress strengthening for functional independence. GOALS: (Goals have been discussed and agreed upon with patient.)  Short-Term Functional Goals: Time Frame: 6 weeks   1. L shoulder AROM forward elevation greater than 120 degrees to progress into functional ranges. Progressing, ongoing 4/4/18  2. Demonstrate good L shoulder forward elevation strength for reach to head with grooming ADL's and reaching to shoulder height with household ADL's. Met with compensation 4-4-18  3. Independent with initial HEP. Met 1/12/18  Discharge Goals: Time Frame: 12 weeks  1. Score less than 20% on the DASH. Ongoing 4/4/18  2. L shoulder AROM forward elevation greater than 135 degrees for improved use with household activities reaching overhead. Ongoing 4/4/18  3. Demonstrate good functional L shoulder strength and endurance for improved use of his L UE with his normalized daily activities. Ongoing 4/4/18  4. Independent with advanced shoulder HEP for continued self-management. Ongoing 4/4/18  Rehabilitation Potential For Stated Goals: Good              The information in this section was collected on 12/18/17 (except where otherwise noted). HISTORY:   History of Present Injury/Illness (Reason for Referral): Long, complex history of L shoulder pain and dysfunction with rotator cuff pathology. He was s/p L rotator cuff repair on 8/28/15. He had a fall to both hands when walking up steps at his house in June 2016, resulting complete cuff tears B shoulders. L shoulder hemiarthroplasty done 7/2016. He did rehab, but poor outcome, and was still unable to raise and use L arm over shoulder.  This revision Reverse TSA on L was done on 10/13/2017. Was in the hospital 3 days, then [facility] to [facility], and finally [facility] to home on 12/15/17. He had complication of a hemearthrosis with drop in hematocrit and hemoglobin. It was aspirated. Blood values improved. He as been doing OT post-op shoulder rehab when in the nursing home, consisting of shoulder/UE ROM and strengthening. He has not done HEP yet. He has co-morbid R rotator cuff pathology limiting functional use of his dominant arm, and anticipates shoulder arthroplasty on this in the near future. he also has co-morbid lumbar pathology with L L4-5 myotome weakness resulting in L foot drop. He lives alone and needs to be able to manage all his ADL's independently. Past Medical History/Comorbidities: Mr. Tushar Carey  has a past medical history of Anemia (fall 2016); Arthritis; CAD (coronary artery disease) (1997); Carotid stenosis (04/2017); Diabetes ([facility] [address] (05/1997); Diverticulosis (12/2011); GERD (gastroesophageal reflux disease); Glaucoma; Heart murmur; Hiatal hernia; History of kidney stones; Hypercholesteremia; Hypertension; Hypothyroid; MI (myocardial infarction); TIA (transient ischemic attack) (07/2016); Unspecified sleep apnea; Urethral stenosis; and Vasovagal syncope. Mr. Tushar Carey  has a past surgical history that includes heart catheterization (3173,0188, 2012, 2013); tonsillectomy (1938); cataract removal (1987 and 1990); lap cholecystectomy (2003); hernia repair (1944); urological (1996); orthopaedic (Left); orthopaedic ([id number]); shoulder arthroscopy (Left, 2015); knee replacement (Bilateral, 2002, 2008); hemorrhoidectomy (1987); heent (Right); carpal tunnel release (Right, 1968); carpal tunnel release (Left, 2012); bunionectomy (Left); rotator cuff repair (Right, 1995); rotator cuff repair (Left, 2000); rotator cuff repair (Right, 2007); and colectomy (01/13/2017). Social History/Living Environment:  Lives alone. Has supportive children and grandchildren living in town.    Prior Level of Function/Work/Activity: Limited overhead use of L UE prior to this surgery. Limited use of R UE currently secondary to cuff pathology. Dominant Side: RIGHT  Current Medications:       simethicone (GAS-X) 125 mg capsule, Take 125 mg by mouth four (4) times daily as needed for Flatulence. , Disp: , Rfl:     loperamide (IMODIUM) 2 mg capsule, Take 2 mg by mouth four (4) times daily as needed for Diarrhea., Disp: , Rfl:     diphenoxylate-atropine (LOMOTIL) 2.5-0.025 mg per tablet, Take 1 Tab by mouth two (2) times daily as needed for Diarrhea., Disp: , Rfl:     atorvastatin (LIPITOR) 40 mg tablet, Take 40 mg by mouth nightly., Disp: , Rfl:     levothyroxine (SYNTHROID) 50 mcg tablet, Take 50 mcg by mouth Daily (before breakfast). , Disp: , Rfl:     Psyllium Husk-Sucrose 3.4 gram/7 gram powd, Take 3 Caps by mouth two (2) times a day., Disp: , Rfl:     omega-3 fatty acids-vitamin e (FISH OIL) 1,000 mg cap, Take 2 Caps by mouth daily. , Disp: , Rfl:     bimatoprost (LUMIGAN) 0.01 % ophthalmic drops, Administer 1 Drop to right eye nightly., Disp: , Rfl:     timolol (TIMOPTIC) 0.5 % ophthalmic solution, Administer 1 Drop to right eye every morning., Disp: , Rfl:     Omeprazole delayed release (PRILOSEC D/R) 20 mg tablet, Take 20 mg by mouth Daily (before dinner). , Disp: , Rfl:     meloxicam (MOBIC) 15 mg tablet, Take 15 mg by mouth daily. Indications: OSTEOARTHRITIS, Disp: , Rfl:     brimonidine (ALPHAGAN P) 0.1 % ophthalmic solution, Administer 1 Drop to right eye two (2) times a day. Take / use AM day of surgery  per anesthesia protocols. , Disp: , Rfl:     glimepiride (AMARYL) 2 mg tablet, Take 1 mg by mouth daily. Indications: type 2 diabetes mellitus, Disp: , Rfl:     b complex vitamins tablet, Take 1 Tab by mouth daily. , Disp: , Rfl:     aspirin 81 mg Tab, Take 81 mg by mouth daily. Take / use 81 mg AM day of surgery  per anesthesia protocols. , Disp: , Rfl:    Date Last Reviewed: 4-16-18 EXAMINATION:   4/16/2018   Observation/Orthostatic Postural Assessment: unremarkable. Palpation: unremarkable. ROM: No new measure. Strength:   L Shoulder Forward elevation, abd: 3- in standing             CLINICAL DECISION MAKING:   Outcome Measure: Tool Used: Disabilities of the Arm, Shoulder and Hand (DASH) Questionnaire - Quick Version  Score:  Initial: 25/55 or 32% disability 24/55 or30% disability (1/12/18) 20/55 or 20% disability (3-5-18) Most recent: 22/55 or 25% disability (4-2-18)   Interpretation of Score: The DASH is designed to measure the activities of daily living in person's with upper extremity dysfunction or pain. Each section is scored on a 1-5 scale, 5 representing the greatest disability. The scores of each section are added together for a total score of 55. This number is divided by 11, followed by subtracting 1 and multiplying by 25 to get a percent score of disability. This value represents the percentage disability: 0-20% minimal disability; 20-40% moderate disability; 40-60% severe disability; % dependent for care or exaggerated symptom behavior. Minimal detectable change is 12%. Score 11 12-19 20-28 29-37 38-45 46-54 55   Modifier CH CI CJ CK CL CM CN ? Carrying, Moving, and Handling Objects:     - CURRENT STATUS: CJ - 20%-39% impaired, limited or restricted (4/4/18)     - GOAL STATUS: CI - 1%-19% impaired, limited or restricted    - D/C STATUS:  ---------------To be determined---------------    Medical Necessity:   · Patient is expected to demonstrate progress in strength and range of motion to promote increased functional use of his L/non-dominant UE. · Co-morbid R rotator cuff pathology and lumbar spine pathology will most likely complicate progress. · Lives alone and will need to be able function independently.     Reason for Services/Other Comments:  · Patient continues to require skilled intervention due to the complexity of his history of shoulder pathology and the complexity of this revision reverse TSA. · He will need safe progression of post-op rehab to maximize return to functional use of his L UE.  · He is pending to have R shoulder arthroplasty in the future          TREATMENT:   (In addition to Assessment/Re-Assessment sessions the following treatments were rendered)  4/16/2018   Pre-treatment Symptoms/Complaints: No new issues. Lincoln Harms out of town over the weekend for a family event. Drove to [facility] without issue. Having difficulty donning a neck tie and suit coat independently. Pain: Initial:    No pain, just soreness Post Session:  No pain     UBE for active warm up per below. Therapeutic Exercise (30 Minutes): Exercises for L UE motion in supine with assisted stretch to forward elevation, horizontal abd and add, and D1 and D2 flexion and extension, 3\"x10 each. Strength exercises done as per grid below with manual resisted stabilization in supine at 0-deg ER and 100-deg flex while holding 2# weight and between active elevation reps. Resistance and exercises modified as indicated. Manual assistance with humeral elevation ranges, especially with concentirc motions. HEP:He is to continue with his HEP. He verbalizes understanding.    Date  3-13-18 Date  3-14-18 Date  3-16-18 Date  3-19-18 Date  3-21-18 Date  3-29-18 Date  3-30-18 Date  4-2-18 Date  4-4-18 Date  4-9-18 Date  4-11-18 Date  4-12-18 Date  4-16-18   Activity/Exercise                UBE U/LBE  L. 5 x15' U/LBE   L.5 x10' U/LBE   L.4 x10' U/LBE  L. 4 x15' U/LBE  L. 4.5 x15' U/LBE  L. 4.5 x15' UBE  L. 3 x15' UBE   L. 3 x 15' UBE L. 3 x15' UBE L. 3 x15' UBE L. 3  x15' L 3 x10' L3. x15'   Shoulder ROM - Assisted as above Assisted as above Assisted as above Asistted stretch, active motion control 1# x10 ea above Asistted stretch - Assisted stretch Assisted stretch to forward elevation, horiz abd and add - - Assisted stretch as above  Assisted stretching as above Rhythmic Stabilization - Alt iso to rhyth stab in supine 0-deg ER, 100-deg flex,  Side-lying 90-deg abd, 500-g ball as above Alt iso to rhyth stab in supine 0-deg ER, 100-deg flex,  Side-lying 90-deg abd 2x30\" e rhyth stab in supine 0-deg ER, 100-deg flex,  Side-lying 90-deg abd 3x30\"  rhyth stab in supine 0-deg ER, 100-deg flex,  Side-lying 90-deg abd holdign 500-g ball 3x30\" ea rhyth stab in supine 0-deg ER, 100-deg flex,  Side-lying 90-deg abd holdign 500-g ball 3x30\" ea Rhyth stab in supine at 100 deg flexion, side-lying 90 deg abd holding 500g ball  Rhyth stab in supine at 100 deg flexion holding 1-kg ball - - Rhyth stab in supine at 0-deg ER, 100 deg flexion holding 2#  X\" each Rhyth stab in supine at 0-deg ER, 100 deg flexion, 90-deg abd holding 2# 6x15-20\" each   Mid and lower trap - - Side-lying 1# 3x5-7 each Side-lying 1# 1x8 ea Side-lying  2# 2x5-6 ea Side-lying  500-g 2x5-6 ea Side-lying  500-g ball  2x6 ea Side-lying 500g ball  2 x 6 each - - - Side-lying  2# 1x5-7 ea Side-lying  2# 3x10 ea   Push up + Dynamic hugs downward  Red 2x15;  Upward   yellow 1x10, 1x7, 1x10, 1x10  assisted Supine AROM press, 500-g ball x30 total with rhyth stab at finish end every 5th and rhyth stab at start end every 5th - Supine press  4# 2x10 Supine AROM press, 2#l x30 total with rhyth stab at finish end every 5th and rhyth stab at start end every 5th - Supine AROM press   5# 1x12, 1x20 Supine press 5# 2 x 10 Supine press 5# 3x10 - - Supine press   2# 1x30  5# 2x10 Supine press  2# with rhyth stab   2x18-20   Side lying shoulder ABD Standing  Abduction to 90-deg,  light manual resisted   2x10 Side-lying 1x15 with rhyth stab x15\" every 5th Side-lying 1# 3x5-7 Side-lying mid delt eccnetrics elbow flexed  2# 2x10,  Elbow extended 2# 1x10 Side-lying   Elbow extended 2# 3x5 with manual resisted rhyth stab Side-lying   Elbow extended 500-g ball  3x5, 1x12 To 90-deg with elbow extended   500-g ball  To 90 deg w/elbow ext 500 g ball, 2 x 10 To 90 deg w/elbow ext  3# 2x9 - Standing unilat  2x10 Side-lying  2# x10;  Standing  1# x30 total Side-lying  2# with rhyth stab x18;  Standing abd+D1 to top of head, AAROM 3x10   Shoulder flexion [id number] chair overhead press eccentrics  X6,   1# 3x5-6  Standing scaption to 60-deg  3x5 AAROM,   Standing active eccentrics  3x3 Standing active eccentrics  3x5 Supine short arc deltoid  2# x15  3# x10  4# 2x10;  Standing   active eccentrics  1# 3x3 Standing active eccentrics  3x5 UE wall slide  4x5 Supine short arc deltoid  3# x10  4# 2x10;  UE wall slide  3x6 Supine short arc deltoid 3# 2 x 10     Wall slide 2 x 6 Supine short arc deltoid   4# 3x10;  Standing AAROM with active eccnetrics 2x5 Wall slide  3x5 Standing scaption 2x5-6 Standing scaption  1# 3x10 45-deg beach chair short arc anterior deltoid  X10,  2# 2x10     Horz ABD - - - - Supine AROM for control  1 #1x10 - -  - standing Horiz Add  3x5-6   - - -   Shoulder EXT Standing  bilat   Black 2x15 - - - - - -  - Ext/add behind back standing AAROM  3x5,   Yellow tubing eccentrics  3x5 Standing unilat  Yellow tubing  extension x30,   Ext/add behind back x30 Standing unilat  red tubing  extension x30,   Ext/add behind back x30 -   Row/pulling Standing  Black double 2x15 - - - - - -  - - - - -   Elbow EXT - - - _ - - -  - - - - -   Side lying ER - Side-lying   place and hold/active eccen  3x10 Side-lying   place and hold/active eccen  1# 3x6-8 Side-lying   place and hold/active eccen  1# 10\" 1x6 - Side-lying   place and hold/active eccen  500-g ball 10\" 3x5, 1x28 - Side-lyingeccentric 2 x 10 through available ROM ssupine ER/IR short arc  3# 2x10 - - - -   IR with band  - - - - - - -  - - Standing in shoulder neutral,  Single-yellow with stabilization x15 Standing in shoulder neutral,  Single-yellow with stabilization x30 total -   ER with band  - - - - - - -  - - Standing in shoulder neutral,  Single-yellow with stabilization x15 Standing in shoulder neutral,  Single-yellow with stabilization x30 total -   B bicep curls 1# x30 total - - - - - -  - - - - -   Diagonal Flex - - - - Supine AROM for control  1 #1x10 ea - -  - Standing  AAROM D1 toward R shoulder  3x5,  Yellow tubing active eccentrics  3x5 Standing  short arc  Single yellow   D1 flex 1x10   Standing  short arc  Single yellow   D1 flex 3x10 -   Diagonal Ext          Standing   AAROM toward R hip, active eccentrics   yellow  3x5-6 Standing  short arc add  Single yellow 1x10,   Short arc D2 ext 1x10 Standing  short arc add  Single yellow 3x10,   Short arc D2 ext 3x10 -   CKC UE  Plantigrade  Frontal plane shift,   Sagittal plane shift, x10-15 ea; Alt iso to rhyth stab at shoulder and pelvis level 3x15-20\" ea - - - - -  - - - - -   -  Treament/Session Assessment:    · Response to Treatment: Good effort with the exercises done today. Exercise focus on humeral elevation and rotational stabilization for grooming/dressing ADL. Continues to have difficulty with these. · Compliance with Program/Exercises: Appear compliant. · Recommendations/Intent for next treatment session: We will continue with shoulder progressive strengthening for functional use of the L UE.     Total Treatment Duration: 30 minutes  PT Patient Time In/Time Out  Time In: 1440  Time Out: [address], PT, MSPT, OCS

## 2018-04-18 ENCOUNTER — HOSPITAL ENCOUNTER (OUTPATIENT)
Dept: PHYSICAL THERAPY | Age: 83
Discharge: HOME | End: 2018-04-18
Payer: MEDICARE

## 2018-04-18 PROCEDURE — 97110 THERAPEUTIC EXERCISES: CPT

## 2018-04-18 NOTE — PROGRESS NOTES
Tavia Glenna  : 1933  [place] / [place] / Product Type: Medicare /  [address] at [facility] [place]  [address], [address], [place] [address].  [phone]   [phone]       OUTPATIENT PHYSICAL THERAPY:Daily Note 2018      ICD-10: Treatment Diagnosis: Stiffness of left shoulder, not elsewhere classified (M25.612); Muscle wasting and atrophy, not elsewhere classified, left shoulder (M62.512); Presence of left artificial shoulder joint (X89.462)  Precautions/Allergies: Reverse TSA precautions; R RC deficient shoulder; lumbar stenosis. Fall Risk Score: 2 (? 5 = High Risk)  MD Orders: Eval and Treat; HEP; ROM; Strength; \"full motion/full strength\" (3-21-18) MEDICAL/REFERRING DIAGNOSIS:REM Implant Lt Shldr/ Revision TSA    DATE OF ONSET: 10-13-17  REFERRING PHYSICIAN: Jelani Herron MD  RETURN PHYSICIAN APPOINTMENT:~18     PROGRESS ASSESSMENT (18):  Mr. Gallo Ashley has now attended 26 total visits to date. He is about 6 months s/p removal of implant of L shoulder hemiarthroplasty, and revision L TSA with a reverse Delta Xtend prosthesis, and latissimus dorsi and teres major tendon transfer. He continues to have PROM in functional ranges. Weakness continues to be his primary impairment. He is slowly progressiig strength and endurance, but still weak to humeral elevation against gravity. Again, his R/dominant UE has known rotator cuff pathology, which is severely limiting functional use of this UE. He also has co-morbid lumbar spine pathology with radicular weakness to L4-5 levels that affects his gait and stability. He lives alone, and needs to be able to perform basic personal care and household ADL's independently. He will benefit from continued PT to further progress reverse TSA rehab to promote safe return to functional use of the L UE, and to prep for pending R shoulder surgery.    PROBLEM LIST (Impacting functional limitations):  1. Decreased L shoulder ROM   2. Weakness L shoulder INTERVENTIONS PLANNED:  1. Manual therapies, therapeutic exercises, HEP for ROM    2. Therapeutic exercises and HEP for strength   TREATMENT PLAN:  Effective Dates: 3/5/2018 to 5/18/2018. Frequency/Duration: 3 times a week for an additional 8 weeks to further progress strengthening for functional independence. GOALS: (Goals have been discussed and agreed upon with patient.)  Short-Term Functional Goals: Time Frame: 6 weeks   1. L shoulder AROM forward elevation greater than 120 degrees to progress into functional ranges. Progressing, ongoing 4/4/18  2. Demonstrate good L shoulder forward elevation strength for reach to head with grooming ADL's and reaching to shoulder height with household ADL's. Met with compensation 4-4-18  3. Independent with initial HEP. Met 1/12/18  Discharge Goals: Time Frame: 12 weeks  1. Score less than 20% on the DASH. Ongoing 4/4/18  2. L shoulder AROM forward elevation greater than 135 degrees for improved use with household activities reaching overhead. Ongoing 4/4/18  3. Demonstrate good functional L shoulder strength and endurance for improved use of his L UE with his normalized daily activities. Ongoing 4/4/18  4. Independent with advanced shoulder HEP for continued self-management. Ongoing 4/4/18  Rehabilitation Potential For Stated Goals: Good              The information in this section was collected on 12/18/17 (except where otherwise noted). HISTORY:   History of Present Injury/Illness (Reason for Referral): Long, complex history of L shoulder pain and dysfunction with rotator cuff pathology. He was s/p L rotator cuff repair on 8/28/15. He had a fall to both hands when walking up steps at his house in June 2016, resulting complete cuff tears B shoulders. L shoulder hemiarthroplasty done 7/2016. He did rehab, but poor outcome, and was still unable to raise and use L arm over shoulder.  This revision Reverse TSA on L was done on 10/13/2017. Was in the hospital 3 days, then [facility] to [facility], and finally [facility] to home on 12/15/17. He had complication of a hemearthrosis with drop in hematocrit and hemoglobin. It was aspirated. Blood values improved. He as been doing OT post-op shoulder rehab when in the nursing home, consisting of shoulder/UE ROM and strengthening. He has not done HEP yet. He has co-morbid R rotator cuff pathology limiting functional use of his dominant arm, and anticipates shoulder arthroplasty on this in the near future. he also has co-morbid lumbar pathology with L L4-5 myotome weakness resulting in L foot drop. He lives alone and needs to be able to manage all his ADL's independently. Past Medical History/Comorbidities: Mr. Nasreen Ramos  has a past medical history of Anemia (fall 2016); Arthritis; CAD (coronary artery disease) (1997); Carotid stenosis (04/2017); Diabetes ([facility] [address] (05/1997); Diverticulosis (12/2011); GERD (gastroesophageal reflux disease); Glaucoma; Heart murmur; Hiatal hernia; History of kidney stones; Hypercholesteremia; Hypertension; Hypothyroid; MI (myocardial infarction); TIA (transient ischemic attack) (07/2016); Unspecified sleep apnea; Urethral stenosis; and Vasovagal syncope. Mr. Nasreen Ramos  has a past surgical history that includes heart catheterization (8541,[id number], 2012, 2013); tonsillectomy (1938); cataract removal (1987 and 1990); lap cholecystectomy (2003); hernia repair (1944); urological (1996); orthopaedic (Left); orthopaedic ([id number]); shoulder arthroscopy (Left, 2015); knee replacement (Bilateral, 2002, 2008); hemorrhoidectomy (1987); heent (Right); carpal tunnel release (Right, 1968); carpal tunnel release (Left, 2012); bunionectomy (Left); rotator cuff repair (Right, 1995); rotator cuff repair (Left, 2000); rotator cuff repair (Right, 2007); and colectomy (01/13/2017). Social History/Living Environment:  Lives alone. Has supportive children and grandchildren living in town.    Prior Level of Function/Work/Activity: Limited overhead use of L UE prior to this surgery. Limited use of R UE currently secondary to cuff pathology. Dominant Side: RIGHT  Current Medications: hydromorphone started 4/17 for R toe pain.   simethicone (GAS-X) 125 mg capsule, Take 125 mg by mouth four (4) times daily as needed for Flatulence. , Disp: , Rfl:     loperamide (IMODIUM) 2 mg capsule, Take 2 mg by mouth four (4) times daily as needed for Diarrhea., Disp: , Rfl:     diphenoxylate-atropine (LOMOTIL) 2.5-0.025 mg per tablet, Take 1 Tab by mouth two (2) times daily as needed for Diarrhea., Disp: , Rfl:     atorvastatin (LIPITOR) 40 mg tablet, Take 40 mg by mouth nightly., Disp: , Rfl:     levothyroxine (SYNTHROID) 50 mcg tablet, Take 50 mcg by mouth Daily (before breakfast). , Disp: , Rfl:     Psyllium Husk-Sucrose 3.4 gram/7 gram powd, Take 3 Caps by mouth two (2) times a day., Disp: , Rfl:     omega-3 fatty acids-vitamin e (FISH OIL) 1,000 mg cap, Take 2 Caps by mouth daily. , Disp: , Rfl:     bimatoprost (LUMIGAN) 0.01 % ophthalmic drops, Administer 1 Drop to right eye nightly., Disp: , Rfl:     timolol (TIMOPTIC) 0.5 % ophthalmic solution, Administer 1 Drop to right eye every morning., Disp: , Rfl:     Omeprazole delayed release (PRILOSEC D/R) 20 mg tablet, Take 20 mg by mouth Daily (before dinner). , Disp: , Rfl:     meloxicam (MOBIC) 15 mg tablet, Take 15 mg by mouth daily. Indications: OSTEOARTHRITIS, Disp: , Rfl:     brimonidine (ALPHAGAN P) 0.1 % ophthalmic solution, Administer 1 Drop to right eye two (2) times a day. Take / use AM day of surgery  per anesthesia protocols. , Disp: , Rfl:     glimepiride (AMARYL) 2 mg tablet, Take 1 mg by mouth daily. Indications: type 2 diabetes mellitus, Disp: , Rfl:     b complex vitamins tablet, Take 1 Tab by mouth daily. , Disp: , Rfl:     aspirin 81 mg Tab, Take 81 mg by mouth daily. Take / use 81 mg AM day of surgery  per anesthesia protocols. , Disp: , Rfl:    Date Last Reviewed: 4-18-18   EXAMINATION:   4/18/2018   Observation/Orthostatic Postural Assessment: unremarkable. Palpation: unremarkable. ROM: No new measure. Strength:   L Shoulder Forward elevation, abd: 3- in standing             CLINICAL DECISION MAKING:   Outcome Measure: Tool Used: Disabilities of the Arm, Shoulder and Hand (DASH) Questionnaire - Quick Version  Score:  Initial: 25/55 or 32% disability 24/55 or30% disability (1/12/18) 20/55 or 20% disability (3-5-18) Most recent: 22/55 or 25% disability (4-2-18)   Interpretation of Score: The DASH is designed to measure the activities of daily living in person's with upper extremity dysfunction or pain. Each section is scored on a 1-5 scale, 5 representing the greatest disability. The scores of each section are added together for a total score of 55. This number is divided by 11, followed by subtracting 1 and multiplying by 25 to get a percent score of disability. This value represents the percentage disability: 0-20% minimal disability; 20-40% moderate disability; 40-60% severe disability; % dependent for care or exaggerated symptom behavior. Minimal detectable change is 12%. Score 11 12-19 20-28 29-37 38-45 46-54 55   Modifier CH CI CJ CK CL CM CN ? Carrying, Moving, and Handling Objects:     - CURRENT STATUS: CJ - 20%-39% impaired, limited or restricted (4/4/18)     - GOAL STATUS: CI - 1%-19% impaired, limited or restricted    - D/C STATUS:  ---------------To be determined---------------    Medical Necessity:   · Patient is expected to demonstrate progress in strength and range of motion to promote increased functional use of his L/non-dominant UE. · Co-morbid R rotator cuff pathology and lumbar spine pathology will most likely complicate progress. · Lives alone and will need to be able function independently.     Reason for Services/Other Comments:  · Patient continues to require skilled intervention due to the complexity of his history of shoulder pathology and the complexity of this revision reverse TSA. · He will need safe progression of post-op rehab to maximize return to functional use of his L UE.  · He is pending to have R shoulder arthroplasty in the future          TREATMENT:   (In addition to Assessment/Re-Assessment sessions the following treatments were rendered)  4/18/2018   Pre-treatment Symptoms/Complaints:Doing \"ok\". No soreness to report to L shoulder. Has been working on the HEP we started last week. Did strength moves yesterday. No problem with set up and performance. Pain: Initial:    No pain, just soreness Post Session:  No pain     UBE for active warm up per below. Therapeutic Exercise (40 Minutes): Exercises for L UE motion in supine with assisted stretch to forward elevation, horizontal abd and add, and D1 and D2 flexion and extension, 3\"x10 each. Strength exercises done as per grid below with manual resisted to end ranges of horizontal adduciton, D1 flexion, and D2 extension primarily; and manual stabilization in supine at 0-deg ER and 100-deg flex while holding 2# weight and between active elevation reps. Resistance and exercises modified as indicated. Manual assistance with humeral elevation ranges, especially with concentirc motions. HEP:He is to continue with his HEP. He verbalizes understanding.    Date  4-9-18 Date  4-11-18 Date  4-12-18 Date  4-16-18 Date  4-18-18   Activity/Exercise        UBE UBE L. 3 x15' UBE L. 3  x15' L 3 x10' L3. x15' L. 3 x15'   Shoulder ROM - - Assisted stretch as above  Assisted stretching as above Assisted as above   Rhythmic Stabilization - - Rhyth stab in supine at 0-deg ER, 100 deg flexion holding 2#  X\" each Rhyth stab in supine at 0-deg ER, 100 deg flexion, 90-deg abd holding 2# 6x15-20\" each Rhyth stab in supine at 0-deg ER, 100 deg flexion, 90-deg abd holding 2# 6x15-20\" each   Mid and lower trap - - Side-lying  2# 1x5-7 ea Side-lying  2# 3x10 ea Side-lying  2# 1x15 ea   Push up + - - Supine press   2# 1x30  5# 2x10 Supine press  2# with rhyth stab   2x18-20 Supine press  2# with rhyth stab   2x15   Side lying shoulder ABD - Standing unilat  2x10 Side-lying  2# x10;  Standing  1# x30 total Side-lying  2# with rhyth stab x18;  Standing abd+D1 to top of head, AAROM 3x10 Side-lying  2# 1x10 AAROM guiding   Shoulder flexion Wall slide  3x5 Standing scaption 2x5-6 Standing scaption  1# 3x10 45-deg beach chair short arc anterior deltoid  X10,  2# 2x10   standing overhead lift to top shelf  1# AAROM conentric  2x5   Horz ABD standing Horiz Add  3x5-6   - - - Supine  AROM x10; with manual resistance 5'x10 ea   Shoulder EXT Ext/add behind back standing AAROM  3x5,   Yellow tubing eccentrics  3x5 Standing unilat  Yellow tubing  extension x30,   Ext/add behind back x30 Standing unilat  red tubing  extension x30,   Ext/add behind back x30 - -   Row/pulling - - - - -   Elbow EXT - - - - -   Side lying ER - - - - -   IR with band  - Standing in shoulder neutral,  Single-yellow with stabilization x15 Standing in shoulder neutral,  Single-yellow with stabilization x30 total - -   ER with band  - Standing in shoulder neutral,  Single-yellow with stabilization x15 Standing in shoulder neutral,  Single-yellow with stabilization x30 total - -   B bicep curls - - - - -   Diagonal Flex Standing  AAROM D1 toward R shoulder  3x5,  Yellow tubing active eccentrics  3x5 Standing  short arc  Single yellow   D1 flex 1x10   Standing  short arc  Single yellow   D1 flex 3x10 - Supine D1 and D2 AROM x10; with manual resistance 5'x10 ea   Diagonal Ext Standing   AAROM toward R hip, active eccentrics   yellow  3x5-6 Standing  short arc add  Single yellow 1x10,   Short arc D2 ext 1x10 Standing  short arc add  Single yellow 3x10,   Short arc D2 ext 3x10 - Supine D1 and D2 AROM x10; with manual resistance 5'x10 ea   CKC UE - - - - -   -  Treament/Session Assessment: · Response to Treatment: Good effort with the exercises done today. · Compliance with Program/Exercises: Appear compliant. · Recommendations/Intent for next treatment session: We will continue with shoulder progressive strengthening for functional use of the L UE.     Total Treatment Duration: 40 minutes  PT Patient Time In/Time Out  Time In: 0420  Time Out: [id number] [facility], PT, MSPT, OCS

## 2018-04-20 ENCOUNTER — HOSPITAL ENCOUNTER (OUTPATIENT)
Dept: PHYSICAL THERAPY | Age: 83
Discharge: HOME | End: 2018-04-20
Payer: MEDICARE

## 2018-04-20 PROCEDURE — 97110 THERAPEUTIC EXERCISES: CPT

## 2018-04-20 NOTE — PROGRESS NOTES
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 Fall Risk Score: 2 (? 5 = High Risk)  MD Orders: Eval and Treat; HEP; ROM; Strength; \"full motion/full strength\" (3-21-18) MEDICAL/REFERRING DIAGNOSIS:REM Implant Lt Shldr/ Revision TSA    DATE OF ONSET: 10-13-17  REFERRING PHYSICIAN: Hollie Herron., MD  RETURN PHYSICIAN APPOINTMENT:~18     PROGRESS ASSESSMENT (18):  Mr. Tushar Carey has now attended 26 total visits to date. He is about 6 months s/p removal of implant of L shoulder hemiarthroplasty, and revision L TSA with a reverse Delta Xtend prosthesis, and latissimus dorsi and teres major tendon transfer. He continues to have PROM in functional ranges. Weakness continues to be his primary impairment. He is slowly progressiig strength and endurance, but still weak to humeral elevation against gravity. Again, his R/dominant UE has known rotator cuff pathology, which is severely limiting functional use of this UE. He also has co-morbid lumbar spine pathology with radicular weakness to L4-5 levels that affects his gait and stability. He lives alone, and needs to be able to perform basic personal care and household ADL's independently. He will benefit from continued PT to further progress reverse TSA rehab to promote safe return to functional use of the L UE, and to prep for pending R shoulder surgery.    PROBLEM LIST (Impacting functional limitations):  1. Decreased L shoulder ROM   2. Weakness L shoulder INTERVENTIONS PLANNED:  1. Manual therapies, therapeutic exercises, HEP for ROM    2. Therapeutic exercises and HEP for strength   TREATMENT PLAN:  Effective Dates: 3/5/2018 to 5/18/2018. Frequency/Duration: 3 times a week for an additional 8 weeks to further progress strengthening for functional independence. GOALS: (Goals have been discussed and agreed upon with patient.)  Short-Term Functional Goals: Time Frame: 6 weeks   1. L shoulder AROM forward elevation greater than 120 degrees to progress into functional ranges. Progressing, ongoing 4/4/18  2. Demonstrate good L shoulder forward elevation strength for reach to head with grooming ADL's and reaching to shoulder height with household ADL's. Met with compensation 4-4-18  3. Independent with initial HEP. Met 1/12/18  Discharge Goals: Time Frame: 12 weeks  1. Score less than 20% on the DASH. Ongoing 4/4/18  2. L shoulder AROM forward elevation greater than 135 degrees for improved use with household activities reaching overhead. Ongoing 4/4/18  3. Demonstrate good functional L shoulder strength and endurance for improved use of his L UE with his normalized daily activities. Ongoing 4/4/18  4. Independent with advanced shoulder HEP for continued self-management. Ongoing 4/4/18  Rehabilitation Potential For Stated Goals: Good              The information in this section was collected on 12/18/17 (except where otherwise noted). HISTORY:   History of Present Injury/Illness (Reason for Referral): Long, complex history of L shoulder pain and dysfunction with rotator cuff pathology. He was s/p L rotator cuff repair on 8/28/15. He had a fall to both hands when walking up steps at his house in June 2016, resulting complete cuff tears B shoulders. L shoulder hemiarthroplasty done 7/2016. He did rehab, but poor outcome, and was still unable to raise and use L arm over shoulder.  This revision Reverse TSA on L was done on 10/13/2017. Was in the hospital 3 days, then [facility] to [facility], and finally [facility] to home on 12/15/17. He had complication of a hemearthrosis with drop in hematocrit and hemoglobin. It was aspirated. Blood values improved. He as been doing OT post-op shoulder rehab when in the nursing home, consisting of shoulder/UE ROM and strengthening. He has not done HEP yet. He has co-morbid R rotator cuff pathology limiting functional use of his dominant arm, and anticipates shoulder arthroplasty on this in the near future. he also has co-morbid lumbar pathology with L L4-5 myotome weakness resulting in L foot drop. He lives alone and needs to be able to manage all his ADL's independently. Past Medical History/Comorbidities: Mr. Eyal Barry  has a past medical history of Anemia (fall 2016); Arthritis; CAD (coronary artery disease) (1997); Carotid stenosis (04/2017); Diabetes ([facility] [address] (05/1997); Diverticulosis (12/2011); GERD (gastroesophageal reflux disease); Glaucoma; Heart murmur; Hiatal hernia; History of kidney stones; Hypercholesteremia; Hypertension; Hypothyroid; MI (myocardial infarction); TIA (transient ischemic attack) (07/2016); Unspecified sleep apnea; Urethral stenosis; and Vasovagal syncope. Mr. Eyal Barry  has a past surgical history that includes heart catheterization (6206,8806, 2012, 2013); tonsillectomy (1938); cataract removal (1987 and 1990); lap cholecystectomy (2003); hernia repair (1944); urological (1996); orthopaedic (Left); orthopaedic ([id number]); shoulder arthroscopy (Left, 2015); knee replacement (Bilateral, 2002, 2008); hemorrhoidectomy (1987); heent (Right); carpal tunnel release (Right, 1968); carpal tunnel release (Left, 2012); bunionectomy (Left); rotator cuff repair (Right, 1995); rotator cuff repair (Left, 2000); rotator cuff repair (Right, 2007); and colectomy (01/13/2017). Social History/Living Environment:  Lives alone. Has supportive children and grandchildren living in town.    Prior Level of Function/Work/Activity: Limited overhead use of L UE prior to this surgery. Limited use of R UE currently secondary to cuff pathology. Dominant Side: RIGHT  Current Medications: hydromorphone started 4/17 for R toe pain.   simethicone (GAS-X) 125 mg capsule, Take 125 mg by mouth four (4) times daily as needed for Flatulence. , Disp: , Rfl:     loperamide (IMODIUM) 2 mg capsule, Take 2 mg by mouth four (4) times daily as needed for Diarrhea., Disp: , Rfl:     diphenoxylate-atropine (LOMOTIL) 2.5-0.025 mg per tablet, Take 1 Tab by mouth two (2) times daily as needed for Diarrhea., Disp: , Rfl:     atorvastatin (LIPITOR) 40 mg tablet, Take 40 mg by mouth nightly., Disp: , Rfl:     levothyroxine (SYNTHROID) 50 mcg tablet, Take 50 mcg by mouth Daily (before breakfast). , Disp: , Rfl:     Psyllium Husk-Sucrose 3.4 gram/7 gram powd, Take 3 Caps by mouth two (2) times a day., Disp: , Rfl:     omega-3 fatty acids-vitamin e (FISH OIL) 1,000 mg cap, Take 2 Caps by mouth daily. , Disp: , Rfl:     bimatoprost (LUMIGAN) 0.01 % ophthalmic drops, Administer 1 Drop to right eye nightly., Disp: , Rfl:     timolol (TIMOPTIC) 0.5 % ophthalmic solution, Administer 1 Drop to right eye every morning., Disp: , Rfl:     Omeprazole delayed release (PRILOSEC D/R) 20 mg tablet, Take 20 mg by mouth Daily (before dinner). , Disp: , Rfl:     meloxicam (MOBIC) 15 mg tablet, Take 15 mg by mouth daily. Indications: OSTEOARTHRITIS, Disp: , Rfl:     brimonidine (ALPHAGAN P) 0.1 % ophthalmic solution, Administer 1 Drop to right eye two (2) times a day. Take / use AM day of surgery  per anesthesia protocols. , Disp: , Rfl:     glimepiride (AMARYL) 2 mg tablet, Take 1 mg by mouth daily. Indications: type 2 diabetes mellitus, Disp: , Rfl:     b complex vitamins tablet, Take 1 Tab by mouth daily. , Disp: , Rfl:     aspirin 81 mg Tab, Take 81 mg by mouth daily. Take / use 81 mg AM day of surgery  per anesthesia protocols. , Disp: , Rfl:    Date Last Reviewed: 4-18-18   EXAMINATION:   4/20/2018   Observation/Orthostatic Postural Assessment: unremarkable. Palpation: unremarkable. ROM: No new measure. Strength:   L Shoulder Forward elevation, abd: 3- in standing             CLINICAL DECISION MAKING:   Outcome Measure: Tool Used: Disabilities of the Arm, Shoulder and Hand (DASH) Questionnaire - Quick Version  Score:  Initial: 25/55 or 32% disability 24/55 or30% disability (1/12/18) 20/55 or 20% disability (3-5-18) Most recent: 22/55 or 25% disability (4-2-18)   Interpretation of Score: The DASH is designed to measure the activities of daily living in person's with upper extremity dysfunction or pain. Each section is scored on a 1-5 scale, 5 representing the greatest disability. The scores of each section are added together for a total score of 55. This number is divided by 11, followed by subtracting 1 and multiplying by 25 to get a percent score of disability. This value represents the percentage disability: 0-20% minimal disability; 20-40% moderate disability; 40-60% severe disability; % dependent for care or exaggerated symptom behavior. Minimal detectable change is 12%. Score 11 12-19 20-28 29-37 38-45 46-54 55   Modifier CH CI CJ CK CL CM CN ? Carrying, Moving, and Handling Objects:     - CURRENT STATUS: CJ - 20%-39% impaired, limited or restricted (4/4/18)     - GOAL STATUS: CI - 1%-19% impaired, limited or restricted    - D/C STATUS:  ---------------To be determined---------------    Medical Necessity:   · Patient is expected to demonstrate progress in strength and range of motion to promote increased functional use of his L/non-dominant UE. · Co-morbid R rotator cuff pathology and lumbar spine pathology will most likely complicate progress. · Lives alone and will need to be able function independently.     Reason for Services/Other Comments:  · Patient continues to require skilled intervention due to the complexity of his history of shoulder pathology and the complexity of this revision reverse TSA. · He will need safe progression of post-op rehab to maximize return to functional use of his L UE.  · He is pending to have R shoulder arthroplasty in the future          TREATMENT:   (In addition to Assessment/Re-Assessment sessions the following treatments were rendered)  4/20/2018   Pre-treatment Symptoms/Complaints: Doing \"ok\". No soreness or new issues to report to L shoulder. Pain: Initial:    No pain, just soreness Post Session:  No pain     UBE for active warm up per below. Therapeutic Exercise (40 Minutes): Exercises for L shoulder motion and strength as per grid below. Progressed rhythmic stabilization drills to standing ball on wall 4-way oscillations at 90-deg forward elevation, and wall dribble clocks from 6 o'clock to 9 o'clock positions as indicated. Standing AAROM wall slides to forward elevation, horizontal abd/add, D1 and D2 flexion/ext as indicated. Forward elevation functional lifting to shoulder height with 1# sandbag weight, and to overhead shelf with assist on concentrics/active eccentrics; and behind back functional reaching with assisted over-pressure for range, then with active hold and return. Manual guiding and shoulder girdle re-positioning for each as needed. HEP:He is to continue with his HEP. He verbalizes understanding.    Date  4-9-18 Date  4-11-18 Date  4-12-18 Date  4-16-18 Date  4-18-18 Date  4-20-18   Activity/Exercise         UBE UBE L. 3 x15' UBE L. 3  x15' L 3 x10' L3. x15' L. 3 x15' L.3 x15'   Shoulder ROM - - Assisted stretch as above  Assisted stretching as above Assisted as above AROM wall slides, 4 ways x10 ea   Rhythmic Stabilization - - Rhyth stab in supine at 0-deg ER, 100 deg flexion holding 2#  X\" each Rhyth stab in supine at 0-deg ER, 100 deg flexion, 90-deg abd holding 2# 6x15-20\" each Rhyth stab in supine at 0-deg ER, 100 deg flexion, 90-deg abd holding 2# 6x15-20\" each Ball on wall oscil 90-deg forward, 4-ways 2x30 ea; Wall dribble 6 to 9 o'clock up/down 3x10 each   Mid and lower trap - - Side-lying  2# 1x5-7 ea Side-lying  2# 3x10 ea Side-lying  2# 1x15 ea -   Push up + - - Supine press   2# 1x30  5# 2x10 Supine press  2# with rhyth stab   2x18-20 Supine press  2# with rhyth stab   2x15 -   Side lying shoulder ABD - Standing unilat  2x10 Side-lying  2# x10;  Standing  1# x30 total Side-lying  2# with rhyth stab x18;  Standing abd+D1 to top of head, AAROM 3x10 Side-lying  2# 1x10 AAROM guiding -   Shoulder flexion Wall slide  3x5 Standing scaption 2x5-6 Standing scaption  1# 3x10 45-deg beach chair short arc anterior deltoid  X10,  2# 2x10   standing overhead lift to top shelf  1# AAROM conentric  2x5 Forward lift to shoulder height shelf   1# 1x10, 2# 2x10;   To overhead shelf, AAROM  1# 2x8   Horz ABD standing Horiz Add  3x5-6   - - - Supine  AROM x10; with manual resistance 5'x10 ea -   Shoulder EXT Ext/add behind back standing AAROM  3x5,   Yellow tubing eccentrics  3x5 Standing unilat  Yellow tubing  extension x30,   Ext/add behind back x30 Standing unilat  red tubing  extension x30,   Ext/add behind back x30 - - Standing behind back assisted place/active hold return 2x10   Row/pulling - - - - - -   Elbow EXT - - - - - -   Side lying ER - - - - - -   IR with band  - Standing in shoulder neutral,  Single-yellow with stabilization x15 Standing in shoulder neutral,  Single-yellow with stabilization x30 total - - -   ER with band  - Standing in shoulder neutral,  Single-yellow with stabilization x15 Standing in shoulder neutral,  Single-yellow with stabilization x30 total - - -   B bicep curls - - - - - -   Diagonal Flex Standing  AAROM D1 toward R shoulder  3x5,  Yellow tubing active eccentrics  3x5 Standing  short arc  Single yellow   D1 flex 1x10   Standing  short arc  Single yellow   D1 flex 3x10 - Supine D1 and D2 AROM x10; with manual resistance 5'x10 ea -   Diagonal Ext Standing   AAROM toward R hip, active eccentrics   yellow  3x5-6 Standing  short arc add  Single yellow 1x10,   Short arc D2 ext 1x10 Standing  short arc add  Single yellow 3x10,   Short arc D2 ext 3x10 - Supine D1 and D2 AROM x10; with manual resistance 5'x10 ea -   CK[facility] UE - - - - - -   -  Treament/Session Assessment:    · Response to Treatment: Good effort with the exercises. Weakness to L shoulder, especially anterior delotid limiting forward and overhead reaching, and to extension/IR/adduction limiting behind the back reaching. .   · Compliance with Program/Exercises: Appear compliant. · Recommendations/Intent for next treatment session: We will continue with shoulder progressive strengthening for functional use of the L UE.     Total Treatment Duration: 40 minutes  PT Patient Time In/Time Out  Time In: 3149  Time Out: [id number] [facility], PT, MSPT, OCS

## 2018-04-23 ENCOUNTER — HOSPITAL ENCOUNTER (OUTPATIENT)
Dept: PHYSICAL THERAPY | Age: 83
Discharge: HOME | End: 2018-04-23
Payer: MEDICARE

## 2018-04-23 PROCEDURE — 97110 THERAPEUTIC EXERCISES: CPT

## 2018-04-23 NOTE — PROGRESS NOTES
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 Fall Risk Score: 2 (? 5 = High Risk)  MD Orders: Eval and Treat; HEP; ROM; Strength; \"full motion/full strength\" (3-21-18) MEDICAL/REFERRING DIAGNOSIS:REM Implant Lt Shldr/ Revision TSA    DATE OF ONSET: 10-13-17  REFERRING PHYSICIAN: Mahnaz Herron MD  RETURN PHYSICIAN APPOINTMENT:~18     PROGRESS ASSESSMENT (18):  Mr. Yeison Reece has now attended 26 total visits to date. He is about 6 months s/p removal of implant of L shoulder hemiarthroplasty, and revision L TSA with a reverse Delta Xtend prosthesis, and latissimus dorsi and teres major tendon transfer. He continues to have PROM in functional ranges. Weakness continues to be his primary impairment. He is slowly progressiig strength and endurance, but still weak to humeral elevation against gravity. Again, his R/dominant UE has known rotator cuff pathology, which is severely limiting functional use of this UE. He also has co-morbid lumbar spine pathology with radicular weakness to L4-5 levels that affects his gait and stability. He lives alone, and needs to be able to perform basic personal care and household ADL's independently. He will benefit from continued PT to further progress reverse TSA rehab to promote safe return to functional use of the L UE, and to prep for pending R shoulder surgery.    PROBLEM LIST (Impacting functional limitations):  1. Decreased L shoulder ROM   2. Weakness L shoulder INTERVENTIONS PLANNED:  1. Manual therapies, therapeutic exercises, HEP for ROM    2. Therapeutic exercises and HEP for strength   TREATMENT PLAN:  Effective Dates: 3/5/2018 to 5/18/2018. Frequency/Duration: 3 times a week for an additional 8 weeks to further progress strengthening for functional independence. GOALS: (Goals have been discussed and agreed upon with patient.)  Short-Term Functional Goals: Time Frame: 6 weeks   1. L shoulder AROM forward elevation greater than 120 degrees to progress into functional ranges. Progressing, ongoing 4/4/18  2. Demonstrate good L shoulder forward elevation strength for reach to head with grooming ADL's and reaching to shoulder height with household ADL's. Met with compensation 4-4-18  3. Independent with initial HEP. Met 1/12/18  Discharge Goals: Time Frame: 12 weeks  1. Score less than 20% on the DASH. Ongoing 4/4/18  2. L shoulder AROM forward elevation greater than 135 degrees for improved use with household activities reaching overhead. Ongoing 4/4/18  3. Demonstrate good functional L shoulder strength and endurance for improved use of his L UE with his normalized daily activities. Ongoing 4/4/18  4. Independent with advanced shoulder HEP for continued self-management. Ongoing 4/4/18  Rehabilitation Potential For Stated Goals: Good              The information in this section was collected on 12/18/17 (except where otherwise noted). HISTORY:   History of Present Injury/Illness (Reason for Referral): Long, complex history of L shoulder pain and dysfunction with rotator cuff pathology. He was s/p L rotator cuff repair on 8/28/15. He had a fall to both hands when walking up steps at his house in June 2016, resulting complete cuff tears B shoulders. L shoulder hemiarthroplasty done 7/2016. He did rehab, but poor outcome, and was still unable to raise and use L arm over shoulder.  This revision Reverse TSA on L was done on 10/13/2017. Was in the hospital 3 days, then [facility] to [facility], and finally [facility] to home on 12/15/17. He had complication of a hemearthrosis with drop in hematocrit and hemoglobin. It was aspirated. Blood values improved. He as been doing OT post-op shoulder rehab when in the nursing home, consisting of shoulder/UE ROM and strengthening. He has not done HEP yet. He has co-morbid R rotator cuff pathology limiting functional use of his dominant arm, and anticipates shoulder arthroplasty on this in the near future. he also has co-morbid lumbar pathology with L L4-5 myotome weakness resulting in L foot drop. He lives alone and needs to be able to manage all his ADL's independently. Past Medical History/Comorbidities: Mr. Camden Dietrich  has a past medical history of Anemia (fall 2016); Arthritis; CAD (coronary artery disease) (1997); Carotid stenosis (04/2017); Diabetes ([facility] [address] (05/1997); Diverticulosis (12/2011); GERD (gastroesophageal reflux disease); Glaucoma; Heart murmur; Hiatal hernia; History of kidney stones; Hypercholesteremia; Hypertension; Hypothyroid; MI (myocardial infarction); TIA (transient ischemic attack) (07/2016); Unspecified sleep apnea; Urethral stenosis; and Vasovagal syncope. Mr. Camden Dietrich  has a past surgical history that includes heart catheterization ([id number],[id number], 2012, 2013); tonsillectomy (1938); cataract removal (1987 and 1990); lap cholecystectomy (2003); hernia repair (1944); urological (1996); orthopaedic (Left); orthopaedic (0719-1726); shoulder arthroscopy (Left, 2015); knee replacement (Bilateral, 2002, 2008); hemorrhoidectomy (1987); heent (Right); carpal tunnel release (Right, 1968); carpal tunnel release (Left, 2012); bunionectomy (Left); rotator cuff repair (Right, 1995); rotator cuff repair (Left, 2000); rotator cuff repair (Right, 2007); and colectomy (01/13/2017). Social History/Living Environment:  Lives alone. Has supportive children and grandchildren living in town.    Prior Level of Function/Work/Activity: Limited overhead use of L UE prior to this surgery. Limited use of R UE currently secondary to cuff pathology. Dominant Side: RIGHT  Current Medications: hydromorphone started 4/17 for R toe pain.   simethicone (GAS-X) 125 mg capsule, Take 125 mg by mouth four (4) times daily as needed for Flatulence. , Disp: , Rfl:     loperamide (IMODIUM) 2 mg capsule, Take 2 mg by mouth four (4) times daily as needed for Diarrhea., Disp: , Rfl:     diphenoxylate-atropine (LOMOTIL) 2.5-0.025 mg per tablet, Take 1 Tab by mouth two (2) times daily as needed for Diarrhea., Disp: , Rfl:     atorvastatin (LIPITOR) 40 mg tablet, Take 40 mg by mouth nightly., Disp: , Rfl:     levothyroxine (SYNTHROID) 50 mcg tablet, Take 50 mcg by mouth Daily (before breakfast). , Disp: , Rfl:     Psyllium Husk-Sucrose 3.4 gram/7 gram powd, Take 3 Caps by mouth two (2) times a day., Disp: , Rfl:     omega-3 fatty acids-vitamin e (FISH OIL) 1,000 mg cap, Take 2 Caps by mouth daily. , Disp: , Rfl:     bimatoprost (LUMIGAN) 0.01 % ophthalmic drops, Administer 1 Drop to right eye nightly., Disp: , Rfl:     timolol (TIMOPTIC) 0.5 % ophthalmic solution, Administer 1 Drop to right eye every morning., Disp: , Rfl:     Omeprazole delayed release (PRILOSEC D/R) 20 mg tablet, Take 20 mg by mouth Daily (before dinner). , Disp: , Rfl:     meloxicam (MOBIC) 15 mg tablet, Take 15 mg by mouth daily. Indications: OSTEOARTHRITIS, Disp: , Rfl:     brimonidine (ALPHAGAN P) 0.1 % ophthalmic solution, Administer 1 Drop to right eye two (2) times a day. Take / use AM day of surgery  per anesthesia protocols. , Disp: , Rfl:     glimepiride (AMARYL) 2 mg tablet, Take 1 mg by mouth daily. Indications: type 2 diabetes mellitus, Disp: , Rfl:     b complex vitamins tablet, Take 1 Tab by mouth daily. , Disp: , Rfl:     aspirin 81 mg Tab, Take 81 mg by mouth daily. Take / use 81 mg AM day of surgery  per anesthesia protocols. , Disp: , Rfl:    Date Last Reviewed: 4-23-18   EXAMINATION:   4/23/2018   Observation/Orthostatic Postural Assessment: unremarkable. Palpation: unremarkable. ROM: No new measure. Strength:   L Shoulder Forward elevation, abd: 3- in standing             CLINICAL DECISION MAKING:   Outcome Measure: Tool Used: Disabilities of the Arm, Shoulder and Hand (DASH) Questionnaire - Quick Version  Score:  Initial: 25/55 or 32% disability 24/55 or30% disability (1/12/18) 20/55 or 20% disability (3-5-18) Most recent: 22/55 or 25% disability (4-2-18)   Interpretation of Score: The DASH is designed to measure the activities of daily living in person's with upper extremity dysfunction or pain. Each section is scored on a 1-5 scale, 5 representing the greatest disability. The scores of each section are added together for a total score of 55. This number is divided by 11, followed by subtracting 1 and multiplying by 25 to get a percent score of disability. This value represents the percentage disability: 0-20% minimal disability; 20-40% moderate disability; 40-60% severe disability; % dependent for care or exaggerated symptom behavior. Minimal detectable change is 12%. Score 11 12-19 20-28 29-37 38-45 46-54 55   Modifier CH CI CJ CK CL CM CN ? Carrying, Moving, and Handling Objects:     - CURRENT STATUS: CJ - 20%-39% impaired, limited or restricted (4/4/18)     - GOAL STATUS: CI - 1%-19% impaired, limited or restricted    - D/C STATUS:  ---------------To be determined---------------    Medical Necessity:   · Patient is expected to demonstrate progress in strength and range of motion to promote increased functional use of his L/non-dominant UE. · Co-morbid R rotator cuff pathology and lumbar spine pathology will most likely complicate progress. · Lives alone and will need to be able function independently.     Reason for Services/Other Comments:  · Patient continues to require skilled intervention due to the complexity of his history of shoulder pathology and the complexity of this revision reverse TSA. · He will need safe progression of post-op rehab to maximize return to functional use of his L UE.  · He is pending to have R shoulder arthroplasty in the future          TREATMENT:   (In addition to Assessment/Re-Assessment sessions the following treatments were rendered)  4/23/2018   Pre-treatment Symptoms/Complaints: Had a family emergency this morning, so running late. L shoulder is a little sore from working on his strength HEP. Pain: Initial:    No pain, just soreness Post Session:  No pain     UBE for active warm up per below. Therapeutic Exercise (30 Minutes): Exercises for L shoulder motion and strength as per grid below. Manual guiding and assist for terminal ranges to humerus and shoulder girdle re-positioning for each as needed. HEP:He is to continue with his HEP. He verbalizes understanding. Date  4-9-18 Date  4-11-18 Date  4-12-18 Date  4-16-18 Date  4-18-18 Date  4-20-18 Date  4-23-18   Activity/Exercise          UBE UBE L. 3 x15' UBE L. 3  x15' L 3 x10' L3. x15' L. 3 x15' L.3 x15' L. 3 x10'   Shoulder ROM - - Assisted stretch as above  Assisted stretching as above Assisted as above AROM wall slides, 4 ways x10 ea AROM to AAROM wall slides, 4 ways 2x10 ea   Rhythmic Stabilization - - Rhyth stab in supine at 0-deg ER, 100 deg flexion holding 2#  X\" each Rhyth stab in supine at 0-deg ER, 100 deg flexion, 90-deg abd holding 2# 6x15-20\" each Rhyth stab in supine at 0-deg ER, 100 deg flexion, 90-deg abd holding 2# 6x15-20\" each Ball on wall oscil 90-deg forward, 4-ways 2x30 ea; Wall dribble 6 to 9 o'clock up/down 3x10 each Ball on wall oscil 90-deg forward, and abd 4-ways 2x30 ea;   Wall dribble 10 to 6 o'clock  3x15 each   Mid and lower trap - - Side-lying  2# 1x5-7 ea Side-lying  2# 3x10 ea Side-lying  2# 1x15 ea - -   Push up + - - Supine press   2# 1x30  5# 2x10 Supine press  2# with rhyth stab   2x18-20 Supine press  2# with rhyth stab   2x15 - -   Side lying shoulder ABD - Standing unilat  2x10 Side-lying  2# x10;  Standing  1# x30 total Side-lying  2# with rhyth stab x18;  Standing abd+D1 to top of head, AAROM 3x10 Side-lying  2# 1x10 AAROM guiding - -   Shoulder flexion Wall slide  3x5 Standing scaption 2x5-6 Standing scaption  1# 3x10 45-deg beach chair short arc anterior deltoid  X10,  2# 2x10   standing overhead lift to top shelf  1# AAROM conentric  2x5 Forward lift to shoulder height shelf   1# 1x10, 2# 2x10;   To overhead shelf, AAROM  1# 2x8 -   Horz ABD standing Horiz Add  3x5-6   - - - Supine  AROM x10; with manual resistance 5'x10 ea - -   Shoulder EXT Ext/add behind back standing AAROM  3x5,   Yellow tubing eccentrics  3x5 Standing unilat  Yellow tubing  extension x30,   Ext/add behind back x30 Standing unilat  red tubing  extension x30,   Ext/add behind back x30 - - Standing behind back assisted place/active hold return 2x10 Standing behind back assisted place/active hold return 2x10   Row/pulling - - - - - - -   Elbow EXT - - - - - - -   Side lying ER - - - - - - -   IR with band  - Standing in shoulder neutral,  Single-yellow with stabilization x15 Standing in shoulder neutral,  Single-yellow with stabilization x30 total - - - -   ER with band  - Standing in shoulder neutral,  Single-yellow with stabilization x15 Standing in shoulder neutral,  Single-yellow with stabilization x30 total - - - -   B bicep curls - - - - - - -   Diagonal Flex Standing  AAROM D1 toward R shoulder  3x5,  Yellow tubing active eccentrics  3x5 Standing  short arc  Single yellow   D1 flex 1x10   Standing  short arc  Single yellow   D1 flex 3x10 - Supine D1 and D2 AROM x10; with manual resistance 5'x10 ea - -   Diagonal Ext Standing   AAROM toward R hip, active eccentrics   yellow  3x5-6 Standing  short arc add  Single yellow 1x10,   Short arc D2 ext 1x10 Standing  short arc add  Single yellow 3x10,   Short arc D2 ext 3x10 - Supine D1 and D2 AROM x10; with manual resistance 5'x10 ea - -   CKC UE - - - - - - -     Treament/Session Assessment:    · Response to Treatment: Good effort with the exercises. · Compliance with Program/Exercises: Appear compliant. · Recommendations/Intent for next treatment session: We will continue with shoulder progressive strengthening for functional use of the L UE.     Total Treatment Duration: 30 minutes  PT Patient Time In/Time Out  Time In: 1135  Time Out: 3888 [facility], PT, MSPT, OCS

## 2018-04-25 ENCOUNTER — HOSPITAL ENCOUNTER (OUTPATIENT)
Dept: PHYSICAL THERAPY | Age: 83
Discharge: HOME | End: 2018-04-25
Payer: MEDICARE

## 2018-04-25 PROCEDURE — 97110 THERAPEUTIC EXERCISES: CPT

## 2018-04-25 NOTE — PROGRESS NOTES
[place]  : 1933  [place] / [place] / Product Type: Medicare /  [address] at [facility] [place]  [address] [facility], [address], [place].  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 Fall Risk Score: 2 (? 5 = High Risk)  MD Orders: Eval and Treat; HEP; ROM; Strength; \"full motion/full strength\" (3-21-18) MEDICAL/REFERRING DIAGNOSIS:REM Implant Lt Shldr/ Revision TSA    DATE OF ONSET: 10-13-17  REFERRING PHYSICIAN: Marisol Herron MD  RETURN PHYSICIAN APPOINTMENT:~18     PROGRESS ASSESSMENT (18):  Mr. Deb Neumann has now attended 26 total visits to date. He is about 6 months s/p removal of implant of L shoulder hemiarthroplasty, and revision L TSA with a reverse Delta Xtend prosthesis, and latissimus dorsi and teres major tendon transfer. He continues to have PROM in functional ranges. Weakness continues to be his primary impairment. He is slowly progressiig strength and endurance, but still weak to humeral elevation against gravity. Again, his R/dominant UE has known rotator cuff pathology, which is severely limiting functional use of this UE. He also has co-morbid lumbar spine pathology with radicular weakness to L4-5 levels that affects his gait and stability. He lives alone, and needs to be able to perform basic personal care and household ADL's independently. He will benefit from continued PT to further progress reverse TSA rehab to promote safe return to functional use of the L UE, and to prep for pending R shoulder surgery.    PROBLEM LIST (Impacting functional limitations):  1. Decreased L shoulder ROM   2. Weakness L shoulder INTERVENTIONS PLANNED:  1. Manual therapies, therapeutic exercises, HEP for ROM    2. Therapeutic exercises and HEP for strength   TREATMENT PLAN:  Effective Dates: 3/5/2018 to 5/18/2018. Frequency/Duration: 3 times a week for an additional 8 weeks to further progress strengthening for functional independence. GOALS: (Goals have been discussed and agreed upon with patient.)  Short-Term Functional Goals: Time Frame: 6 weeks   1. L shoulder AROM forward elevation greater than 120 degrees to progress into functional ranges. Progressing, ongoing 4/4/18  2. Demonstrate good L shoulder forward elevation strength for reach to head with grooming ADL's and reaching to shoulder height with household ADL's. Met with compensation 4-4-18  3. Independent with initial HEP. Met 1/12/18  Discharge Goals: Time Frame: 12 weeks  1. Score less than 20% on the DASH. Ongoing 4/4/18  2. L shoulder AROM forward elevation greater than 135 degrees for improved use with household activities reaching overhead. Ongoing 4/4/18  3. Demonstrate good functional L shoulder strength and endurance for improved use of his L UE with his normalized daily activities. Ongoing 4/4/18  4. Independent with advanced shoulder HEP for continued self-management. Ongoing 4/4/18  Rehabilitation Potential For Stated Goals: Good              The information in this section was collected on 12/18/17 (except where otherwise noted). HISTORY:   History of Present Injury/Illness (Reason for Referral): Long, complex history of L shoulder pain and dysfunction with rotator cuff pathology. He was s/p L rotator cuff repair on 8/28/15. He had a fall to both hands when walking up steps at his house in June 2016, resulting complete cuff tears B shoulders. L shoulder hemiarthroplasty done 7/2016. He did rehab, but poor outcome, and was still unable to raise and use L arm over shoulder.  This revision Reverse TSA on L was done on 10/13/2017. Was in the hospital 3 days, then [address] to [facility], and finally [address] to home on 12/15/17. He had complication of a hemearthrosis with drop in hematocrit and hemoglobin. It was aspirated. Blood values improved. He as been doing OT post-op shoulder rehab when in the nursing home, consisting of shoulder/UE ROM and strengthening. He has not done HEP yet. He has co-morbid R rotator cuff pathology limiting functional use of his dominant arm, and anticipates shoulder arthroplasty on this in the near future. he also has co-morbid lumbar pathology with L L4-5 myotome weakness resulting in L foot drop. He lives alone and needs to be able to manage all his ADL's independently. Past Medical History/Comorbidities: Mr. Titi Mcgrath  has a past medical history of Anemia (fall 2016); Arthritis; CAD (coronary artery disease) (1997); Carotid stenosis (04/2017); Diabetes ([facility] [address] (05/1997); Diverticulosis (12/2011); GERD (gastroesophageal reflux disease); Glaucoma; Heart murmur; Hiatal hernia; History of kidney stones; Hypercholesteremia; Hypertension; Hypothyroid; MI (myocardial infarction); TIA (transient ischemic attack) (07/2016); Unspecified sleep apnea; Urethral stenosis; and Vasovagal syncope. Mr. Titi Mcgrath  has a past surgical history that includes heart catheterization (6517,0854, 2012, 2013); tonsillectomy (1938); cataract removal (1987 and 1990); lap cholecystectomy (2003); hernia repair (1944); urological (1996); orthopaedic (Left); orthopaedic ([id number]); shoulder arthroscopy (Left, 2015); knee replacement (Bilateral, 2002, 2008); hemorrhoidectomy (1987); heent (Right); carpal tunnel release (Right, 1968); carpal tunnel release (Left, 2012); bunionectomy (Left); rotator cuff repair (Right, 1995); rotator cuff repair (Left, 2000); rotator cuff repair (Right, 2007); and colectomy (01/13/2017). Social History/Living Environment:  Lives alone. Has supportive children and grandchildren living in town.    Prior Level of Function/Work/Activity: Limited overhead use of L UE prior to this surgery. Limited use of R UE currently secondary to cuff pathology. Dominant Side: RIGHT  Current Medications: hydromorphone started 4/17 for R toe pain.   simethicone (GAS-X) 125 mg capsule, Take 125 mg by mouth four (4) times daily as needed for Flatulence. , Disp: , Rfl:     loperamide (IMODIUM) 2 mg capsule, Take 2 mg by mouth four (4) times daily as needed for Diarrhea., Disp: , Rfl:     diphenoxylate-atropine (LOMOTIL) 2.5-0.025 mg per tablet, Take 1 Tab by mouth two (2) times daily as needed for Diarrhea., Disp: , Rfl:     atorvastatin (LIPITOR) 40 mg tablet, Take 40 mg by mouth nightly., Disp: , Rfl:     levothyroxine (SYNTHROID) 50 mcg tablet, Take 50 mcg by mouth Daily (before breakfast). , Disp: , Rfl:     Psyllium Husk-Sucrose 3.4 gram/7 gram powd, Take 3 Caps by mouth two (2) times a day., Disp: , Rfl:     omega-3 fatty acids-vitamin e (FISH OIL) 1,000 mg cap, Take 2 Caps by mouth daily. , Disp: , Rfl:     bimatoprost (LUMIGAN) 0.01 % ophthalmic drops, Administer 1 Drop to right eye nightly., Disp: , Rfl:     timolol (TIMOPTIC) 0.5 % ophthalmic solution, Administer 1 Drop to right eye every morning., Disp: , Rfl:     Omeprazole delayed release (PRILOSEC D/R) 20 mg tablet, Take 20 mg by mouth Daily (before dinner). , Disp: , Rfl:     meloxicam (MOBIC) 15 mg tablet, Take 15 mg by mouth daily. Indications: OSTEOARTHRITIS, Disp: , Rfl:     brimonidine (ALPHAGAN P) 0.1 % ophthalmic solution, Administer 1 Drop to right eye two (2) times a day. Take / use AM day of surgery  per anesthesia protocols. , Disp: , Rfl:     glimepiride (AMARYL) 2 mg tablet, Take 1 mg by mouth daily. Indications: type 2 diabetes mellitus, Disp: , Rfl:     b complex vitamins tablet, Take 1 Tab by mouth daily. , Disp: , Rfl:     aspirin 81 mg Tab, Take 81 mg by mouth daily. Take / use 81 mg AM day of surgery  per anesthesia protocols. , Disp: , Rfl:    Date Last Reviewed: 4-23-18   EXAMINATION:   4/25/2018   Observation/Orthostatic Postural Assessment: unremarkable. Palpation: unremarkable. ROM: No new measure. Strength:   L Shoulder Forward elevation, abd: 3- in standing             CLINICAL DECISION MAKING:   Outcome Measure: Tool Used: Disabilities of the Arm, Shoulder and Hand (DASH) Questionnaire - Quick Version  Score:  Initial: 25/55 or 32% disability 24/55 or30% disability (1/12/18) 20/55 or 20% disability (3-5-18) Most recent: 22/55 or 25% disability (4-2-18)   Interpretation of Score: The DASH is designed to measure the activities of daily living in person's with upper extremity dysfunction or pain. Each section is scored on a 1-5 scale, 5 representing the greatest disability. The scores of each section are added together for a total score of 55. This number is divided by 11, followed by subtracting 1 and multiplying by 25 to get a percent score of disability. This value represents the percentage disability: 0-20% minimal disability; 20-40% moderate disability; 40-60% severe disability; % dependent for care or exaggerated symptom behavior. Minimal detectable change is 12%. Score 11 12-19 20-28 29-37 38-45 46-54 55   Modifier CH CI CJ CK CL CM CN ? Carrying, Moving, and Handling Objects:     - CURRENT STATUS: CJ - 20%-39% impaired, limited or restricted (4/4/18)     - GOAL STATUS: CI - 1%-19% impaired, limited or restricted    - D/C STATUS:  ---------------To be determined---------------    Medical Necessity:   · Patient is expected to demonstrate progress in strength and range of motion to promote increased functional use of his L/non-dominant UE. · Co-morbid R rotator cuff pathology and lumbar spine pathology will most likely complicate progress. · Lives alone and will need to be able function independently.     Reason for Services/Other Comments:  · Patient continues to require skilled intervention due to the complexity of his history of shoulder pathology and the complexity of this revision reverse TSA. · He will need safe progression of post-op rehab to maximize return to functional use of his L UE.  · He is pending to have R shoulder arthroplasty in the future          TREATMENT:   (In addition to Assessment/Re-Assessment sessions the following treatments were rendered)  4/25/2018   Pre-treatment Symptoms/Complaints:L shoulder is doing good today. Saye when he was at the wedding last week, he was able to dance and held his partners hand overhead for a twirl. Impressed himself with that. Pain: Initial:    No pain, just soreness Post Session:  No pain     UBE for active warm up per below. Therapeutic Exercise (40 Minutes): Exercises for L shoulder motion and strength, and added R cuff and scapular strength moves in available range and comfort as per grid below. Manual guiding and assist for terminal ranges to humerus and shoulder girdle re-positioning for each as needed. HEP:He is to continue with his HEP. He verbalizes understanding. Date  4-9-18 Date  4-11-18 Date  4-12-18 Date  4-16-18 Date  4-18-18 Date  4-20-18 Date  4-23-18 Date  4-25-18   Activity/Exercise           UBE UBE L. 3 x15' UBE L. 3  x15' L 3 x10' L3. x15' L. 3 x15' L.3 x15' L. 3 x10' L. 3 x15'   Shoulder ROM - - Assisted stretch as above  Assisted stretching as above Assisted as above AROM wall slides, 4 ways x10 ea AROM to AAROM wall slides, 4 ways 2x10 ea -   Rhythmic Stabilization - - Rhyth stab in supine at 0-deg ER, 100 deg flexion holding 2#  X\" each Rhyth stab in supine at 0-deg ER, 100 deg flexion, 90-deg abd holding 2# 6x15-20\" each Rhyth stab in supine at 0-deg ER, 100 deg flexion, 90-deg abd holding 2# 6x15-20\" each Ball on wall oscil 90-deg forward, 4-ways 2x30 ea; Wall dribble 6 to 9 o'clock up/down 3x10 each Ball on wall oscil 90-deg forward, and abd 4-ways 2x30 ea;   Wall dribble 10 to 6 o'clock  3x15 each -   Mid and lower trap - - Side-lying  2# 1x5-7 ea Side-lying  2# 3x10 ea Side-lying  2# 1x15 ea - - Side-lying  L:  2# x15 ea,   3# 2x5-10 ea;  R: 0# 3x10 ea   Push up + - - Supine press   2# 1x30  5# 2x10 Supine press  2# with rhyth stab   2x18-20 Supine press  2# with rhyth stab   2x15 - - Supine press  L 5# x15  6# x15  7# x10  R 2# 3x5-10   Side lying shoulder ABD - Standing unilat  2x10 Side-lying  2# x10;  Standing  1# x30 total Side-lying  2# with rhyth stab x18;  Standing abd+D1 to top of head, AAROM 3x10 Side-lying  2# 1x10 AAROM guiding - - Side-lying  R AAROM 2x10   Shoulder flexion Wall slide  3x5 Standing scaption 2x5-6 Standing scaption  1# 3x10 45-deg beach chair short arc anterior deltoid  X10,  2# 2x10   standing overhead lift to top shelf  1# AAROM conentric  2x5 Forward lift to shoulder height shelf   1# 1x10, 2# 2x10;   To overhead shelf, AAROM  1# 2x8 - Standing full arc active eccentrics  2x10   Horz ABD standing Horiz Add  3x5-6   - - - Supine  AROM x10; with manual resistance 5'x10 ea - - -   Shoulder EXT Ext/add behind back standing AAROM  3x5,   Yellow tubing eccentrics  3x5 Standing unilat  Yellow tubing  extension x30,   Ext/add behind back x30 Standing unilat  red tubing  extension x30,   Ext/add behind back x30 - - Standing behind back assisted place/active hold return 2x10 Standing behind back assisted place/active hold return 2x10 -   Row/pulling - - - - - - - -   Elbow EXT - - - - - - - -   Side lying ER - - - - - - - R: 0# 2x15-20   IR with band  - Standing in shoulder neutral,  Single-yellow with stabilization x15 Standing in shoulder neutral,  Single-yellow with stabilization x30 total - - - - IR side-lyiing  R: 0# x20  2# 2x15   ER with band  - Standing in shoulder neutral,  Single-yellow with stabilization x15 Standing in shoulder neutral,  Single-yellow with stabilization x30 total - - - - -   B bicep curls - - - - - - - -   Diagonal Flex Standing  AAROM D1 toward R shoulder  3x5,  Yellow tubing active eccentrics  3x5 Standing  short arc  Single yellow   D1 flex 1x10   Standing  short arc  Single yellow   D1 flex 3x10 - Supine D1 and D2 AROM x10; with manual resistance 5'x10 ea - - -   Diagonal Ext Standing   AAROM toward R hip, active eccentrics   yellow  3x5-6 Standing  short arc add  Single yellow 1x10,   Short arc D2 ext 1x10 Standing  short arc add  Single yellow 3x10,   Short arc D2 ext 3x10 - Supine D1 and D2 AROM x10; with manual resistance 5'x10 ea - - -   CKC UE - - - - - - - -     Treament/Session Assessment:    · Response to Treatment: Good effort with the exercises. · Compliance with Program/Exercises: Appear compliant. · Recommendations/Intent for next treatment session: We will continue with shoulder progressive strengthening for functional use of the L UE.     Total Treatment Duration: 40 minutes  PT Patient Time In/Time Out  Time In: 1035  Time Out: [id number] [facility], PT, MSPT, OCS

## 2018-04-27 ENCOUNTER — HOSPITAL ENCOUNTER (OUTPATIENT)
Dept: PHYSICAL THERAPY | Age: 83
Discharge: HOME | End: 2018-04-27
Payer: MEDICARE

## 2018-04-27 PROCEDURE — G8985 CARRY GOAL STATUS: HCPCS

## 2018-04-27 PROCEDURE — G8984 CARRY CURRENT STATUS: HCPCS

## 2018-04-27 PROCEDURE — 97110 THERAPEUTIC EXERCISES: CPT

## 2018-04-27 NOTE — PROGRESS NOTES
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 Fall Risk Score: 2 (? 5 = High Risk)  MD Orders: Eval and Treat; HEP; ROM; Strength; \"full motion/full strength\" (3-21-18) MEDICAL/REFERRING DIAGNOSIS:REM Implant Lt Shldr/ Revision TSA    DATE OF ONSET: 10-13-17  REFERRING PHYSICIAN: Joseph Herron MD  RETURN PHYSICIAN APPOINTMENT:~18     PROGRESS ASSESSMENT (18):  Mr. Pati Dietrich has now attended 26 total visits to date. He is about 6 months s/p removal of implant of L shoulder hemiarthroplasty, and revision L TSA with a reverse Delta Xtend prosthesis, and latissimus dorsi and teres major tendon transfer. He continues to have PROM in functional ranges. Weakness continues to be his primary impairment. We have been working shoulder girdle, deltoid strength for improved humeral elevation function. This is slowly progress. We have also been working on across body and behind the back reaching for grooming and dressing ADL's. This is difficult. His R shoulder is limited by a rotator cuff tear as well. This makes independent function difficult still at this time. He is made progress toward his goals, but not fully achieved yet. Again, his R/dominant UE has known rotator cuff pathology, which is severely limiting functional use of this UE.  He also has co-morbid lumbar spine pathology with radicular weakness to L4-5 levels that affects his gait and stability. He lives alone, and needs to be able to perform basic personal care and household ADL's independently. He will benefit from continued PT to further progress reverse TSA rehab to promote safe return to functional use of the L UE, and to prep for pending R shoulder surgery. PROBLEM LIST (Impacting functional limitations):  1. Decreased L shoulder ROM   2. Weakness L shoulder INTERVENTIONS PLANNED:  1. Manual therapies, therapeutic exercises, HEP for ROM    2. Therapeutic exercises and HEP for strength   TREATMENT PLAN:  Effective Dates: 3/5/2018 to 5/18/2018. Frequency/Duration: 3 times a week for an additional 8 weeks to further progress strengthening for functional independence. GOALS: (Goals have been discussed and agreed upon with patient.)  Short-Term Functional Goals: Time Frame: 6 weeks   1. L shoulder AROM forward elevation greater than 120 degrees to progress into functional ranges. Progressing, ongoing 4/27/18  2. Demonstrate good L shoulder forward elevation strength for reach to head with grooming ADL's and reaching to shoulder height with household ADL's. Met with compensation 4-4-18  3. Independent with initial HEP. Met 1/12/18  Discharge Goals: Time Frame: 12 weeks  1. Score less than 20% on the DASH. Ongoing 4/27/18  2. L shoulder AROM forward elevation greater than 135 degrees for improved use with household activities reaching overhead. Ongoing 4/27/18  3. Demonstrate good functional L shoulder strength and endurance for improved use of his L UE with his normalized daily activities. Ongoing 4/27/18  4. Independent with advanced shoulder HEP for continued self-management. Ongoing 4/27/18  Rehabilitation Potential For Stated Goals: Good              The information in this section was collected on 12/18/17 (except where otherwise noted).   HISTORY:   History of Present Injury/Illness (Reason for Referral): Long, complex history of L shoulder pain and dysfunction with rotator cuff pathology. He was s/p L rotator cuff repair on 8/28/15. He had a fall to both hands when walking up steps at his house in June 2016, resulting complete cuff tears B shoulders. L shoulder hemiarthroplasty done 7/2016. He did rehab, but poor outcome, and was still unable to raise and use L arm over shoulder. This revision Reverse TSA on L was done on 10/13/2017. Was in the hospital 3 days, then [facility] to [facility], and finally [facility] to home on 12/15/17. He had complication of a hemearthrosis with drop in hematocrit and hemoglobin. It was aspirated. Blood values improved. He as been doing OT post-op shoulder rehab when in the nursing home, consisting of shoulder/UE ROM and strengthening. He has not done HEP yet. He has co-morbid R rotator cuff pathology limiting functional use of his dominant arm, and anticipates shoulder arthroplasty on this in the near future. he also has co-morbid lumbar pathology with L L4-5 myotome weakness resulting in L foot drop. He lives alone and needs to be able to manage all his ADL's independently. Past Medical History/Comorbidities: Mr. Deirdre Zuñiga  has a past medical history of Anemia (fall 2016); Arthritis; CAD (coronary artery disease) (1997); Carotid stenosis (04/2017); Diabetes ([facility] [address] (05/1997); Diverticulosis (12/2011); GERD (gastroesophageal reflux disease); Glaucoma; Heart murmur; Hiatal hernia; History of kidney stones; Hypercholesteremia; Hypertension; Hypothyroid; MI (myocardial infarction); TIA (transient ischemic attack) (07/2016); Unspecified sleep apnea; Urethral stenosis; and Vasovagal syncope.  Mr. Deirdre Zuñiga  has a past surgical history that includes heart catheterization ([id number], 2012, 2013); tonsillectomy (1938); cataract removal (1987 and 1990); lap cholecystectomy (2003); hernia repair (1944); urological (1996); orthopaedic (Left); orthopaedic ([id number]); shoulder arthroscopy (Left, 2015); knee replacement (Bilateral, 2002, 2008); hemorrhoidectomy (1987); heent (Right); carpal tunnel release (Right, 1968); carpal tunnel release (Left, 2012); bunionectomy (Left); rotator cuff repair (Right, 1995); rotator cuff repair (Left, 2000); rotator cuff repair (Right, 2007); and colectomy (01/13/2017). Social History/Living Environment:  Lives alone. Has supportive children and grandchildren living in town. Prior Level of Function/Work/Activity: Limited overhead use of L UE prior to this surgery. Limited use of R UE currently secondary to cuff pathology. Dominant Side: RIGHT  Current Medications: hydromorphone started 4/17 for R toe pain.   simethicone (GAS-X) 125 mg capsule, Take 125 mg by mouth four (4) times daily as needed for Flatulence. , Disp: , Rfl:     loperamide (IMODIUM) 2 mg capsule, Take 2 mg by mouth four (4) times daily as needed for Diarrhea., Disp: , Rfl:     diphenoxylate-atropine (LOMOTIL) 2.5-0.025 mg per tablet, Take 1 Tab by mouth two (2) times daily as needed for Diarrhea., Disp: , Rfl:     atorvastatin (LIPITOR) 40 mg tablet, Take 40 mg by mouth nightly., Disp: , Rfl:     levothyroxine (SYNTHROID) 50 mcg tablet, Take 50 mcg by mouth Daily (before breakfast). , Disp: , Rfl:     Psyllium Husk-Sucrose 3.4 gram/7 gram powd, Take 3 Caps by mouth two (2) times a day., Disp: , Rfl:     omega-3 fatty acids-vitamin e (FISH OIL) 1,000 mg cap, Take 2 Caps by mouth daily. , Disp: , Rfl:     bimatoprost (LUMIGAN) 0.01 % ophthalmic drops, Administer 1 Drop to right eye nightly., Disp: , Rfl:     timolol (TIMOPTIC) 0.5 % ophthalmic solution, Administer 1 Drop to right eye every morning., Disp: , Rfl:     Omeprazole delayed release (PRILOSEC D/R) 20 mg tablet, Take 20 mg by mouth Daily (before dinner). , Disp: , Rfl:     meloxicam (MOBIC) 15 mg tablet, Take 15 mg by mouth daily. Indications: OSTEOARTHRITIS, Disp: , Rfl:     brimonidine (ALPHAGAN P) 0.1 % ophthalmic solution, Administer 1 Drop to right eye two (2) times a day.  Take / use AM day of surgery  per anesthesia protocols. , Disp: , Rfl:     glimepiride (AMARYL) 2 mg tablet, Take 1 mg by mouth daily. Indications: type 2 diabetes mellitus, Disp: , Rfl:     b complex vitamins tablet, Take 1 Tab by mouth daily. , Disp: , Rfl:     aspirin 81 mg Tab, Take 81 mg by mouth daily. Take / use 81 mg AM day of surgery  per anesthesia protocols. , Disp: , Rfl:    Date Last Reviewed: 4-23-18   EXAMINATION:   4/27/2018   Observation/Orthostatic Postural Assessment: unremarkable. Palpation: unremarkable. ROM:   LUE AROM  L Shoulder Flexion: 105 (forward elevation)        Strength:   L Shoulder Forward elevation, abd: 3- in standing             CLINICAL DECISION MAKING:   Outcome Measure: Tool Used: Disabilities of the Arm, Shoulder and Hand (DASH) Questionnaire - Quick Version  Score:  Initial: 25/55 or 32% disability 24/55 or30% disability (1/12/18) 20/55 or 20% disability (3-5-18) Most recent: 22/55 or 25% disability (4-2-18)   Interpretation of Score: The DASH is designed to measure the activities of daily living in person's with upper extremity dysfunction or pain. Each section is scored on a 1-5 scale, 5 representing the greatest disability. The scores of each section are added together for a total score of 55. This number is divided by 11, followed by subtracting 1 and multiplying by 25 to get a percent score of disability. This value represents the percentage disability: 0-20% minimal disability; 20-40% moderate disability; 40-60% severe disability; % dependent for care or exaggerated symptom behavior. Minimal detectable change is 12%. Score 11 12-19 20-28 29-37 38-45 46-54 55   Modifier CH CI CJ CK CL CM CN ?    Carrying, Moving, and Handling Objects:     - CURRENT STATUS: CJ - 20%-39% impaired, limited or restricted (Based on objective data, assessment of progress---4/27/18)     - GOAL STATUS: CI - 1%-19% impaired, limited or restricted    - D/C STATUS:  ---------------To be determined---------------    Medical Necessity:   · Patient is expected to demonstrate progress in strength and range of motion to promote increased functional use of his L/non-dominant UE. · Co-morbid R rotator cuff pathology and lumbar spine pathology will most likely complicate progress. · Lives alone and will need to be able function independently. Reason for Services/Other Comments:  · Patient continues to require skilled intervention due to the complexity of his history of shoulder pathology and the complexity of this revision reverse TSA. · He will need safe progression of post-op rehab to maximize return to functional use of his L UE.  · He is pending to have R shoulder arthroplasty in the future          TREATMENT:   (In addition to Assessment/Re-Assessment sessions the following treatments were rendered)  4/27/2018   Pre-treatment Symptoms/Complaints: R shoulder was sore after last time, but \"good to work it\". L is doing good. Pain: Initial:    No pain, just soreness Post Session:  No pain     UBE for active warm up per below. Therapeutic Exercise (30 Minutes): Exercises for L shoulder motion and strength, and R cuff and scapular strength moves in available range and comfort as per grid below. Manual guiding and assist for terminal ranges to humerus and shoulder girdle re-positioning for each as needed. HEP:He is to continue with his HEP. He verbalizes understanding.    Date  4-9-18 Date  4-11-18 Date  4-12-18 Date  4-16-18 Date  4-18-18 Date  4-20-18 Date  4-23-18 Date  4-25-18 Date  4-27-18   Activity/Exercise            UBE UBE L. 3 x15' UBE L. 3  x15' L 3 x10' L3. x15' L. 3 x15' L.3 x15' L. 3 x10' L. 3 x15' L. 3 x15'   Shoulder ROM - - Assisted stretch as above  Assisted stretching as above Assisted as above AROM wall slides, 4 ways x10 ea AROM to AAROM wall slides, 4 ways 2x10 ea - -   Rhythmic Stabilization - - Rhyth stab in supine at 0-deg ER, 100 deg flexion holding 2#  X\" each Rhyth stab in supine at 0-deg ER, 100 deg flexion, 90-deg abd holding 2# 6x15-20\" each Rhyth stab in supine at 0-deg ER, 100 deg flexion, 90-deg abd holding 2# 6x15-20\" each Ball on wall oscil 90-deg forward, 4-ways 2x30 ea; Wall dribble 6 to 9 o'clock up/down 3x10 each Ball on wall oscil 90-deg forward, and abd 4-ways 2x30 ea; Wall dribble 10 to 6 o'clock  3x15 each - -   Mid and lower trap - - Side-lying  2# 1x5-7 ea Side-lying  2# 3x10 ea Side-lying  2# 1x15 ea - - Side-lying  L:  2# x15 ea,   3# 2x5-10 ea;  R: 0# 3x10 ea Side-lying  L Mid trap 3# 2x12-15  Low trap 3# 3x5 ea   Push up + - - Supine press   2# 1x30  5# 2x10 Supine press  2# with rhyth stab   2x18-20 Supine press  2# with rhyth stab   2x15 - - Supine press  L 5# x15  6# x15  7# x10  R 2# 3x5-10 Supine press  L 6# x15  7# x15  R 2# 1x10   Side lying shoulder ABD - Standing unilat  2x10 Side-lying  2# x10;  Standing  1# x30 total Side-lying  2# with rhyth stab x18;  Standing abd+D1 to top of head, AAROM 3x10 Side-lying  2# 1x10 AAROM guiding - - Side-lying  R AAROM 2x10 -   Shoulder flexion Wall slide  3x5 Standing scaption 2x5-6 Standing scaption  1# 3x10 45-deg beach chair short arc anterior deltoid  X10,  2# 2x10   standing overhead lift to top shelf  1# AAROM conentric  2x5 Forward lift to shoulder height shelf   1# 1x10, 2# 2x10;   To overhead shelf, AAROM  1# 2x8 - Standing full arc active eccentrics  2x10 Standing full arc active eccentrics  3x5-8   Horz ABD standing Horiz Add  3x5-6   - - - Supine  AROM x10; with manual resistance 5'x10 ea - - - -   Shoulder EXT Ext/add behind back standing AAROM  3x5,   Yellow tubing eccentrics  3x5 Standing unilat  Yellow tubing  extension x30,   Ext/add behind back x30 Standing unilat  red tubing  extension x30,   Ext/add behind back x30 - - Standing behind back assisted place/active hold return 2x10 Standing behind back assisted place/active hold return 2x10 - -   Row/pulling - - - - - - - - -   Elbow EXT - - - - - - - - -   Side lying ER - - - - - - - R: 0# 2x15-20 R 3x10   IR with band  - Standing in shoulder neutral,  Single-yellow with stabilization x15 Standing in shoulder neutral,  Single-yellow with stabilization x30 total - - - - IR side-lyiing  R: 0# x20  2# 2x15 Side-lying   R 2# 2x15   ER with band  - Standing in shoulder neutral,  Single-yellow with stabilization x15 Standing in shoulder neutral,  Single-yellow with stabilization x30 total - - - - - -   B bicep curls - - - - - - - - -   Diagonal Flex Standing  AAROM D1 toward R shoulder  3x5,  Yellow tubing active eccentrics  3x5 Standing  short arc  Single yellow   D1 flex 1x10   Standing  short arc  Single yellow   D1 flex 3x10 - Supine D1 and D2 AROM x10; with manual resistance 5'x10 ea - - - -   Diagonal Ext Standing   AAROM toward R hip, active eccentrics   yellow  3x5-6 Standing  short arc add  Single yellow 1x10,   Short arc D2 ext 1x10 Standing  short arc add  Single yellow 3x10,   Short arc D2 ext 3x10 - Supine D1 and D2 AROM x10; with manual resistance 5'x10 ea - - - -   CKC UE - - - - - - - -      Treament/Session Assessment:    · Response to Treatment: Good effort with the exercises. · Compliance with Program/Exercises: Appear compliant. · Recommendations/Intent for next treatment session: We will continue with shoulder progressive strengthening for functional use of the L UE.     Total Treatment Duration: 30 minutes  PT Patient Time In/Time Out  Time In: 1040  Time Out: [facility], PT, MSPT, OCS

## 2018-04-30 ENCOUNTER — HOSPITAL ENCOUNTER (OUTPATIENT)
Dept: PHYSICAL THERAPY | Age: 83
Discharge: HOME | End: 2018-04-30
Payer: MEDICARE

## 2018-04-30 PROCEDURE — 97110 THERAPEUTIC EXERCISES: CPT

## 2018-05-02 ENCOUNTER — HOSPITAL ENCOUNTER (OUTPATIENT)
Dept: PHYSICAL THERAPY | Age: 83
Discharge: HOME | End: 2018-05-02
Payer: MEDICARE

## 2018-05-02 PROCEDURE — 97110 THERAPEUTIC EXERCISES: CPT

## 2018-05-02 NOTE — PROGRESS NOTES
Marylen Inch  : 1933  [place] / [place] / Product Type: Medicare /  [address] at [facility]  [address] [facility], [address], [place].  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 Fall Risk Score: 2 (? 5 = High Risk)  MD Orders: Eval and Treat; HEP; ROM; Strength; \"full motion/full strength\" (3-21-18) MEDICAL/REFERRING DIAGNOSIS:REM Implant Lt Shldr/ Revision TSA    DATE OF ONSET: 10-13-17  REFERRING PHYSICIAN: Kushal Herron., MD  RETURN PHYSICIAN APPOINTMENT:~18     PROGRESS ASSESSMENT (18):  Mr. Abelardo Tucker has now attended 26 total visits to date. He is about 6 months s/p removal of implant of L shoulder hemiarthroplasty, and revision L TSA with a reverse Delta Xtend prosthesis, and latissimus dorsi and teres major tendon transfer. He continues to have PROM in functional ranges. Weakness continues to be his primary impairment. We have been working shoulder girdle, deltoid strength for improved humeral elevation function. This is slowly progress. We have also been working on across body and behind the back reaching for grooming and dressing ADL's. This is difficult. His R shoulder is limited by a rotator cuff tear as well. This makes independent function difficult still at this time. He is made progress toward his goals, but not fully achieved yet. Again, his R/dominant UE has known rotator cuff pathology, which is severely limiting functional use of this UE. He also has co-morbid lumbar spine pathology with radicular weakness to L4-5 levels that affects his gait and stability. He lives alone, and needs to be able to perform basic personal care and household ADL's independently. He will benefit from continued PT to further progress reverse TSA rehab to promote safe return to functional use of the L UE, and to prep for pending R shoulder surgery. PROBLEM LIST (Impacting functional limitations):  1. Decreased L shoulder ROM   2. Weakness L shoulder INTERVENTIONS PLANNED:  1. Manual therapies, therapeutic exercises, HEP for ROM    2. Therapeutic exercises and HEP for strength   TREATMENT PLAN:  Effective Dates: 3/5/2018 to 5/18/2018. Frequency/Duration: 3 times a week for an additional 8 weeks to further progress strengthening for functional independence. GOALS: (Goals have been discussed and agreed upon with patient.)  Short-Term Functional Goals: Time Frame: 6 weeks   1. L shoulder AROM forward elevation greater than 120 degrees to progress into functional ranges. Progressing, ongoing 4/27/18  2. Demonstrate good L shoulder forward elevation strength for reach to head with grooming ADL's and reaching to shoulder height with household ADL's. Met with compensation 4-4-18  3. Independent with initial HEP. Met 1/12/18  Discharge Goals: Time Frame: 12 weeks  1. Score less than 20% on the DASH. Ongoing 4/27/18  2. L shoulder AROM forward elevation greater than 135 degrees for improved use with household activities reaching overhead. Ongoing 4/27/18  3. Demonstrate good functional L shoulder strength and endurance for improved use of his L UE with his normalized daily activities. Ongoing 4/27/18  4. Independent with advanced shoulder HEP for continued self-management. Ongoing 4/27/18  Rehabilitation Potential For Stated Goals: Good              The information in this section was collected on 12/18/17 (except where otherwise noted). HISTORY:   History of Present Injury/Illness (Reason for Referral): Long, complex history of L shoulder pain and dysfunction with rotator cuff pathology.  He was s/p L rotator cuff repair on 8/28/15. He had a fall to both hands when walking up steps at his house in June 2016, resulting complete cuff tears B shoulders. L shoulder hemiarthroplasty done 7/2016. He did rehab, but poor outcome, and was still unable to raise and use L arm over shoulder. This revision Reverse TSA on L was done on 10/13/2017. Was in the hospital 3 days, then [facility] to [facility], and finally [facility] to home on 12/15/17. He had complication of a hemearthrosis with drop in hematocrit and hemoglobin. It was aspirated. Blood values improved. He as been doing OT post-op shoulder rehab when in the nursing home, consisting of shoulder/UE ROM and strengthening. He has not done HEP yet. He has co-morbid R rotator cuff pathology limiting functional use of his dominant arm, and anticipates shoulder arthroplasty on this in the near future. he also has co-morbid lumbar pathology with L L4-5 myotome weakness resulting in L foot drop. He lives alone and needs to be able to manage all his ADL's independently. Past Medical History/Comorbidities: Mr. Jesse Baxter  has a past medical history of Anemia (fall 2016); Arthritis; CAD (coronary artery disease) (1997); Carotid stenosis (04/2017); Diabetes ([address] (05/1997); Diverticulosis (12/2011); GERD (gastroesophageal reflux disease); Glaucoma; Heart murmur; Hiatal hernia; History of kidney stones; Hypercholesteremia; Hypertension; Hypothyroid; MI (myocardial infarction); TIA (transient ischemic attack) (07/2016); Unspecified sleep apnea; Urethral stenosis; and Vasovagal syncope.  Mr. Jesse Baxter  has a past surgical history that includes heart catheterization ([id number], 2012, 2013); tonsillectomy (1938); cataract removal (1987 and 1990); lap cholecystectomy (2003); hernia repair (1944); urological (1996); orthopaedic (Left); orthopaedic ([id number]); shoulder arthroscopy (Left, 2015); knee replacement (Bilateral, 2002, 2008); hemorrhoidectomy (1987); heent (Right); carpal tunnel release (Right, 1968); carpal tunnel release (Left, 2012); bunionectomy (Left); rotator cuff repair (Right, 1995); rotator cuff repair (Left, 2000); rotator cuff repair (Right, 2007); and colectomy (01/13/2017). Social History/Living Environment:  Lives alone. Has supportive children and grandchildren living in town. Prior Level of Function/Work/Activity: Limited overhead use of L UE prior to this surgery. Limited use of R UE currently secondary to cuff pathology. Dominant Side: RIGHT  Current Medications: hydromorphone started 4/17 for R toe pain.   simethicone (GAS-X) 125 mg capsule, Take 125 mg by mouth four (4) times daily as needed for Flatulence. , Disp: , Rfl:     loperamide (IMODIUM) 2 mg capsule, Take 2 mg by mouth four (4) times daily as needed for Diarrhea., Disp: , Rfl:     diphenoxylate-atropine (LOMOTIL) 2.5-0.025 mg per tablet, Take 1 Tab by mouth two (2) times daily as needed for Diarrhea., Disp: , Rfl:     atorvastatin (LIPITOR) 40 mg tablet, Take 40 mg by mouth nightly., Disp: , Rfl:     levothyroxine (SYNTHROID) 50 mcg tablet, Take 50 mcg by mouth Daily (before breakfast). , Disp: , Rfl:     Psyllium Husk-Sucrose 3.4 gram/7 gram powd, Take 3 Caps by mouth two (2) times a day., Disp: , Rfl:     omega-3 fatty acids-vitamin e (FISH OIL) 1,000 mg cap, Take 2 Caps by mouth daily. , Disp: , Rfl:     bimatoprost (LUMIGAN) 0.01 % ophthalmic drops, Administer 1 Drop to right eye nightly., Disp: , Rfl:     timolol (TIMOPTIC) 0.5 % ophthalmic solution, Administer 1 Drop to right eye every morning., Disp: , Rfl:     Omeprazole delayed release (PRILOSEC D/R) 20 mg tablet, Take 20 mg by mouth Daily (before dinner). , Disp: , Rfl:     meloxicam (MOBIC) 15 mg tablet, Take 15 mg by mouth daily. Indications: OSTEOARTHRITIS, Disp: , Rfl:     brimonidine (ALPHAGAN P) 0.1 % ophthalmic solution, Administer 1 Drop to right eye two (2) times a day. Take / use AM day of surgery  per anesthesia protocols. , Disp: , Rfl:    glimepiride (AMARYL) 2 mg tablet, Take 1 mg by mouth daily. Indications: type 2 diabetes mellitus, Disp: , Rfl:     b complex vitamins tablet, Take 1 Tab by mouth daily. , Disp: , Rfl:     aspirin 81 mg Tab, Take 81 mg by mouth daily. Take / use 81 mg AM day of surgery  per anesthesia protocols. , Disp: , Rfl:    Date Last Reviewed: 4-30-18   EXAMINATION:   5/2/2018   Observation/Orthostatic Postural Assessment: unremarkable. Palpation: unremarkable. ROM: Not measured. Strength:   L Shoulder Forward elevation, abd: 3- in standing             CLINICAL DECISION MAKING:   Outcome Measure: Tool Used: Disabilities of the Arm, Shoulder and Hand (DASH) Questionnaire - Quick Version  Score:  Initial: 25/55 or 32% disability 24/55 or30% disability (1/12/18) 20/55 or 20% disability (3-5-18) Most recent: 22/55 or 25% disability (4-2-18)   Interpretation of Score: The DASH is designed to measure the activities of daily living in person's with upper extremity dysfunction or pain. Each section is scored on a 1-5 scale, 5 representing the greatest disability. The scores of each section are added together for a total score of 55. This number is divided by 11, followed by subtracting 1 and multiplying by 25 to get a percent score of disability. This value represents the percentage disability: 0-20% minimal disability; 20-40% moderate disability; 40-60% severe disability; % dependent for care or exaggerated symptom behavior. Minimal detectable change is 12%. Score 11 12-19 20-28 29-37 38-45 46-54 55   Modifier CH CI CJ CK CL CM CN ?    Carrying, Moving, and Handling Objects:     - CURRENT STATUS: CJ - 20%-39% impaired, limited or restricted (Based on objective data, assessment of progress---4/27/18)     - GOAL STATUS: CI - 1%-19% impaired, limited or restricted    - D/C STATUS:  ---------------To be determined---------------    Medical Necessity:   · Patient is expected to demonstrate progress in strength and range of motion to promote increased functional use of his L/non-dominant UE. · Co-morbid R rotator cuff pathology and lumbar spine pathology will most likely complicate progress. · Lives alone and will need to be able function independently. Reason for Services/Other Comments:  · Patient continues to require skilled intervention due to the complexity of his history of shoulder pathology and the complexity of this revision reverse TSA. · He will need safe progression of post-op rehab to maximize return to functional use of his L UE.  · He is pending to have R shoulder arthroplasty in the future          TREATMENT:   (In addition to Assessment/Re-Assessment sessions the following treatments were rendered)  5/2/2018   Pre-treatment Symptoms/Complaints: Says he is tired and his shoulder is a little sore. Pain: Initial:    No pain, just soreness Post Session:  No pain     Therapeutic Exercise (20 Minutes): Exercises for L shoulder, deltoid strength done as per grid. Short rep sets secondary to fatigue. Manual guiding and assist for terminal ranges to humerus and shoulder girdle re-positioning for each as needed. HEP:He is to continue with his HEP. He verbalizes understanding.    Date  4-9-18 Date  4-11-18 Date  4-12-18 Date  4-16-18 Date  4-18-18 Date  4-20-18 Date  4-23-18 Date  4-25-18 Date  4-27-18 Date  4-30-18 Date  5-2-19   Activity/Exercise              UBE UBE L. 3 x15' UBE L. 3  x15' L 3 x10' L3. x15' L. 3 x15' L.3 x15' L. 3 x10' L. 3 x15' L. 3 x15' L. 3 x15' -   Shoulder ROM - - Assisted stretch as above  Assisted stretching as above Assisted as above AROM wall slides, 4 ways x10 ea AROM to AAROM wall slides, 4 ways 2x10 ea - - - -   Rhythmic Stabilization - - Rhyth stab in supine at 0-deg ER, 100 deg flexion holding 2#  X\" each Rhyth stab in supine at 0-deg ER, 100 deg flexion, 90-deg abd holding 2# 6x15-20\" each Rhyth stab in supine at 0-deg ER, 100 deg flexion, 90-deg abd holding 2# 6x15-20\" each Ball on wall oscil 90-deg forward, 4-ways 2x30 ea; Wall dribble 6 to 9 o'clock up/down 3x10 each Ball on wall oscil 90-deg forward, and abd 4-ways 2x30 ea; Wall dribble 10 to 6 o'clock  3x15 each - - - -   Mid and lower trap - - Side-lying  2# 1x5-7 ea Side-lying  2# 3x10 ea Side-lying  2# 1x15 ea - - Side-lying  L:  2# x15 ea,   3# 2x5-10 ea;  R: 0# 3x10 ea Side-lying  L Mid trap 3# 2x12-15  Low trap 3# 3x5 ea - Side-lying  L Mid trap 3# 2x5-8   Low trap 3# 2x4-6   Push up + - - Supine press   2# 1x30  5# 2x10 Supine press  2# with rhyth stab   2x18-20 Supine press  2# with rhyth stab   2x15 - - Supine press  L 5# x15  6# x15  7# x10  R 2# 3x5-10 Supine press  L 6# x15  7# x15  R 2# 1x10 - Supine press  L 7# 2x6   Side lying shoulder ABD - Standing unilat  2x10 Side-lying  2# x10;  Standing  1# x30 total Side-lying  2# with rhyth stab x18;  Standing abd+D1 to top of head, AAROM 3x10 Side-lying  2# 1x10 AAROM guiding - - Side-lying  R AAROM 2x10 - - -   Shoulder flexion Wall slide  3x5 Standing scaption 2x5-6 Standing scaption  1# 3x10 45-deg beach chair short arc anterior deltoid  X10,  2# 2x10   standing overhead lift to top shelf  1# AAROM conentric  2x5 Forward lift to shoulder height shelf   1# 1x10, 2# 2x10; To overhead shelf, AAROM  1# 2x8 - Standing full arc active eccentrics  2x10 Standing full arc active eccentrics  3x5-8 Forward lift to shoulder height shelf   1# 4x5; To abd  1# 3x6-8;   Wall slide full range  1x10  Yellow band  4x5 60-deg beach chair  2# AAROM  3x5   Horz ABD standing Horiz Add  3x5-6   - - - Supine  AROM x10; with manual resistance 5'x10 ea - - - - - -   Shoulder EXT Ext/add behind back standing AAROM  3x5,   Yellow tubing eccentrics  3x5 Standing unilat  Yellow tubing  extension x30,   Ext/add behind back x30 Standing unilat  red tubing  extension x30,   Ext/add behind back x30 - - Standing behind back assisted place/active hold return 2x10 Standing behind back assisted place/active hold return 2x10 - - Standing behind back assisted place/active hold return 2x10 -   Row/pulling - - - - - - - - - - -   Elbow EXT - - - - - - - - - - -   Side lying ER - - - - - - - R: 0# 2x15-20 R 3x10 - 3# AAROM  2x6   IR with band  - Standing in shoulder neutral,  Single-yellow with stabilization x15 Standing in shoulder neutral,  Single-yellow with stabilization x30 total - - - - IR side-lyiing  R: 0# x20  2# 2x15 Side-lying   R 2# 2x15 - -   ER with band  - Standing in shoulder neutral,  Single-yellow with stabilization x15 Standing in shoulder neutral,  Single-yellow with stabilization x30 total - - - - - - - -   B bicep curls - - - - - - - - - - -   Diagonal Flex Standing  AAROM D1 toward R shoulder  3x5,  Yellow tubing active eccentrics  3x5 Standing  short arc  Single yellow   D1 flex 1x10   Standing  short arc  Single yellow   D1 flex 3x10 - Supine D1 and D2 AROM x10; with manual resistance 5'x10 ea - - - - Standing at wall  x10 ea -   Diagonal Ext Standing   AAROM toward R hip, active eccentrics   yellow  3x5-6 Standing  short arc add  Single yellow 1x10,   Short arc D2 ext 1x10 Standing  short arc add  Single yellow 3x10,   Short arc D2 ext 3x10 - Supine D1 and D2 AROM x10; with manual resistance 5'x10 ea - - - -  --   CKC UE - - - - - - - -   -   ---  Treament/Session Assessment:    · Response to Treatment: Short session secondary to therapist late arrival and his fatigue. Did not over push exercises based on his muscle performance today. · Compliance with Program/Exercises: Appear compliant. · Recommendations/Intent for next treatment session: We will continue with shoulder progressive strengthening for functional use of the L UE. He is to follow up with Dr. Campbell Monday this morning.    Total Treatment Duration: 20 minutes  PT Patient Time In/Time Out  Time In: 0920  Time Out: [address] Hammonds, PT, MSPT, OCS

## 2018-05-04 ENCOUNTER — APPOINTMENT (OUTPATIENT)
Dept: PHYSICAL THERAPY | Age: 83
End: 2018-05-04
Payer: MEDICARE

## 2018-05-07 ENCOUNTER — HOSPITAL ENCOUNTER (OUTPATIENT)
Dept: PHYSICAL THERAPY | Age: 83
Discharge: HOME | End: 2018-05-07
Payer: MEDICARE

## 2018-05-07 PROCEDURE — 97110 THERAPEUTIC EXERCISES: CPT

## 2018-05-07 NOTE — PROGRESS NOTES
Renita Hein  : 1933  [place] / [place] / Product Type: Medicare /  [address]  at [facility] [employer]  [address] [facility], [address] [place] [address],[address], [employer] [facility] [address].  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 Fall Risk Score: 2 (? 5 = High Risk)  MD Orders: Eval and Treat; HEP; ROM; Strength; \"full motion/full strength\" (3-21-18) MEDICAL/REFERRING DIAGNOSIS:REM Implant Lt Shldr/ Revision TSA    DATE OF ONSET: 10-13-17  REFERRING PHYSICIAN: Benitez Herron MD  RETURN PHYSICIAN APPOINTMENT:~18     PROGRESS ASSESSMENT (18):  Mr. Jesse Baxter has now attended 26 total visits to date. He is about 6 months s/p removal of implant of L shoulder hemiarthroplasty, and revision L TSA with a reverse Delta Xtend prosthesis, and latissimus dorsi and teres major tendon transfer. He continues to have PROM in functional ranges. Weakness continues to be his primary impairment. We have been working shoulder girdle, deltoid strength for improved humeral elevation function. This is slowly progress. We have also been working on across body and behind the back reaching for grooming and dressing ADL's. This is difficult. His R shoulder is limited by a rotator cuff tear as well. This makes independent function difficult still at this time. He is made progress toward his goals, but not fully achieved yet. Again, his R/dominant UE has known rotator cuff pathology, which is severely limiting functional use of this UE. He also has co-morbid lumbar spine pathology with radicular weakness to L4-5 levels that affects his gait and stability. He lives alone, and needs to be able to perform basic personal care and household ADL's independently. He will benefit from continued PT to further progress reverse TSA rehab to promote safe return to functional use of the L UE, and to prep for pending R shoulder surgery. PROBLEM LIST (Impacting functional limitations):  1. Decreased L shoulder ROM   2. Weakness L shoulder INTERVENTIONS PLANNED:  1. Manual therapies, therapeutic exercises, HEP for ROM    2. Therapeutic exercises and HEP for strength   TREATMENT PLAN:  Effective Dates: 3/5/2018 to 5/18/2018. Frequency/Duration: 3 times a week for an additional 8 weeks to further progress strengthening for functional independence. GOALS: (Goals have been discussed and agreed upon with patient.)  Short-Term Functional Goals: Time Frame: 6 weeks   1. L shoulder AROM forward elevation greater than 120 degrees to progress into functional ranges. Progressing, ongoing 4/27/18  2. Demonstrate good L shoulder forward elevation strength for reach to head with grooming ADL's and reaching to shoulder height with household ADL's. Met with compensation 4-4-18  3. Independent with initial HEP. Met 1/12/18  Discharge Goals: Time Frame: 12 weeks  1. Score less than 20% on the DASH. Ongoing 4/27/18  2. L shoulder AROM forward elevation greater than 135 degrees for improved use with household activities reaching overhead. Ongoing 4/27/18  3. Demonstrate good functional L shoulder strength and endurance for improved use of his L UE with his normalized daily activities. Ongoing 4/27/18  4. Independent with advanced shoulder HEP for continued self-management. Ongoing 4/27/18  Rehabilitation Potential For Stated Goals: Good              The information in this section was collected on 12/18/17 (except where otherwise noted). HISTORY:   History of Present Injury/Illness (Reason for Referral): Long, complex history of L shoulder pain and dysfunction with rotator cuff pathology.  He was s/p L rotator cuff repair on 8/28/15. He had a fall to both hands when walking up steps at his house in June 2016, resulting complete cuff tears B shoulders. L shoulder hemiarthroplasty done 7/2016. He did rehab, but poor outcome, and was still unable to raise and use L arm over shoulder. This revision Reverse TSA on L was done on 10/13/2017. Was in the hospital 3 days, then [facility] THE [facility] to [facility], and finally [facility] to home on 12/15/17. He had complication of a hemearthrosis with drop in hematocrit and hemoglobin. It was aspirated. Blood values improved. He as been doing OT post-op shoulder rehab when in the nursing home, consisting of shoulder/UE ROM and strengthening. He has not done HEP yet. He has co-morbid R rotator cuff pathology limiting functional use of his dominant arm, and anticipates shoulder arthroplasty on this in the near future. he also has co-morbid lumbar pathology with L L4-5 myotome weakness resulting in L foot drop. He lives alone and needs to be able to manage all his ADL's independently. Past Medical History/Comorbidities: Mr. Eveline Castro  has a past medical history of Anemia (fall 2016); Arthritis; CAD (coronary artery disease) (1997); Carotid stenosis (04/2017); Diabetes ([facility] [address] (05/1997); Diverticulosis (12/2011); GERD (gastroesophageal reflux disease); Glaucoma; Heart murmur; Hiatal hernia; History of kidney stones; Hypercholesteremia; Hypertension; Hypothyroid; MI (myocardial infarction); TIA (transient ischemic attack) (07/2016); Unspecified sleep apnea; Urethral stenosis; and Vasovagal syncope.  Mr. Eveline Castro  has a past surgical history that includes heart catheterization (4587,[id number], 2012, 2013); tonsillectomy (1938); cataract removal (1987 and 1990); lap cholecystectomy (2003); hernia repair (1944); urological (1996); orthopaedic (Left); orthopaedic ([id number]); shoulder arthroscopy (Left, 2015); knee replacement (Bilateral, 2002, 2008); hemorrhoidectomy (1987); heent (Right); carpal tunnel release (Right, 1968); carpal tunnel release (Left, 2012); bunionectomy (Left); rotator cuff repair (Right, 1995); rotator cuff repair (Left, 2000); rotator cuff repair (Right, 2007); and colectomy (01/13/2017). Social History/Living Environment:  Lives alone. Has supportive children and grandchildren living in town. Prior Level of Function/Work/Activity: Limited overhead use of L UE prior to this surgery. Limited use of R UE currently secondary to cuff pathology. Dominant Side: RIGHT  Current Medications: hydromorphone started 4/17 for R toe pain.   simethicone (GAS-X) 125 mg capsule, Take 125 mg by mouth four (4) times daily as needed for Flatulence. , Disp: , Rfl:     loperamide (IMODIUM) 2 mg capsule, Take 2 mg by mouth four (4) times daily as needed for Diarrhea., Disp: , Rfl:     diphenoxylate-atropine (LOMOTIL) 2.5-0.025 mg per tablet, Take 1 Tab by mouth two (2) times daily as needed for Diarrhea., Disp: , Rfl:     atorvastatin (LIPITOR) 40 mg tablet, Take 40 mg by mouth nightly., Disp: , Rfl:     levothyroxine (SYNTHROID) 50 mcg tablet, Take 50 mcg by mouth Daily (before breakfast). , Disp: , Rfl:     Psyllium Husk-Sucrose 3.4 gram/7 gram powd, Take 3 Caps by mouth two (2) times a day., Disp: , Rfl:     omega-3 fatty acids-vitamin e (FISH OIL) 1,000 mg cap, Take 2 Caps by mouth daily. , Disp: , Rfl:     bimatoprost (LUMIGAN) 0.01 % ophthalmic drops, Administer 1 Drop to right eye nightly., Disp: , Rfl:     timolol (TIMOPTIC) 0.5 % ophthalmic solution, Administer 1 Drop to right eye every morning., Disp: , Rfl:     Omeprazole delayed release (PRILOSEC D/R) 20 mg tablet, Take 20 mg by mouth Daily (before dinner). , Disp: , Rfl:     meloxicam (MOBIC) 15 mg tablet, Take 15 mg by mouth daily. Indications: OSTEOARTHRITIS, Disp: , Rfl:     brimonidine (ALPHAGAN P) 0.1 % ophthalmic solution, Administer 1 Drop to right eye two (2) times a day. Take / use AM day of surgery  per anesthesia protocols. , Disp: , Rfl:    glimepiride (AMARYL) 2 mg tablet, Take 1 mg by mouth daily. Indications: type 2 diabetes mellitus, Disp: , Rfl:     b complex vitamins tablet, Take 1 Tab by mouth daily. , Disp: , Rfl:     aspirin 81 mg Tab, Take 81 mg by mouth daily. Take / use 81 mg AM day of surgery  per anesthesia protocols. , Disp: , Rfl:    Date Last Reviewed: 4-30-18   EXAMINATION:   5/7/2018   Observation/Orthostatic Postural Assessment: unremarkable. Palpation: unremarkable. ROM: Not measured. Strength:   L Shoulder Forward elevation, abd: 3- in standing             CLINICAL DECISION MAKING:   Outcome Measure: Tool Used: Disabilities of the Arm, Shoulder and Hand (DASH) Questionnaire - Quick Version  Score:  Initial: 25/55 or 32% disability 24/55 or30% disability (1/12/18) 20/55 or 20% disability (3-5-18) Most recent: 22/55 or 25% disability (4-2-18)   Interpretation of Score: The DASH is designed to measure the activities of daily living in person's with upper extremity dysfunction or pain. Each section is scored on a 1-5 scale, 5 representing the greatest disability. The scores of each section are added together for a total score of 55. This number is divided by 11, followed by subtracting 1 and multiplying by 25 to get a percent score of disability. This value represents the percentage disability: 0-20% minimal disability; 20-40% moderate disability; 40-60% severe disability; % dependent for care or exaggerated symptom behavior. Minimal detectable change is 12%. Score 11 12-19 20-28 29-37 38-45 46-54 55   Modifier CH CI CJ CK CL CM CN ?    Carrying, Moving, and Handling Objects:     - CURRENT STATUS: CJ - 20%-39% impaired, limited or restricted (Based on objective data, assessment of progress---4/27/18)     - GOAL STATUS: CI - 1%-19% impaired, limited or restricted    - D/C STATUS:  ---------------To be determined---------------    Medical Necessity:   · Patient is expected to demonstrate progress in strength and range of motion to promote increased functional use of his L/non-dominant UE. · Co-morbid R rotator cuff pathology and lumbar spine pathology will most likely complicate progress. · Lives alone and will need to be able function independently. Reason for Services/Other Comments:  · Patient continues to require skilled intervention due to the complexity of his history of shoulder pathology and the complexity of this revision reverse TSA. · He will need safe progression of post-op rehab to maximize return to functional use of his L UE.  · He is pending to have R shoulder arthroplasty in the future          TREATMENT:   (In addition to Assessment/Re-Assessment sessions the following treatments were rendered)  5/7/2018   Pre-treatment Symptoms/Complaints: Stated that his R shoulder has been bothering him more lately. Has some soreness in both shoulders and has to do yardwork today. Pain: Initial:    No pain, just soreness Post Session:  No pain     Therapeutic Exercise ( 35 minutes): Exercises for L shoulder, deltoid strength done as per grid. Manual assist as needed to complete full range of motion when performing deltoid strengthening. Manual and tactile cues to keep shoulder from elevating when performing side-lying abductions and external rotations. HEP:He is to continue with his HEP. He verbalizes understanding.    Date  4-9-18 Date  4-11-18 Date  4-12-18 Date  4-16-18 Date  4-18-18 Date  4-20-18 Date  4-23-18 Date  4-25-18 Date  4-27-18 Date  4-30-18 Date  5-2-19 Date  5-7-18   Activity/Exercise               UBE UBE L. 3 x15' UBE L. 3  x15' L 3 x10' L3. x15' L. 3 x15' L.3 x15' L. 3 x10' L. 3 x15' L. 3 x15' L. 3 x15' - L. 3 x 15'   Shoulder ROM - - Assisted stretch as above  Assisted stretching as above Assisted as above AROM wall slides, 4 ways x10 ea AROM to AAROM wall slides, 4 ways 2x10 ea - - - - Wall slides AROM to AAROM 2 x 10    Rhythmic Stabilization - - Rhyth stab in supine at 0-deg ER, 100 deg flexion holding 2#  X\" each Rhyth stab in supine at 0-deg ER, 100 deg flexion, 90-deg abd holding 2# 6x15-20\" each Rhyth stab in supine at 0-deg ER, 100 deg flexion, 90-deg abd holding 2# 6x15-20\" each Ball on wall oscil 90-deg forward, 4-ways 2x30 ea; Wall dribble 6 to 9 o'clock up/down 3x10 each Ball on wall oscil 90-deg forward, and abd 4-ways 2x30 ea; Wall dribble 10 to 6 o'clock  3x15 each - - - - Supine x 20\" x 2 L shoulder in 100 deg flexion   Mid and lower trap - - Side-lying  2# 1x5-7 ea Side-lying  2# 3x10 ea Side-lying  2# 1x15 ea - - Side-lying  L:  2# x15 ea,   3# 2x5-10 ea;  R: 0# 3x10 ea Side-lying  L Mid trap 3# 2x12-15  Low trap 3# 3x5 ea - Side-lying  L Mid trap 3# 2x5-8   Low trap 3# 2x4-6 Side-lying L mid-trap  3# 37 x 6-8  Low trap  3#  2x 5-6   Push up + - - Supine press   2# 1x30  5# 2x10 Supine press  2# with rhyth stab   2x18-20 Supine press  2# with rhyth stab   2x15 - - Supine press  L 5# x15  6# x15  7# x10  R 2# 3x5-10 Supine press  L 6# x15  7# x15  R 2# 1x10 - Supine press  L 7# 2x6 Supine L 7# 3 x 5    Side lying shoulder ABD - Standing unilat  2x10 Side-lying  2# x10;  Standing  1# x30 total Side-lying  2# with rhyth stab x18;  Standing abd+D1 to top of head, AAROM 3x10 Side-lying  2# 1x10 AAROM guiding - - Side-lying  R AAROM 2x10 - - - Side-lying 3# 3 x 8 AAROM   Shoulder flexion Wall slide  3x5 Standing scaption 2x5-6 Standing scaption  1# 3x10 45-deg beach chair short arc anterior deltoid  X10,  2# 2x10   standing overhead lift to top shelf  1# AAROM conentric  2x5 Forward lift to shoulder height shelf   1# 1x10, 2# 2x10; To overhead shelf, AAROM  1# 2x8 - Standing full arc active eccentrics  2x10 Standing full arc active eccentrics  3x5-8 Forward lift to shoulder height shelf   1# 4x5; To abd  1# 3x6-8;   Wall slide full range  1x10  Yellow band  4x5 60-deg beach chair  2# AAROM  3x5 Forward lift to shoulder heigh 1# 3 x 7 L   Horz ABD standing Horiz Add  3x5-6   - - - Supine  AROM x10; with manual resistance 5'x10 ea - - - - - -    Shoulder EXT Ext/add behind back standing AAROM  3x5,   Yellow tubing eccentrics  3x5 Standing unilat  Yellow tubing  extension x30,   Ext/add behind back x30 Standing unilat  red tubing  extension x30,   Ext/add behind back x30 - - Standing behind back assisted place/active hold return 2x10 Standing behind back assisted place/active hold return 2x10 - - Standing behind back assisted place/active hold return 2x10 - Standing behind back assisted place/active hold return 2 x 10   Row/pulling - - - - - - - - - - -    Elbow EXT - - - - - - - - - - -    Side lying ER - - - - - - - R: 0# 2x15-20 R 3x10 - 3# AAROM  2x6 3# AAROM 2 x 6 L   IR with band  - Standing in shoulder neutral,  Single-yellow with stabilization x15 Standing in shoulder neutral,  Single-yellow with stabilization x30 total - - - - IR side-lyiing  R: 0# x20  2# 2x15 Side-lying   R 2# 2x15 - -    ER with band  - Standing in shoulder neutral,  Single-yellow with stabilization x15 Standing in shoulder neutral,  Single-yellow with stabilization x30 total - - - - - - - -    B bicep curls - - - - - - - - - - -    Diagonal Flex Standing  AAROM D1 toward R shoulder  3x5,  Yellow tubing active eccentrics  3x5 Standing  short arc  Single yellow   D1 flex 1x10   Standing  short arc  Single yellow   D1 flex 3x10 - Supine D1 and D2 AROM x10; with manual resistance 5'x10 ea - - - - Standing at wall  x10 ea -    Diagonal Ext Standing   AAROM toward R hip, active eccentrics   yellow  3x5-6 Standing  short arc add  Single yellow 1x10,   Short arc D2 ext 1x10 Standing  short arc add  Single yellow 3x10,   Short arc D2 ext 3x10 - Supine D1 and D2 AROM x10; with manual resistance 5'x10 ea - - - -  --    CKC UE - - - - - - - -   -    ---  Treament/Session Assessment:    · Response to Treatment: Good performance with exercises. Patient demonstrates very good effort.  Fatigues quickly with resisted flexion. · Compliance with Program/Exercises: Appear compliant. · Recommendations/Intent for next treatment session: We will continue with shoulder progressive strengthening for functional use of the L UE. He is to follow up with Dr. Joaquina Dominguez this morning.    Total Treatment Duration: 35 minutes  PT Patient Time In/Time Out  Time In: 1025  Time Out: [address], PT, DPT

## 2018-05-09 ENCOUNTER — HOSPITAL ENCOUNTER (OUTPATIENT)
Dept: PHYSICAL THERAPY | Age: 83
Discharge: HOME | End: 2018-05-09
Payer: MEDICARE

## 2018-05-09 PROCEDURE — 97110 THERAPEUTIC EXERCISES: CPT

## 2018-05-09 NOTE — PROGRESS NOTES
Leatrice Cushing  : 1933  [place] / [place] / Product Type: Medicare /  [address]  at [address] [place] [address] Rd  [address] [facility], [address], [place].  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 Fall Risk Score: 2 (? 5 = High Risk)  MD Orders: Eval and Treat; HEP; ROM; Strength; \"full motion/full strength\"  MEDICAL/REFERRING DIAGNOSIS:REM Implant Lt Shldr/ Revision TSA    DATE OF ONSET: 10-13-17  REFERRING PHYSICIAN: Ariadna Newberry MD  RETURN PHYSICIAN APPOINTMENT: 2019     PROGRESS ASSESSMENT (18):  Mr. Raffy Garner has now attended 26 total visits to date. He is about 6 months s/p removal of implant of L shoulder hemiarthroplasty, and revision L TSA with a reverse Delta Xtend prosthesis, and latissimus dorsi and teres major tendon transfer. He continues to have PROM in functional ranges. Weakness continues to be his primary impairment. We have been working shoulder girdle, deltoid strength for improved humeral elevation function. This is slowly progress. We have also been working on across body and behind the back reaching for grooming and dressing ADL's. This is difficult. His R shoulder is limited by a rotator cuff tear as well. This makes independent function difficult still at this time. He is made progress toward his goals, but not fully achieved yet. Again, his R/dominant UE has known rotator cuff pathology, which is severely limiting functional use of this UE. He also has co-morbid lumbar spine pathology with radicular weakness to L4-5 levels that affects his gait and stability.  He lives alone, and needs to be able to perform basic personal care and household ADL's independently. He will benefit from continued PT to further progress reverse TSA rehab to promote safe return to functional use of the L UE, and to prep for pending R shoulder surgery. PROBLEM LIST (Impacting functional limitations):  1. Decreased L shoulder ROM   2. Weakness L shoulder INTERVENTIONS PLANNED:  1. Manual therapies, therapeutic exercises, HEP for ROM    2. Therapeutic exercises and HEP for strength   TREATMENT PLAN:  Effective Dates: 3/5/2018 to 5/18/2018. Frequency/Duration: 3 times a week for an additional 8 weeks to further progress strengthening for functional independence. GOALS: (Goals have been discussed and agreed upon with patient.)  Short-Term Functional Goals: Time Frame: 6 weeks   1. L shoulder AROM forward elevation greater than 120 degrees to progress into functional ranges. Progressing, ongoing 4/27/18  2. Demonstrate good L shoulder forward elevation strength for reach to head with grooming ADL's and reaching to shoulder height with household ADL's. Met with compensation 4-4-18  3. Independent with initial HEP. Met 1/12/18  Discharge Goals: Time Frame: 12 weeks  1. Score less than 20% on the DASH. Ongoing 4/27/18  2. L shoulder AROM forward elevation greater than 135 degrees for improved use with household activities reaching overhead. Ongoing 4/27/18  3. Demonstrate good functional L shoulder strength and endurance for improved use of his L UE with his normalized daily activities. Ongoing 4/27/18  4. Independent with advanced shoulder HEP for continued self-management. Ongoing 4/27/18  Rehabilitation Potential For Stated Goals: Good              The information in this section was collected on 12/18/17 (except where otherwise noted). HISTORY:   History of Present Injury/Illness (Reason for Referral): Long, complex history of L shoulder pain and dysfunction with rotator cuff pathology.  He was s/p L rotator cuff repair on 8/28/15. He had a fall to both hands when walking up steps at his house in June 2016, resulting complete cuff tears B shoulders. L shoulder hemiarthroplasty done 7/2016. He did rehab, but poor outcome, and was still unable to raise and use L arm over shoulder. This revision Reverse TSA on L was done on 10/13/2017. Was in the hospital 3 days, then [facility] to [facility], and finally [facility] to home on 12/15/17. He had complication of a hemearthrosis with drop in hematocrit and hemoglobin. It was aspirated. Blood values improved. He as been doing OT post-op shoulder rehab when in the nursing home, consisting of shoulder/UE ROM and strengthening. He has not done HEP yet. He has co-morbid R rotator cuff pathology limiting functional use of his dominant arm, and anticipates shoulder arthroplasty on this in the near future. he also has co-morbid lumbar pathology with L L4-5 myotome weakness resulting in L foot drop. He lives alone and needs to be able to manage all his ADL's independently. Past Medical History/Comorbidities: Mr. Yeison Reece  has a past medical history of Anemia (fall 2016); Arthritis; CAD (coronary artery disease) (1997); Carotid stenosis (04/2017); Diabetes ([facility] [address] (05/1997); Diverticulosis (12/2011); GERD (gastroesophageal reflux disease); Glaucoma; Heart murmur; Hiatal hernia; History of kidney stones; Hypercholesteremia; Hypertension; Hypothyroid; MI (myocardial infarction); TIA (transient ischemic attack) (07/2016); Unspecified sleep apnea; Urethral stenosis; and Vasovagal syncope.  Mr. Yeison Reece  has a past surgical history that includes heart catheterization ([id number], 2012, 2013); tonsillectomy (1938); cataract removal (1987 and 1990); lap cholecystectomy (2003); hernia repair (1944); urological (1996); orthopaedic (Left); orthopaedic ([id number]); shoulder arthroscopy (Left, 2015); knee replacement (Bilateral, 2002, 2008); hemorrhoidectomy (1987); heent (Right); carpal tunnel release (Right, 1968); carpal tunnel release (Left, 2012); bunionectomy (Left); rotator cuff repair (Right, 1995); rotator cuff repair (Left, 2000); rotator cuff repair (Right, 2007); and colectomy (01/13/2017). Social History/Living Environment:  Lives alone. Has supportive children and grandchildren living in town. Prior Level of Function/Work/Activity: Limited overhead use of L UE prior to this surgery. Limited use of R UE currently secondary to cuff pathology. Dominant Side: RIGHT  Current Medications: hydromorphone started 4/17 for R toe pain.   simethicone (GAS-X) 125 mg capsule, Take 125 mg by mouth four (4) times daily as needed for Flatulence. , Disp: , Rfl:     loperamide (IMODIUM) 2 mg capsule, Take 2 mg by mouth four (4) times daily as needed for Diarrhea., Disp: , Rfl:     diphenoxylate-atropine (LOMOTIL) 2.5-0.025 mg per tablet, Take 1 Tab by mouth two (2) times daily as needed for Diarrhea., Disp: , Rfl:     atorvastatin (LIPITOR) 40 mg tablet, Take 40 mg by mouth nightly., Disp: , Rfl:     levothyroxine (SYNTHROID) 50 mcg tablet, Take 50 mcg by mouth Daily (before breakfast). , Disp: , Rfl:     Psyllium Husk-Sucrose 3.4 gram/7 gram powd, Take 3 Caps by mouth two (2) times a day., Disp: , Rfl:     omega-3 fatty acids-vitamin e (FISH OIL) 1,000 mg cap, Take 2 Caps by mouth daily. , Disp: , Rfl:     bimatoprost (LUMIGAN) 0.01 % ophthalmic drops, Administer 1 Drop to right eye nightly., Disp: , Rfl:     timolol (TIMOPTIC) 0.5 % ophthalmic solution, Administer 1 Drop to right eye every morning., Disp: , Rfl:     Omeprazole delayed release (PRILOSEC D/R) 20 mg tablet, Take 20 mg by mouth Daily (before dinner). , Disp: , Rfl:     meloxicam (MOBIC) 15 mg tablet, Take 15 mg by mouth daily. Indications: OSTEOARTHRITIS, Disp: , Rfl:     brimonidine (ALPHAGAN P) 0.1 % ophthalmic solution, Administer 1 Drop to right eye two (2) times a day. Take / use AM day of surgery  per anesthesia protocols. , Disp: , Rfl:    glimepiride (AMARYL) 2 mg tablet, Take 1 mg by mouth daily. Indications: type 2 diabetes mellitus, Disp: , Rfl:     b complex vitamins tablet, Take 1 Tab by mouth daily. , Disp: , Rfl:     aspirin 81 mg Tab, Take 81 mg by mouth daily. Take / use 81 mg AM day of surgery  per anesthesia protocols. , Disp: , Rfl:    Date Last Reviewed: 5-9-18   EXAMINATION:   5/9/2018   Observation/Orthostatic Postural Assessment: unremarkable. Palpation: unremarkable. ROM: Not measured. Strength:   L Shoulder Forward elevation, abd: 3- in standing             CLINICAL DECISION MAKING:   Outcome Measure: Tool Used: Disabilities of the Arm, Shoulder and Hand (DASH) Questionnaire - Quick Version  Score:  Initial: 25/55 or 32% disability 24/55 or30% disability (1/12/18) 20/55 or 20% disability (3-5-18) Most recent: 22/55 or 25% disability (4-2-18)   Interpretation of Score: The DASH is designed to measure the activities of daily living in person's with upper extremity dysfunction or pain. Each section is scored on a 1-5 scale, 5 representing the greatest disability. The scores of each section are added together for a total score of 55. This number is divided by 11, followed by subtracting 1 and multiplying by 25 to get a percent score of disability. This value represents the percentage disability: 0-20% minimal disability; 20-40% moderate disability; 40-60% severe disability; % dependent for care or exaggerated symptom behavior. Minimal detectable change is 12%. Score 11 12-19 20-28 29-37 38-45 46-54 55   Modifier CH CI CJ CK CL CM CN ?    Carrying, Moving, and Handling Objects:     - CURRENT STATUS: CJ - 20%-39% impaired, limited or restricted (Based on objective data, assessment of progress---4/27/18)     - GOAL STATUS: CI - 1%-19% impaired, limited or restricted    - D/C STATUS:  ---------------To be determined---------------    Medical Necessity:   · Patient is expected to demonstrate progress in strength and range of motion to promote increased functional use of his L/non-dominant UE. · Co-morbid R rotator cuff pathology and lumbar spine pathology will most likely complicate progress. · Lives alone and will need to be able function independently. Reason for Services/Other Comments:  · Patient continues to require skilled intervention due to the complexity of his history of shoulder pathology and the complexity of this revision reverse TSA. · He will need safe progression of post-op rehab to maximize return to functional use of his L UE.  · He is pending to have R shoulder arthroplasty in the future          TREATMENT:   (In addition to Assessment/Re-Assessment sessions the following treatments were rendered)  5/9/2018   Reports having his follow up with Dr. Edmond Crawford last week. He is agreeable with holding on surgery for the R shoulder. He was provided with a shoulder HEP and green resistive band. He is to follow up again in a year. Pre-treatment Symptoms/Complaints: Says he had posterior shoulder pain on last visit, and again with the UBE this morning. This pain is sharp and only with activity. No pain when stops. Not haivn this pain prior. Did his HEP yesterday and no more than light work in his yard picking up sticks on prior to last visit. Drove to [facility] the end of the week last week without L shoulder issue. Pain: Initial:    No pain, just soreness Post Session:  No pain     Therapeutic Exercise (32 Minutes): Started on the UBE, but stopped by PT secondary to L posterior shoulder pain. Brief positional release technique to posterior deltoid for muscle restriction in supine and R side-lying positions, then PROM L shoulder while in side-lying (secondary to toe pain in supine). Exercises for L shoulder girdle strength done as per grid with decreased resistance and increased hold times for endurance.  Manual assist as needed to complete full range of motion when performing deltoid strengthening. HEP:He is to hold on his HEP until Friday's session. He verbalizes understanding. Date  5-2-19 Date  5-7-18 Date  5-9-18   Activity/Exercise      UBE - L. 3 x 15' Stopped due to pain   Shoulder ROM - Wall slides AROM to AAROM 2 x 10  Assisted as above   Rhythmic Stabilization - Supine x 20\" x 2 L shoulder in 100 deg flexion Side-lying 90-deg abd 3x20-30\" static and with active motion   Mid and lower trap Side-lying  L Mid trap 3# 2x5-8   Low trap 3# 2x4-6 Side-lying L mid-trap  3# 37 x 6-8  Low trap  3#  2x 5-6 Side-lying combo ER/middle trap UE lift, 3-part 1# long holds 2x5   Push up + Supine press  L 7# 2x6 Supine L 7# 3 x 5  45-deg beach chair press red tubing 2x8-10   Side lying shoulder ABD - Side-lying 3# 3 x 8 AAROM Side-lying 2# 2x10   Shoulder flexion 60-deg beach chair  2# AAROM  3x5 Forward lift to shoulder heigh 1# 3 x 7 L 45-deg beach chair overhead reach  1# 3x5;  Wall slide  3x5   Horz ABD -     Shoulder EXT - Standing behind back assisted place/active hold return 2 x 10 -   Row/pulling -  -   Elbow EXT -  -   Side lying ER 3# AAROM  2x6 3# AAROM 2 x 6 L -   IR with band  -  -   ER with band  -  -   B bicep curls -  -   Diagonal Flex -  --   Diagonal Ext --  -   CKC UE -  -     Treament/Session Assessment:    · Response to Treatment: Posterior L shoulder pain noted with the UBE today, so stopped. He is tender to the posterior deltoid, but not pain provocation with the exercises done today. Exercise emphasis no control of motions with lighter load vs increasing resistance. .  · Compliance with Program/Exercises: Appear compliant. Recommendations/Intent for next treatment session: We will continue with shoulder progressive strengthening for functional use of the L UE.     Total Treatment Duration: 35 minutes  PT Patient Time In/Time Out  Time In: 1040  Time Out: [id number]  [facility], PT, MSPT, OCS

## 2018-05-11 ENCOUNTER — HOSPITAL ENCOUNTER (OUTPATIENT)
Dept: PHYSICAL THERAPY | Age: 83
Discharge: HOME | End: 2018-05-11
Payer: MEDICARE

## 2018-05-11 PROCEDURE — G8986 CARRY D/C STATUS: HCPCS

## 2018-05-11 PROCEDURE — 97110 THERAPEUTIC EXERCISES: CPT

## 2018-05-11 PROCEDURE — G8985 CARRY GOAL STATUS: HCPCS

## 2018-05-11 NOTE — PROGRESS NOTES
Karan Johnson  : 1933  [place] / [place] PART [place] B / Product Type: Medicare /  [address]  at [facility]  [facility], [address], [place].  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 Fall Risk Score: 2 (? 5 = High Risk)  MD Orders: Eval and Treat; HEP; ROM; Strength; \"full motion/full strength\"  MEDICAL/REFERRING DIAGNOSIS:REM Implant Lt Shldr/ Revision TSA    DATE OF ONSET: 10-13-17  REFERRING PHYSICIAN: Andre Herron MD  RETURN PHYSICIAN APPOINTMENT: 2019     PROGRESS ASSESSMENT (18):  Mr. Derek Sánchez has attended 31 total visits to date. He is about 7 months s/p removal of implant of L shoulder hemiarthroplasty,   and revision L TSA with a reverse Delta Xtend prosthesis, and latissimus dorsi and teres major tendon transfer. He continues to have PROM in functional ranges. Weakness limiting humeral elevation and functional use of the L UE continues to be his primary impairment. We have been working shoulder girdle, deltoid strength for improved humeral elevation, functional use, and endurance. He is  a little better, but still limited by the L shoulder. Again, his R/dominant UE has known rotator cuff pathology, which is severely limiting functional use of this UE. He also has co-morbid lumbar spine pathology with radicular weakness to L4-5 levels that affects his gait and stability. He lives alone, and needs to be able to perform basic personal care and household ADL's independently.  Since his is nearly out of covered visits, and his progress has made little gain, we will follow up for HEP review and discuss DC plan. PROBLEM LIST (Impacting functional limitations):  1. Decreased L shoulder ROM   2. Weakness L shoulder INTERVENTIONS PLANNED:  1. Manual therapies, therapeutic exercises, HEP for ROM    2. Therapeutic exercises and HEP for strength   TREATMENT PLAN:  Effective Dates: 3/5/2018 to 5/18/2018. Frequency/Duration: 3 times a week for an additional 8 weeks to further progress strengthening for functional independence. GOALS: (Goals have been discussed and agreed upon with patient.)  Short-Term Functional Goals: Time Frame: 6 weeks   1. L shoulder AROM forward elevation greater than 120 degrees to progress into functional ranges. Not met 5/11/18  2. Demonstrate good L shoulder forward elevation strength for reach to head with grooming ADL's and reaching to shoulder height with household ADL's. Met with compensation 4-4-18  3. Independent with initial HEP. Met 1/12/18  Discharge Goals: Time Frame: 12 weeks  1. Score less than 20% on the DASH. No change 5/11/18  2. L shoulder AROM forward elevation greater than 135 degrees for improved use with household activities reaching overhead. Not met 5/11/18  3. Demonstrate good functional L shoulder strength and endurance for improved use of his L UE with his normalized daily  Progressed 5/11/18  4. Independent with advanced shoulder HEP for continued self-management. Met 5/11/18  Rehabilitation Potential For Stated Goals: Good              The information in this section was collected on 12/18/17 (except where otherwise noted). HISTORY:   History of Present Injury/Illness (Reason for Referral): Long, complex history of L shoulder pain and dysfunction with rotator cuff pathology. He was s/p L rotator cuff repair on 8/28/15. He had a fall to both hands when walking up steps at his house in June 2016, resulting complete cuff tears B shoulders. L shoulder hemiarthroplasty done 7/2016.  He did rehab, but poor outcome, and was still unable to raise and use L arm over shoulder. This revision Reverse TSA on L was done on 10/13/2017. Was in the hospital 3 days, then [facility] to [facility], and finally [facility] to home on 12/15/17. He had complication of a hemearthrosis with drop in hematocrit and hemoglobin. It was aspirated. Blood values improved. He as been doing OT post-op shoulder rehab when in the nursing home, consisting of shoulder/UE ROM and strengthening. He has not done HEP yet. He has co-morbid R rotator cuff pathology limiting functional use of his dominant arm, and anticipates shoulder arthroplasty on this in the near future. he also has co-morbid lumbar pathology with L L4-5 myotome weakness resulting in L foot drop. He lives alone and needs to be able to manage all his ADL's independently. Past Medical History/Comorbidities: Mr. Bhanu Gill  has a past medical history of Anemia (fall 2016); Arthritis; CAD (coronary artery disease) (1997); Carotid stenosis (04/2017); Diabetes ([facility] [address] (05/1997); Diverticulosis (12/2011); GERD (gastroesophageal reflux disease); Glaucoma; Heart murmur; Hiatal hernia; History of kidney stones; Hypercholesteremia; Hypertension; Hypothyroid; MI (myocardial infarction); TIA (transient ischemic attack) (07/2016); Unspecified sleep apnea; Urethral stenosis; and Vasovagal syncope. Mr. Bhanu Gill  has a past surgical history that includes heart catheterization (3409,9043, 2012, 2013); tonsillectomy (1938); cataract removal (1987 and 1990); lap cholecystectomy (2003); hernia repair (1944); urological (1996); orthopaedic (Left); orthopaedic ([id number]); shoulder arthroscopy (Left, 2015); knee replacement (Bilateral, 2002, 2008); hemorrhoidectomy (1987); heent (Right); carpal tunnel release (Right, 1968); carpal tunnel release (Left, 2012); bunionectomy (Left); rotator cuff repair (Right, 1995); rotator cuff repair (Left, 2000); rotator cuff repair (Right, 2007); and colectomy (01/13/2017).   Social History/Living Environment: Lives alone. Has supportive children and grandchildren living in town. Prior Level of Function/Work/Activity: Limited overhead use of L UE prior to this surgery. Limited use of R UE currently secondary to cuff pathology. Dominant Side: RIGHT  Current Medications: hydromorphone started 4/17 for R toe pain.   simethicone (GAS-X) 125 mg capsule, Take 125 mg by mouth four (4) times daily as needed for Flatulence. , Disp: , Rfl:     loperamide (IMODIUM) 2 mg capsule, Take 2 mg by mouth four (4) times daily as needed for Diarrhea., Disp: , Rfl:     diphenoxylate-atropine (LOMOTIL) 2.5-0.025 mg per tablet, Take 1 Tab by mouth two (2) times daily as needed for Diarrhea., Disp: , Rfl:     atorvastatin (LIPITOR) 40 mg tablet, Take 40 mg by mouth nightly., Disp: , Rfl:     levothyroxine (SYNTHROID) 50 mcg tablet, Take 50 mcg by mouth Daily (before breakfast). , Disp: , Rfl:     Psyllium Husk-Sucrose 3.4 gram/7 gram powd, Take 3 Caps by mouth two (2) times a day., Disp: , Rfl:     omega-3 fatty acids-vitamin e (FISH OIL) 1,000 mg cap, Take 2 Caps by mouth daily. , Disp: , Rfl:     bimatoprost (LUMIGAN) 0.01 % ophthalmic drops, Administer 1 Drop to right eye nightly., Disp: , Rfl:     timolol (TIMOPTIC) 0.5 % ophthalmic solution, Administer 1 Drop to right eye every morning., Disp: , Rfl:     Omeprazole delayed release (PRILOSEC D/R) 20 mg tablet, Take 20 mg by mouth Daily (before dinner). , Disp: , Rfl:     meloxicam (MOBIC) 15 mg tablet, Take 15 mg by mouth daily. Indications: OSTEOARTHRITIS, Disp: , Rfl:     brimonidine (ALPHAGAN P) 0.1 % ophthalmic solution, Administer 1 Drop to right eye two (2) times a day. Take / use AM day of surgery  per anesthesia protocols. , Disp: , Rfl:     glimepiride (AMARYL) 2 mg tablet, Take 1 mg by mouth daily. Indications: type 2 diabetes mellitus, Disp: , Rfl:     b complex vitamins tablet, Take 1 Tab by mouth daily. , Disp: , Rfl:     aspirin 81 mg Tab, Take 81 mg by mouth daily. Take / use 81 mg AM day of surgery  per anesthesia protocols. , Disp: , Rfl:    Date Last Reviewed: 5-9-18   EXAMINATION:   5/11/2018   Observation/Orthostatic Postural Assessment: Unchanged. Palpation: Not assessed. ROM:   LUE AROM  L Shoulder Flexion: 105 (forward elevation)  L Shoulder Internal Rotation:  (to posterior hip reaching behind back)  LUE PROM  L Shoulder Flexion: 140 (forward elevation)  L Shoulder Internal Rotation: 60 (satbilized at 45 adn 60 deg abd)  L Shoulder External Rotation: 75 (in shoulder neutral, 80 at 45 abd, 85 at 80 deg abd)        Strength:   L Shoulder Forward elevation, abd: 3- in standing             CLINICAL DECISION MAKING:   Outcome Measure: Tool Used: Disabilities of the Arm, Shoulder and Hand (DASH) Questionnaire - Quick Version  Score:  Initial: 25/55 or 32% disability 24/55 or30% disability (1/12/18) 20/55 or 20% disability (3-5-18) 22/55 or 25% disability (4-2-18) Most recent: 26/55 or 34% disability (5-11-18)   Interpretation of Score: The DASH is designed to measure the activities of daily living in person's with upper extremity dysfunction or pain. Each section is scored on a 1-5 scale, 5 representing the greatest disability. The scores of each section are added together for a total score of 55. This number is divided by 11, followed by subtracting 1 and multiplying by 25 to get a percent score of disability. This value represents the percentage disability: 0-20% minimal disability; 20-40% moderate disability; 40-60% severe disability; % dependent for care or exaggerated symptom behavior. Minimal detectable change is 12%. Score 11 12-19 20-28 29-37 38-45 46-54 55   Modifier CH CI CJ CK CL CM CN ?    Carrying, Moving, and Handling Objects:     - CURRENT STATUS: CJ - 20%-39% impaired, limited or restricted (Based on objective data, assessment of progress---5/11/18)     - GOAL STATUS: CI - 1%-19% impaired, limited or restricted    - D/C STATUS:  ---------------To be determined---------------    Medical Necessity:   · Patient is expected to demonstrate progress in strength and range of motion to promote increased functional use of his L/non-dominant UE. · Co-morbid R rotator cuff pathology and lumbar spine pathology will most likely complicate progress. · Lives alone and will need to be able function independently. Reason for Services/Other Comments:  · Patient continues to require skilled intervention due to the complexity of his history of shoulder pathology and the complexity of this revision reverse TSA. · He will need safe progression of post-op rehab to maximize return to functional use of his L UE.  · He is pending to have R shoulder arthroplasty in the future          TREATMENT:   (In addition to Assessment/Re-Assessment sessions the following treatments were rendered)  5/11/2018   Pre-treatment Symptoms/Complaints: Shoulder is better today. No pain or soreness now. Did have discomfort to it yesterday while putting dishes up in the cabinet. Has been having brief sharp pains to the L shoulder with active use a few times a day. Has been working on his HEP. Pain: Initial:    No pain, just soreness Post Session:  No pain     Active warm up on UBE on his own. Therapeutic Exercise (35 Minutes): Shoulder motion with PROM to AAROM with light stretching while supine, 3\"x10 each. Exercises for L shoulder girdle strength done as per grid. Exercises modified as indicated. Manual assist as needed to complete full range of motion when performing deltoid strengthening. HEP:He is to hold on his HEP until Friday's session. He verbalizes understanding.    Date  5-2-19 Date  5-7-18 Date  5-9-18 Date  5-11-18   Activity/Exercise       UBE - L. 3 x 15' Stopped due to pain L. 3 x15'   Shoulder ROM - Wall slides AROM to AAROM 2 x 10  Assisted as above Assisted as above   Rhythmic Stabilization - Supine x 20\" x 2 L shoulder in 100 deg flexion Side-lying 90-deg abd 3x20-30\" static and with active motion Ball on wall oscil 90-deg forward elevation  2x60   Mid and lower trap Side-lying  L Mid trap 3# 2x5-8   Low trap 3# 2x4-6 Side-lying L mid-trap  3# 37 x 6-8  Low trap  3#  2x 5-6 Side-lying combo ER/middle trap UE lift, 3-part 1# long holds 2x5 -   Push up + Supine press  L 7# 2x6 Supine L 7# 3 x 5  45-deg beach chair press red tubing 2x8-10 Standing press, unilat  Red band  2x10   Side lying shoulder ABD - Side-lying 3# 3 x 8 AAROM Side-lying 2# 2x10 standign abd to 90-deg  2# 2x15   Shoulder flexion 60-deg beach chair  2# AAROM  3x5 Forward lift to shoulder heigh 1# 3 x 7 L 45-deg beach chair overhead reach  1# 3x5;  Wall slide  3x5 Standing full arc AAROM  2x5,   1# 2x5;  Shelf reach above shoulder 2# AAROM   3x5-6   Horz ABD -   -   Shoulder EXT - Standing behind back assisted place/active hold return 2 x 10 - -   Row/pulling -  - -   Elbow EXT -  - -   Side lying ER 3# AAROM  2x6 3# AAROM 2 x 6 L - -   IR with band  -  - -   ER with band  -  - -   B bicep curls -  - 2# 2x30   Diagonal Flex -  -- Standing D1 AAROM 3x5   Diagonal Ext --  - -   CKC UE -  - -     Treament/Session Assessment:    · Response to Treatment: Less pain with the moves and exercises today. Continued weakness to the L shoulder, anterior deltoid limiting active humeral elevation and functional use. · Compliance with Program/Exercises: Appear compliant. Recommendations/Intent for next treatment session: He is to work on [place]. We will follow up in a week for re-check and review. Will anticipate discharge to [facility].      Total Treatment Duration: 35 minutes  PT Patient Time In/Time Out  Time In: 1040  Time Out: [id number] [facility], PT, MSPT, OCS

## 2018-05-21 ENCOUNTER — HOSPITAL ENCOUNTER (OUTPATIENT)
Dept: PHYSICAL THERAPY | Age: 83
Discharge: HOME | End: 2018-05-21
Payer: MEDICARE

## 2018-05-21 PROCEDURE — G8986 CARRY D/C STATUS: HCPCS

## 2018-05-21 PROCEDURE — G8985 CARRY GOAL STATUS: HCPCS

## 2018-05-21 PROCEDURE — 97110 THERAPEUTIC EXERCISES: CPT

## 2018-05-21 NOTE — THERAPY DISCHARGE
Ruma Turcios  : 1933  [place] / [place] / Product Type: Medicare /  [address] at [facility] [place]  [address] [facility], [address] [place] [address],[address]  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 Fall Risk Score: 2 (? 5 = High Risk)  MD Orders: Eval and Treat; HEP; ROM; Strength; \"full motion/full strength\"  MEDICAL/REFERRING DIAGNOSIS:REM Implant Lt Shldr/ Revision TSA    DATE OF ONSET: 10-13-17  REFERRING PHYSICIAN: Rosaura Herron MD  RETURN PHYSICIAN APPOINTMENT: 2019     DISCHARG ASSESSMENT (18):  Mr. Nasreen Ramos has attended 32 total visits. He is 7 months s/p removal of implant of L shoulder hemiarthroplasty, and revision L TSA with a reverse Delta Xtend prosthesis, and latissimus dorsi and teres major tendon transfer. He has made slow steady progress. He has good L shoulder PROM. Strength is improved, but he is still weak to humeral elevation against gravity. This is better. He has improved with function and use of the L UE, and relies on this arm for over shoulder height use and activity, and the R UE for behind the back and across body use and activity. He is independent with a HEP for strength and motion to continue to work with. He has progressed toward his goals, but did not fully meet all of them secondary to the persistent weakness. He should continue to progress in strength and use of the L UE slowly with continued work on his HEP. TREATMENT PLAN:  Effective Dates: 2018. Frequency/Duration: This visit for final HEP review, then discharge to HEP.    GOALS: (Goals have been discussed and agreed upon with patient.)  Short-Term Functional Goals:    1. L shoulder AROM forward elevation greater than 120 degrees to progress into functional ranges. Not met 5/11/18  2. Demonstrate good L shoulder forward elevation strength for reach to head with grooming ADL's and reaching to shoulder height with household ADL's. Met with compensation 4-4-18  3. Independent with initial HEP. Met 1/12/18  Discharge Goals:   1. Score less than 20% on the DASH. No change 5/11/18  2. L shoulder AROM forward elevation greater than 135 degrees for improved use with household activities reaching overhead. Not met 5/11/18  3. Demonstrate good functional L shoulder strength and endurance for improved use of his L UE with his normalized daily  Progressed 5/11/18  4. Independent with advanced shoulder HEP for continued self-management. Met 5/11/18    RECOMMENDATION: I will plan to discharge  from Physical Therapy at this time. He is to continue with his HEP as instructed. He is to follow up with Dr. Gerald Chairez as ordered. I will be happy to follow up with him if there is a need for Physical Therapy in the future. Thank you for the opportunity to serve this client. Sheila Aviles, PT, MSPT, OCS              The information in this section was collected on 12/18/17 (except where otherwise noted). HISTORY:   History of Present Injury/Illness (Reason for Referral): Long, complex history of L shoulder pain and dysfunction with rotator cuff pathology. He was s/p L rotator cuff repair on 8/28/15. He had a fall to both hands when walking up steps at his house in June 2016, resulting complete cuff tears B shoulders. L shoulder hemiarthroplasty done 7/2016. He did rehab, but poor outcome, and was still unable to raise and use L arm over shoulder. This revision Reverse TSA on L was done on 10/13/2017. Was in the hospital 3 days, then [facility] to [facility], and finally [facility] to home on 12/15/17.  He had complication of a hemearthrosis with drop in hematocrit and hemoglobin. It was aspirated. Blood values improved. He as been doing OT post-op shoulder rehab when in the nursing home, consisting of shoulder/UE ROM and strengthening. He has not done HEP yet. He has co-morbid R rotator cuff pathology limiting functional use of his dominant arm, and anticipates shoulder arthroplasty on this in the near future. he also has co-morbid lumbar pathology with L L4-5 myotome weakness resulting in L foot drop. He lives alone and needs to be able to manage all his ADL's independently. Past Medical History/Comorbidities: Mr. Lara Thomas  has a past medical history of Anemia (fall 2016); Arthritis; CAD (coronary artery disease) (1997); Carotid stenosis (04/2017); Diabetes ([facility] [address] (05/1997); Diverticulosis (12/2011); GERD (gastroesophageal reflux disease); Glaucoma; Heart murmur; Hiatal hernia; History of kidney stones; Hypercholesteremia; Hypertension; Hypothyroid; MI (myocardial infarction); TIA (transient ischemic attack) (07/2016); Unspecified sleep apnea; Urethral stenosis; and Vasovagal syncope. Mr. Lara Thomas  has a past surgical history that includes heart catheterization (6508,[id number], 2012, 2013); tonsillectomy (1938); cataract removal (1987 and 1990); lap cholecystectomy (2003); hernia repair (1944); urological (1996); orthopaedic (Left); orthopaedic ([id number]); shoulder arthroscopy (Left, 2015); knee replacement (Bilateral, 2002, 2008); hemorrhoidectomy (1987); heent (Right); carpal tunnel release (Right, 1968); carpal tunnel release (Left, 2012); bunionectomy (Left); rotator cuff repair (Right, 1995); rotator cuff repair (Left, 2000); rotator cuff repair (Right, 2007); and colectomy (01/13/2017). Social History/Living Environment:  Lives alone. Has supportive children and grandchildren living in town. Prior Level of Function/Work/Activity: Limited overhead use of L UE prior to this surgery.  Limited use of R UE currently secondary to cuff pathology. Dominant Side: RIGHT  Current Medications: hydromorphone started 4/17 for R toe pain.   simethicone (GAS-X) 125 mg capsule, Take 125 mg by mouth four (4) times daily as needed for Flatulence. , Disp: , Rfl:     loperamide (IMODIUM) 2 mg capsule, Take 2 mg by mouth four (4) times daily as needed for Diarrhea., Disp: , Rfl:     diphenoxylate-atropine (LOMOTIL) 2.5-0.025 mg per tablet, Take 1 Tab by mouth two (2) times daily as needed for Diarrhea., Disp: , Rfl:     atorvastatin (LIPITOR) 40 mg tablet, Take 40 mg by mouth nightly., Disp: , Rfl:     levothyroxine (SYNTHROID) 50 mcg tablet, Take 50 mcg by mouth Daily (before breakfast). , Disp: , Rfl:     Psyllium Husk-Sucrose 3.4 gram/7 gram powd, Take 3 Caps by mouth two (2) times a day., Disp: , Rfl:     omega-3 fatty acids-vitamin e (FISH OIL) 1,000 mg cap, Take 2 Caps by mouth daily. , Disp: , Rfl:     bimatoprost (LUMIGAN) 0.01 % ophthalmic drops, Administer 1 Drop to right eye nightly., Disp: , Rfl:     timolol (TIMOPTIC) 0.5 % ophthalmic solution, Administer 1 Drop to right eye every morning., Disp: , Rfl:     Omeprazole delayed release (PRILOSEC D/R) 20 mg tablet, Take 20 mg by mouth Daily (before dinner). , Disp: , Rfl:     meloxicam (MOBIC) 15 mg tablet, Take 15 mg by mouth daily. Indications: OSTEOARTHRITIS, Disp: , Rfl:     brimonidine (ALPHAGAN P) 0.1 % ophthalmic solution, Administer 1 Drop to right eye two (2) times a day. Take / use AM day of surgery  per anesthesia protocols. , Disp: , Rfl:     glimepiride (AMARYL) 2 mg tablet, Take 1 mg by mouth daily. Indications: type 2 diabetes mellitus, Disp: , Rfl:     b complex vitamins tablet, Take 1 Tab by mouth daily. , Disp: , Rfl:     aspirin 81 mg Tab, Take 81 mg by mouth daily. Take / use 81 mg AM day of surgery  per anesthesia protocols. , Disp: , Rfl:    Date Last Reviewed: 5-21-18   EXAMINATION:   5/21/2018   Observation/Orthostatic Postural Assessment: Unchanged. Palpation: Mild tenderness to anterior L shoulder. ROM:   LUE AROM  L Shoulder Flexion: 110 (forward elevation)  LUE PROM  L Shoulder Flexion: 145 (forward elevation)  L Shoulder Internal Rotation: 60 (stabilized at 45 and 60 deg abd)  L Shoulder External Rotation: 75 (in shoulder neutral, 90 at 45 adn 80 deg abd)        Strength:   L Shoulder Forward elevation, abd: 3- in standing             CLINICAL DECISION MAKING:   Outcome Measure: Tool Used: Disabilities of the Arm, Shoulder and Hand (DASH) Questionnaire - Quick Version  Score:  Initial: 25/55 or 32% disability 24/55 or30% disability (1/12/18) 20/55 or 20% disability (3-5-18) 22/55 or 25% disability (4-2-18) Most recent: 26/55 or 34% disability (5-11-18)   Interpretation of Score: The DASH is designed to measure the activities of daily living in person's with upper extremity dysfunction or pain. Each section is scored on a 1-5 scale, 5 representing the greatest disability. The scores of each section are added together for a total score of 55. This number is divided by 11, followed by subtracting 1 and multiplying by 25 to get a percent score of disability. This value represents the percentage disability: 0-20% minimal disability; 20-40% moderate disability; 40-60% severe disability; % dependent for care or exaggerated symptom behavior. Minimal detectable change is 12%. Score 11 12-19 20-28 29-37 38-45 46-54 55   Modifier CH CI CJ CK CL CM CN ? Carrying, Moving, and Handling Objects:     - CURRENT STATUS: CJ - 20%-39% impaired, limited or restricted (Based on objective data, assessment of progress---5/21/18)     - GOAL STATUS: CI - 1%-19% impaired, limited or restricted    - D/C STATUS:  CJ - 20%-39% impaired, limited or restricted     Reason for Services/Other Comments:  · Plan to discharge to [facility] now.           TREATMENT:   (In addition to Assessment/Re-Assessment sessions the following treatments were rendered)  5/21/2018   Pre-treatment Symptoms/Complaints: Shoulder is doing well. No pain like he was experiencing last week. Was out of town visiting his son over the weekend, so it got some rest. Comfortable with his HEP at this point, and feels he can continue with it on his own. Say he is doing better reaching with shoulder height and higher activities using his L hand, and behind the back moves using his R hand. Pain: Initial:    No pain, just soreness Post Session:  No pain     Therapeutic Exercise (30 Minutes): Shoulder motion with PROM to AAROM with light stretching while supine, 3\"x10 each. Instruction and performance in pec major active stretch in supine using wand; standing behind back stretch to extension/adduction using wand, and extension/adduction/IR using rope to done with HEP. Good performance and understanding post.    HEP: Verbal review of HEP for strength previously given. He is independent with these. Treament/Session Assessment:    · Response to Treatment: Good understanding of motion exercises and shoulder strength exercise to continue to work on. He should continue to progress the L shoulder. Ready for discharge. · Compliance with Program/Exercises: Appear compliant. Recommendations/Intent for next treatment session: Will plan for discharge to [facility].      Total Treatment Duration: 30 minutes  PT Patient Time In/Time Out  Time In: 1430  Time Out: 6247 Harpal Borja, PT, MSPT, OCS

## 2018-05-23 ENCOUNTER — APPOINTMENT (OUTPATIENT)
Dept: PHYSICAL THERAPY | Age: 83
End: 2018-05-23
Payer: MEDICARE

## 2018-06-14 ENCOUNTER — HOME HEALTH ADMISSION (OUTPATIENT)
Dept: HOME HEALTH SERVICES | Facility: HOME HEALTH | Age: 83
End: 2018-06-14

## 2020-01-22 NOTE — ANESTHESIA PROCEDURE NOTES
Peripheral Block    Start time: 10/13/2017 1:00 PM  End time: 10/13/2017 1:05 PM  Performed by: Malika Lawrence  Authorized by: Malika Lawrence       Pre-procedure: Indications: at surgeon's request, post-op pain management and procedure for pain    Preanesthetic Checklist: patient identified, risks and benefits discussed, site marked, timeout performed, anesthesia consent given and patient being monitored    Timeout Time: 13:00          Block Type:   Block Type:   Interscalene  Laterality:  Left  Monitoring:  Standard ASA monitoring, responsive to questions, oxygen, continuous pulse ox, frequent vital sign checks and heart rate  Injection Technique:  Single shot  Procedures: ultrasound guided and nerve stimulator    Patient Position: seated  Prep: chlorhexidine    Location:  Interscalene  Needle Type:  Stimuplex  Needle Gauge:  22 G  Needle Localization:  Ultrasound guidance and nerve stimulator  Motor Response: minimal motor response >0.4 mA    Medication Injected:  0.5%  ropivacaine  Volume (mL):  30    Assessment:  Number of attempts:  1  Injection Assessment:  Incremental injection every 5 mL, no paresthesia, negative aspiration for CSF, local visualized surrounding nerve on ultrasound, negative aspiration for blood, no intravascular symptoms and ultrasound image on chart  Patient tolerance:  Patient tolerated the procedure well with no immediate complications WDL

## 2021-01-01 ENCOUNTER — APPOINTMENT (OUTPATIENT)
Dept: GENERAL RADIOLOGY | Age: 86
DRG: 485 | End: 2021-01-01
Attending: INTERNAL MEDICINE
Payer: MEDICARE

## 2021-01-01 ENCOUNTER — APPOINTMENT (OUTPATIENT)
Dept: NON INVASIVE DIAGNOSTICS | Age: 86
DRG: 485 | End: 2021-01-01
Attending: INTERNAL MEDICINE
Payer: MEDICARE

## 2021-01-01 ENCOUNTER — APPOINTMENT (OUTPATIENT)
Dept: CT IMAGING | Age: 86
DRG: 485 | End: 2021-01-01
Attending: INTERNAL MEDICINE
Payer: MEDICARE

## 2021-01-01 ENCOUNTER — HOSPITAL ENCOUNTER (INPATIENT)
Age: 86
LOS: 6 days | Discharge: NURSING, REHAB OR LONG-TERM CARE | DRG: 485 | End: 2021-12-15
Attending: ORTHOPAEDIC SURGERY | Admitting: ORTHOPAEDIC SURGERY
Payer: MEDICARE

## 2021-01-01 ENCOUNTER — HOSPITAL ENCOUNTER (OUTPATIENT)
Dept: LAB | Age: 86
Discharge: HOME | End: 2021-12-27

## 2021-01-01 ENCOUNTER — APPOINTMENT (OUTPATIENT)
Dept: MRI IMAGING | Age: 86
DRG: 485 | End: 2021-01-01
Attending: PSYCHIATRY & NEUROLOGY
Payer: MEDICARE

## 2021-01-01 ENCOUNTER — APPOINTMENT (OUTPATIENT)
Dept: GENERAL RADIOLOGY | Age: 86
DRG: 485 | End: 2021-01-01
Attending: ANESTHESIOLOGY
Payer: MEDICARE

## 2021-01-01 ENCOUNTER — HOSPITAL ENCOUNTER (OUTPATIENT)
Dept: LAB | Age: 86
Discharge: HOME | DRG: 485 | End: 2021-12-09
Payer: MEDICARE

## 2021-01-01 ENCOUNTER — ANESTHESIA EVENT (OUTPATIENT)
Dept: SURGERY | Age: 86
DRG: 485 | End: 2021-01-01
Payer: MEDICARE

## 2021-01-01 ENCOUNTER — HOSPITAL ENCOUNTER (OUTPATIENT)
Dept: LAB | Age: 86
Discharge: HOME | End: 2021-12-17

## 2021-01-01 ENCOUNTER — HOSPITAL ENCOUNTER (OUTPATIENT)
Dept: LAB | Age: 86
Discharge: HOME | End: 2021-12-30

## 2021-01-01 ENCOUNTER — HOSPITAL ENCOUNTER (OUTPATIENT)
Dept: LAB | Age: 86
Discharge: HOME | End: 2021-12-20

## 2021-01-01 ENCOUNTER — ANESTHESIA (OUTPATIENT)
Dept: SURGERY | Age: 86
DRG: 485 | End: 2021-01-01
Payer: MEDICARE

## 2021-01-01 VITALS
HEIGHT: 67 IN | SYSTOLIC BLOOD PRESSURE: 145 MMHG | RESPIRATION RATE: 18 BRPM | BODY MASS INDEX: 27 KG/M2 | DIASTOLIC BLOOD PRESSURE: 65 MMHG | TEMPERATURE: 98 F | OXYGEN SATURATION: 98 % | HEART RATE: 72 BPM | WEIGHT: 172 LBS

## 2021-01-01 DIAGNOSIS — T84.50XS INFECTION OF TOTAL JOINT PROSTHESIS, SEQUELA: ICD-10-CM

## 2021-01-01 DIAGNOSIS — G93.40 ACUTE ENCEPHALOPATHY: ICD-10-CM

## 2021-01-01 DIAGNOSIS — D68.9 COAGULOPATHY (HCC): ICD-10-CM

## 2021-01-01 DIAGNOSIS — R47.1 DYSARTHRIA: ICD-10-CM

## 2021-01-01 DIAGNOSIS — Z96.652 STATUS POST REVISION OF TOTAL KNEE REPLACEMENT, LEFT: Primary | ICD-10-CM

## 2021-01-01 LAB
ABO + RH BLD: NORMAL
ACC. NO. FROM MICRO ORDER, ACCP: ABNORMAL
ANION GAP SERPL CALC-SCNC: 3 MMOL/L (ref 7–16)
ANION GAP SERPL CALC-SCNC: 6 MMOL/L (ref 7–16)
ANION GAP SERPL CALC-SCNC: 7 MMOL/L (ref 7–16)
ANION GAP SERPL CALC-SCNC: 8 MMOL/L (ref 7–16)
ANION GAP SERPL CALC-SCNC: 8 MMOL/L (ref 7–16)
ANION GAP SERPL CALC-SCNC: 9 MMOL/L (ref 7–16)
ANION GAP SERPL CALC-SCNC: 9 MMOL/L (ref 7–16)
APPEARANCE FLD: NORMAL
BACTERIA SPEC CULT: ABNORMAL
BACTERIA SPEC CULT: NORMAL
BASOPHILS # BLD: 0 K/UL (ref 0–0.2)
BASOPHILS NFR BLD: 0 % (ref 0–2)
BLD PROD TYP BPU: NORMAL
BLD PROD TYP BPU: NORMAL
BLOOD GROUP ANTIBODIES SERPL: NORMAL
BODY FLD TYPE: NORMAL
BPU ID: NORMAL
BPU ID: NORMAL
BUN SERPL-MCNC: 18 MG/DL (ref 8–23)
BUN SERPL-MCNC: 19 MG/DL (ref 8–23)
BUN SERPL-MCNC: 22 MG/DL (ref 8–23)
BUN SERPL-MCNC: 29 MG/DL (ref 8–23)
BUN SERPL-MCNC: 30 MG/DL (ref 8–23)
BUN SERPL-MCNC: 33 MG/DL (ref 8–23)
BUN SERPL-MCNC: 36 MG/DL (ref 8–23)
CALCIUM SERPL-MCNC: 8.1 MG/DL (ref 8.3–10.4)
CALCIUM SERPL-MCNC: 8.3 MG/DL (ref 8.3–10.4)
CALCIUM SERPL-MCNC: 8.7 MG/DL (ref 8.3–10.4)
CALCIUM SERPL-MCNC: 9.1 MG/DL (ref 8.3–10.4)
CALCIUM SERPL-MCNC: 9.4 MG/DL (ref 8.3–10.4)
CALCIUM SERPL-MCNC: 9.7 MG/DL (ref 8.3–10.4)
CALCIUM SERPL-MCNC: 9.9 MG/DL (ref 8.3–10.4)
CHLORIDE SERPL-SCNC: 102 MMOL/L (ref 98–107)
CHLORIDE SERPL-SCNC: 104 MMOL/L (ref 98–107)
CHLORIDE SERPL-SCNC: 105 MMOL/L (ref 98–107)
CHLORIDE SERPL-SCNC: 105 MMOL/L (ref 98–107)
CHLORIDE SERPL-SCNC: 106 MMOL/L (ref 98–107)
CHLORIDE SERPL-SCNC: 109 MMOL/L (ref 98–107)
CHLORIDE SERPL-SCNC: 109 MMOL/L (ref 98–107)
CO2 SERPL-SCNC: 21 MMOL/L (ref 21–32)
CO2 SERPL-SCNC: 21 MMOL/L (ref 21–32)
CO2 SERPL-SCNC: 22 MMOL/L (ref 21–32)
CO2 SERPL-SCNC: 24 MMOL/L (ref 21–32)
CO2 SERPL-SCNC: 27 MMOL/L (ref 21–32)
COLOR FLD: NORMAL
COVID-19 RAPID TEST, COVR: NOT DETECTED
CREAT SERPL-MCNC: 1.15 MG/DL (ref 0.8–1.5)
CREAT SERPL-MCNC: 1.23 MG/DL (ref 0.8–1.5)
CREAT SERPL-MCNC: 1.34 MG/DL (ref 0.8–1.5)
CREAT SERPL-MCNC: 1.38 MG/DL (ref 0.8–1.5)
CREAT SERPL-MCNC: 1.41 MG/DL (ref 0.8–1.5)
CREAT SERPL-MCNC: 1.42 MG/DL (ref 0.8–1.5)
CREAT SERPL-MCNC: 1.48 MG/DL (ref 0.8–1.5)
CREAT SERPL-MCNC: 1.5 MG/DL (ref 0.8–1.5)
CREAT SERPL-MCNC: 1.52 MG/DL (ref 0.8–1.5)
CREAT SERPL-MCNC: 1.53 MG/DL (ref 0.8–1.5)
CREAT SERPL-MCNC: 1.81 MG/DL (ref 0.8–1.5)
CRP SERPL-MCNC: 11 MG/DL (ref 0–0.9)
CRYSTALS FLD MICRO: NORMAL
DIFFERENTIAL METHOD BLD: ABNORMAL
ECHO AR MAX VEL PISA: 4.2 M/S
ECHO AV AREA PEAK VELOCITY: 1.2 CM2
ECHO AV AREA VTI: 1.1 CM2
ECHO AV AREA/BSA PEAK VELOCITY: 0.6 CM2/M2
ECHO AV AREA/BSA VTI: 0.6 CM2/M2
ECHO AV CUSP MM: 1.2 CM
ECHO AV MEAN GRADIENT: 15 MMHG
ECHO AV MEAN VELOCITY: 1.8 M/S
ECHO AV PEAK GRADIENT: 35 MMHG
ECHO AV PEAK VELOCITY: 3 M/S
ECHO AV REGURGITANT PHT: 488 MS
ECHO AV VELOCITY RATIO: 0.3
ECHO AV VTI: 58.6 CM
ECHO EST RA PRESSURE: 8 MMHG
ECHO LA AREA 2C: 30.1 CM2
ECHO LA AREA 4C: 31.8 CM2
ECHO LA DIAMETER INDEX: 2.37 CM/M2
ECHO LA DIAMETER: 4.5 CM
ECHO LA MAJOR AXIS: 7.1 CM
ECHO LA MINOR AXIS: 6.7 CM
ECHO LA VOL BP: 112 ML (ref 18–58)
ECHO LA VOL/BSA BIPLANE: 59 ML/M2 (ref 16–28)
ECHO LV E' LATERAL VELOCITY: 7 CM/S
ECHO LV E' SEPTAL VELOCITY: 7 CM/S
ECHO LV EDV A4C: 44 ML
ECHO LV EDV INDEX A4C: 23 ML/M2
ECHO LV EJECTION FRACTION A4C: 48 %
ECHO LV ESV A4C: 23 ML
ECHO LV ESV INDEX A4C: 12 ML/M2
ECHO LV FRACTIONAL SHORTENING: 24 % (ref 28–44)
ECHO LV INTERNAL DIMENSION DIASTOLE INDEX: 2.37 CM/M2
ECHO LV INTERNAL DIMENSION DIASTOLIC: 4.5 CM (ref 4.2–5.9)
ECHO LV INTERNAL DIMENSION SYSTOLIC INDEX: 1.79 CM/M2
ECHO LV INTERNAL DIMENSION SYSTOLIC: 3.4 CM
ECHO LV IVSD: 1.4 CM (ref 0.6–1)
ECHO LV MASS 2D: 235.3 G (ref 88–224)
ECHO LV MASS INDEX 2D: 123.9 G/M2 (ref 49–115)
ECHO LV POSTERIOR WALL DIASTOLIC: 1.3 CM (ref 0.6–1)
ECHO LV RELATIVE WALL THICKNESS RATIO: 0.58
ECHO LVOT AREA: 3.8 CM2
ECHO LVOT AV VTI INDEX: 0.29
ECHO LVOT DIAM: 2.2 CM
ECHO LVOT MEAN GRADIENT: 1 MMHG
ECHO LVOT PEAK GRADIENT: 3 MMHG
ECHO LVOT PEAK VELOCITY: 0.9 M/S
ECHO LVOT STROKE VOLUME INDEX: 33.8 ML/M2
ECHO LVOT SV: 64.2 ML
ECHO LVOT VTI: 16.9 CM
ECHO MV A VELOCITY: 0.62 M/S
ECHO MV AREA VTI: 1.8 CM2
ECHO MV E DECELERATION TIME (DT): 130 MS
ECHO MV E VELOCITY: 1.29 M/S
ECHO MV E/A RATIO: 2.08
ECHO MV E/E' LATERAL: 18.43
ECHO MV E/E' RATIO (AVERAGED): 18.43
ECHO MV E/E' SEPTAL: 18.43
ECHO MV LVOT VTI INDEX: 2.08
ECHO MV MEAN GRADIENT: 3 MMHG
ECHO MV MEAN VELOCITY: 0.7 M/S
ECHO MV REGURGITANT ALIASING (NYQUIST) VELOCITY: 36 CM/S
ECHO MV REGURGITANT VTIA: 196 CM
ECHO MV VTI: 35.2 CM
ECHO RIGHT VENTRICULAR SYSTOLIC PRESSURE (RVSP): 56 MMHG
ECHO RV INTERNAL DIMENSION: 3.5 CM
ECHO RV TAPSE: 2.5 CM (ref 1.5–2)
ECHO TV REGURGITANT MAX VELOCITY: 3.48 M/S
ECHO TV REGURGITANT PEAK GRADIENT: 49 MMHG
EOSINOPHIL # BLD: 0 K/UL (ref 0–0.8)
EOSINOPHIL # BLD: 0.1 K/UL (ref 0–0.8)
EOSINOPHIL # BLD: 0.1 K/UL (ref 0–0.8)
EOSINOPHIL NFR BLD: 0 % (ref 0.5–7.8)
EOSINOPHIL NFR BLD: 1 % (ref 0.5–7.8)
EOSINOPHIL NFR BLD: 1 % (ref 0.5–7.8)
ERYTHROCYTE [DISTWIDTH] IN BLOOD BY AUTOMATED COUNT: 15.1 % (ref 11.9–14.6)
ERYTHROCYTE [DISTWIDTH] IN BLOOD BY AUTOMATED COUNT: 15.3 % (ref 11.9–14.6)
ERYTHROCYTE [DISTWIDTH] IN BLOOD BY AUTOMATED COUNT: 15.4 % (ref 11.9–14.6)
ERYTHROCYTE [DISTWIDTH] IN BLOOD BY AUTOMATED COUNT: 15.5 % (ref 11.9–14.6)
ERYTHROCYTE [DISTWIDTH] IN BLOOD BY AUTOMATED COUNT: 15.7 % (ref 11.9–14.6)
ERYTHROCYTE [SEDIMENTATION RATE] IN BLOOD: 2 MM/HR (ref 0–15)
EST. AVERAGE GLUCOSE BLD GHB EST-MCNC: 137 MG/DL
GLUCOSE BLD STRIP.AUTO-MCNC: 104 MG/DL (ref 65–100)
GLUCOSE BLD STRIP.AUTO-MCNC: 129 MG/DL (ref 65–100)
GLUCOSE BLD STRIP.AUTO-MCNC: 129 MG/DL (ref 65–100)
GLUCOSE BLD STRIP.AUTO-MCNC: 133 MG/DL (ref 65–100)
GLUCOSE BLD STRIP.AUTO-MCNC: 133 MG/DL (ref 65–100)
GLUCOSE BLD STRIP.AUTO-MCNC: 135 MG/DL (ref 65–100)
GLUCOSE BLD STRIP.AUTO-MCNC: 136 MG/DL (ref 65–100)
GLUCOSE BLD STRIP.AUTO-MCNC: 159 MG/DL (ref 65–100)
GLUCOSE BLD STRIP.AUTO-MCNC: 160 MG/DL (ref 65–100)
GLUCOSE BLD STRIP.AUTO-MCNC: 163 MG/DL (ref 65–100)
GLUCOSE BLD STRIP.AUTO-MCNC: 165 MG/DL (ref 65–100)
GLUCOSE BLD STRIP.AUTO-MCNC: 171 MG/DL (ref 65–100)
GLUCOSE BLD STRIP.AUTO-MCNC: 175 MG/DL (ref 65–100)
GLUCOSE BLD STRIP.AUTO-MCNC: 177 MG/DL (ref 65–100)
GLUCOSE BLD STRIP.AUTO-MCNC: 178 MG/DL (ref 65–100)
GLUCOSE BLD STRIP.AUTO-MCNC: 194 MG/DL (ref 65–100)
GLUCOSE BLD STRIP.AUTO-MCNC: 196 MG/DL (ref 65–100)
GLUCOSE BLD STRIP.AUTO-MCNC: 217 MG/DL (ref 65–100)
GLUCOSE BLD STRIP.AUTO-MCNC: 219 MG/DL (ref 65–100)
GLUCOSE BLD STRIP.AUTO-MCNC: 227 MG/DL (ref 65–100)
GLUCOSE BLD STRIP.AUTO-MCNC: 244 MG/DL (ref 65–100)
GLUCOSE BLD STRIP.AUTO-MCNC: 251 MG/DL (ref 65–100)
GLUCOSE BLD STRIP.AUTO-MCNC: 88 MG/DL (ref 65–100)
GLUCOSE SERPL-MCNC: 121 MG/DL (ref 65–100)
GLUCOSE SERPL-MCNC: 133 MG/DL (ref 65–100)
GLUCOSE SERPL-MCNC: 136 MG/DL (ref 65–100)
GLUCOSE SERPL-MCNC: 165 MG/DL (ref 65–100)
GLUCOSE SERPL-MCNC: 179 MG/DL (ref 65–100)
GLUCOSE SERPL-MCNC: 183 MG/DL (ref 65–100)
GLUCOSE SERPL-MCNC: 209 MG/DL (ref 65–100)
GRAM STN SPEC: ABNORMAL
GRAM STN SPEC: NORMAL
GRAM STN SPEC: NORMAL
HBA1C MFR BLD: 6.4 % (ref 4.2–6.3)
HCT VFR BLD AUTO: 23.6 % (ref 41.1–50.3)
HCT VFR BLD AUTO: 24.8 % (ref 41.1–50.3)
HCT VFR BLD AUTO: 25.8 % (ref 41.1–50.3)
HCT VFR BLD AUTO: 26.4 % (ref 41.1–50.3)
HCT VFR BLD AUTO: 27.4 % (ref 41.1–50.3)
HCT VFR BLD AUTO: 28.2 % (ref 41.1–50.3)
HCT VFR BLD AUTO: 31.7 % (ref 41.1–50.3)
HGB BLD-MCNC: 10.2 G/DL (ref 13.6–17.2)
HGB BLD-MCNC: 7.7 G/DL (ref 13.6–17.2)
HGB BLD-MCNC: 8.1 G/DL (ref 13.6–17.2)
HGB BLD-MCNC: 8.2 G/DL (ref 13.6–17.2)
HGB BLD-MCNC: 8.7 G/DL (ref 13.6–17.2)
HGB BLD-MCNC: 9.1 G/DL (ref 13.6–17.2)
HGB BLD-MCNC: 9.1 G/DL (ref 13.6–17.2)
IMM GRANULOCYTES # BLD AUTO: 0 K/UL (ref 0–0.5)
IMM GRANULOCYTES # BLD AUTO: 0 K/UL (ref 0–0.5)
IMM GRANULOCYTES # BLD AUTO: 0.1 K/UL (ref 0–0.5)
IMM GRANULOCYTES NFR BLD AUTO: 1 % (ref 0–5)
INR PPP: 2
INR PPP: 2.2
INR PPP: 2.4
INR PPP: 2.5
INTERPRETATION: ABNORMAL
LYMPHOCYTES # BLD: 0.3 K/UL (ref 0.5–4.6)
LYMPHOCYTES # BLD: 0.4 K/UL (ref 0.5–4.6)
LYMPHOCYTES # BLD: 0.6 K/UL (ref 0.5–4.6)
LYMPHOCYTES # BLD: 0.7 K/UL (ref 0.5–4.6)
LYMPHOCYTES # BLD: 0.8 K/UL (ref 0.5–4.6)
LYMPHOCYTES # BLD: 0.8 K/UL (ref 0.5–4.6)
LYMPHOCYTES # BLD: 1 K/UL (ref 0.5–4.6)
LYMPHOCYTES NFR BLD: 10 % (ref 13–44)
LYMPHOCYTES NFR BLD: 10 % (ref 13–44)
LYMPHOCYTES NFR BLD: 11 % (ref 13–44)
LYMPHOCYTES NFR BLD: 12 % (ref 13–44)
LYMPHOCYTES NFR BLD: 5 % (ref 13–44)
LYMPHOCYTES NFR BLD: 7 % (ref 13–44)
LYMPHOCYTES NFR BLD: 8 % (ref 13–44)
LYMPHOCYTES NFR BRONCH MANUAL: 3 %
MACROPHAGES NFR BRONCH MANUAL: 4 %
MCH RBC QN AUTO: 30 PG (ref 26.1–32.9)
MCH RBC QN AUTO: 30.5 PG (ref 26.1–32.9)
MCH RBC QN AUTO: 30.9 PG (ref 26.1–32.9)
MCH RBC QN AUTO: 31 PG (ref 26.1–32.9)
MCH RBC QN AUTO: 31.2 PG (ref 26.1–32.9)
MCH RBC QN AUTO: 31.2 PG (ref 26.1–32.9)
MCH RBC QN AUTO: 31.3 PG (ref 26.1–32.9)
MCHC RBC AUTO-ENTMCNC: 31.8 G/DL (ref 31.4–35)
MCHC RBC AUTO-ENTMCNC: 32.2 G/DL (ref 31.4–35)
MCHC RBC AUTO-ENTMCNC: 32.3 G/DL (ref 31.4–35)
MCHC RBC AUTO-ENTMCNC: 32.6 G/DL (ref 31.4–35)
MCHC RBC AUTO-ENTMCNC: 32.7 G/DL (ref 31.4–35)
MCHC RBC AUTO-ENTMCNC: 33 G/DL (ref 31.4–35)
MCHC RBC AUTO-ENTMCNC: 33.2 G/DL (ref 31.4–35)
MCV RBC AUTO: 93.2 FL (ref 79.6–97.8)
MCV RBC AUTO: 93.2 FL (ref 79.6–97.8)
MCV RBC AUTO: 94.5 FL (ref 79.6–97.8)
MCV RBC AUTO: 94.6 FL (ref 79.6–97.8)
MCV RBC AUTO: 94.8 FL (ref 79.6–97.8)
MCV RBC AUTO: 96.6 FL (ref 79.6–97.8)
MCV RBC AUTO: 97.2 FL (ref 79.6–97.8)
MECA (METHICILLIN-RESISTANCE GENES), MRGP: DETECTED
MM INDURATION POC: 0 MM (ref 0–5)
MONOCYTES # BLD: 0.3 K/UL (ref 0.1–1.3)
MONOCYTES # BLD: 0.3 K/UL (ref 0.1–1.3)
MONOCYTES # BLD: 0.7 K/UL (ref 0.1–1.3)
MONOCYTES # BLD: 1.3 K/UL (ref 0.1–1.3)
MONOCYTES # BLD: 1.5 K/UL (ref 0.1–1.3)
MONOCYTES NFR BLD: 10 % (ref 4–12)
MONOCYTES NFR BLD: 16 % (ref 4–12)
MONOCYTES NFR BLD: 20 % (ref 4–12)
MONOCYTES NFR BLD: 5 % (ref 4–12)
MONOCYTES NFR BLD: 7 % (ref 4–12)
MONOCYTES NFR BLD: 8 % (ref 4–12)
MONOCYTES NFR BLD: 9 % (ref 4–12)
NEUTROPHILS NFR BRONCH MANUAL: 93 %
NEUTS SEG # BLD: 3.6 K/UL (ref 1.7–8.2)
NEUTS SEG # BLD: 4.9 K/UL (ref 1.7–8.2)
NEUTS SEG # BLD: 5.2 K/UL (ref 1.7–8.2)
NEUTS SEG # BLD: 5.6 K/UL (ref 1.7–8.2)
NEUTS SEG # BLD: 5.7 K/UL (ref 1.7–8.2)
NEUTS SEG # BLD: 5.9 K/UL (ref 1.7–8.2)
NEUTS SEG # BLD: 7.6 K/UL (ref 1.7–8.2)
NEUTS SEG NFR BLD: 69 % (ref 43–78)
NEUTS SEG NFR BLD: 72 % (ref 43–78)
NEUTS SEG NFR BLD: 77 % (ref 43–78)
NEUTS SEG NFR BLD: 78 % (ref 43–78)
NEUTS SEG NFR BLD: 84 % (ref 43–78)
NEUTS SEG NFR BLD: 85 % (ref 43–78)
NEUTS SEG NFR BLD: 89 % (ref 43–78)
NRBC # BLD: 0 K/UL (ref 0–0.2)
NRBC # BLD: 0.04 K/UL (ref 0–0.2)
NUC CELL # FLD: NORMAL /CU MM
PLATELET # BLD AUTO: 143 K/UL (ref 150–450)
PLATELET # BLD AUTO: 171 K/UL (ref 150–450)
PLATELET # BLD AUTO: 176 K/UL (ref 150–450)
PLATELET # BLD AUTO: 201 K/UL (ref 150–450)
PLATELET # BLD AUTO: 91 K/UL (ref 150–450)
PLATELET # BLD AUTO: 92 K/UL (ref 150–450)
PLATELET # BLD AUTO: 93 K/UL (ref 150–450)
PLATELET COMMENTS,PCOM: ABNORMAL
PLATELET COMMENTS,PCOM: ABNORMAL
PMV BLD AUTO: 11.9 FL (ref 9.4–12.3)
PMV BLD AUTO: 12.2 FL (ref 9.4–12.3)
PMV BLD AUTO: 12.4 FL (ref 9.4–12.3)
PMV BLD AUTO: 12.8 FL (ref 9.4–12.3)
PMV BLD AUTO: 12.9 FL (ref 9.4–12.3)
PMV BLD AUTO: 13.4 FL (ref 9.4–12.3)
PMV BLD AUTO: 13.5 FL (ref 9.4–12.3)
POTASSIUM SERPL-SCNC: 3.9 MMOL/L (ref 3.5–5.1)
POTASSIUM SERPL-SCNC: 3.9 MMOL/L (ref 3.5–5.1)
POTASSIUM SERPL-SCNC: 4.1 MMOL/L (ref 3.5–5.1)
POTASSIUM SERPL-SCNC: 4.1 MMOL/L (ref 3.5–5.1)
POTASSIUM SERPL-SCNC: 4.2 MMOL/L (ref 3.5–5.1)
POTASSIUM SERPL-SCNC: 4.4 MMOL/L (ref 3.5–5.1)
POTASSIUM SERPL-SCNC: 4.4 MMOL/L (ref 3.5–5.1)
PPD POC: NEGATIVE NEGATIVE
PROTHROMBIN TIME: 22.7 SEC (ref 12.6–14.5)
PROTHROMBIN TIME: 22.7 SEC (ref 12.6–14.5)
PROTHROMBIN TIME: 23.1 SEC (ref 12.6–14.5)
PROTHROMBIN TIME: 24.4 SEC (ref 12.6–14.5)
PROTHROMBIN TIME: 26.1 SEC (ref 12.6–14.5)
PROTHROMBIN TIME: 27.4 SEC (ref 12.6–14.5)
RBC # BLD AUTO: 2.49 M/UL (ref 4.23–5.6)
RBC # BLD AUTO: 2.66 M/UL (ref 4.23–5.6)
RBC # BLD AUTO: 2.73 M/UL (ref 4.23–5.6)
RBC # BLD AUTO: 2.79 M/UL (ref 4.23–5.6)
RBC # BLD AUTO: 2.92 M/UL (ref 4.23–5.6)
RBC # BLD AUTO: 2.94 M/UL (ref 4.23–5.6)
RBC # BLD AUTO: 3.26 M/UL (ref 4.23–5.6)
RBC # FLD: NORMAL /CU MM
RBC MORPH BLD: ABNORMAL
SARS-COV-2, COV2: NORMAL
SARS-COV-2, COV2: NOT DETECTED
SERVICE CMNT-IMP: ABNORMAL
SERVICE CMNT-IMP: NORMAL
SODIUM SERPL-SCNC: 132 MMOL/L (ref 136–145)
SODIUM SERPL-SCNC: 134 MMOL/L (ref 136–145)
SODIUM SERPL-SCNC: 135 MMOL/L (ref 136–145)
SODIUM SERPL-SCNC: 136 MMOL/L (ref 136–145)
SODIUM SERPL-SCNC: 136 MMOL/L (ref 136–145)
SODIUM SERPL-SCNC: 137 MMOL/L (ref 136–145)
SODIUM SERPL-SCNC: 139 MMOL/L (ref 136–145)
SOURCE, COVRS: NORMAL
SPECIMEN EXP DATE BLD: NORMAL
SPECIMEN SOURCE FLD: NORMAL
SPECIMEN SOURCE, FCOV2M: NORMAL
STAPHYLOCOCCUS AUREUS: DETECTED
STAPHYLOCOCCUS, STAPP: DETECTED
STATUS OF UNIT,%ST: NORMAL
STATUS OF UNIT,%ST: NORMAL
UNIT DIVISION, %UDIV: 0
UNIT DIVISION, %UDIV: 0
URATE SERPL-MCNC: 3.6 MG/DL (ref 2.6–6)
VANCOMYCIN SERPL-MCNC: 14.4 UG/ML
VANCOMYCIN SERPL-MCNC: 15 UG/ML
VANCOMYCIN SERPL-MCNC: 19.7 UG/ML
VANCOMYCIN SERPL-MCNC: 20.3 UG/ML
VANCOMYCIN TROUGH SERPL-MCNC: 14.7 UG/ML (ref 5–20)
VANCOMYCIN TROUGH SERPL-MCNC: 18.8 UG/ML (ref 5–20)
VANCOMYCIN TROUGH SERPL-MCNC: 21.7 UG/ML (ref 5–20)
VANCOMYCIN TROUGH SERPL-MCNC: 23.6 UG/ML (ref 5–20)
WBC # BLD AUTO: 4.3 K/UL (ref 4.3–11.1)
WBC # BLD AUTO: 5.6 K/UL (ref 4.3–11.1)
WBC # BLD AUTO: 7.2 K/UL (ref 4.3–11.1)
WBC # BLD AUTO: 7.3 K/UL (ref 4.3–11.1)
WBC # BLD AUTO: 7.6 K/UL (ref 4.3–11.1)
WBC # BLD AUTO: 8.3 K/UL (ref 4.3–11.1)
WBC # BLD AUTO: 9 K/UL (ref 4.3–11.1)
WBC MORPH BLD: ABNORMAL
WBC MORPH BLD: ABNORMAL

## 2021-01-01 PROCEDURE — 82962 GLUCOSE BLOOD TEST: CPT

## 2021-01-01 PROCEDURE — 74011000250 HC RX REV CODE- 250: Performed by: PHYSICIAN ASSISTANT

## 2021-01-01 PROCEDURE — 65270000029 HC RM PRIVATE

## 2021-01-01 PROCEDURE — 85025 COMPLETE CBC W/AUTO DIFF WBC: CPT

## 2021-01-01 PROCEDURE — 80202 ASSAY OF VANCOMYCIN: CPT

## 2021-01-01 PROCEDURE — C1713 ANCHOR/SCREW BN/BN,TIS/BN: HCPCS | Performed by: ORTHOPAEDIC SURGERY

## 2021-01-01 PROCEDURE — 73030 X-RAY EXAM OF SHOULDER: CPT

## 2021-01-01 PROCEDURE — 0SPD09Z REMOVAL OF LINER FROM LEFT KNEE JOINT, OPEN APPROACH: ICD-10-PCS | Performed by: ORTHOPAEDIC SURGERY

## 2021-01-01 PROCEDURE — 74011250637 HC RX REV CODE- 250/637: Performed by: PHYSICIAN ASSISTANT

## 2021-01-01 PROCEDURE — 80048 BASIC METABOLIC PNL TOTAL CA: CPT

## 2021-01-01 PROCEDURE — 77030000032 HC CUF TRNQT ZIMM -B: Performed by: ORTHOPAEDIC SURGERY

## 2021-01-01 PROCEDURE — 74011636637 HC RX REV CODE- 636/637: Performed by: INTERNAL MEDICINE

## 2021-01-01 PROCEDURE — 82565 ASSAY OF CREATININE: CPT

## 2021-01-01 PROCEDURE — 87077 CULTURE AEROBIC IDENTIFY: CPT

## 2021-01-01 PROCEDURE — 77030008462 HC STPLR SKN PROX J&J -A: Performed by: ORTHOPAEDIC SURGERY

## 2021-01-01 PROCEDURE — U0005 INFEC AGEN DETEC AMPLI PROBE: HCPCS

## 2021-01-01 PROCEDURE — 77030025869: Performed by: ORTHOPAEDIC SURGERY

## 2021-01-01 PROCEDURE — 99024 POSTOP FOLLOW-UP VISIT: CPT | Performed by: PHYSICIAN ASSISTANT

## 2021-01-01 PROCEDURE — 97530 THERAPEUTIC ACTIVITIES: CPT

## 2021-01-01 PROCEDURE — 86900 BLOOD TYPING SEROLOGIC ABO: CPT

## 2021-01-01 PROCEDURE — 86580 TB INTRADERMAL TEST: CPT | Performed by: PHYSICIAN ASSISTANT

## 2021-01-01 PROCEDURE — 76060000036 HC ANESTHESIA 2.5 TO 3 HR: Performed by: ORTHOPAEDIC SURGERY

## 2021-01-01 PROCEDURE — 85610 PROTHROMBIN TIME: CPT

## 2021-01-01 PROCEDURE — 92611 MOTION FLUOROSCOPY/SWALLOW: CPT

## 2021-01-01 PROCEDURE — 74011250637 HC RX REV CODE- 250/637: Performed by: INTERNAL MEDICINE

## 2021-01-01 PROCEDURE — 27486 REVISE/REPLACE KNEE JOINT: CPT | Performed by: PHYSICIAN ASSISTANT

## 2021-01-01 PROCEDURE — 76010000171 HC OR TIME 2 TO 2.5 HR INTENSV-TIER 1: Performed by: ORTHOPAEDIC SURGERY

## 2021-01-01 PROCEDURE — 84550 ASSAY OF BLOOD/URIC ACID: CPT

## 2021-01-01 PROCEDURE — 77030039425 HC BLD LARYNG TRULITE DISP TELE -A: Performed by: NURSE ANESTHETIST, CERTIFIED REGISTERED

## 2021-01-01 PROCEDURE — C1751 CATH, INF, PER/CENT/MIDLINE: HCPCS

## 2021-01-01 PROCEDURE — 74011250636 HC RX REV CODE- 250/636: Performed by: NURSE ANESTHETIST, CERTIFIED REGISTERED

## 2021-01-01 PROCEDURE — 74011000250 HC RX REV CODE- 250: Performed by: NURSE ANESTHETIST, CERTIFIED REGISTERED

## 2021-01-01 PROCEDURE — 2709999900 HC NON-CHARGEABLE SUPPLY: Performed by: ORTHOPAEDIC SURGERY

## 2021-01-01 PROCEDURE — 77030031139 HC SUT VCRL2 J&J -A: Performed by: ORTHOPAEDIC SURGERY

## 2021-01-01 PROCEDURE — 97110 THERAPEUTIC EXERCISES: CPT

## 2021-01-01 PROCEDURE — 2709999900 HC NON-CHARGEABLE SUPPLY

## 2021-01-01 PROCEDURE — 77030002912 HC SUT ETHBND J&J -A: Performed by: ORTHOPAEDIC SURGERY

## 2021-01-01 PROCEDURE — 74011250636 HC RX REV CODE- 250/636: Performed by: INTERNAL MEDICINE

## 2021-01-01 PROCEDURE — 70551 MRI BRAIN STEM W/O DYE: CPT

## 2021-01-01 PROCEDURE — 87186 SC STD MICRODIL/AGAR DIL: CPT

## 2021-01-01 PROCEDURE — 77030035236 HC SUT PDS STRATFX BARB J&J -B: Performed by: ORTHOPAEDIC SURGERY

## 2021-01-01 PROCEDURE — 74011000250 HC RX REV CODE- 250: Performed by: INTERNAL MEDICINE

## 2021-01-01 PROCEDURE — 36430 TRANSFUSION BLD/BLD COMPNT: CPT

## 2021-01-01 PROCEDURE — 74230 X-RAY XM SWLNG FUNCJ C+: CPT

## 2021-01-01 PROCEDURE — 74011250636 HC RX REV CODE- 250/636: Performed by: ANESTHESIOLOGY

## 2021-01-01 PROCEDURE — 77030002966 HC SUT PDS J&J -A: Performed by: ORTHOPAEDIC SURGERY

## 2021-01-01 PROCEDURE — 77030020269 HC MISC IMPL: Performed by: ORTHOPAEDIC SURGERY

## 2021-01-01 PROCEDURE — 97161 PT EVAL LOW COMPLEX 20 MIN: CPT

## 2021-01-01 PROCEDURE — 97166 OT EVAL MOD COMPLEX 45 MIN: CPT

## 2021-01-01 PROCEDURE — 94760 N-INVAS EAR/PLS OXIMETRY 1: CPT

## 2021-01-01 PROCEDURE — 36415 COLL VENOUS BLD VENIPUNCTURE: CPT

## 2021-01-01 PROCEDURE — 74011000250 HC RX REV CODE- 250: Performed by: ORTHOPAEDIC SURGERY

## 2021-01-01 PROCEDURE — 87205 SMEAR GRAM STAIN: CPT

## 2021-01-01 PROCEDURE — 87150 DNA/RNA AMPLIFIED PROBE: CPT

## 2021-01-01 PROCEDURE — 77030035643 HC BLD SAW OSC PRECIS STRY -C: Performed by: ORTHOPAEDIC SURGERY

## 2021-01-01 PROCEDURE — 36573 INSJ PICC RS&I 5 YR+: CPT | Performed by: INTERNAL MEDICINE

## 2021-01-01 PROCEDURE — 97535 SELF CARE MNGMENT TRAINING: CPT

## 2021-01-01 PROCEDURE — 85652 RBC SED RATE AUTOMATED: CPT

## 2021-01-01 PROCEDURE — 93306 TTE W/DOPPLER COMPLETE: CPT

## 2021-01-01 PROCEDURE — 77030040022 HC WND LAVG DISP BACTISURE ZIMM -F: Performed by: ORTHOPAEDIC SURGERY

## 2021-01-01 PROCEDURE — 77030012935 HC DRSG AQUACEL BMS -B: Performed by: ORTHOPAEDIC SURGERY

## 2021-01-01 PROCEDURE — 74011250636 HC RX REV CODE- 250/636: Performed by: PHYSICIAN ASSISTANT

## 2021-01-01 PROCEDURE — 87040 BLOOD CULTURE FOR BACTERIA: CPT

## 2021-01-01 PROCEDURE — 77030040922 HC BLNKT HYPOTHRM STRY -A: Performed by: NURSE ANESTHETIST, CERTIFIED REGISTERED

## 2021-01-01 PROCEDURE — 73560 X-RAY EXAM OF KNEE 1 OR 2: CPT

## 2021-01-01 PROCEDURE — 77030040830 HC CATH URETH FOL MDII -A: Performed by: ORTHOPAEDIC SURGERY

## 2021-01-01 PROCEDURE — 74011250636 HC RX REV CODE- 250/636: Performed by: ORTHOPAEDIC SURGERY

## 2021-01-01 PROCEDURE — 0SUW09Z SUPPLEMENT LEFT KNEE JOINT, TIBIAL SURFACE WITH LINER, OPEN APPROACH: ICD-10-PCS | Performed by: ORTHOPAEDIC SURGERY

## 2021-01-01 PROCEDURE — 77030020044 HC CLD THERAPY UNIT -B

## 2021-01-01 PROCEDURE — 87070 CULTURE OTHR SPECIMN AEROBIC: CPT

## 2021-01-01 PROCEDURE — 86140 C-REACTIVE PROTEIN: CPT

## 2021-01-01 PROCEDURE — 99221 1ST HOSP IP/OBS SF/LOW 40: CPT | Performed by: PSYCHIATRY & NEUROLOGY

## 2021-01-01 PROCEDURE — 89050 BODY FLUID CELL COUNT: CPT

## 2021-01-01 PROCEDURE — 71045 X-RAY EXAM CHEST 1 VIEW: CPT

## 2021-01-01 PROCEDURE — 74011000250 HC RX REV CODE- 250: Performed by: ANESTHESIOLOGY

## 2021-01-01 PROCEDURE — 87635 SARS-COV-2 COVID-19 AMP PRB: CPT

## 2021-01-01 PROCEDURE — 02HV33Z INSERTION OF INFUSION DEVICE INTO SUPERIOR VENA CAVA, PERCUTANEOUS APPROACH: ICD-10-PCS | Performed by: ANESTHESIOLOGY

## 2021-01-01 PROCEDURE — 92610 EVALUATE SWALLOWING FUNCTION: CPT

## 2021-01-01 PROCEDURE — 77030018836 HC SOL IRR NACL ICUM -A: Performed by: ORTHOPAEDIC SURGERY

## 2021-01-01 PROCEDURE — 70450 CT HEAD/BRAIN W/O DYE: CPT

## 2021-01-01 PROCEDURE — 27486 REVISE/REPLACE KNEE JOINT: CPT | Performed by: ORTHOPAEDIC SURGERY

## 2021-01-01 PROCEDURE — 99358 PROLONG SERVICE W/O CONTACT: CPT | Performed by: PHYSICAL MEDICINE & REHABILITATION

## 2021-01-01 PROCEDURE — 87075 CULTR BACTERIA EXCEPT BLOOD: CPT

## 2021-01-01 PROCEDURE — 77030040361 HC SLV COMPR DVT MDII -B

## 2021-01-01 PROCEDURE — 89060 EXAM SYNOVIAL FLUID CRYSTALS: CPT

## 2021-01-01 PROCEDURE — C1751 CATH, INF, PER/CENT/MIDLINE: HCPCS | Performed by: NURSE ANESTHETIST, CERTIFIED REGISTERED

## 2021-01-01 PROCEDURE — 76210000016 HC OR PH I REC 1 TO 1.5 HR: Performed by: ORTHOPAEDIC SURGERY

## 2021-01-01 PROCEDURE — 77030037088 HC TUBE ENDOTRACH ORAL NSL COVD-A: Performed by: NURSE ANESTHETIST, CERTIFIED REGISTERED

## 2021-01-01 PROCEDURE — 77030019557 HC ELECTRD VES SEAL MEDT -F: Performed by: ORTHOPAEDIC SURGERY

## 2021-01-01 PROCEDURE — P9059 PLASMA, FRZ BETWEEN 8-24HOUR: HCPCS

## 2021-01-01 PROCEDURE — 74011250637 HC RX REV CODE- 250/637: Performed by: ANESTHESIOLOGY

## 2021-01-01 PROCEDURE — 83036 HEMOGLOBIN GLYCOSYLATED A1C: CPT

## 2021-01-01 DEVICE — STIMULAN® RAPID CURE PROVIDED STERILE FOR SINGLE PATIENT USE. STIMULAN® RAPID CURE CONTAINS CALCIUM SULFATE POWDER AND MIXING SOLUTION IN PRE-MEASURED QUANTITIES SO THAT WHEN MIXED TOGETHER IN A STERILE MIXING BOWL, THE RESULTANT PASTE IS TO BE DIGITALLY PACKED INTO OPEN BONE VOID/GAP TO SET INSITU OR PLACED INTO THE MOULD PROVIDED, THE MIXTURE SETS TO FORM BEADS. THE BIODEGRADABLE, RADIOPAQUE BEADS ARE RESORBED IN APPROXIMATELY 30 – 60 DAYS WHEN USED IN ACCORDANCE WITH THE DEVICE LABELLING. STIMULAN® RAPID CURE IS MANUFACTURED FROM SYNTHETIC IMPLANT GRADE CALCIUM SULFATE DIHYDRATE(CASO4.2H2O) THAT RESORBS AND IS REPLACED WITH BONE DURING THE HEALING PROCESS. ALSO, AS THE BONE VOID FILLER BEADS ARE BIODEGRADABLE AND BIOCOMPATIBLE, THEY MAY BE USED AT AN INFECTED SITE.
Type: IMPLANTABLE DEVICE | Site: KNEE | Status: FUNCTIONAL
Brand: STIMULAN® RAPID CURE

## 2021-01-01 RX ORDER — ROPIVACAINE HYDROCHLORIDE 2 MG/ML
INJECTION, SOLUTION EPIDURAL; INFILTRATION; PERINEURAL
Status: COMPLETED | OUTPATIENT
Start: 2021-01-01 | End: 2021-01-01

## 2021-01-01 RX ORDER — SODIUM CHLORIDE 9 MG/ML
100 INJECTION, SOLUTION INTRAVENOUS CONTINUOUS
Status: DISCONTINUED | OUTPATIENT
Start: 2021-01-01 | End: 2021-01-01 | Stop reason: HOSPADM

## 2021-01-01 RX ORDER — LATANOPROST 50 UG/ML
1 SOLUTION/ DROPS OPHTHALMIC EVERY EVENING
Status: DISCONTINUED | OUTPATIENT
Start: 2021-01-01 | End: 2021-01-01 | Stop reason: ALTCHOICE

## 2021-01-01 RX ORDER — LANOLIN ALCOHOL/MO/W.PET/CERES
1 CREAM (GRAM) TOPICAL
Status: DISCONTINUED | OUTPATIENT
Start: 2021-01-01 | End: 2021-01-01 | Stop reason: HOSPADM

## 2021-01-01 RX ORDER — VANCOMYCIN HYDROCHLORIDE
1250 ONCE
Status: COMPLETED | OUTPATIENT
Start: 2021-01-01 | End: 2021-01-01

## 2021-01-01 RX ORDER — SODIUM CHLORIDE, SODIUM LACTATE, POTASSIUM CHLORIDE, CALCIUM CHLORIDE 600; 310; 30; 20 MG/100ML; MG/100ML; MG/100ML; MG/100ML
INJECTION, SOLUTION INTRAVENOUS
Status: DISCONTINUED | OUTPATIENT
Start: 2021-01-01 | End: 2021-01-01 | Stop reason: HOSPADM

## 2021-01-01 RX ORDER — CEFAZOLIN SODIUM/WATER 2 G/20 ML
2 SYRINGE (ML) INTRAVENOUS EVERY 8 HOURS
Status: COMPLETED | OUTPATIENT
Start: 2021-01-01 | End: 2021-01-01

## 2021-01-01 RX ORDER — ATORVASTATIN CALCIUM 40 MG/1
40 TABLET, FILM COATED ORAL
Status: DISCONTINUED | OUTPATIENT
Start: 2021-01-01 | End: 2021-01-01 | Stop reason: HOSPADM

## 2021-01-01 RX ORDER — CEFAZOLIN SODIUM/WATER 2 G/20 ML
2 SYRINGE (ML) INTRAVENOUS ONCE
Status: COMPLETED | OUTPATIENT
Start: 2021-01-01 | End: 2021-01-01

## 2021-01-01 RX ORDER — TRAMADOL HYDROCHLORIDE 50 MG/1
50 TABLET ORAL
Status: DISCONTINUED | OUTPATIENT
Start: 2021-01-01 | End: 2021-01-01 | Stop reason: HOSPADM

## 2021-01-01 RX ORDER — AMOXICILLIN 250 MG
2 CAPSULE ORAL DAILY
Status: DISCONTINUED | OUTPATIENT
Start: 2021-01-01 | End: 2021-01-01 | Stop reason: HOSPADM

## 2021-01-01 RX ORDER — SODIUM CHLORIDE 0.9 % (FLUSH) 0.9 %
5-40 SYRINGE (ML) INJECTION EVERY 8 HOURS
Status: DISCONTINUED | OUTPATIENT
Start: 2021-01-01 | End: 2021-01-01 | Stop reason: HOSPADM

## 2021-01-01 RX ORDER — CHOLECALCIFEROL (VITAMIN D3) 125 MCG
CAPSULE ORAL
COMMUNITY

## 2021-01-01 RX ORDER — LIDOCAINE HYDROCHLORIDE 20 MG/ML
INJECTION, SOLUTION EPIDURAL; INFILTRATION; INTRACAUDAL; PERINEURAL AS NEEDED
Status: DISCONTINUED | OUTPATIENT
Start: 2021-01-01 | End: 2021-01-01 | Stop reason: HOSPADM

## 2021-01-01 RX ORDER — PROMETHAZINE HYDROCHLORIDE 12.5 MG/1
12.5 TABLET ORAL
Qty: 30 TABLET | Refills: 0 | Status: SHIPPED
Start: 2021-01-01

## 2021-01-01 RX ORDER — ACETAMINOPHEN 500 MG
1000 TABLET ORAL EVERY 6 HOURS
Status: DISCONTINUED | OUTPATIENT
Start: 2021-01-01 | End: 2021-01-01 | Stop reason: HOSPADM

## 2021-01-01 RX ORDER — ALLOPURINOL 300 MG/1
TABLET ORAL DAILY
COMMUNITY

## 2021-01-01 RX ORDER — ETOMIDATE 2 MG/ML
INJECTION INTRAVENOUS AS NEEDED
Status: DISCONTINUED | OUTPATIENT
Start: 2021-01-01 | End: 2021-01-01 | Stop reason: HOSPADM

## 2021-01-01 RX ORDER — SODIUM CHLORIDE 9 MG/ML
100 INJECTION, SOLUTION INTRAVENOUS CONTINUOUS
Status: DISPENSED | OUTPATIENT
Start: 2021-01-01 | End: 2021-01-01

## 2021-01-01 RX ORDER — OXYCODONE HYDROCHLORIDE 5 MG/1
10 TABLET ORAL
Status: DISCONTINUED | OUTPATIENT
Start: 2021-01-01 | End: 2021-01-01 | Stop reason: HOSPADM

## 2021-01-01 RX ORDER — SIMETHICONE 80 MG
80 TABLET,CHEWABLE ORAL
Status: DISCONTINUED | OUTPATIENT
Start: 2021-01-01 | End: 2021-01-01 | Stop reason: HOSPADM

## 2021-01-01 RX ORDER — SODIUM CHLORIDE 9 MG/ML
250 INJECTION, SOLUTION INTRAVENOUS AS NEEDED
Status: DISCONTINUED | OUTPATIENT
Start: 2021-01-01 | End: 2021-01-01 | Stop reason: HOSPADM

## 2021-01-01 RX ORDER — HYDROMORPHONE HYDROCHLORIDE 1 MG/ML
1 INJECTION, SOLUTION INTRAMUSCULAR; INTRAVENOUS; SUBCUTANEOUS
Status: DISCONTINUED | OUTPATIENT
Start: 2021-01-01 | End: 2021-01-01 | Stop reason: HOSPADM

## 2021-01-01 RX ORDER — NALOXONE HYDROCHLORIDE 0.4 MG/ML
.2-.4 INJECTION, SOLUTION INTRAMUSCULAR; INTRAVENOUS; SUBCUTANEOUS
Status: DISCONTINUED | OUTPATIENT
Start: 2021-01-01 | End: 2021-01-01 | Stop reason: HOSPADM

## 2021-01-01 RX ORDER — BRIMONIDINE TARTRATE 2 MG/ML
1 SOLUTION/ DROPS OPHTHALMIC EVERY 8 HOURS
Status: DISCONTINUED | OUTPATIENT
Start: 2021-01-01 | End: 2021-01-01 | Stop reason: HOSPADM

## 2021-01-01 RX ORDER — HYDROMORPHONE HYDROCHLORIDE 2 MG/ML
INJECTION, SOLUTION INTRAMUSCULAR; INTRAVENOUS; SUBCUTANEOUS AS NEEDED
Status: DISCONTINUED | OUTPATIENT
Start: 2021-01-01 | End: 2021-01-01 | Stop reason: HOSPADM

## 2021-01-01 RX ORDER — DIPHENHYDRAMINE HCL 25 MG
25 CAPSULE ORAL
Status: DISCONTINUED | OUTPATIENT
Start: 2021-01-01 | End: 2021-01-01 | Stop reason: HOSPADM

## 2021-01-01 RX ORDER — VANCOMYCIN/0.9 % SOD CHLORIDE 1.5G/250ML
1500 PLASTIC BAG, INJECTION (ML) INTRAVENOUS ONCE
Status: DISCONTINUED | OUTPATIENT
Start: 2021-01-01 | End: 2021-01-01 | Stop reason: DRUGHIGH

## 2021-01-01 RX ORDER — DEXAMETHASONE SODIUM PHOSPHATE 100 MG/10ML
10 INJECTION INTRAMUSCULAR; INTRAVENOUS ONCE
Status: ACTIVE | OUTPATIENT
Start: 2021-01-01 | End: 2021-01-01

## 2021-01-01 RX ORDER — GUAIFENESIN 100 MG/5ML
81 LIQUID (ML) ORAL DAILY
Status: DISCONTINUED | OUTPATIENT
Start: 2021-01-01 | End: 2021-01-01

## 2021-01-01 RX ORDER — TAMSULOSIN HYDROCHLORIDE 0.4 MG/1
0.4 CAPSULE ORAL DAILY
Status: DISCONTINUED | OUTPATIENT
Start: 2021-01-01 | End: 2021-01-01 | Stop reason: HOSPADM

## 2021-01-01 RX ORDER — ACETAMINOPHEN 650 MG/1
650 SUPPOSITORY RECTAL ONCE
Status: ACTIVE | OUTPATIENT
Start: 2021-01-01 | End: 2021-01-01

## 2021-01-01 RX ORDER — TRAZODONE HYDROCHLORIDE 100 MG/1
100 TABLET ORAL
COMMUNITY

## 2021-01-01 RX ORDER — VANCOMYCIN HYDROCHLORIDE 1 G/20ML
INJECTION, POWDER, LYOPHILIZED, FOR SOLUTION INTRAVENOUS AS NEEDED
Status: DISCONTINUED | OUTPATIENT
Start: 2021-01-01 | End: 2021-01-01 | Stop reason: HOSPADM

## 2021-01-01 RX ORDER — FENTANYL CITRATE 50 UG/ML
INJECTION, SOLUTION INTRAMUSCULAR; INTRAVENOUS AS NEEDED
Status: DISCONTINUED | OUTPATIENT
Start: 2021-01-01 | End: 2021-01-01 | Stop reason: HOSPADM

## 2021-01-01 RX ORDER — ACETAMINOPHEN 500 MG
1000 TABLET ORAL
Status: DISCONTINUED | OUTPATIENT
Start: 2021-01-01 | End: 2021-01-01 | Stop reason: HOSPADM

## 2021-01-01 RX ORDER — LEVOTHYROXINE SODIUM 50 UG/1
50 TABLET ORAL
Status: DISCONTINUED | OUTPATIENT
Start: 2021-01-01 | End: 2021-01-01 | Stop reason: HOSPADM

## 2021-01-01 RX ORDER — ONDANSETRON 8 MG/1
8 TABLET, ORALLY DISINTEGRATING ORAL
Status: DISCONTINUED | OUTPATIENT
Start: 2021-01-01 | End: 2021-01-01 | Stop reason: HOSPADM

## 2021-01-01 RX ORDER — TOBRAMYCIN 1.2 G/30ML
INJECTION, POWDER, LYOPHILIZED, FOR SOLUTION INTRAVENOUS AS NEEDED
Status: DISCONTINUED | OUTPATIENT
Start: 2021-01-01 | End: 2021-01-01 | Stop reason: HOSPADM

## 2021-01-01 RX ORDER — DORZOLAMIDE HYDROCHLORIDE AND TIMOLOL MALEATE 20; 5 MG/ML; MG/ML
1 SOLUTION/ DROPS OPHTHALMIC 2 TIMES DAILY
Status: DISCONTINUED | OUTPATIENT
Start: 2021-01-01 | End: 2021-01-01 | Stop reason: HOSPADM

## 2021-01-01 RX ORDER — DEXAMETHASONE SODIUM PHOSPHATE 4 MG/ML
INJECTION, SOLUTION INTRA-ARTICULAR; INTRALESIONAL; INTRAMUSCULAR; INTRAVENOUS; SOFT TISSUE
Status: COMPLETED | OUTPATIENT
Start: 2021-01-01 | End: 2021-01-01

## 2021-01-01 RX ORDER — GLYCOPYRROLATE 0.2 MG/ML
INJECTION INTRAMUSCULAR; INTRAVENOUS AS NEEDED
Status: DISCONTINUED | OUTPATIENT
Start: 2021-01-01 | End: 2021-01-01 | Stop reason: HOSPADM

## 2021-01-01 RX ORDER — EPHEDRINE SULFATE/0.9% NACL/PF 50 MG/5 ML
SYRINGE (ML) INTRAVENOUS AS NEEDED
Status: DISCONTINUED | OUTPATIENT
Start: 2021-01-01 | End: 2021-01-01 | Stop reason: HOSPADM

## 2021-01-01 RX ORDER — SODIUM CHLORIDE 0.9 % (FLUSH) 0.9 %
5-40 SYRINGE (ML) INJECTION AS NEEDED
Status: DISCONTINUED | OUTPATIENT
Start: 2021-01-01 | End: 2021-01-01 | Stop reason: HOSPADM

## 2021-01-01 RX ORDER — FENTANYL CITRATE 50 UG/ML
100 INJECTION, SOLUTION INTRAMUSCULAR; INTRAVENOUS ONCE
Status: COMPLETED | OUTPATIENT
Start: 2021-01-01 | End: 2021-01-01

## 2021-01-01 RX ORDER — ACETAMINOPHEN 325 MG/1
975 TABLET ORAL ONCE
Status: DISPENSED | OUTPATIENT
Start: 2021-01-01 | End: 2021-01-01

## 2021-01-01 RX ORDER — PANTOPRAZOLE SODIUM 40 MG/1
40 TABLET, DELAYED RELEASE ORAL
Status: DISCONTINUED | OUTPATIENT
Start: 2021-01-01 | End: 2021-01-01 | Stop reason: HOSPADM

## 2021-01-01 RX ORDER — ROCURONIUM BROMIDE 10 MG/ML
INJECTION, SOLUTION INTRAVENOUS AS NEEDED
Status: DISCONTINUED | OUTPATIENT
Start: 2021-01-01 | End: 2021-01-01 | Stop reason: HOSPADM

## 2021-01-01 RX ORDER — NEOSTIGMINE METHYLSULFATE 1 MG/ML
INJECTION, SOLUTION INTRAVENOUS AS NEEDED
Status: DISCONTINUED | OUTPATIENT
Start: 2021-01-01 | End: 2021-01-01 | Stop reason: HOSPADM

## 2021-01-01 RX ORDER — ONDANSETRON 4 MG/1
4 TABLET, ORALLY DISINTEGRATING ORAL
Status: DISCONTINUED | OUTPATIENT
Start: 2021-01-01 | End: 2021-01-01 | Stop reason: HOSPADM

## 2021-01-01 RX ORDER — INSULIN LISPRO 100 [IU]/ML
INJECTION, SOLUTION INTRAVENOUS; SUBCUTANEOUS EVERY 6 HOURS
Status: DISCONTINUED | OUTPATIENT
Start: 2021-01-01 | End: 2021-01-01 | Stop reason: HOSPADM

## 2021-01-01 RX ORDER — GUAIFENESIN 100 MG/5ML
81 LIQUID (ML) ORAL
Status: COMPLETED | OUTPATIENT
Start: 2021-01-01 | End: 2021-01-01

## 2021-01-01 RX ORDER — GLIPIZIDE 5 MG/1
2.5 TABLET ORAL
Status: DISCONTINUED | OUTPATIENT
Start: 2021-01-01 | End: 2021-01-01 | Stop reason: HOSPADM

## 2021-01-01 RX ORDER — ASPIRIN 81 MG/1
81 TABLET ORAL EVERY 12 HOURS
Status: COMPLETED | OUTPATIENT
Start: 2021-01-01 | End: 2021-01-01

## 2021-01-01 RX ADMIN — DORZOLAMIDE HYDROCHLORIDE AND TIMOLOL MALEATE 1 DROP: 20; 5 SOLUTION/ DROPS OPHTHALMIC at 09:10

## 2021-01-01 RX ADMIN — CEFAZOLIN SODIUM 2 G: 100 INJECTION, POWDER, LYOPHILIZED, FOR SOLUTION INTRAVENOUS at 20:22

## 2021-01-01 RX ADMIN — HYDROMORPHONE HYDROCHLORIDE 0.2 MG: 2 INJECTION INTRAMUSCULAR; INTRAVENOUS; SUBCUTANEOUS at 16:42

## 2021-01-01 RX ADMIN — ATORVASTATIN CALCIUM 40 MG: 40 TABLET, FILM COATED ORAL at 21:33

## 2021-01-01 RX ADMIN — FENTANYL CITRATE 50 MCG: 50 INJECTION INTRAMUSCULAR; INTRAVENOUS at 14:30

## 2021-01-01 RX ADMIN — HYDROMORPHONE HYDROCHLORIDE 1 MG: 1 INJECTION, SOLUTION INTRAMUSCULAR; INTRAVENOUS; SUBCUTANEOUS at 20:36

## 2021-01-01 RX ADMIN — ETOMIDATE 15 MCG: 2 INJECTION, SOLUTION INTRAVENOUS at 14:30

## 2021-01-01 RX ADMIN — BRIMONIDINE TARTRATE 1 DROP: 2 SOLUTION OPHTHALMIC at 06:00

## 2021-01-01 RX ADMIN — APIXABAN 5 MG: 5 TABLET, FILM COATED ORAL at 09:56

## 2021-01-01 RX ADMIN — CEFAZOLIN 2 G: 1 INJECTION, POWDER, FOR SOLUTION INTRAVENOUS at 15:33

## 2021-01-01 RX ADMIN — INSULIN LISPRO 6 UNITS: 100 INJECTION, SOLUTION INTRAVENOUS; SUBCUTANEOUS at 18:14

## 2021-01-01 RX ADMIN — INSULIN LISPRO 2 UNITS: 100 INJECTION, SOLUTION INTRAVENOUS; SUBCUTANEOUS at 06:12

## 2021-01-01 RX ADMIN — ACETAMINOPHEN 1000 MG: 500 TABLET, FILM COATED ORAL at 00:17

## 2021-01-01 RX ADMIN — FENTANYL CITRATE 50 MCG: 50 INJECTION INTRAMUSCULAR; INTRAVENOUS at 14:26

## 2021-01-01 RX ADMIN — ASPIRIN 81 MG CHEWABLE TABLET 81 MG: 81 TABLET CHEWABLE at 13:55

## 2021-01-01 RX ADMIN — APIXABAN 5 MG: 5 TABLET, FILM COATED ORAL at 07:29

## 2021-01-01 RX ADMIN — DEXAMETHASONE SODIUM PHOSPHATE 5 MG: 4 INJECTION, SOLUTION INTRAMUSCULAR; INTRAVENOUS at 14:09

## 2021-01-01 RX ADMIN — DOCUSATE SODIUM 50 MG AND SENNOSIDES 8.6 MG 2 TABLET: 8.6; 5 TABLET, FILM COATED ORAL at 09:00

## 2021-01-01 RX ADMIN — PANTOPRAZOLE SODIUM 40 MG: 40 TABLET, DELAYED RELEASE ORAL at 07:30

## 2021-01-01 RX ADMIN — INSULIN LISPRO 2 UNITS: 100 INJECTION, SOLUTION INTRAVENOUS; SUBCUTANEOUS at 23:33

## 2021-01-01 RX ADMIN — Medication 1 AMPULE: at 10:03

## 2021-01-01 RX ADMIN — ACETAMINOPHEN 1000 MG: 500 TABLET, FILM COATED ORAL at 06:02

## 2021-01-01 RX ADMIN — ACETAMINOPHEN 1000 MG: 500 TABLET, FILM COATED ORAL at 23:34

## 2021-01-01 RX ADMIN — HYDROMORPHONE HYDROCHLORIDE 1 MG: 1 INJECTION, SOLUTION INTRAMUSCULAR; INTRAVENOUS; SUBCUTANEOUS at 04:34

## 2021-01-01 RX ADMIN — PANTOPRAZOLE SODIUM 40 MG: 40 TABLET, DELAYED RELEASE ORAL at 08:48

## 2021-01-01 RX ADMIN — BRIMONIDINE TARTRATE 1 DROP: 2 SOLUTION OPHTHALMIC at 22:00

## 2021-01-01 RX ADMIN — INSULIN LISPRO 4 UNITS: 100 INJECTION, SOLUTION INTRAVENOUS; SUBCUTANEOUS at 06:12

## 2021-01-01 RX ADMIN — Medication 1 AMPULE: at 09:00

## 2021-01-01 RX ADMIN — ATORVASTATIN CALCIUM 40 MG: 40 TABLET, FILM COATED ORAL at 22:00

## 2021-01-01 RX ADMIN — APIXABAN 5 MG: 5 TABLET, FILM COATED ORAL at 21:33

## 2021-01-01 RX ADMIN — BRIMONIDINE TARTRATE 1 DROP: 2 SOLUTION OPHTHALMIC at 14:00

## 2021-01-01 RX ADMIN — LIDOCAINE HYDROCHLORIDE 80 MG: 20 INJECTION, SOLUTION EPIDURAL; INFILTRATION; INTRACAUDAL; PERINEURAL at 14:30

## 2021-01-01 RX ADMIN — SODIUM CHLORIDE 100 ML/HR: 900 INJECTION, SOLUTION INTRAVENOUS at 20:46

## 2021-01-01 RX ADMIN — APIXABAN 5 MG: 5 TABLET, FILM COATED ORAL at 09:00

## 2021-01-01 RX ADMIN — ROCURONIUM BROMIDE 40 MG: 10 INJECTION, SOLUTION INTRAVENOUS at 14:30

## 2021-01-01 RX ADMIN — PANTOPRAZOLE SODIUM 40 MG: 40 TABLET, DELAYED RELEASE ORAL at 09:08

## 2021-01-01 RX ADMIN — Medication 10 ML: at 16:13

## 2021-01-01 RX ADMIN — LEVOTHYROXINE SODIUM 50 MCG: 0.05 TABLET ORAL at 06:01

## 2021-01-01 RX ADMIN — SODIUM CHLORIDE, SODIUM LACTATE, POTASSIUM CHLORIDE, CALCIUM CHLORIDE: 600; 310; 30; 20 INJECTION, SOLUTION INTRAVENOUS at 14:58

## 2021-01-01 RX ADMIN — TRANEXAMIC ACID 1 G: 100 INJECTION, SOLUTION INTRAVENOUS at 14:50

## 2021-01-01 RX ADMIN — APIXABAN 5 MG: 5 TABLET, FILM COATED ORAL at 21:45

## 2021-01-01 RX ADMIN — ACETAMINOPHEN 1000 MG: 500 TABLET, FILM COATED ORAL at 13:00

## 2021-01-01 RX ADMIN — INSULIN LISPRO 2 UNITS: 100 INJECTION, SOLUTION INTRAVENOUS; SUBCUTANEOUS at 23:20

## 2021-01-01 RX ADMIN — DEXAMETHASONE SODIUM PHOSPHATE 4 MG: 4 INJECTION, SOLUTION INTRAMUSCULAR; INTRAVENOUS at 14:35

## 2021-01-01 RX ADMIN — ONDANSETRON 8 MG: 8 TABLET, ORALLY DISINTEGRATING ORAL at 20:37

## 2021-01-01 RX ADMIN — Medication 1 AMPULE: at 22:27

## 2021-01-01 RX ADMIN — INSULIN LISPRO 2 UNITS: 100 INJECTION, SOLUTION INTRAVENOUS; SUBCUTANEOUS at 00:09

## 2021-01-01 RX ADMIN — INSULIN LISPRO 4 UNITS: 100 INJECTION, SOLUTION INTRAVENOUS; SUBCUTANEOUS at 12:00

## 2021-01-01 RX ADMIN — ASPIRIN 81 MG: 81 TABLET, COATED ORAL at 20:22

## 2021-01-01 RX ADMIN — TAMSULOSIN HYDROCHLORIDE 0.4 MG: 0.4 CAPSULE ORAL at 09:00

## 2021-01-01 RX ADMIN — APIXABAN 5 MG: 5 TABLET, FILM COATED ORAL at 10:02

## 2021-01-01 RX ADMIN — PANTOPRAZOLE SODIUM 40 MG: 40 TABLET, DELAYED RELEASE ORAL at 06:10

## 2021-01-01 RX ADMIN — Medication 1 AMPULE: at 20:21

## 2021-01-01 RX ADMIN — INSULIN LISPRO 2 UNITS: 100 INJECTION, SOLUTION INTRAVENOUS; SUBCUTANEOUS at 06:41

## 2021-01-01 RX ADMIN — TAMSULOSIN HYDROCHLORIDE 0.4 MG: 0.4 CAPSULE ORAL at 07:28

## 2021-01-01 RX ADMIN — DOCUSATE SODIUM 50 MG AND SENNOSIDES 8.6 MG 2 TABLET: 8.6; 5 TABLET, FILM COATED ORAL at 07:31

## 2021-01-01 RX ADMIN — SODIUM CHLORIDE 100 ML/HR: 900 INJECTION, SOLUTION INTRAVENOUS at 17:47

## 2021-01-01 RX ADMIN — DORZOLAMIDE HYDROCHLORIDE AND TIMOLOL MALEATE 1 DROP: 20; 5 SOLUTION/ DROPS OPHTHALMIC at 18:00

## 2021-01-01 RX ADMIN — ACETAMINOPHEN 1000 MG: 500 TABLET, FILM COATED ORAL at 11:40

## 2021-01-01 RX ADMIN — LEVOTHYROXINE SODIUM 50 MCG: 0.05 TABLET ORAL at 05:53

## 2021-01-01 RX ADMIN — ACETAMINOPHEN 1000 MG: 500 TABLET, FILM COATED ORAL at 10:02

## 2021-01-01 RX ADMIN — ACETAMINOPHEN 1000 MG: 500 TABLET, FILM COATED ORAL at 19:03

## 2021-01-01 RX ADMIN — APIXABAN 5 MG: 5 TABLET, FILM COATED ORAL at 20:42

## 2021-01-01 RX ADMIN — TUBERCULIN PURIFIED PROTEIN DERIVATIVE 5 UNITS: 5 INJECTION, SOLUTION INTRADERMAL at 18:10

## 2021-01-01 RX ADMIN — BRIMONIDINE TARTRATE 1 DROP: 2 SOLUTION OPHTHALMIC at 21:34

## 2021-01-01 RX ADMIN — VANCOMYCIN HYDROCHLORIDE 1250 MG: 10 INJECTION, POWDER, LYOPHILIZED, FOR SOLUTION INTRAVENOUS at 15:33

## 2021-01-01 RX ADMIN — LATANOPROST 1 DROP: 50 SOLUTION OPHTHALMIC at 21:58

## 2021-01-01 RX ADMIN — DORZOLAMIDE HYDROCHLORIDE AND TIMOLOL MALEATE 1 DROP: 20; 5 SOLUTION/ DROPS OPHTHALMIC at 18:03

## 2021-01-01 RX ADMIN — BRIMONIDINE TARTRATE 1 DROP: 2 SOLUTION OPHTHALMIC at 20:49

## 2021-01-01 RX ADMIN — LEVOTHYROXINE SODIUM 50 MCG: 0.05 TABLET ORAL at 08:48

## 2021-01-01 RX ADMIN — LEVOTHYROXINE SODIUM 50 MCG: 0.05 TABLET ORAL at 09:09

## 2021-01-01 RX ADMIN — DORZOLAMIDE HYDROCHLORIDE AND TIMOLOL MALEATE 1 DROP: 20; 5 SOLUTION/ DROPS OPHTHALMIC at 10:13

## 2021-01-01 RX ADMIN — FERROUS SULFATE TAB 325 MG (65 MG ELEMENTAL FE) 325 MG: 325 (65 FE) TAB at 10:00

## 2021-01-01 RX ADMIN — INSULIN LISPRO 2 UNITS: 100 INJECTION, SOLUTION INTRAVENOUS; SUBCUTANEOUS at 11:40

## 2021-01-01 RX ADMIN — Medication 1 AMPULE: at 09:09

## 2021-01-01 RX ADMIN — DOCUSATE SODIUM 50 MG AND SENNOSIDES 8.6 MG 2 TABLET: 8.6; 5 TABLET, FILM COATED ORAL at 09:08

## 2021-01-01 RX ADMIN — BARIUM SULFATE 15 ML: 400 SUSPENSION ORAL at 13:47

## 2021-01-01 RX ADMIN — TUBERCULIN PURIFIED PROTEIN DERIVATIVE 5 UNITS: 5 INJECTION, SOLUTION INTRADERMAL at 10:01

## 2021-01-01 RX ADMIN — Medication 4 MG: at 16:21

## 2021-01-01 RX ADMIN — BRIMONIDINE TARTRATE 1 DROP: 2 SOLUTION OPHTHALMIC at 06:02

## 2021-01-01 RX ADMIN — INSULIN LISPRO 4 UNITS: 100 INJECTION, SOLUTION INTRAVENOUS; SUBCUTANEOUS at 00:17

## 2021-01-01 RX ADMIN — LEVOTHYROXINE SODIUM 50 MCG: 0.05 TABLET ORAL at 06:11

## 2021-01-01 RX ADMIN — ATORVASTATIN CALCIUM 40 MG: 40 TABLET, FILM COATED ORAL at 21:58

## 2021-01-01 RX ADMIN — FENTANYL CITRATE 100 MCG: 50 INJECTION INTRAMUSCULAR; INTRAVENOUS at 14:06

## 2021-01-01 RX ADMIN — PANTOPRAZOLE SODIUM 40 MG: 40 TABLET, DELAYED RELEASE ORAL at 06:01

## 2021-01-01 RX ADMIN — CEFAZOLIN SODIUM 2 G: 100 INJECTION, POWDER, LYOPHILIZED, FOR SOLUTION INTRAVENOUS at 06:01

## 2021-01-01 RX ADMIN — ACETAMINOPHEN 1000 MG: 500 TABLET, FILM COATED ORAL at 06:09

## 2021-01-01 RX ADMIN — ACETAMINOPHEN 1000 MG: 500 TABLET, FILM COATED ORAL at 06:01

## 2021-01-01 RX ADMIN — SODIUM CHLORIDE 100 ML/HR: 900 INJECTION, SOLUTION INTRAVENOUS at 21:39

## 2021-01-01 RX ADMIN — PANTOPRAZOLE SODIUM 40 MG: 40 TABLET, DELAYED RELEASE ORAL at 05:53

## 2021-01-01 RX ADMIN — Medication 1 AMPULE: at 20:42

## 2021-01-01 RX ADMIN — LEVOTHYROXINE SODIUM 50 MCG: 0.05 TABLET ORAL at 06:02

## 2021-01-01 RX ADMIN — HYDROMORPHONE HYDROCHLORIDE 0.2 MG: 2 INJECTION INTRAMUSCULAR; INTRAVENOUS; SUBCUTANEOUS at 16:36

## 2021-01-01 RX ADMIN — Medication 10 MG: at 14:42

## 2021-01-01 RX ADMIN — GLYCOPYRROLATE 0.8 MG: 0.2 INJECTION, SOLUTION INTRAMUSCULAR; INTRAVENOUS at 16:21

## 2021-01-01 RX ADMIN — Medication 1 AMPULE: at 21:46

## 2021-01-01 RX ADMIN — TAMSULOSIN HYDROCHLORIDE 0.4 MG: 0.4 CAPSULE ORAL at 15:15

## 2021-01-01 RX ADMIN — APIXABAN 5 MG: 5 TABLET, FILM COATED ORAL at 21:00

## 2021-01-01 RX ADMIN — ATORVASTATIN CALCIUM 40 MG: 40 TABLET, FILM COATED ORAL at 21:45

## 2021-01-01 RX ADMIN — DOCUSATE SODIUM 50 MG AND SENNOSIDES 8.6 MG 2 TABLET: 8.6; 5 TABLET, FILM COATED ORAL at 10:03

## 2021-01-01 RX ADMIN — SODIUM CHLORIDE, SODIUM LACTATE, POTASSIUM CHLORIDE, CALCIUM CHLORIDE: 600; 310; 30; 20 INJECTION, SOLUTION INTRAVENOUS at 14:19

## 2021-01-01 RX ADMIN — LATANOPROST 1 DROP: 50 SOLUTION OPHTHALMIC at 20:46

## 2021-01-01 RX ADMIN — ONDANSETRON 8 MG: 8 TABLET, ORALLY DISINTEGRATING ORAL at 18:03

## 2021-01-01 RX ADMIN — BRIMONIDINE TARTRATE 1 DROP: 2 SOLUTION OPHTHALMIC at 18:17

## 2021-01-01 RX ADMIN — HYDROMORPHONE HYDROCHLORIDE 1 MG: 1 INJECTION, SOLUTION INTRAMUSCULAR; INTRAVENOUS; SUBCUTANEOUS at 15:56

## 2021-01-01 RX ADMIN — VANCOMYCIN HYDROCHLORIDE 1250 MG: 10 INJECTION, POWDER, LYOPHILIZED, FOR SOLUTION INTRAVENOUS at 12:20

## 2021-01-01 RX ADMIN — PHENYLEPHRINE HYDROCHLORIDE 20 MCG/MIN: 10 INJECTION INTRAVENOUS at 14:58

## 2021-01-01 RX ADMIN — APIXABAN 5 MG: 5 TABLET, FILM COATED ORAL at 09:08

## 2021-01-01 RX ADMIN — SODIUM CHLORIDE, SODIUM LACTATE, POTASSIUM CHLORIDE, AND CALCIUM CHLORIDE: 600; 310; 30; 20 INJECTION, SOLUTION INTRAVENOUS at 14:19

## 2021-01-01 RX ADMIN — ATORVASTATIN CALCIUM 40 MG: 40 TABLET, FILM COATED ORAL at 20:22

## 2021-01-01 RX ADMIN — Medication 10 ML: at 22:00

## 2021-01-01 RX ADMIN — Medication 10 ML: at 06:00

## 2021-01-01 RX ADMIN — DORZOLAMIDE HYDROCHLORIDE AND TIMOLOL MALEATE 1 DROP: 20; 5 SOLUTION/ DROPS OPHTHALMIC at 07:00

## 2021-01-01 RX ADMIN — ATORVASTATIN CALCIUM 40 MG: 40 TABLET, FILM COATED ORAL at 20:49

## 2021-01-01 RX ADMIN — ACETAMINOPHEN 1000 MG: 500 TABLET, FILM COATED ORAL at 06:20

## 2021-01-01 RX ADMIN — BARIUM SULFATE 15 ML: 400 SUSPENSION ORAL at 13:49

## 2021-01-01 RX ADMIN — DORZOLAMIDE HYDROCHLORIDE AND TIMOLOL MALEATE 1 DROP: 20; 5 SOLUTION/ DROPS OPHTHALMIC at 09:00

## 2021-01-01 RX ADMIN — ACETAMINOPHEN 1000 MG: 500 TABLET, FILM COATED ORAL at 00:00

## 2021-01-01 RX ADMIN — DIPHENHYDRAMINE HCL 25 MG: 25 CAPSULE ORAL at 06:32

## 2021-01-01 RX ADMIN — BARIUM SULFATE 45 ML: 980 POWDER, FOR SUSPENSION ORAL at 13:41

## 2021-01-01 RX ADMIN — BARIUM SULFATE 15 ML: 400 PASTE ORAL at 13:42

## 2021-01-01 RX ADMIN — VANCOMYCIN HYDROCHLORIDE 1000 MG: 1 INJECTION, POWDER, LYOPHILIZED, FOR SOLUTION INTRAVENOUS at 17:44

## 2021-01-01 RX ADMIN — DOCUSATE SODIUM 50 MG AND SENNOSIDES 8.6 MG 2 TABLET: 8.6; 5 TABLET, FILM COATED ORAL at 10:02

## 2021-01-01 RX ADMIN — HYDROMORPHONE HYDROCHLORIDE 0.2 MG: 2 INJECTION INTRAMUSCULAR; INTRAVENOUS; SUBCUTANEOUS at 16:29

## 2021-01-01 RX ADMIN — ACETAMINOPHEN 1000 MG: 500 TABLET, FILM COATED ORAL at 00:09

## 2021-01-01 RX ADMIN — ROPIVACAINE HYDROCHLORIDE 40 MG: 2 INJECTION, SOLUTION EPIDURAL; INFILTRATION at 14:09

## 2021-01-01 RX ADMIN — HYDROMORPHONE HYDROCHLORIDE 0.4 MG: 2 INJECTION INTRAMUSCULAR; INTRAVENOUS; SUBCUTANEOUS at 15:09

## 2021-01-01 RX ADMIN — Medication 1 AMPULE: at 21:33

## 2021-01-01 RX ADMIN — ONDANSETRON 4 MG: 8 TABLET, ORALLY DISINTEGRATING ORAL at 14:35

## 2021-12-09 PROBLEM — T84.50XA INFECTION OF TOTAL JOINT PROSTHESIS (HCC): Status: ACTIVE | Noted: 2021-01-01

## 2021-12-09 NOTE — H&P
[id number] [facility]  Admission History and Physical Exam    Patient ID:  Khoi West  [id number]  39 y.o.  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 The patient was evaluated and examined during a consultation prior to this office visit. There have been no changes to the patient's orthopedic condition since the initial consultation. The patient has failed previous conservative treatment for this condition including antiinflammatories , and lifestyle modifications. The necessity for joint replacement is present. The patient will be admitted the day of surgery for Incision and Drainage with poly exchange of left total knee. Past Medical/Surgical History:  Past Medical History:   Diagnosis Date    Anemia fall 2016    r/t GI bleeds     Arthritis     osteo    CAD (coronary artery disease) 1997    4 stents total (1997, 2012, 2013); \"ECHO 7/2016 normal with good EF\" per cardio office note; \"Stress test 6/2016 Findings consistent with very mild inferior ischemia\"    Carotid stenosis 04/2017    carotid u/s: Right internal carotid artery has 50-69% stenosis. Left internal carotid artery has <50% stenosis.     Diabetes ([facility] [address] 05/1997    type 2; oral med; rare BG checks, last hgba1c- 6.9 (6/2017)    Diverticulosis 12/2011    with GI bleeding; no current problems    GERD (gastroesophageal reflux disease)     on med for control     Glaucoma     Heart murmur     ECHO 7/14/16- mild AS, mild MR, mild TR    Hiatal hernia     History of kidney stones     Hypercholesteremia     on med for control     Hypertension     hx of- no medications currently    Hypothyroid     managed with medication    MI (myocardial infarction) ([facility] [address]     x2, STEMI    TIA (transient ischemic attack) 07/2016    experienced numbness/tingling L arm and left lower face- \"only lasted a couple of hrs\"; 7/13 CT head: cerebral white matter changes have progressed since 4/19/13 likely indicative of chronic small vessel ischemic disease, no acute intracranial abnormality.  Unspecified sleep apnea     denies     Urethral stenosis     has had difficulty with placement/removal previously     Vasovagal syncope      Past Surgical History:   Procedure Laterality Date    HX BUNIONECTOMY Left     with hammer toe    HX CARPAL TUNNEL RELEASE Right 1968    HX CARPAL TUNNEL RELEASE Left 2012    HX CATARACT REMOVAL  1987 and 1990    Teofilo with IOLens implamts    HX HEART CATHETERIZATION  3669,9964, 2012, 2013    total 4 stents: 2 stents- 1997, 1 stent- 2012 (BMS to RCA), 1 stent- 2013 (ANGELLA to RCA)    HX HEENT Right     eye surgery to replace lens due to fall     HX HEMORRHOIDECTOMY  1987    HX HERNIA REPAIR  1944    L inguinal    HX KNEE REPLACEMENT Bilateral 2002, 2008    HX LAP CHOLECYSTECTOMY  5897    umbilical hernia repair    HX ORTHOPAEDIC Left     trigger finger     HX ORTHOPAEDIC  [id number]    multiple knee surgery    HX ROTATOR CUFF REPAIR Right 1995    HX ROTATOR CUFF REPAIR Left 2000    HX ROTATOR CUFF REPAIR Right 2007    with martina tendon repair    HX SHOULDER ARTHROSCOPY Left 2015    HX TONSILLECTOMY  1938    HX TOTAL COLECTOMY  01/13/2017    HX UROLOGICAL  1996    cysto with stone extraction and stent        Allergies: Allergies   Allergen Reactions    Acetazolamide Unknown (comments)     fatigue    Benzonatate Other (comments)        Physical Exam:   General: NAD, Alert, Oriented, Appears their stated age     [de-identified]: NC/AT    Skin: No rashes, lesions or wounds seen      Psych: normal affect      Heart: Regular Rate, Rhythm     Lungs: unlabored respirations, no wheezing    Abdomen: Soft and non-distended     Ortho: Pain with limited ROM of the left knee    Neuro: no focal defects, moving extremities equally    Lymph: no lymphadenopathy     Meds:   No current facility-administered medications for this encounter.      Current Outpatient Medications   Medication Sig    simethicone (GAS-X) 125 mg capsule Take 125 mg by mouth four (4) times daily as needed for Flatulence.  loperamide (IMODIUM) 2 mg capsule Take 2 mg by mouth four (4) times daily as needed for Diarrhea.  diphenoxylate-atropine (LOMOTIL) 2.5-0.025 mg per tablet Take 1 Tab by mouth two (2) times daily as needed for Diarrhea.  atorvastatin (LIPITOR) 40 mg tablet Take 40 mg by mouth nightly.  levothyroxine (SYNTHROID) 50 mcg tablet Take 50 mcg by mouth Daily (before breakfast).  Psyllium Husk-Sucrose 3.4 gram/7 gram powd Take 3 Caps by mouth two (2) times a day.  omega-3 fatty acids-vitamin e (FISH OIL) 1,000 mg cap Take 2 Caps by mouth daily.  bimatoprost (LUMIGAN) 0.01 % ophthalmic drops Administer 1 Drop to right eye nightly.  timolol (TIMOPTIC) 0.5 % ophthalmic solution Administer 1 Drop to right eye every morning.  Omeprazole delayed release (PRILOSEC D/R) 20 mg tablet Take 20 mg by mouth Daily (before dinner).  meloxicam (MOBIC) 15 mg tablet Take 15 mg by mouth daily. Indications: OSTEOARTHRITIS    brimonidine (ALPHAGAN P) 0.1 % ophthalmic solution Administer 1 Drop to right eye two (2) times a day. Take / use AM day of surgery  per anesthesia protocols.  glimepiride (AMARYL) 2 mg tablet Take 1 mg by mouth daily. Indications: type 2 diabetes mellitus    b complex vitamins tablet Take 1 Tab by mouth daily.  aspirin 81 mg Tab Take 81 mg by mouth daily. Take / use 81 mg AM day of surgery  per anesthesia protocols. Labs:  No visits with results within 3 Month(s) from this visit.    Latest known visit with results is:   Admission on 10/13/2017, Discharged on 10/16/2017   Component Date Value Ref Range Status    Crossmatch Expiration 10/13/2017 10/16/2017   Final    ABO/Rh(D) 10/13/2017 A POSITIVE   Final    Antibody screen 10/13/2017 NEG   Final    Unit number 10/13/2017 [id number]   Final    Blood component type 10/13/2017 RC LR AS5 Final    Unit division 10/13/2017 00   Final    Status of unit 10/13/2017 REL FROM [facility]   Final    Crossmatch result 10/13/2017 Compatible   Final    Unit number 10/13/2017 [id number]   Final    Blood component type 10/13/2017 RC LR AS5   Final    Unit division 10/13/2017 00   Final    Status of unit 10/13/2017 REL FROM [facility]   Final    Crossmatch result 10/13/2017 Compatible   Final    Magnesium 10/13/2017 1.8  1.8 - 2.4 mg/dL Final    Hemoglobin A1c 10/13/2017 6.2* 4.8 - 6.0 % Final    Est. average glucose 10/13/2017 131  mg/dL Final    Comment: (NOTE)  The eAG should be interpreted with patient characteristics in mind   since ethnicity, interindividual differences, red cell lifespan,   variation in rates of glycation, etc. may affect the validity of the   calculation.  Glucose (POC) 10/13/2017 128* 65 - 100 mg/dL Final    Comment: 47 - 60 mg/dl Consistent with, but not fully diagnostic of hypoglycemia.   101 - 125 mg/dl Impaired fasting glucose/consistent with pre-diabetes mellitus  > 126 mg/dl Fasting glucose consistent with overt diabetes mellitus      mm Induration 10/16/2017 0  mm Preliminary    Melany Rychcik    PPD 10/15/2017 0  Negative Preliminary    mm Induration 10/15/2017 0  mm Preliminary    Melany Rychcik    PPD 10/16/2017 neg  Negative Final    mm Induration 10/16/2017 0  mm Final    Special Requests: 10/13/2017     Final                    Value:LEFT  SHOL  1      GRAM STAIN 10/13/2017 0 TO 1 WBC'S/OIF   Final    GRAM STAIN 10/13/2017 NO DEFINITE ORGANISM SEEN    Final    Culture result: 10/13/2017 NO GROWTH 2 DAYS    Final    Special Requests: 10/13/2017     Final                    Value:LEFT  SHOL  2      GRAM STAIN 10/13/2017 NO WBCS SEEN    Final    GRAM STAIN 10/13/2017 NO DEFINITE ORGANISM SEEN    Final    Culture result: 10/13/2017 NO GROWTH 2 DAYS    Final    Special Requests: 10/13/2017     Final                    Value:LEFT  SHOL  3      GRAM STAIN 10/13/2017 NO WBCS SEEN    Final    GRAM STAIN 10/13/2017 NO DEFINITE ORGANISM SEEN    Final    Culture result: 10/13/2017 NO GROWTH 2 DAYS    Final    Special Requests: 10/13/2017     Final                    Value:LEFT  SHOL  4      GRAM STAIN 10/13/2017 NO WBCS SEEN    Final    GRAM STAIN 10/13/2017 NO DEFINITE ORGANISM SEEN    Final    Culture result: 10/13/2017 NO GROWTH 2 DAYS    Final    Special Requests: 10/13/2017    Final                    Value:LFT  SHOL  5      GRAM STAIN 10/13/2017 NO WBCS SEEN    Final    GRAM STAIN 10/13/2017 NO DEFINITE ORGANISM SEEN    Final    Culture result: 10/13/2017 NO GROWTH 2 DAYS    Final    Special Requests: 10/13/2017 NONE    Final    GRAM STAIN 10/13/2017 NO WBCS SEEN    Final    GRAM STAIN 10/13/2017 NO DEFINITE ORGANISM SEEN    Final    Culture result: 10/13/2017 NO GROWTH 2 DAYS    Final    WBC 10/14/2017 7.5  4.3 - 11.1 K/uL Final    RBC 10/14/2017 3.74* 4.23 - 5.67 M/uL Final    HGB 10/14/2017 11.9* 13.6 - 17.2 g/dL Final    HCT 10/14/2017 36.2* 41.1 - 50.3 % Final    MCV 10/14/2017 96.8  79.6 - 97.8 FL Final    MCH 10/14/2017 31.8  26.1 - 32.9 PG Final    MCHC 10/14/2017 32.9  31.4 - 35.0 g/dL Final    RDW 10/14/2017 15.6* 11.9 - 14.6 % Final    PLATELET 58/80/8940 379* 150 - 450 K/uL Final    MPV 10/14/2017 11.4  10.8 - 14.1 FL Final    Sodium 10/14/2017 140  136 - 145 mmol/L Final    Potassium 10/14/2017 4.2  3.5 - 5.1 mmol/L Final    Chloride 10/14/2017 108* 98 - 107 mmol/L Final    CO2 10/14/2017 22  21 - 32 mmol/L Final    Anion gap 10/14/2017 10  7 - 16 mmol/L Final    Glucose 10/14/2017 208* 65 - 100 mg/dL Final    Comment: 47 - 60 mg/dl Consistent with, but not fully diagnostic of hypoglycemia.   101 - 125 mg/dl Impaired fasting glucose/consistent with pre-diabetes mellitus  > 126 mg/dl Fasting glucose consistent with overt diabetes mellitus      BUN 10/14/2017 27* 8 - 23 MG/DL Final    Creatinine 10/14/2017 1.46  0.8 - 1.5 MG/DL Final  GFR est AA 10/14/2017 59* >60 ml/min/1.73m2 Final    GFR est non-AA 10/14/2017 49* >60 ml/min/1.73m2 Final    Comment: (NOTE)  Estimated GFR is calculated using the Modification of Diet in Renal   Disease (MDRD) Study equation, reported for both  Americans   (GFRAA) and non- Americans (GFRNA), and normalized to 1.73m2   body surface area. The physician must decide which value applies to   the patient. The MDRD study equation should only be used in   individuals age 25 or older. It has not been validated for the   following: pregnant women, patients with serious comorbid conditions,   or on certain medications, or persons with extremes of body size,   muscle mass, or nutritional status.  Calcium 10/14/2017 7.8* 8.3 - 10.4 MG/DL Final    Magnesium 10/14/2017 1.8  1.8 - 2.4 mg/dL Final    Special Requests: 10/13/2017     Final                    Value:LEFT  1      Culture result: 10/13/2017 NO ANAEROBIC GROWTH IN 21 DAYS   Final    Special Requests: 10/13/2017     Final                    Value:LEFT  2      Culture result: 10/13/2017 NO ANAEROBIC GROWTH IN 21 DAYS   Final    Special Requests: 10/13/2017     Final                    Value:LEFT  3      Culture result: 10/13/2017 NO ANAEROBIC GROWTH IN 21DAYS   Final    Special Requests: 10/13/2017     Final                    Value:LEFT  4      Culture result: 10/13/2017 NO ANAEROBIC GROWTH IN 21 DAYS   Final    Special Requests: 10/13/2017     Final                    Value:LEFT  5      Culture result: 10/13/2017 NO ANAEROBIC GROWTH IN 21 DAYS   Final    Special Requests: 10/13/2017 LEFT    Final    Culture result: 10/13/2017 NO ANAEROBIC GROWTH IN 21 DAYS   Final    Glucose (POC) 10/14/2017 129* 65 - 100 mg/dL Final    Comment: 47 - 60 mg/dl Consistent with, but not fully diagnostic of hypoglycemia.   101 - 125 mg/dl Impaired fasting glucose/consistent with pre-diabetes mellitus  > 126 mg/dl Fasting glucose consistent with overt diabetes mellitus      Sodium 10/15/2017 139  136 - 145 mmol/L Final    Potassium 10/15/2017 3.8  3.5 - 5.1 mmol/L Final    Chloride 10/15/2017 107  98 - 107 mmol/L Final    CO2 10/15/2017 23  21 - 32 mmol/L Final    Anion gap 10/15/2017 9  7 - 16 mmol/L Final    Glucose 10/15/2017 155* 65 - 100 mg/dL Final    Comment: 47 - 60 mg/dl Consistent with, but not fully diagnostic of hypoglycemia. 101 - 125 mg/dl Impaired fasting glucose/consistent with pre-diabetes mellitus  > 126 mg/dl Fasting glucose consistent with overt diabetes mellitus      BUN 10/15/2017 30* 8 - 23 MG/DL Final    Creatinine 10/15/2017 1.73* 0.8 - 1.5 MG/DL Final    GFR est AA 10/15/2017 49* >60 ml/min/1.73m2 Final    GFR est non-AA 10/15/2017 40* >60 ml/min/1.73m2 Final    Calcium 10/15/2017 7.4* 8.3 - 10.4 MG/DL Final    Magnesium 10/15/2017 1.6* 1.8 - 2.4 mg/dL Final    WBC 10/15/2017 5.3  4.3 - 11.1 K/uL Final    RBC 10/15/2017 3.17* 4.23 - 5.67 M/uL Final    HGB 10/15/2017 10.0* 13.6 - 17.2 g/dL Final    HCT 10/15/2017 30.6* 41.1 - 50.3 % Final    MCV 10/15/2017 96.5  79.6 - 97.8 FL Final    MCH 10/15/2017 31.5  26.1 - 32.9 PG Final    MCHC 10/15/2017 32.7  31.4 - 35.0 g/dL Final    RDW 10/15/2017 15.5* 11.9 - 14.6 % Final    PLATELET 62/69/8783 569* 150 - 450 K/uL Final    MPV 10/15/2017 11.0  10.8 - 14.1 FL Final    Glucose (POC) 10/15/2017 176* 65 - 100 mg/dL Final    Comment: 47 - 60 mg/dl Consistent with, but not fully diagnostic of hypoglycemia.   101 - 125 mg/dl Impaired fasting glucose/consistent with pre-diabetes mellitus  > 126 mg/dl Fasting glucose consistent with overt diabetes mellitus      Sodium 10/16/2017 142  136 - 145 mmol/L Final    Potassium 10/16/2017 3.8  3.5 - 5.1 mmol/L Final    Chloride 10/16/2017 110* 98 - 107 mmol/L Final    CO2 10/16/2017 26  21 - 32 mmol/L Final    Anion gap 10/16/2017 6* 7 - 16 mmol/L Final    Glucose 10/16/2017 133* 65 - 100 mg/dL Final    Comment: 47 - 60 mg/dl Consistent with, but not fully diagnostic of hypoglycemia. 101 - 125 mg/dl Impaired fasting glucose/consistent with pre-diabetes mellitus  > 126 mg/dl Fasting glucose consistent with overt diabetes mellitus      BUN 10/16/2017 20  8 - 23 MG/DL Final    Creatinine 10/16/2017 1.20  0.8 - 1.5 MG/DL Final    GFR est AA 10/16/2017 >60  >60 ml/min/1.73m2 Final    GFR est non-AA 10/16/2017 >60  >60 ml/min/1.73m2 Final    Calcium 10/16/2017 7.8* 8.3 - 10.4 MG/DL Final    Magnesium 10/16/2017 1.9  1.8 - 2.4 mg/dL Final    WBC 10/16/2017 4.2* 4.3 - 11.1 K/uL Final    RBC 10/16/2017 3.09* 4.23 - 5.67 M/uL Final    HGB 10/16/2017 9.6* 13.6 - 17.2 g/dL Final    HCT 10/16/2017 29.9* 41.1 - 50.3 % Final    MCV 10/16/2017 96.8  79.6 - 97.8 FL Final    MCH 10/16/2017 31.1  26.1 - 32.9 PG Final    MCHC 10/16/2017 32.1  31.4 - 35.0 g/dL Final    RDW 10/16/2017 15.6* 11.9 - 14.6 % Final    PLATELET 93/94/9850 736* 150 - 450 K/uL Final    MPV 10/16/2017 11.2  10.8 - 14.1 FL Final                 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 Proceed with scheduled Incision and Drainage with poly exchange of left total knee     The patient was counseled at length about the risks of katelyn Covid-19 during their perioperative period and any recovery window from their procedure. The patient was made aware that katelyn Covid-19  may worsen their prognosis for recovering from their procedure and lend to a higher morbidity and/or mortality risk. All material risks, benefits, and reasonable alternatives including postponing the procedure were discussed. The patient does  wish to proceed with the procedure at this time.          Signed By: GABE Estrada  December 9, 2021

## 2021-12-10 PROBLEM — Z96.652 STATUS POST REVISION OF TOTAL KNEE REPLACEMENT, LEFT: Status: ACTIVE | Noted: 2021-01-01

## 2021-12-10 PROBLEM — E11.65 HYPERGLYCEMIA DUE TO TYPE 2 DIABETES MELLITUS (HCC): Chronic | Status: ACTIVE | Noted: 2021-01-01

## 2021-12-10 PROBLEM — E78.5 HYPERLIPIDEMIA: Chronic | Status: ACTIVE | Noted: 2021-01-01

## 2021-12-10 PROBLEM — I48.91 ATRIAL FIBRILLATION (HCC): Chronic | Status: ACTIVE | Noted: 2021-01-01

## 2021-12-10 PROBLEM — E03.9 ACQUIRED HYPOTHYROIDISM: Chronic | Status: ACTIVE | Noted: 2021-01-01

## 2021-12-10 PROBLEM — H91.90 HEARING LOSS: Chronic | Status: ACTIVE | Noted: 2021-01-01

## 2021-12-10 PROBLEM — D68.9 COAGULOPATHY (HCC): Status: ACTIVE | Noted: 2021-01-01

## 2021-12-10 NOTE — H&P
The patient has an infected total knee. The patient was seen and examined and there are no changes to the patient's orthopedic condition. The necessity for the joint replacement is still present, and the H&P from the office is still current. The patient will be admitted today for Procedure(s) (LRB):  INCISION AND DRAINAGE LEFT KNEE/ POLY EXCHANGE/ POSSIBLE PROSETHSIS EXPLANT W/ CEMENT SPACER (Left)  LEFT KNEE ARTHROPLASTY TOTAL (Left) .

## 2021-12-10 NOTE — ANESTHESIA PROCEDURE NOTES
Central Line Placement    Start time: 12/10/2021 2:41 PM  End time: 12/10/2021 2:49 PM  Performed by: Basilio Tarango MD  Authorized by: Lanie Chahal MD     Indications: vascular access and need for vasopressors  Preanesthetic Checklist: patient identified, risks and benefits discussed, anesthesia consent, site marked, patient being monitored and timeout performed    Timeout Time: 14:41 EST       Pre-procedure: All elements of maximal sterile barrier technique followed?  Yes    2% Chlorhexidine for cutaneous antisepsis and Hand hygiene performed prior to catheter insertion              Procedure:   Prep:  ChloraPrep  Location: internal jugular  Orientation:  Right  Patient position:  Trendelenburg  Catheter type:  Quad lumen    Catheter length:  20 cm  Number of attempts:  1  Successful placement: Yes      Assessment:   Post-procedure:  Catheter secured, sterile dressing applied and sterile dressing with CHG applied  Assessment:  Blood return through all ports, free fluid flow and guidewire removal verified  Insertion:  Uncomplicated  Patient tolerance:  Patient tolerated the procedure well with no immediate complications

## 2021-12-10 NOTE — ANESTHESIA POSTPROCEDURE EVALUATION
Procedure(s):  INCISION AND DRAINAGE LEFT KNEE/ POLY EXCHANGE/ POSSIBLE PROSETHSIS EXPLANT W/ CEMENT SPACER  POSS LEFT KNEE ARTHROPLASTY TOTAL.     general    Anesthesia Post Evaluation      Multimodal analgesia: multimodal analgesia used between 6 hours prior to anesthesia start to PACU discharge  Patient location during evaluation: bedside  Patient participation: complete - patient participated  Level of consciousness: awake  Pain management: adequate  Airway patency: patent  Anesthetic complications: no  Cardiovascular status: acceptable  Respiratory status: spontaneous ventilation and acceptable  Hydration status: acceptable  Post anesthesia nausea and vomiting:  none      INITIAL Post-op Vital signs:   Vitals Value Taken Time   /56 12/10/21 1718   Temp 36.2 °C (97.2 °F) 12/10/21 1648   Pulse 76 12/10/21 1718   Resp 16 12/10/21 1718   SpO2 95 % 12/10/21 1718

## 2021-12-10 NOTE — ANESTHESIA PROCEDURE NOTES
Peripheral Block    Start time: 12/10/2021 2:06 PM  End time: 12/10/2021 2:09 PM  Performed by: Sagar Johnson MD  Authorized by: Sagar Johnson MD       Pre-procedure: Indications: at surgeon's request and post-op pain management    Preanesthetic Checklist: patient identified, risks and benefits discussed, site marked, timeout performed, anesthesia consent given and patient being monitored    Timeout Time: 14:06 EST          Block Type:   Block Type:   Adductor canal block  Laterality:  Left  Monitoring:  Standard ASA monitoring, continuous pulse ox, frequent vital sign checks, heart rate, responsive to questions and oxygen  Injection Technique:  Single shot  Procedures: ultrasound guided    Patient Position: supine  Prep: chlorhexidine    Location:  Mid thigh  Needle Type:  Stimuplex  Needle Gauge:  20 G  Needle Localization:  Anatomical landmarks and ultrasound guidance  Medication Injected:  Ropivacaine (NAROPIN) 2 mg/mL (0.2 %) injection, 40 mg  dexamethasone (DECADRON) 4 mg/mL injection, 5 mg  Med Admin Time: 12/10/2021 2:09 PM    Assessment:  Number of attempts:  1  Injection Assessment:  Incremental injection every 5 mL, local visualized surrounding nerve on ultrasound, negative aspiration for blood, no paresthesia and ultrasound image on chart  Patient tolerance:  Patient tolerated the procedure well with no immediate complications

## 2021-12-10 NOTE — PROGRESS NOTES
[facility] Pharmacokinetic Monitoring Service - Vancomycin     Kera Jang is a 80 y.o. male starting on vancomycin therapy for Injected Knee replacement. Pharmacy consulted by GABE Wheeler for monitoring and adjustment. Target Concentration: Goal AUC/JESSICA 400-600 mg*hr/L    Additional Antimicrobials: None    Pertinent Laboratory Values: Wt Readings from Last 1 Encounters:   12/10/21 78 kg (172 lb)     Temp Readings from Last 1 Encounters:   12/10/21 98.4 °F (36.9 °C)     No components found for: PROCAL  Recent Labs     12/09/21  2124   BUN 18   CREA 1.34   WBC 7.6     Estimated Creatinine Clearance: 35.6 mL/min (based on SCr of 1.34 mg/dL). MRSA Nasal Swab: N/A. Non-respiratory infection. .    Plan:  Dosing recommendations based on Bayesian software  Initial Preop dose of Vanco 1250 mg x 1 was done at 15:33 12/10  Start vancomycin 1000 mg IV q24h  Anticipated AUC of 500 and trough concentration of 16.1 at steady state  Renal labs as indicated   Vancomycin concentration to be ordered for 12/12 am   Pharmacy will continue to monitor patient and adjust therapy as indicated    Thank you for the consult,  Varun Huber, [facility]

## 2021-12-10 NOTE — PROGRESS NOTES
Admission assessment complete. Pt a/ox4 and resting in bed. Very hard of hearing with bilateral hearing aids. 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 Left knee warm to the touch, no pain at this time. Moderate bilateral plantar/dorsiflexion with pedal pulses present and palpable +2. Laceration on forehead c/d/i with steri-strips. Skin tear on right elbow with gauze. IV attempt unsuccessful, will contact supervisor for assistance. Bed low and locked, alarm on, side rails x3, gripper socks on, and call light in reach. Encouraged to call for help if needed and pt verbalized understanding.

## 2021-12-10 NOTE — PROGRESS NOTES
To [facility]  Dr. Victorino godwin scheduled for Monday will need to be cancelled due to the patient being admitted to [facility] under the Care of Dr. Radha Keyes for and incision and drainage of an infection in the left knee.      Daniel Cruz RN  [facility]  [phone]

## 2021-12-10 NOTE — OP NOTES
[facility]  Knee Incision and Drainage with poly exchange     Patient:Daniele Mello   : 1933  Medical Record [id number]  Pre-operative Diagnosis:  Left knee infection  Post-operative Diagnosis: Coagulopathy [facility])  Location: [address] [place] [address],[address], MD   Assistant: Idania Rivera    Anesthesia: General    Procedure: Knee Incision and Drainage with poly exchange   CPT Code: 52375  The complexity of the total joint surgery requires the use of a first assistant for positioning, retraction and expertise in closure. Tourniquet Time: 0 minutes  EBL: 300 cc  Findings: moderate amount of fluid with well-fixed components    Peggy Patel was brought to the operating room and positioned on the operating table. He was anethestized with anesthesia. IV antibiotics were administered. Prior to the incision being made a timeout was called identifying the patient, procedure ,operative side and surgeon. .  A pneumatic tourniquet was placed about the limb and the left leg was prepped and draped in the usual sterile manner. An anterior longitudinal incision was accomplished just medial to the tibial tubercle and extending approximal 6 centimeters proximal to the superior pole of the patella. A medial parapatellar capsular incision was performed. The medial capsular flap was elevated around to the insertion of the semimembranous tendon. A moderate amount of fluid was encountered. Opening cultures were obtained. Synovectomy and scar tissue removal were then performed in the suprapatellar pouch, medial and lateral gutters and the infrapatellar region. The Femoral,Tibial, and Patella components were inspected for loosening and all components were found to be well fixed. The tibial bearing surface was removed using a small osteotome. The knee was irrigated with 1000 cc of Bactisure. The knee was thoroughly irrigated with 9 Liters of Normal Saline.    A size 12 mm bearing surface was then opened and placed. Antibiotics beads were placed. The operative knee was injected with 60cc of Naropin, 10 cc's of morphine and 1 cc of 30mg of Toradol. The capsular layer was closed using a #1 Stratafix suture, while subcutaneous layers were closed using 2-0 Vicryl interrupted sutures. Finally the skin was closed using 3-0 Vicryl and skin staples, which were applied in occlusive fashion and sterile bandage applied. An Iceman cryo pad was applied on the operative leg. The patient was placed in a knee immobilizer. Sponge count and needle counts were correct. Juan Ramon Diop left the operating room     Implants:   Implant Name Type Inv.  Item Serial No.  Lot No. LRB No. Used Action   GRAFT BNE SUB 10CC BEAD 25CC CA SULPHATE RAP SET W/ INDIV - [id number]  GRAFT BNE SUB 10CC BEAD 25CC CA SULPHATE RAP SET W/ INDIV  [employer] [id number] Left 1 Implanted   SPACER SPNL 42-74MM COR [id number] FOR MINI SELF EXP VBR - [id number]  SPACER SPNL 42-74MM COR [id number] FOR MINI SELF EXP VBR  [employer]WD [id number] Left 1 Implanted       Signed By: Jeanne Stern MD  12/10/2021,  3:52 PM

## 2021-12-10 NOTE — PROGRESS NOTES
12/10/21 0947   Dual Skin Pressure Injury Assessment   Dual Skin Pressure Injury Assessment X   Skin Integumentary   Skin Integumentary (WDL) X    Pressure  Injury Documentation No Pressure Injury Noted-Pressure Ulcer Prevention Initiated   Skin Color Ecchymosis (comment)  (brusing)   Skin Condition/Temp Fragile; Flaky;  Warm; Dry   Skin Integrity Tear  (tear on right elbow)   Turgor Epidermis thin w/ loss of subcut tissue

## 2021-12-10 NOTE — H&P
[facility] Hospitalist Consult   Admit Date:  2021  7:32 PM   Name:  Qi James   Age:  80 y.o. Sex:  male  :  1933   MRN:  [id number]   Room:  [facility]/    Presenting Complaint: No chief complaint on file. Reason(s) for Admission: Infection of total joint prosthesis [facility]     Hospitalists consulted by Hussein Singh MD for: elevated INR    History of Presenting Illness:   Qi James is a 80 y.o. male with history of AFIB on eliquis, HLP, DM2, hypothyroidism, hearing loss who is evaluated with complaints of left TKA infection and needs I&D. He is very hard of hearing but describes knee pain, need for eye surgery and back surgery. He last took eliquis 3-4 days prior to admit. INR 2.4 on 21. FFP ordered per orthopedics. Review of Systems:  10 systems not possible due to hearing       Assessment & Plan:    Active Problems:    Infection of total joint prosthesis ([address] (2021)  ·   Defer to orthopedics      Atrial fibrillation ([address] (12/10/2021)    Coagulopathy  Chronic anticoagulation:  · FFP ordered per orthopedics  · STAT INR pending   · Holding eliquis   · In NSR on exam           Hyperglycemia due to type 2 diabetes mellitus ([address] (12/10/2021)  ·   Hold glipizide   · POC and sliding scale insulin         Hyperlipidemia (12/10/2021)  ·   Continued lipitor       Acquired hypothyroidism (12/10/2021)  ·   Continued synthroid       Hearing loss (12/10/2021)            Hospital Problems as of 12/10/2021 Date Reviewed: 10/13/2017          Codes Class Noted - Resolved POA    Atrial fibrillation ([address] (Chronic) ICD-10-CM: I48.91  ICD-9-CM: 427.31  12/10/2021 - Present Yes        Hyperglycemia due to type 2 diabetes mellitus (HCC) (Chronic) ICD-10-CM: E11.65  ICD-9-CM: 250.00  12/10/2021 - Present Yes        Hyperlipidemia (Chronic) ICD-10-CM: E78.5  ICD-9-CM: 272.4  12/10/2021 - Present Yes        Acquired hypothyroidism (Chronic) ICD-10-CM: E03.9  ICD-9-CM: 244.9  12/10/2021 - Present Yes        Hearing loss (Chronic) ICD-10-CM: H91.90  ICD-9-CM: 389.9  12/10/2021 - Present Yes        Infection of total joint prosthesis ([facility] ICD-10-CM: T84.50XA  ICD-9-CM: 996.66  12/9/2021 - Present Unknown              Past History:  Past Medical History:   Diagnosis Date    Anemia fall 2016    r/t GI bleeds     Arthritis     osteo    Atrial fibrillation ([facility] 12/10/2021    CAD (coronary artery disease) 1997    4 stents total (1997, 2012, 2013); \"ECHO 7/2016 normal with good EF\" per cardio office note; \"Stress test 6/2016 Findings consistent with very mild inferior ischemia\"    Carotid stenosis 04/2017    carotid u/s: Right internal carotid artery has 50-69% stenosis. Left internal carotid artery has <50% stenosis.  Diabetes ([facility] 05/1997    type 2; oral med; rare BG checks, last hgba1c- 6.9 (6/2017)    Diverticulosis 12/2011    with GI bleeding; no current problems    GERD (gastroesophageal reflux disease)     on med for control     Glaucoma     Heart murmur     ECHO 7/14/16- mild AS, mild MR, mild TR    Hiatal hernia     History of kidney stones     Hypercholesteremia     on med for control     Hypertension     hx of- no medications currently    Hypothyroid     managed with medication    Infection of total joint prosthesis ([facility] 12/9/2021    MI (myocardial infarction) ([facility]     x2, STEMI    TIA (transient ischemic attack) 07/2016    experienced numbness/tingling L arm and left lower face- \"only lasted a couple of hrs\"; 7/13 CT head: cerebral white matter changes have progressed since 4/19/13 likely indicative of chronic small vessel ischemic disease, no acute intracranial abnormality.       Unspecified sleep apnea     denies     Urethral stenosis     has had difficulty with placement/removal previously     Vasovagal syncope       Past Surgical History:   Procedure Laterality Date    HX BUNIONECTOMY Left     with hammer toe    HX CARPAL TUNNEL RELEASE Right 1968    HX CARPAL TUNNEL RELEASE Left 2012    HX CATARACT REMOVAL  1987 and 1990    Teofilo with IOLens implamts    HX HEART CATHETERIZATION  7476,1485, 2012, 2013    total 4 stents: 2 stents- 1997, 1 stent- 2012 (BMS to RCA), 1 stent- 2013 (ANGELLA to RCA)    HX HEENT Right     eye surgery to replace lens due to fall     HX HEMORRHOIDECTOMY  1987    HX HERNIA REPAIR  1944    L inguinal    HX KNEE REPLACEMENT Bilateral 2002, 2008    HX LAP CHOLECYSTECTOMY  3853    umbilical hernia repair    HX ORTHOPAEDIC Left     trigger finger     HX ORTHOPAEDIC  6054-8156    multiple knee surgery    HX ROTATOR CUFF REPAIR Right 1995    HX ROTATOR CUFF REPAIR Left 2000    HX ROTATOR CUFF REPAIR Right 2007    with martina tendon repair    HX SHOULDER ARTHROSCOPY Left 2015    HX TONSILLECTOMY  1938    HX TOTAL COLECTOMY  01/13/2017    HX UROLOGICAL  1996    cysto with stone extraction and stent      Allergies   Allergen Reactions    Acetazolamide Unknown (comments)     fatigue    Benzonatate Other (comments)      Social History     Tobacco Use    Smoking status: Never Smoker    Smokeless tobacco: Never Used   Substance Use Topics    Alcohol use: Yes     Alcohol/week: 7.0 standard drinks     Types: 7 Glasses of wine per week      Family History   Problem Relation Age of Onset    Heart Disease Father     Heart Attack Father     Stroke Mother     Alzheimer's Disease Sister     Alzheimer's Disease Sister       Family history reviewed and negative except as otherwise noted.     Immunization History   Administered Date(s) Administered    TB Skin Test (PPD) Intradermal 10/14/2017     Current Facility-Administered Medications   Medication Dose Route Frequency    acetaminophen (TYLENOL) tablet 1,000 mg  1,000 mg Oral Q6H PRN    insulin lispro (HUMALOG) injection   SubCUTAneous Q6H    0.9% sodium chloride infusion 250 mL  250 mL IntraVENous PRN    0.9% sodium chloride infusion  75 mL/hr IntraVENous CONTINUOUS    traMADoL (ULTRAM) tablet 50 mg  50 mg Oral Q6H PRN    ondansetron (ZOFRAN ODT) tablet 8 mg  8 mg Oral Q8H PRN    diphenhydrAMINE (BENADRYL) capsule 25 mg  25 mg Oral Q6H PRN    atorvastatin (LIPITOR) tablet 40 mg  40 mg Oral QHS    latanoprost (XALATAN) 0.005 % ophthalmic solution 1 Drop  1 Drop Right Eye QPM    [Held by provider] glipiZIDE (GLUCOTROL) tablet 2.5 mg  2.5 mg Oral ACB    levothyroxine (SYNTHROID) tablet 50 mcg  50 mcg Oral ACB    pantoprazole (PROTONIX) tablet 40 mg  40 mg Oral ACB    simethicone (MYLICON) tablet 80 mg  80 mg Oral QID PRN    dorzolamide-timoloL (COSOPT) 22.3-6.8 mg/mL ophthalmic solution 1 Drop  1 Drop Right Eye BID    lip protectant (BLISTEX) ointment 1 Each  1 Each Topical PRN       Objective:     Patient Vitals for the past 24 hrs:   Temp Pulse Resp BP SpO2   12/10/21 0737 97.9 °F (36.6 °C) 81 16 104/61 96 %   12/10/21 0345 98.4 °F (36.9 °C) 74 16 105/76 97 %   12/09/21 2326 98.8 °F (37.1 °C) 76 14 103/61 96 %   12/09/21 1930 98.1 °F (36.7 °C) 77 16 107/84 98 %     Oxygen Therapy  O2 Sat (%): 96 % (12/10/21 0737)    Estimated body mass index is 24.22 kg/m² as calculated from the following:    Height as of 10/13/17: 5' 9\" (1.753 m). Weight as of 10/13/17: 74.4 kg (164 lb). Intake/Output Summary (Last 24 hours) at 12/10/2021 0931  Last data filed at 12/10/2021 0345  Gross per 24 hour   Intake    Output 100 ml   Net -100 ml         Physical Exam:    Blood pressure 104/61, pulse 81, temperature 97.9 °F (36.6 °C), resp. rate 16, SpO2 96 %. General:    Well nourished. No overt distress, hard of hearing   Head:  Normocephalic, atraumatic  Eyes:  Sclerae appear normal.  Pupils equally round. ENT:  Nares appear normal, no drainage. Moist oral mucosa  Neck:  No restricted ROM. Trachea midline   CV:   RRR. No m/r/g. No jugular venous distension. Trace edema   Lungs:   CTAB. No wheezing, rhonchi, or rales. Respirations even, unlabored  Abdomen: Bowel sounds present.   Soft, nontender, nondistended. Extremities: No cyanosis or clubbing. No edema  Skin:     No rashes and normal coloration. Warm and dry. Neuro:  grossly intact. Psych:  Normal mood and affect. I have reviewed ordered lab tests and independently visualized imaging below:    Recent Labs:  Recent Results (from the past 48 hour(s))   CELL COUNT, BODY FLUID    Collection Time: 12/09/21  8:30 PM   Result Value Ref Range    BODY FLUID TYPE LEFT KNEE      FLUID RBC CT. 72,000 /cu mm    FLUID WBC COUNT 75,870 /cu mm    BRCH NEUTROPHIL 93 %    BRCH LYMPHS 3 %    BRCH MACROPHAGES 4 %    FLUID COLOR RED      FLUID APPEARANCE TURBID     CRYSTALS, SYNOVIAL FLUID    Collection Time: 12/09/21  8:30 PM   Result Value Ref Range    FLUID TYPE(7) LEFT KNEE      Crystals, body fluid PENDING    CULTURE, BODY FLUID W GRAM STAIN    Collection Time: 12/09/21  8:30 PM    Specimen: Knee, left   Result Value Ref Range    Special Requests: NO SPECIAL REQUESTS      GRAM STAIN PENDING     Culture result:        NO GROWTH AFTER SHORT PERIOD OF INCUBATION. FURTHER RESULTS TO FOLLOW AFTER OVERNIGHT INCUBATION. CBC WITH AUTOMATED DIFF    Collection Time: 12/09/21  9:24 PM   Result Value Ref Range    WBC 7.6 4.3 - 11.1 K/uL    RBC 3.26 (L) 4.23 - 5.6 M/uL    HGB 10.2 (L) 13.6 - 17.2 g/dL    HCT 31.7 (L) 41.1 - 50.3 %    MCV 97.2 79.6 - 97.8 FL    MCH 31.3 26.1 - 32.9 PG    MCHC 32.2 31.4 - 35.0 g/dL    RDW 15.7 (H) 11.9 - 14.6 %    PLATELET 91 (L) 114 - 450 K/uL    MPV 12.9 (H) 9.4 - 12.3 FL    ABSOLUTE NRBC 0.00 0.0 - 0.2 K/uL    NEUTROPHILS 69 43 - 78 %    LYMPHOCYTES 10 (L) 13 - 44 %    MONOCYTES 20 (H) 4.0 - 12.0 %    EOSINOPHILS 0 (L) 0.5 - 7.8 %    BASOPHILS 0 0.0 - 2.0 %    IMMATURE GRANULOCYTES 1 0.0 - 5.0 %    ABS. NEUTROPHILS 5.2 1.7 - 8.2 K/UL    ABS. LYMPHOCYTES 0.8 0.5 - 4.6 K/UL    ABS. MONOCYTES 1.5 (H) 0.1 - 1.3 K/UL    ABS. EOSINOPHILS 0.0 0.0 - 0.8 K/UL    ABS. BASOPHILS 0.0 0.0 - 0.2 K/UL    ABS. IMM.  GRANS. 0.1 0.0 - 0.5 K/UL    RBC COMMENTS MODERATE  ANISOCYTOSIS + POIKILOCYTOSIS        RBC COMMENTS SLIGHT  OVALOCYTES        RBC COMMENTS SLIGHT  SCHISTOCYTES        RBC COMMENTS NRBCs SEEN     WBC COMMENTS Result Confirmed By Smear      PLATELET COMMENTS DECREASED      DF AUTOMATED     URIC ACID    Collection Time: 12/09/21  9:24 PM   Result Value Ref Range    Uric acid 3.6 2.6 - 6.0 MG/DL   METABOLIC PANEL, BASIC    Collection Time: 12/09/21  9:24 PM   Result Value Ref Range    Sodium 132 (L) 136 - 145 mmol/L    Potassium 3.9 3.5 - 5.1 mmol/L    Chloride 102 98 - 107 mmol/L    CO2 27 21 - 32 mmol/L    Anion gap 3 (L) 7 - 16 mmol/L    Glucose 209 (H) 65 - 100 mg/dL    BUN 18 8 - 23 MG/DL    Creatinine 1.34 0.8 - 1.5 MG/DL    GFR est AA >60 >60 ml/min/1.73m2    GFR est non-AA 53 (L) >60 ml/min/1.73m2    Calcium 8.1 (L) 8.3 - 10.4 MG/DL   PROTHROMBIN TIME + INR    Collection Time: 12/09/21  9:24 PM   Result Value Ref Range    Prothrombin time 26.1 (H) 12.6 - 14.5 sec    INR 2.4     C REACTIVE PROTEIN, QT    Collection Time: 12/09/21  9:24 PM   Result Value Ref Range    C-Reactive protein 11.0 (H) 0.0 - 0.9 mg/dL   SED RATE, AUTOMATED    Collection Time: 12/09/21  9:24 PM   Result Value Ref Range    Sed rate, automated 2 0 - 15 mm/hr   COVID-19 RAPID TEST    Collection Time: 12/10/21  6:37 AM   Result Value Ref Range    Specimen source NASAL SWAB      COVID-19 rapid test Not detected NOTD     GLUCOSE, POC    Collection Time: 12/10/21  9:08 AM   Result Value Ref Range    Glucose (POC) 88 65 - 100 mg/dL    [place] [facility] [place] Results     Procedure Component Value Units Date/Time    COVID-19 RAPID TEST [[id number]] Collected: 12/10/21 4692    Order Status: Completed Specimen: Nasopharyngeal Updated: 12/10/21 0713     Specimen source NASAL SWAB        COVID-19 rapid test Not detected        Comment:      The specimen is NEGATIVE for SARS-CoV-2, the novel coronavirus associated with COVID-19.   A negative result does not rule out COVID-19. This test has been authorized by the FDA under an Emergency Use Authorization (EUA) for use by authorized laboratories. Fact sheet for Healthcare Providers: ConventionUpdate.co.nz  Fact sheet for Patients: ConventionUpdate.co.nz       Methodology: Isothermal Nucleic Acid Amplification         CULTURE, BODY FLUID Issa Barry [[id number]] Collected: 12/09/21 1937    Order Status: Canceled           Other Studies:  XR KNEE LT 3 V    Result Date: 12/9/2021  AUTOMATIC ADMINISTRATIVE RESULT The result for this exam can be found in the Progress note in the chart.     : See Progress note in the chart. Signed:  Tamika Lam MD    Part of this note may have been written by using a voice dictation software. The note has been proof read but may still contain some grammatical/other typographical errors.

## 2021-12-10 NOTE — PROGRESS NOTES
Placed Hospitalist consult for medical management and assistance reducing pt's INR to try to take him to surgery later today for I&D of infected TKA.

## 2021-12-10 NOTE — PERIOP NOTES
TRANSFER - OUT REPORT:    Verbal report given to recieving nurse Tiera(name) on Alisia Harley being transferred to [facility](unit) for routine progression of care       Report consisted of patient's Situation, Background, Assessment and   Recommendations(SBAR). Information from the following report(s) OR Summary, Procedure Summary, Intake/Output and Accordion was reviewed with the receiving nurse. Opportunity for questions and clarification was provided.       Patient transported with:   O2 @ 1 liters  Tech

## 2021-12-10 NOTE — ANESTHESIA PREPROCEDURE EVALUATION
Relevant Problems   CARDIOVASCULAR   (+) Atrial fibrillation (HCC)      ENDOCRINE   (+) Acquired hypothyroidism       Anesthetic History   No history of anesthetic complications            Review of Systems / Medical History  Patient summary reviewed and pertinent labs reviewed    Pulmonary  Within defined limits      Sleep apnea: No treatment           Neuro/Psych         TIA     Cardiovascular    Hypertension        Dysrhythmias : atrial fibrillation  Past MI, CAD and cardiac stents    Exercise tolerance: <4 METS  Comments: Remote hx of MI, 4 stents in the years after   GI/Hepatic/Renal     GERD    Renal disease: stones       Endo/Other    Diabetes: type 2  Hypothyroidism  Arthritis     Other Findings   Comments: 2 U's of FFP being administered prior to OR         Physical Exam    Airway  Mallampati: II  TM Distance: 4 - 6 cm  Neck ROM: decreased range of motion        Cardiovascular    Rhythm: irregular           Dental    Dentition: Caps/crowns     Pulmonary                 Abdominal  GI exam deferred       Other Findings            Anesthetic Plan    ASA: 3, emergent  Anesthesia type: general - femoral single shot      Post-op pain plan if not by surgeon: peripheral nerve block single    Induction: Intravenous  Anesthetic plan and risks discussed with: Patient

## 2021-12-11 NOTE — PROGRESS NOTES
Problem: Mobility Impaired (Adult and Pediatric)  Goal: *Acute Goals and Plan of Care (Insert Text)  Outcome: Progressing Towards Goal  Note: GOALS (1-4 days):  (1.)Mr. Guadalupe Santiago will move from supine to sit and sit to supine  in bed with STAND BY ASSIST.  (2.)Mr. Guadalupe Santiago will transfer from bed to chair and chair to bed with STAND BY ASSIST using the least restrictive device. (3.)Mr. Guadalupe Santiago will ambulate with STAND BY ASSIST for 350 feet with the least restrictive device. (4.)Mr. Guadalupe Santiago will increase left knee ROM to 0°-90°.  ________________________________________________________________________________________________        PHYSICAL THERAPY JOINT CAMP TKA: Initial Assessment and AM 12/11/2021  INPATIENT: Hospital Day: 3  [place] / [place] / Product Type: Medicare /      NAME/AGE/GENDER: Rosenda Hope is a 80 y.o. male   PRIMARY DIAGNOSIS:  Left knee infection   Procedure(s) and Anesthesia Type:     * INCISION AND DRAINAGE LEFT KNEE/ POLY EXCHANGE/ POSSIBLE PROSETHSIS EXPLANT W/ CEMENT SPACER - General     * POSS LEFT KNEE ARTHROPLASTY TOTAL - Spinal (Left  Left)  ICD-10: Treatment Diagnosis:    Pain in Left Knee (M25.562)  Stiffness of Left Knee, Not elsewhere classified (M25.662)  Difficulty in walking, Not elsewhere classified (R26.2)      ASSESSMENT:     Mr. Guadalupe Santiago presents with decreased strength and range of motion left lower extremity and with decreased independence with functional mobility s/p left TKA. Pt will benefit from skilled PT interventions to maximize independence with functional mobility and TKA management. Pt did fairly well with assessment. He is slow and needs extra time to complete tasks. He was supine on contact. He transferred out of bed and ambulated to the bathroom for toileting then ambulated 175 ft in the hallways with RW and CGA. He returned to the room and sat in the recliner to perform therex. Pt instructed not to get up without assistance.  Hope to progress mobility and exercises in the morning. Pt plans to discharge to rehab with continued therapy for follow up. He wants to go to [facility] and is wanting to talk with the . This section established at most recent assessment   PROBLEM LIST (Impairments causing functional limitations):  Decreased Strength  Decreased ADL/Functional Activities  Decreased Transfer Abilities  Decreased Ambulation Ability/Technique  Decreased Flexibility/Joint Mobility  Edema/Girth  Decreased [place] with Home Exercise Program   INTERVENTIONS PLANNED: (Benefits and precautions of physical therapy have been discussed with the patient.)  Bed Mobility  Cold  Gait Training  Home Exercise Program (HEP)  Range of Motion (ROM)  Therapeutic Activites  Therapeutic Exercise/Strengthening  Transfer Training     TREATMENT PLAN: Frequency/Duration: Follow patient BID for duration of hospital stay to address above goals. Rehabilitation Potential For Stated Goals: Good     RECOMMENDED REHABILITATION/EQUIPMENT: (at time of discharge pending progress): Continue Skilled Therapy and Rehab. HISTORY:   History of Present Injury/Illness (Reason for Referral):  Pt s/p total knee arthroplasty revision on L LE    Past Medical History/Comorbidities:   Mr. Jonah Diaz  has a past medical history of Anemia (fall 2016), Arthritis, Atrial fibrillation ([address] (12/10/2021), CAD (coronary artery disease) (1997), Carotid stenosis (04/2017), Coagulopathy ([address] (12/10/2021), Diabetes ([address] (05/1997), Diverticulosis (12/2011), GERD (gastroesophageal reflux disease), Glaucoma, Heart murmur, Hiatal hernia, History of kidney stones, Hypercholesteremia, Hypertension, Hypothyroid, Infection of total joint prosthesis ([address] (12/9/2021), MI (myocardial infarction) ([address] TIA (transient ischemic attack) (07/2016), Unspecified sleep apnea, Urethral stenosis, and Vasovagal syncope.  He also has no past medical history of Adverse effect of anesthesia, Aneurysm ([address] Asthma, Autoimmune disease ([address] Cancer ([address] Chronic kidney disease, Chronic pain, COPD, Dementia, Difficult intubation, Endocarditis, Gastrointestinal disorder, Heart failure ([address] Ill-defined condition, Liver disease, Malignant hyperthermia due to anesthesia, Morbid obesity ([address] Nausea & vomiting, Neurological disorder, Other ill-defined conditions(799.89), Pseudocholinesterase deficiency, Psychiatric disorder, PUD (peptic ulcer disease), Rheumatic fever, Seizures ([address] Sleep disorder, Stroke ([address] or Thromboembolus ([address] Mr. German Sherman  has a past surgical history that includes hx heart catheterization (5241,6901, 2012, 2013); hx tonsillectomy (1938); hx cataract removal (1987 and 1990); hx lap cholecystectomy (2003); hx hernia repair (1944); hx urological (1996); hx orthopaedic (Left); hx orthopaedic (5789-4199); hx shoulder arthroscopy (Left, 2015); hx knee replacement (Bilateral, 2002, 2008); hx hemorrhoidectomy (1987); hx heent (Right); hx carpal tunnel release (Right, 1968); hx carpal tunnel release (Left, 2012); hx bunionectomy (Left); hx rotator cuff repair (Right, 1995); hx rotator cuff repair (Left, 2000); hx rotator cuff repair (Right, 2007); and hx total colectomy (01/13/2017). Social History/Living Environment:   Living Alone: Yes  Patient Expects to be Discharged to[de-identified] Rehabilitation facility  Prior Level of Function/Work/Activity:  Independent prior to admit. Lives alone.  May have caregivers that assist at home but unsure how frequently   Number of Personal Factors/Comorbidities that affect the Plan of Care: 0: LOW COMPLEXITY   EXAMINATION:   Most Recent Physical Functioning:   Gross Assessment: Yes  Gross Assessment  AROM: Generally decreased, functional (limited L LE)  Strength: Generally decreased, functional (limited L LE)                     Bed Mobility  Supine to Sit: Contact guard assistance  Scooting: Stand-by assistance    Transfers  Sit to Stand: Minimum assistance  Stand to Sit: Contact guard assistance  Bed to Chair: Contact guard assistance; Minimum assistance    Balance  Sitting: Intact  Standing: Pull to stand; With support              Weight Bearing Status  Left Side Weight Bearing: As tolerated  Distance (ft): 175 Feet (ft)  Ambulation - Level of Assistance: Contact guard assistance  Assistive Device: Walker, rolling  Speed/Tracy: Pace decreased (<100 feet/min)  Step Length: Right shortened; Left shortened  Stance: Left decreased  Gait Abnormalities: Antalgic; Decreased step clearance; Step to gait  Interventions: Verbal cues; Safety awareness training     Braces/Orthotics: none    Left Knee Cold  Type: Cryocuff      Body Structures Involved:  Joints  Muscles Body Functions Affected: Movement Related Activities and Participation Affected: Mobility  Self Care   Number of elements that affect the Plan of Care: 4+: HIGH COMPLEXITY   CLINICAL PRESENTATION:   Presentation: Stable and uncomplicated: LOW COMPLEXITY   CLINICAL DECISION MAKING:   [facility] AM-PAC 6 Clicks   Basic Mobility Inpatient Short Form  How much difficulty does the patient currently have. .. Unable A Lot A Little None   1. Turning over in bed (including adjusting bedclothes, sheets and blankets)? [] 1   [] 2   [x] 3   [] 4   2. Sitting down on and standing up from a chair with arms ( e.g., wheelchair, bedside commode, etc.)   [] 1   [] 2   [x] 3   [] 4   3. Moving from lying on back to sitting on the side of the bed? [] 1   [] 2   [x] 3   [] 4   How much help from another person does the patient currently need. .. Total A Lot A Little None   4. Moving to and from a bed to a chair (including a wheelchair)? [] 1   [] 2   [x] 3   [] 4   5. Need to walk in hospital room? [] 1   [] 2   [x] 3   [] 4   6. Climbing 3-5 steps with a railing? [] 1   [] 2   [x] 3   [] 4   © 2007, Trustees of [facility], under license to [employer].  All rights reserved     Score: Initial: 18 Most Recent: X (Date: -- )    Interpretation of Tool:  Represents activities that are increasingly more difficult (i.e. Bed mobility, Transfers, Gait). Medical Necessity:     Patient is expected to demonstrate progress in   strength, range of motion, and functional technique   to   decrease assistance required with functional mobility and TKA managment  . Reason for Services/Other Comments:  Patient continues to require skilled intervention due to   Inability to complete functional mobility and TKA management independently  . Use of outcome tool(s) and clinical judgement create a POC that gives a: Clear prediction of patient's progress: LOW COMPLEXITY            TREATMENT:   (In addition to Assessment/Re-Assessment sessions the following treatments were rendered)     Pre-treatment Symptoms/Complaints:  no complaints  Pain Initial:      Post Session:  0     Therapeutic Activity: (    45 minutes): Therapeutic activities including Bed transfers, Chair transfers, Toilet transfers, Ambulation on level ground, and LE therex to improve mobility, strength, and balance. Required minimal Verbal cues; Safety awareness training to promote dynamic balance in standing. assessement    Date:  12/11 Date:   Date:     ACTIVITY/EXERCISE AM PM AM PM AM PM     []  []  []  []  []  []   Ankle Pumps 15        Quad Sets 15        Gluteal Sets 15        Hip ABd/ADduction 15        Straight Leg Raises 15        Knee Slides 15        Short Arc Quads 15        Chair Slides                           B = bilateral; AA = active assistive; A = active; P = passive      Treatment/Session Assessment:     Response to Treatment:  tolerated therapy well.      Education:  [x] Home Exercises  [x] Fall Precautions  [x] Use of Cold Therapy Unit [] D/C Instruction Review  [] Knee Prosthesis Review  [x] Walker Management/Safety [] Adaptive Equipment as Needed  [x] No pillow under knee       Interdisciplinary Collaboration:   Physical Therapist  Registered Nurse      After treatment position/precautions:   Up in chair  Bed/Chair-wheels locked  Bed in low position  Call light within reach  RN notified    Compliance with Program/Exercises: Compliant all of the time. Recommendations/Intent for next treatment session:  Treatment next visit will focus on increasing Mr. Ermias Vila independence with bed mobility, transfers, gait training, strength/ROM exercises, modalities for pain, and patient education.       Total Treatment Duration:  PT Patient Time In/Time Out  Time In: 0840  Time Out: KIP [place]

## 2021-12-11 NOTE — CONSULTS
Consult noted. Pt on vanc/cefazolin. Cultures no growth to date. Would continue for now and ID will see formally tomorrow.

## 2021-12-11 NOTE — PROGRESS NOTES
Problem: Self Care Deficits Care Plan (Adult)  Goal: *Acute Goals and Plan of Care (Insert Text)  Outcome: Progressing Towards Goal  Note: GOALS:   DISCHARGE GOALS (in preparation for going home/rehab):  3 days  1. Mr. Yuli Conroy will perform one lower body dressing activity with minimal assistance required to demonstrate improved functional mobility and safety. 2.  Mr. Yuli Conroy will perform one lower body bathing activity with minimal assistance required to demonstrate improved functional mobility and safety. 3.  Mr. Yuli Conroy will perform toileting/toilet transfer with contact guard assistance to demonstrate improved functional mobility and safety. 4.  Mr. uYli Conroy will perform shower transfer with contact guard assistance to demonstrate improved functional mobility and safety. JOINT CAMP OCCUPATIONAL THERAPY TKA: Initial Assessment, Daily Note, and PM 12/11/2021  INPATIENT: Hospital Day: 3  [place] / [place] / Product Type: Medicare /      NAME/AGE/GENDER: Sulma Daniel is a 80 y.o. male   PRIMARY DIAGNOSIS:  Left knee infection   Procedure(s) and Anesthesia Type:     * INCISION AND DRAINAGE LEFT KNEE/ POLY EXCHANGE/ POSSIBLE PROSETHSIS EXPLANT W/ CEMENT SPACER - General     * POSS LEFT KNEE ARTHROPLASTY TOTAL - Spinal (Left  Left)  ICD-10: Treatment Diagnosis: L TKA rev   Pain in Left Knee (M25.562)  Stiffness of Left Knee, Not elsewhere classified (I34.021)      ASSESSMENT:     Mr. Yuli Conroy is s/p L TKA and presents with decreased weight bearing on L LE and decreased independence with functional mobility and activities of daily living as compared to baseline level of function and safety. Patient would benefit from skilled Occupational Therapy to maximize independence and safety with self-care task and functional mobility. Pt would also benefit from education on adaptive equipment and safety precautions in preparation for going home.   Today, pt performed toileting, bathing, dressing, grooming and functional mobility with assistance as charted below. Pt required CGA and RW to mobilize and minimal assistance to navigate in and out of bathroom and shower. Pt is [place], has decreased vision and poor ROM in bilateral shoulders. Continue per OT POC. Pt plans on rehab stay at discharge prior to returning home. This section established at most recent assessment   PROBLEM LIST (Impairments causing functional limitations):  Decreased Strength  Decreased ADL/Functional Activities  Decreased Transfer Abilities  Increased Pain  Increased Fatigue  Decreased Flexibility/Joint Mobility  Decreased Knowledge of Precautions   INTERVENTIONS PLANNED: (Benefits and precautions of occupational therapy have been discussed with the patient.)  Activities of daily living training  Adaptive equipment training  Balance training  Clothing management  Donning&doffing training  Theraputic activity     TREATMENT PLAN: Frequency/Duration: Follow patient 3x/week to address above goals. Rehabilitation Potential For Stated Goals: Good     RECOMMENDED REHABILITATION/EQUIPMENT: (at time of discharge pending progress): Continue Skilled Therapy. OCCUPATIONAL PROFILE AND HISTORY:   History of Present Injury/Illness (Reason for Referral): Pt presents this date s/p (L) TKA. revision  Past Medical History/Comorbidities:   Mr. Pari Bonilla  has a past medical history of Anemia (fall 2016), Arthritis, Atrial fibrillation ([address] (12/10/2021), CAD (coronary artery disease) (1997), Carotid stenosis (04/2017), Coagulopathy ([address] (12/10/2021), Diabetes ([address] (05/1997), Diverticulosis (12/2011), GERD (gastroesophageal reflux disease), Glaucoma, Heart murmur, Hiatal hernia, History of kidney stones, Hypercholesteremia, Hypertension, Hypothyroid, Infection of total joint prosthesis ([address] (12/9/2021), MI (myocardial infarction) ([address] TIA (transient ischemic attack) (07/2016), Unspecified sleep apnea, Urethral stenosis, and Vasovagal syncope. He also has no past medical history of Adverse effect of anesthesia, Aneurysm ([address] Asthma, Autoimmune disease ([address] Cancer ([address] Chronic kidney disease, Chronic pain, COPD, Dementia, Difficult intubation, Endocarditis, Gastrointestinal disorder, Heart failure ([address] Ill-defined condition, Liver disease, Malignant hyperthermia due to anesthesia, Morbid obesity ([address] Nausea & vomiting, Neurological disorder, Other ill-defined conditions(799.89), Pseudocholinesterase deficiency, Psychiatric disorder, PUD (peptic ulcer disease), Rheumatic fever, Seizures ([address] Sleep disorder, Stroke ([address] or Thromboembolus ([address] Mr. Jonah Diaz  has a past surgical history that includes hx heart catheterization (9208,1009, 2012, 2013); hx tonsillectomy (1938); hx cataract removal (1987 and 1990); hx lap cholecystectomy (2003); hx hernia repair (1944); hx urological (1996); hx orthopaedic (Left); hx orthopaedic (5261-9610); hx shoulder arthroscopy (Left, 2015); hx knee replacement (Bilateral, 2002, 2008); hx hemorrhoidectomy (1987); hx heent (Right); hx carpal tunnel release (Right, 1968); hx carpal tunnel release (Left, 2012); hx bunionectomy (Left); hx rotator cuff repair (Right, 1995); hx rotator cuff repair (Left, 2000); hx rotator cuff repair (Right, 2007); and hx total colectomy (01/13/2017). Social History/Living Environment:   Living Alone: Yes  Support Systems: Child(wojciech)  Patient Expects to be Discharged to[de-identified] Rehabilitation facility    Prior Level of Function/Work/Activity:  Assistance at baseline     Number of Personal Factors/Comorbidities that affect the Plan of Care: Expanded review of therapy/medical records (1-2):  MODERATE COMPLEXITY   ASSESSMENT OF OCCUPATIONAL PERFORMANCE[de-identified]   Most Recent Physical Functioning:   Balance  Sitting: Intact  Standing: Pull to stand;  With support       Gross Assessment: Yes  Gross Assessment  AROM: Generally decreased, functional (limited L LE)  Strength: Generally decreased, functional (limited L LE)            Coordination  Fine Motor Skills-Upper: Left Intact; Right Intact  Gross Motor Skills-Upper: Left Impaired; Right Impaired (poor shoulder ROM)         Mental Status  Neurologic State: Alert  Orientation Level: Oriented X4  Cognition: Appropriate decision making  Perception: Appears intact  Perseveration: No perseveration noted  Safety/Judgement: Awareness of environment                Basic ADLs (From Assessment) Complex ADLs (From Assessment)   Basic ADL  Feeding: Independent  Oral Facial Hygiene/Grooming: Setup, Stand-by assistance  Bathing: Moderate assistance  Type of Bath: Chlorhexidine (CHG)  Upper Body Dressing: Moderate assistance  Lower Body Dressing: Maximum assistance  Toileting: Minimum assistance     Grooming/Bathing/Dressing Activities of Daily Living     Cognitive Retraining  Safety/Judgement: Awareness of environment                 Functional Transfers  Bathroom Mobility: Contact guard assistance; Minimum assistance  Shower Transfer: Minimum assistance     Bed/Mat Mobility  Supine to Sit: Contact guard assistance  Sit to Stand: Minimum assistance  Stand to Sit: Minimum assistance  Bed to Chair: Contact guard assistance  Scooting: Stand-by assistance         Physical Skills Involved:  Range of Motion  Balance  Strength  Activity Tolerance Cognitive Skills Affected (resulting in the inability to perform in a timely and safe manner):  [facility] Psychosocial Skills Affected:  Environmental Adaptation   Number of elements that affect the Plan of Care: 3-5:  MODERATE COMPLEXITY   CLINICAL DECISION MAKIN [facility] [address] [id number] AM-PAC 6 Clicks   Daily Activity Inpatient Short Form  How much help from another person does the patient currently need. .. Total A Lot A Little None   1. Putting on and taking off regular lower body clothing? [] 1   [x] 2   [] 3   [] 4   2. Bathing (including washing, rinsing, drying)? [] 1   [x] 2   [] 3   [] 4   3.   Toileting, which includes using toilet, bedpan or urinal?   [] 1   [] 2   [x] 3   [] 4   4. Putting on and taking off regular upper body clothing? [] 1   [] 2   [x] 3   [] 4   5. Taking care of personal grooming such as brushing teeth? [] 1   [] 2   [] 3   [x] 4   6. Eating meals? [] 1   [] 2   [] 3   [x] 4   © 2007, Trustees of [address] [place] [address] [address] [id number], under license to [employer]. All rights reserved     Score:  Initial: 18 Most Recent: X (Date: -- )    Interpretation of Tool:  Represents activities that are increasingly more difficult (i.e. Bed mobility, Transfers, Gait). Medical Necessity:     Skilled intervention continues to be required due to the above deficits. Reason for Services/Other Comments:  Patient continues to require skilled intervention due to   New TKA  . Use of outcome tool(s) and clinical judgement create a POC that gives a: MODERATE COMPLEXITY            TREATMENT:   (In addition to Assessment/Re-Assessment sessions the following treatments were rendered)     Pre-treatment Symptoms/Complaints:    Pain: Initial:   Pain Intensity 1: 0  Post Session:  pt resting comfortably in recliner with ice in place     Co-treatment was necessary to improve patient's ability to increase activity demands and ability to return to normal functional activity. Self Care: (50): Procedure(s) (per grid) utilized to improve and/or restore self-care/home management as related to dressing, bathing, toileting, grooming, and functional mobility . Required moderate verbal, manual, and tactile cueing to facilitate activities of daily living skills and compensatory activities. Evaluation complete    Treatment/Session Assessment:     Response to Treatment:  Good, up in chair.     Education:  [] Home Exercises  [x] Fall Precautions  [] Hip Precautions [] Going Home Video  [x] Knee/Hip Prosthesis Review  [x] Walker Management/Safety [x] Adaptive Equipment as Needed       Interdisciplinary Collaboration:   Physical Therapist  Occupational Therapist  Registered Nurse    After treatment position/precautions:   Up in chair  Bed/Chair-wheels locked  Call light within reach  RN notified  Family at bedside     Compliance with Program/Exercises: Will assess as treatment progresses. Recommendations/Intent for next treatment session:  Treatment next visit will focus on increasing Mr. Doron España independence with bed mobility, transfers, self care, functional mobility, modalities for pain, and patient education.       Total Treatment Duration:  OT Patient Time In/Time Out  Time In: 1405  Time Out: [address]

## 2021-12-11 NOTE — PROGRESS NOTES
Care Management Interventions  PCP Verified by CM: Yes  Transition of Care Consult (CM Consult): SNF  Support Systems: Child(wojciech)  The Plan for Transition of Care is Related to the Following Treatment Goals : Increase independence  The Patient and/or Patient Representative was Provided with a Choice of Provider and Agrees with the Discharge Plan?: Yes  Freedom of Choice List was Provided with Basic Dialogue that Supports the Patient's Individualized Plan of Care/Goals, Treatment Preferences and Shares the Quality Data Associated with the Providers?: Yes  Discharge Location  Discharge Placement: Skilled nursing facility  80 yr-old M with infection of [address]. Spoke with pt and dtr Hill Vargas ([phone]) who would like pt to go to rehab at [facility] at discharge. Referral made in CC. Also [place] dtr with [place] of [place] and contacted  who will come to hospital to meet with her.

## 2021-12-11 NOTE — CONSULTS
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 · Continue current antibiotics with vancomycin pending final cultures from the left knee. · At this time I do not see a need for imaging of the left 2nd toe ulcer. · We will hold off on adding rifampin at this time due to interactions with his anticoagulation. · Hold off on PICC placement for now pending stroke workup. · I anticipate a prolonged course of antibiotics with oral suppression thereafter. Anti-infectives:   · IV vanc  · IV cefazolin    Subjective:   Date of Consultation:  December 12, 2021  Referring Physician: Edward Piercema    Patient is a 80 y.o. male with history of Afib on Eliquis who presented to [facility] with left TKA infection. He has been evaluated by podiatry for a left 2nd toe ulcer and last week had purulent drainage from the toe. He was then referred to orthopedic surgery for subsequent left knee swelling. Office aspirate was consistent with TKA infection and pt was taken to the OR for I&D with poly exchange on 12/10. He was started on vanc/cefazolin post-op. Yesterday he had development of dysarthria and was evaluated by neurology. An MRI is pending. Pt reports no fevers or chills. Operative cultures show Staph aureus, sens pending. ID is consulted for further recommendations.     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    Atrial fibrillation (HCC) I48.91    Hyperglycemia due to type 2 diabetes mellitus ([address] E11.65    Hyperlipidemia E78.5    Acquired hypothyroidism E03.9    Hearing loss H91.90    Coagulopathy ([address] D68.9    Status post revision of total knee replacement, left W41.971     Past Medical History:   Diagnosis Date    Anemia fall 2016    r/t GI bleeds     Arthritis     osteo    Atrial fibrillation ([address] 12/10/2021    CAD (coronary artery disease) 1997    4 stents total (1997, 2012, 2013); \"ECHO 7/2016 normal with good EF\" per cardio office note; \"Stress test 6/2016 Findings consistent with very mild inferior ischemia\"    Carotid stenosis 04/2017    carotid u/s: Right internal carotid artery has 50-69% stenosis. Left internal carotid artery has <50% stenosis.  Coagulopathy ([address] 12/10/2021    Diabetes ([facility] 05/1997    type 2; oral med; rare BG checks, last hgba1c- 6.9 (6/2017)    Diverticulosis 12/2011    with GI bleeding; no current problems    GERD (gastroesophageal reflux disease)     on med for control     Glaucoma     Heart murmur     ECHO 7/14/16- mild AS, mild MR, mild TR    Hiatal hernia     History of kidney stones     Hypercholesteremia     on med for control     Hypertension     hx of- no medications currently    Hypothyroid     managed with medication    Infection of total joint prosthesis ([address] 12/9/2021    MI (myocardial infarction) ([address]     x2, STEMI    TIA (transient ischemic attack) 07/2016    experienced numbness/tingling L arm and left lower face- \"only lasted a couple of hrs\"; 7/13 CT head: cerebral white matter changes have progressed since 4/19/13 likely indicative of chronic small vessel ischemic disease, no acute intracranial abnormality.       Unspecified sleep apnea     denies     Urethral stenosis     has had difficulty with placement/removal previously     Vasovagal syncope       Family History   Problem Relation Age of Onset    Heart Disease Father     Heart Attack Father     Stroke Mother     Alzheimer's Disease Sister     Alzheimer's Disease Sister       Social History     Tobacco Use    Smoking status: Never Smoker    Smokeless tobacco: Never Used   Substance Use Topics    Alcohol use: Yes     Alcohol/week: 7.0 standard drinks     Types: 7 Glasses of wine per week     Past Surgical History:   Procedure Laterality Date    HX BUNIONECTOMY Left     with hammer toe    HX CARPAL TUNNEL RELEASE Right 1968    HX CARPAL TUNNEL RELEASE Left 2012    HX CATARACT REMOVAL  1987 and 1990    Teofilo with IOLens implamts    HX HEART CATHETERIZATION  4928,8044, 2012, 2013    total 4 stents: 2 stents- 1997, 1 stent- 2012 (BMS to RCA), 1 stent- 2013 (ANGELLA to RCA)    HX HEENT Right     eye surgery to replace lens due to fall     HX HEMORRHOIDECTOMY  1987    HX HERNIA REPAIR  1944    L inguinal    HX KNEE REPLACEMENT Bilateral 2002, 2008    HX LAP CHOLECYSTECTOMY  1212    umbilical hernia repair    HX ORTHOPAEDIC Left     trigger finger     HX ORTHOPAEDIC  8397-2663    multiple knee surgery    HX ROTATOR CUFF REPAIR Right 1995    HX ROTATOR CUFF REPAIR Left 2000    HX ROTATOR CUFF REPAIR Right 2007    with martina tendon repair    HX SHOULDER ARTHROSCOPY Left 2015    HX TONSILLECTOMY  1938    HX TOTAL COLECTOMY  01/13/2017    HX UROLOGICAL  1996    cysto with stone extraction and stent      Prior to Admission medications    Medication Sig Start Date End Date Taking? Authorizing Provider   apixaban (Eliquis) 5 mg tablet Take 5 mg by mouth two (2) times a day. Yes Provider, Historical   simethicone (GAS-X) 125 mg capsule Take 125 mg by mouth four (4) times daily as needed for Flatulence. Yes Provider, Historical   loperamide (IMODIUM) 2 mg capsule Take 2 mg by mouth four (4) times daily as needed for Diarrhea.    Yes Provider, Historical diphenoxylate-atropine (LOMOTIL) 2.5-0.025 mg per tablet Take 1 Tab by mouth two (2) times daily as needed for Diarrhea. Yes Provider, Historical   atorvastatin (LIPITOR) 40 mg tablet Take 40 mg by mouth nightly. Yes Provider, Historical   levothyroxine (SYNTHROID) 50 mcg tablet Take 50 mcg by mouth Daily (before breakfast). Yes Provider, Historical   Psyllium Husk-Sucrose 3.4 gram/7 gram powd Take 3 Caps by mouth two (2) times a day. Yes Provider, Historical   omega-3 fatty acids-vitamin e (FISH OIL) 1,000 mg cap Take 2 Caps by mouth daily. Yes Provider, Historical   bimatoprost (LUMIGAN) 0.01 % ophthalmic drops Administer 1 Drop to right eye nightly. Yes Provider, Historical   timolol (TIMOPTIC) 0.5 % ophthalmic solution Administer 1 Drop to right eye every morning. Yes Provider, Historical   Omeprazole delayed release (PRILOSEC D/R) 20 mg tablet Take 20 mg by mouth Daily (before dinner). Yes Provider, Historical   meloxicam (MOBIC) 15 mg tablet Take 15 mg by mouth daily. Indications: OSTEOARTHRITIS   Yes Provider, Historical   brimonidine (ALPHAGAN P) 0.1 % ophthalmic solution Administer 1 Drop to right eye two (2) times a day. Take / use AM day of surgery  per anesthesia protocols. Yes Provider, Historical   glimepiride (AMARYL) 2 mg tablet Take 1 mg by mouth daily. Indications: type 2 diabetes mellitus   Yes Provider, Historical   b complex vitamins tablet Take 1 Tab by mouth daily. Yes Provider, Historical   aspirin 81 mg Tab Take 81 mg by mouth daily. Take / use 81 mg AM day of surgery  per anesthesia protocols. Yes Other, MD Skip       Allergies   Allergen Reactions    Acetazolamide Unknown (comments)     fatigue    Benzonatate Other (comments)        Review of Systems:  A comprehensive review of systems was negative except for that written in the History of Present Illness.     Objective:     Visit Vitals  /63 (BP 1 Location: Right upper arm, BP Patient Position: At rest) Pulse 78   Temp 97.5 °F (36.4 °C)   Resp 20   Ht 5' 7\" (1.702 m)   Wt 78 kg (172 lb)   SpO2 97%   BMI 26.94 kg/m²     Temp (24hrs), Av °F (36.7 °C), Min:97.5 °F (36.4 °C), Max:98.6 °F (37 °C)       Lines:  Central Venous Catheter:       Physical Exam:    General:  Alert, cooperative, well noursished, well developed, appears stated age   Eyes:  Sclera anicteric. Pupils equally round and reactive to light. Mouth/Throat: Mucous membranes normal, oral pharynx clear; mild dysarthria noted   Neck: Supple   Lungs:   Clear to auscultation bilaterally, good effort   CV:  Regular rate and rhythm,no murmur, click, rub or gallop   Abdomen:   Soft, non-tender. bowel sounds normal. non-distended   Extremities: No cyanosis; trace LE edema   Skin: Mottled skin with ecchymoses noted   Lymph nodes: Cervical and supraclavicular normal   Musculoskeletal: L TKA incision clean; Left 2nd toe ulcer noted   Lines/Devices:  Intact, no erythema, drainage or tenderness   Psych: Alert and oriented, normal mood affect given the setting       Data Review:     CBC:  Recent Labs     12/12/21  0338 12/11/21  0403 12/10/21  2045 12/10/21  2045   WBC 9.0 4.3  --  5.6   GRANS 84* 85*   < > 89*   MONOS 8 7   < > 5   EOS 0* 0*   < > 0*   ANEU 7.6 3.6   < > 4.9   ABL 0.6 0.4*   < > 0.3*   HGB 8.2* 8.7*  --  9.1*   HCT 25.8* 26.4*  --  28.2*   * 92*  --  93*    < > = values in this interval not displayed. BMP:  Recent Labs     12/12/21  0338 12/11/21  0403 12/10/21  2045   CREA 1.48 1.23 1.15   BUN 30* 22 19   * 136 136   K 4.4 4.2 4.1    105 104   CO2 21 22 24   AGAP 9 9 8   * 179* 136*       LFTS:  No results for input(s): TBILI, ALT, AP, TP, ALB in the last 72 hours.     No lab exists for component: SGOT    Microbiology:     All Micro Results     Procedure Component Value Units Date/Time    CULTURE, Silviadanial Elvin [[id number]] Collected: 12/10/21 1514    Order Status: Completed Specimen: Wound from Knee Updated: 12/12/21 0752     Special Requests: --        LEFT KNEE  NO.1       GRAM STAIN 1 TO 8 WBCS SEEN PER OIF      NO DEFINITE ORGANISM SEEN        Culture result: NO GROWTH 1 DAY       CULTURE, Yvetta Citron STAIN [[id number]]  (Abnormal) Collected: 12/10/21 1534    Order Status: Completed Specimen: Wound from Knee  Updated: 12/12/21 0751     Special Requests: --        LEFT KNEE  NO.3       GRAM STAIN 0 TO 1 WBCS SEEN PER OIF      NO DEFINITE ORGANISM SEEN        Culture result:       SCANT GRAM POSITIVE COCCI SUBCULTURE IN PROGRESS          CULTURE, Yvetta Citron STAIN [[id number]]  (Abnormal) Collected: 12/10/21 1533    Order Status: Completed Specimen: Wound from Knee  Updated: 12/12/21 0746     Special Requests: --        LEFT KNEE  NO.2       GRAM STAIN 0 TO 1 WBCS SEEN PER OIF      NO DEFINITE ORGANISM SEEN        Culture result:       LIGHT STAPHYLOCOCCUS AUREUS SENSITIVITY TO FOLLOW          CULTURE, ANAEROBIC [[id number]] Collected: 12/10/21 1533    Order Status: Completed Specimen: Knee  Updated: 12/10/21 2147    CULTURE, ANAEROBIC [[id number]] Collected: 12/10/21 1534    Order Status: Completed Specimen: Knee  Updated: 12/10/21 2147    CULTURE, ANAEROBIC [[id number]] Collected: 12/10/21 1514    Order Status: Completed Specimen: Knee  Updated: 12/10/21 2147    FUNGUS CULTURE AND SMEAR [[id number]] Collected: 12/10/21 1534    Order Status: Completed Updated: 12/10/21 1735    FUNGUS CULTURE AND SMEAR [[id number]] Collected: 12/10/21 1533    Order Status: Completed Updated: 12/10/21 1734    FUNGUS CULTURE AND SMEAR [[id number]] Collected: 12/10/21 1514    Order Status: Completed Updated: 12/10/21 1734    AFB CULTURE + SMEAR W/RFLX ID FROM CULTURE [[id number]] Collected: 12/10/21 1545    Order Status: Canceled     AFB CULTURE + SMEAR W/RFLX ID FROM CULTURE [[id number]] Collected: 12/10/21 1545    Order Status: Canceled     CULTURE, FUNGUS [[id number]]     Order Status: Sent Specimen: Knee Fluid     CULTURE, FUNGUS [[id number]]     Order Status: Sent Specimen: Knee Fluid     CULTURE, FUNGUS [[id number]]     Order Status: Sent Specimen: Knee Fluid     AFB CULTURE + SMEAR W/RFLX ID FROM CULTURE [[id number]] Collected: 12/10/21 1515    Order Status: Canceled     COVID-19 RAPID TEST [[id number]] Collected: 12/10/21 0637    Order Status: Completed Specimen: Nasopharyngeal Updated: 12/10/21 07     Specimen source NASAL SWAB        COVID-19 rapid test Not detected        Comment:      The specimen is NEGATIVE for SARS-CoV-2, the novel coronavirus associated with COVID-19. A negative result does not rule out COVID-19. This test has been authorized by the FDA under an Emergency Use Authorization (EUA) for use by authorized laboratories. Fact sheet for Healthcare Providers: ConventionUpdate.co.nz  Fact sheet for Patients: ConventionUpdate.co.nz       Methodology: Isothermal Nucleic Acid Amplification         CULTURE, BODY FLUID Murriel Hutching STAIN [[id number]] Collected: 21 193    Order Status: Canceled           Imagin21 Brain MRI: pending    21 CT head: IMPRESSION  White matter findings compatible with chronic small vessel ischemic  Disease. 12/10/21 L knee: IMPRESSION     1. Retrocardiac atelectasis or consolidation.     2. Revision left knee arthroplasty with implantation of antibiotic beads. 12/10/21 CXR: IMPRESSION     1. Retrocardiac atelectasis or consolidation.     2. Revision left knee arthroplasty with implantation of antibiotic beads.     Signed By: Marla Reyes MD     2021

## 2021-12-11 NOTE — PROGRESS NOTES
Problem: Mobility Impaired (Adult and Pediatric)  Goal: *Acute Goals and Plan of Care (Insert Text)  Outcome: Progressing Towards Goal  Note: GOALS (1-4 days):  (1.)Mr. Gorge Gatica will move from supine to sit and sit to supine  in bed with STAND BY ASSIST.  (2.)Mr. Gorge Gatica will transfer from bed to chair and chair to bed with STAND BY ASSIST using the least restrictive device. (3.)Mr. Gorge Gatica will ambulate with STAND BY ASSIST for 350 feet with the least restrictive device. (4.)Mr. Gorge Gatica will increase left knee ROM to 0°-90°.  ________________________________________________________________________________________________        PHYSICAL THERAPY JOINT CAMP TKA: Daily Note and PM 12/11/2021  INPATIENT: Hospital Day: 3  [place] / [place] / Product Type: Medicare /      NAME/AGE/GENDER: Yo Chery is a 80 y.o. male   PRIMARY DIAGNOSIS:  Left knee infection   Procedure(s) and Anesthesia Type:     * INCISION AND DRAINAGE LEFT KNEE/ POLY EXCHANGE/ POSSIBLE PROSETHSIS EXPLANT W/ CEMENT SPACER - General     * POSS LEFT KNEE ARTHROPLASTY TOTAL - Spinal (Left  Left)  ICD-10: Treatment Diagnosis:    · Pain in Left Knee (M25.562)  · Stiffness of Left Knee, Not elsewhere classified (M25.662)  · Difficulty in walking, Not elsewhere classified (R26.2)      ASSESSMENT:     Mr. Gorge Gatica presents with decreased strength and range of motion left lower extremity and with decreased independence with functional mobility s/p left TKA. Pt will benefit from skilled PT interventions to maximize independence with functional mobility and TKA management. Pt did fairly well with assessment. He is slow and needs extra time to complete tasks. He was supine on contact. He transferred out of bed and ambulated to the bathroom for toileting then ambulated 175 ft in the hallways with RW and CGA. He returned to the room and sat in the recliner to perform therex. Pt instructed not to get up without assistance.  Hope to progress mobility and exercises in the morning. Pt plans to discharge to rehab with continued therapy for follow up. He wants to go to [facility] and is wanting to talk with the SW.  12/11- up in chair with daughter present. He was more sore this afternoon but agreeable to therapy. Co treat with OT due to increased need for mobility. He ambulated 100 ft with RW and CGA then returned to the room and went to the bathroom and then showered. He practiced scooting, sitting/ standing balance and sit to stand while showering then returned to the chair to kaylin his shoes. Left up in room with OT for grooming. Progressing therapy today. This section established at most recent assessment   PROBLEM LIST (Impairments causing functional limitations):  1. Decreased Strength  2. Decreased ADL/Functional Activities  3. Decreased Transfer Abilities  4. Decreased Ambulation Ability/Technique  5. Decreased Flexibility/Joint Mobility  6. Edema/Girth  7. Decreased [place] with Home Exercise Program   INTERVENTIONS PLANNED: (Benefits and precautions of physical therapy have been discussed with the patient.)  1. Bed Mobility  2. Cold  3. Gait Training  4. Home Exercise Program (HEP)  5. Range of Motion (ROM)  6. Therapeutic Activites  7. Therapeutic Exercise/Strengthening  8. Transfer Training     TREATMENT PLAN: Frequency/Duration: Follow patient BID for duration of hospital stay to address above goals. Rehabilitation Potential For Stated Goals: Good     RECOMMENDED REHABILITATION/EQUIPMENT: (at time of discharge pending progress): Continue Skilled Therapy and Rehab.               HISTORY:   History of Present Injury/Illness (Reason for Referral):  Pt s/p total knee arthroplasty revision on L LE    Past Medical History/Comorbidities:   Mr. 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 He also has no past medical history of Adverse effect of anesthesia, Aneurysm ([address] Asthma, Autoimmune disease ([address] Cancer ([address] Chronic kidney disease, Chronic pain, COPD, Dementia, Difficult intubation, Endocarditis, Gastrointestinal disorder, Heart failure ([address] Ill-defined condition, Liver disease, Malignant hyperthermia due to anesthesia, Morbid obesity ([address] Nausea & vomiting, Neurological disorder, Other ill-defined conditions(799.89), Pseudocholinesterase deficiency, Psychiatric disorder, PUD (peptic ulcer disease), Rheumatic fever, Seizures ([address] Sleep disorder, Stroke ([address] or Thromboembolus ([address] Mr. Arash Vaca  has a past surgical history that includes hx heart catheterization (4520,0044, 2012, 2013); hx tonsillectomy (1938); hx cataract removal (1987 and 1990); hx lap cholecystectomy (2003); hx hernia repair (1944); hx urological (1996); hx orthopaedic (Left); hx orthopaedic (5081-3699); hx shoulder arthroscopy (Left, 2015); hx knee replacement (Bilateral, 2002, 2008); hx hemorrhoidectomy (1987); hx heent (Right); hx carpal tunnel release (Right, 1968); hx carpal tunnel release (Left, 2012); hx bunionectomy (Left); hx rotator cuff repair (Right, 1995); hx rotator cuff repair (Left, 2000); hx rotator cuff repair (Right, 2007); and hx total colectomy (01/13/2017). Social History/Living Environment:   Living Alone: Yes  Support Systems: Child(wojciech)  Patient Expects to be Discharged to[de-identified] Rehabilitation facility  Prior Level of Function/Work/Activity:  Independent prior to admit. Lives alone. May have caregivers that assist at home but unsure how frequently   Number of Personal Factors/Comorbidities that affect the Plan of Care: 0: LOW COMPLEXITY   EXAMINATION:   Most Recent Physical Functioning:   Gross Assessment: Yes  Gross Assessment  AROM: Generally decreased, functional (limited L LE)  Strength: Generally decreased, functional (limited L LE)                     Bed Mobility  Supine to Sit: Contact guard assistance  Scooting: Stand-by assistance    Transfers  Sit to Stand: Minimum assistance  Stand to Sit: Minimum assistance  Bed to Chair: Contact guard assistance    Balance  Sitting: Intact  Standing: Pull to stand; With support              Weight Bearing Status  Left Side Weight Bearing: As tolerated  Distance (ft): 100 Feet (ft)  Ambulation - Level of Assistance: Contact guard assistance  Assistive Device: Walker, rolling  Speed/Tracy: Pace decreased (<100 feet/min)  Step Length: Right shortened  Stance: Left decreased  Gait Abnormalities: Antalgic; Decreased step clearance; Step to gait  Interventions: Verbal cues; Safety awareness training     Braces/Orthotics: none    Left Knee Cold  Type: Cryocuff      Body Structures Involved:  1. Joints  2. Muscles Body Functions Affected:  1. Movement Related Activities and Participation Affected:  1. Mobility  2. Self Care   Number of elements that affect the Plan of Care: 4+: HIGH COMPLEXITY   CLINICAL PRESENTATION:   Presentation: Stable and uncomplicated: LOW COMPLEXITY   CLINICAL DECISION MAKIN [facility] [address] [id number] AM-PAC 6 Clicks   Basic Mobility Inpatient Short Form  How much difficulty does the patient currently have. .. Unable A Lot A Little None   1. Turning over in bed (including adjusting bedclothes, sheets and blankets)? [] 1   [] 2   [x] 3   [] 4   2. Sitting down on and standing up from a chair with arms ( e.g., wheelchair, bedside commode, etc.)   [] 1   [] 2   [x] 3   [] 4   3.   Moving from lying on back to sitting on the side of the bed? [] 1   [] 2   [x] 3   [] 4   How much help from another person does the patient currently need. .. Total A Lot A Little None   4. Moving to and from a bed to a chair (including a wheelchair)? [] 1   [] 2   [x] 3   [] 4   5. Need to walk in hospital room? [] 1   [] 2   [x] 3   [] 4   6. Climbing 3-5 steps with a railing? [] 1   [] 2   [x] 3   [] 4   © 2007, Trustees of [address] [place] [address] [address] [id number], under license to [employer]. All rights reserved     Score:  Initial: 18 Most Recent: X (Date: -- )    Interpretation of Tool:  Represents activities that are increasingly more difficult (i.e. Bed mobility, Transfers, Gait). Medical Necessity:     · Patient is expected to demonstrate progress in   · strength, range of motion, and functional technique  ·  to   · decrease assistance required with functional mobility and TKA managment  · .  Reason for Services/Other Comments:  · Patient continues to require skilled intervention due to   · Inability to complete functional mobility and TKA management independently  · . Use of outcome tool(s) and clinical judgement create a POC that gives a: Clear prediction of patient's progress: LOW COMPLEXITY            TREATMENT:   (In addition to Assessment/Re-Assessment sessions the following treatments were rendered)     Pre-treatment Symptoms/Complaints:  no complaints  Pain Initial:      Post Session:  0     Therapeutic Activity: (    40 minutes): Therapeutic activities including  Chair transfers, sit to stand and  Ambulation on level groundto improve mobility, strength, and balance. Required minimal Verbal cues; Safety awareness training to promote dynamic balance in standing.           Date:  12/11 Date:   Date:     ACTIVITY/EXERCISE AM PM AM PM AM PM     []  []  []  []  []  []   Ankle Pumps 15        Quad Sets 15        Gluteal Sets 15        Hip ABd/ADduction 15        Straight Leg Raises 15        Knee Slides 15        Short Arc Quads 15        Chair Slides B = bilateral; AA = active assistive; A = active; P = passive      Treatment/Session Assessment:     Response to Treatment:  tolerated therapy well. Education:  [x] Home Exercises  [x] Fall Precautions  [x] Use of Cold Therapy Unit [] D/C Instruction Review  [] Knee Prosthesis Review  [x] Walker Management/Safety [] Adaptive Equipment as Needed  [x] No pillow under knee       Interdisciplinary Collaboration:   o Physical Therapist  o Occupational Therapist  o Registered Nurse  o Physician  o     After treatment position/precautions:   o Up in chair  o Bed/Chair-wheels locked  o Bed in low position  o Call light within reach  o RN notified  o Family at bedside    Compliance with Program/Exercises: Compliant all of the time. Recommendations/Intent for next treatment session:  Treatment next visit will focus on increasing Mr. Wallace Mcguire independence with bed mobility, transfers, gait training, strength/ROM exercises, modalities for pain, and patient education.       Total Treatment Duration:  PT Patient Time In/Time Out  Time In: 1405  Time Out: 0478 [facility],

## 2021-12-11 NOTE — ACP (ADVANCE CARE PLANNING)
Patient was in bathroom with staff    Spent time with daughter who asked about getting a \"temporary directive\"      Advised her not to do that since it would have to be redone again after a few days (POA is out of town)    Enrike Chua will be available if plans change

## 2021-12-11 NOTE — PROGRESS NOTES
Hospitalist Progress Note   Admit Date:  2021  7:32 PM   Name:  Nicci Boogie   Age:  80 y.o. Sex:  male  :  1933   MRN:  [id number]   Room:  [facility]/    Presenting Complaint: No chief complaint on file. Reason(s) for Admission: Infection of total joint prosthesis [facility] Course & Interval History:          Nicci Boogie is a 80 y.o. male with history of AFIB on eliquis, HLP, DM2, hypothyroidism, hearing loss who is evaluated with complaints of left TKA infection and needs I&D. He last took eliquis 3-4 days prior to admit. INR 2.4 on 21. FFP  2 units ordered per orthopedics preop. ID consulted and on antibiotics. Postop cultures pending. discharge plans pending to rehab. 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)    Active Problems:    Infection of total joint prosthesis ([address] (2021)  ·   Defer to orthopedics   · Antibiotics per ID         Atrial fibrillation ([address] (12/10/2021)  · Continued eliquis           Hyperglycemia due to type 2 diabetes mellitus ([address] (12/10/2021)  ·   POC and SSI  · Oral agents held         Hyperlipidemia (12/10/2021)  ·  continued lipitor      Acquired hypothyroidism (12/10/2021)  · continued synthroid         Coagulopathy ([address] (12/10/2021)  ·   S/p FFP      Anemia and thrombocytopenia:  · Trend CBC    Dysarthria:  · CT head  · Tele neuro consult  · ECHO  · Continued eliquis and lipitor  · SLP consult   · Remote tele           Dispo/Discharge Planning:     pending to rehab    Diet:  ADULT DIET Regular; 4 carb choices (60 gm/meal);  Low Sodium (2 gm)  DVT PPx:eliquis   Code status: Full Code    Hospital Problems as of 12/11/2021 Date Reviewed: 12/10/2021          Codes Class Noted - Resolved POA    Atrial fibrillation (HCC) (Chronic) ICD-10-CM: I48.91  ICD-9-CM: 427.31  12/10/2021 - Present Yes        Hyperglycemia due to type 2 diabetes mellitus (HCC) (Chronic) ICD-10-CM: E11.65  ICD-9-CM: 250.00  12/10/2021 - Present Yes        Hyperlipidemia (Chronic) ICD-10-CM: E78.5  ICD-9-CM: 272.4  12/10/2021 - Present Yes        Acquired hypothyroidism (Chronic) ICD-10-CM: E03.9  ICD-9-CM: 244.9  12/10/2021 - Present Yes        Hearing loss (Chronic) ICD-10-CM: H91.90  ICD-9-CM: 389.9  12/10/2021 - Present Yes        Coagulopathy ([address] ICD-10-CM: D68.9  ICD-9-CM: 286.9  12/10/2021 - Present Yes        * (Principal) Status post revision of total knee replacement, left ICD-10-CM: T06.551  ICD-9-CM: V43.65  12/10/2021 - Present Unknown        Infection of total joint prosthesis ([address] ICD-10-CM: T84.50XA  ICD-9-CM: 996.66  12/9/2021 - Present Unknown              Objective:     Patient Vitals for the past 24 hrs:   Temp Pulse Resp BP SpO2   12/11/21 1058 97.6 °F (36.4 °C) 82 18 (!) 122/59 97 %   12/11/21 0711 97.8 °F (36.6 °C) 83 18 123/73 96 %   12/11/21 0432 97.9 °F (36.6 °C) 81 16 (!) 165/81 96 %   12/10/21 2328 98.3 °F (36.8 °C) 87 18 (!) 163/88 96 %   12/10/21 2234     97 %   12/10/21 2051 98.9 °F (37.2 °C) 77 18 (!) 172/90 96 %   12/10/21 1811 98.4 °F (36.9 °C) 72 20 (!) 149/74 97 %   12/10/21 1718  76 16 (!) 120/56 95 %   12/10/21 1703  86 16 111/69 95 %   12/10/21 1658  94 16 120/65 95 %   12/10/21 1653  90 16 123/72 95 %   12/10/21 1648 97.2 °F (36.2 °C) 97 16 (!) 149/75 95 %     Oxygen Therapy  O2 Sat (%): 97 % (12/11/21 1058)  Pulse via Oximetry: 67 beats per minute (12/10/21 2234)  O2 Device: None (Room air) (12/10/21 2234)  O2 Flow Rate (L/min): 0 l/min (12/10/21 2234)    Estimated body mass index is 26.94 kg/m² as calculated from the following:    Height as of this encounter: 5' 7\" (1.702 m).    Weight as of this encounter: 78 kg (172 lb). Intake/Output Summary (Last 24 hours) at 12/11/2021 1506  Last data filed at 12/11/2021 0601  Gross per 24 hour   Intake 2043.33 ml   Output 250 ml   Net 1793.33 ml         Physical Exam:     Blood pressure (!) 122/59, pulse 82, temperature 97.6 °F (36.4 °C), resp. rate 18, height 5' 7\" (1.702 m), weight 78 kg (172 lb), SpO2 97 %. General:    Well nourished. No overt distress, alert, elderly, hard of hearing  CV:   RRR. III/VI DENISSE, no edema   Lungs:   CTAB. No wheezing, rhonchi, or rales. Respirations even, unlabored anterior   Abdomen: Bowel sounds present. Soft, nontender, nondistended. Extremities: No cyanosis or clubbing. No edema  Skin:     No rashes and normal coloration. Warm and dry. Neuro:   grossly intact. Alert, some issue with fluent speech  Psych:  Normal mood and affect.       I have reviewed ordered lab tests and independently visualized imaging below:    Recent Labs:  Recent Results (from the past 48 hour(s))   CELL COUNT, BODY FLUID    Collection Time: 12/09/21  8:30 PM   Result Value Ref Range    BODY FLUID TYPE LEFT KNEE      FLUID RBC CT. 72,000 /cu mm    FLUID WBC COUNT 75,870 /cu mm    BRCH NEUTROPHIL 93 %    BRCH LYMPHS 3 %    BRCH MACROPHAGES 4 %    FLUID COLOR RED      FLUID APPEARANCE TURBID     CRYSTALS, SYNOVIAL FLUID    Collection Time: 12/09/21  8:30 PM   Result Value Ref Range    FLUID TYPE(7) LEFT KNEE      Crystals, body fluid (NOTE)    CULTURE, BODY FLUID W GRAM STAIN    Collection Time: 12/09/21  8:30 PM    Specimen: Knee, left   Result Value Ref Range    Special Requests: NO SPECIAL REQUESTS      GRAM STAIN 0 TO 3 WBCS PER OIF     GRAM STAIN NO DEFINITE ORGANISM SEEN      Culture result: NO GROWTH 1 DAY     HEMOGLOBIN A1C WITH EAG    Collection Time: 12/09/21  9:23 PM   Result Value Ref Range    Hemoglobin A1c 6.4 (H) 4.20 - 6.30 %    Est. average glucose 137 mg/dL   CBC WITH AUTOMATED DIFF    Collection Time: 12/09/21  9:24 PM   Result Value Ref Range    WBC 7.6 4.3 - 11.1 K/uL    RBC 3.26 (L) 4.23 - 5.6 M/uL    HGB 10.2 (L) 13.6 - 17.2 g/dL    HCT 31.7 (L) 41.1 - 50.3 %    MCV 97.2 79.6 - 97.8 FL    MCH 31.3 26.1 - 32.9 PG    MCHC 32.2 31.4 - 35.0 g/dL    RDW 15.7 (H) 11.9 - 14.6 %    PLATELET 91 (L) 880 - 450 K/uL    MPV 12.9 (H) 9.4 - 12.3 FL    ABSOLUTE NRBC 0.00 0.0 - 0.2 K/uL    NEUTROPHILS 69 43 - 78 %    LYMPHOCYTES 10 (L) 13 - 44 %    MONOCYTES 20 (H) 4.0 - 12.0 %    EOSINOPHILS 0 (L) 0.5 - 7.8 %    BASOPHILS 0 0.0 - 2.0 %    IMMATURE GRANULOCYTES 1 0.0 - 5.0 %    ABS. NEUTROPHILS 5.2 1.7 - 8.2 K/UL    ABS. LYMPHOCYTES 0.8 0.5 - 4.6 K/UL    ABS. MONOCYTES 1.5 (H) 0.1 - 1.3 K/UL    ABS. EOSINOPHILS 0.0 0.0 - 0.8 K/UL    ABS. BASOPHILS 0.0 0.0 - 0.2 K/UL    ABS. IMM.  GRANS. 0.1 0.0 - 0.5 K/UL    RBC COMMENTS MODERATE  ANISOCYTOSIS + POIKILOCYTOSIS        RBC COMMENTS SLIGHT  OVALOCYTES        RBC COMMENTS SLIGHT  SCHISTOCYTES        RBC COMMENTS NRBCs SEEN     WBC COMMENTS Result Confirmed By Smear      PLATELET COMMENTS DECREASED      DF AUTOMATED     URIC ACID    Collection Time: 12/09/21  9:24 PM   Result Value Ref Range    Uric acid 3.6 2.6 - 6.0 MG/DL   METABOLIC PANEL, BASIC    Collection Time: 12/09/21  9:24 PM   Result Value Ref Range    Sodium 132 (L) 136 - 145 mmol/L    Potassium 3.9 3.5 - 5.1 mmol/L    Chloride 102 98 - 107 mmol/L    CO2 27 21 - 32 mmol/L    Anion gap 3 (L) 7 - 16 mmol/L    Glucose 209 (H) 65 - 100 mg/dL    BUN 18 8 - 23 MG/DL    Creatinine 1.34 0.8 - 1.5 MG/DL    GFR est AA >60 >60 ml/min/1.73m2    GFR est non-AA 53 (L) >60 ml/min/1.73m2    Calcium 8.1 (L) 8.3 - 10.4 MG/DL   PROTHROMBIN TIME + INR    Collection Time: 12/09/21  9:24 PM   Result Value Ref Range    Prothrombin time 26.1 (H) 12.6 - 14.5 sec    INR 2.4     C REACTIVE PROTEIN, QT    Collection Time: 12/09/21  9:24 PM   Result Value Ref Range    C-Reactive protein 11.0 (H) 0.0 - 0.9 mg/dL   SED RATE, AUTOMATED    Collection Time: 12/09/21  9:24 PM   Result Value Ref Range    Sed rate, automated 2 0 - 15 mm/hr   COVID-19 RAPID TEST    Collection Time: 12/10/21  6:37 AM   Result Value Ref Range    Specimen source NASAL SWAB      COVID-19 rapid test Not detected NOTD     PLASMA, ALLOCATE    Collection Time: 12/10/21  9:00 AM   Result Value Ref Range    Unit number [id number]     Blood component type FP 24h, Thaw     Unit division 00     Status of unit TRANSFUSED     Unit number [id number]     Blood component type FP 24h, Thaw     Unit division 00     Status of unit TRANSFUSED    GLUCOSE, POC    Collection Time: 12/10/21  9:08 AM   Result Value Ref Range    Glucose (POC) 88 65 - 100 mg/dL    Performed by [facility]    PROTHROMBIN TIME + INR    Collection Time: 12/10/21 12:04 PM   Result Value Ref Range    Prothrombin time 22.7 (H) 12.6 - 14.5 sec    INR 2.0     TYPE & SCREEN    Collection Time: 12/10/21 12:57 PM   Result Value Ref Range    Crossmatch Expiration 12/13/2021,2359     ABO/Rh(D) A POSITIVE     Antibody screen NEG    CULTURE, WOUND [place] STAIN    Collection Time: 12/10/21  3:14 PM    Specimen: Knee ; Wound   Result Value Ref Range    Special Requests: LEFT KNEE  NO.1        GRAM STAIN 1 TO 8 WBCS SEEN PER OIF     GRAM STAIN NO DEFINITE ORGANISM SEEN      Culture result:        NO GROWTH AFTER SHORT PERIOD OF INCUBATION. FURTHER RESULTS TO FOLLOW AFTER OVERNIGHT INCUBATION. CULTURE, WOUND [place] STAIN    Collection Time: 12/10/21  3:33 PM    Specimen: Knee ; Wound   Result Value Ref Range    Special Requests: LEFT KNEE  NO.2        GRAM STAIN 0 TO 1 WBCS SEEN PER OIF     GRAM STAIN NO DEFINITE ORGANISM SEEN      Culture result:        NO GROWTH AFTER SHORT PERIOD OF INCUBATION. FURTHER RESULTS TO FOLLOW AFTER OVERNIGHT INCUBATION. CULTURE, WOUND [place] STAIN    Collection Time: 12/10/21  3:34 PM    Specimen: Knee ;  Wound   Result Value Ref Range    Special Requests: LEFT KNEE  NO.3        GRAM STAIN 0 TO 1 WBCS SEEN PER OIF     GRAM STAIN NO DEFINITE ORGANISM SEEN      Culture result:        NO GROWTH AFTER SHORT PERIOD OF INCUBATION. FURTHER RESULTS TO FOLLOW AFTER OVERNIGHT INCUBATION. GLUCOSE, POC    Collection Time: 12/10/21  4:54 PM   Result Value Ref Range    Glucose (POC) 133 (H) 65 - 100 mg/dL    [facility]    GLUCOSE, POC    Collection Time: 12/10/21  5:46 PM   Result Value Ref Range    Glucose (POC) 135 (H) 65 - 100 mg/dL    [facility]    METABOLIC PANEL, BASIC    Collection Time: 12/10/21  8:45 PM   Result Value Ref Range    Sodium 136 136 - 145 mmol/L    Potassium 4.1 3.5 - 5.1 mmol/L    Chloride 104 98 - 107 mmol/L    CO2 24 21 - 32 mmol/L    Anion gap 8 7 - 16 mmol/L    Glucose 136 (H) 65 - 100 mg/dL    BUN 19 8 - 23 MG/DL    Creatinine 1.15 0.8 - 1.5 MG/DL    GFR est AA >60 >60 ml/min/1.73m2    GFR est non-AA >60 >60 ml/min/1.73m2    Calcium 8.7 8.3 - 10.4 MG/DL   PROTHROMBIN TIME + INR    Collection Time: 12/10/21  8:45 PM   Result Value Ref Range    Prothrombin time 22.7 (H) 12.6 - 14.5 sec    INR 2.0     CBC WITH AUTOMATED DIFF    Collection Time: 12/10/21  8:45 PM   Result Value Ref Range    WBC 5.6 4.3 - 11.1 K/uL    RBC 2.92 (L) 4.23 - 5.6 M/uL    HGB 9.1 (L) 13.6 - 17.2 g/dL    HCT 28.2 (L) 41.1 - 50.3 %    MCV 96.6 79.6 - 97.8 FL    MCH 31.2 26.1 - 32.9 PG    MCHC 32.3 31.4 - 35.0 g/dL    RDW 15.4 (H) 11.9 - 14.6 %    PLATELET 93 (L) 816 - 450 K/uL    MPV 13.4 (H) 9.4 - 12.3 FL    ABSOLUTE NRBC 0.00 0.0 - 0.2 K/uL    NEUTROPHILS 89 (H) 43 - 78 %    LYMPHOCYTES 5 (L) 13 - 44 %    MONOCYTES 5 4.0 - 12.0 %    EOSINOPHILS 0 (L) 0.5 - 7.8 %    BASOPHILS 0 0.0 - 2.0 %    IMMATURE GRANULOCYTES 1 0.0 - 5.0 %    ABS. NEUTROPHILS 4.9 1.7 - 8.2 K/UL    ABS. LYMPHOCYTES 0.3 (L) 0.5 - 4.6 K/UL    ABS. MONOCYTES 0.3 0.1 - 1.3 K/UL    ABS. EOSINOPHILS 0.0 0.0 - 0.8 K/UL    ABS. BASOPHILS 0.0 0.0 - 0.2 K/UL    ABS. IMM.  GRANS. 0.1 0.0 - 0.5 K/UL    RBC COMMENTS OCCASIONAL  OVALOCYTES        RBC COMMENTS OCCASIONAL  ALLIE CELLS        RBC COMMENTS OCCASIONAL  MICROCYTOSIS        RBC COMMENTS OCCASIONAL  MACROCYTOSIS        WBC COMMENTS Result Confirmed By Smear      PLATELET COMMENTS OCCASIONAL      DF AUTOMATED     GLUCOSE, POC    Collection Time: 12/10/21 10:15 PM   Result Value Ref Range    Glucose (POC) 178 (H) 65 - 100 mg/dL    Performed by Sandrita Hernandez    GLUCOSE, POC    Collection Time: 12/11/21 12:08 AM   Result Value Ref Range    Glucose (POC) 163 (H) 65 - 100 mg/dL    Performed by Sandrita Hernandez    METABOLIC PANEL, BASIC    Collection Time: 12/11/21  4:03 AM   Result Value Ref Range    Sodium 136 136 - 145 mmol/L    Potassium 4.2 3.5 - 5.1 mmol/L    Chloride 105 98 - 107 mmol/L    CO2 22 21 - 32 mmol/L    Anion gap 9 7 - 16 mmol/L    Glucose 179 (H) 65 - 100 mg/dL    BUN 22 8 - 23 MG/DL    Creatinine 1.23 0.8 - 1.5 MG/DL    GFR est AA >60 >60 ml/min/1.73m2    GFR est non-AA 59 (L) >60 ml/min/1.73m2    Calcium 8.3 8.3 - 10.4 MG/DL   PROTHROMBIN TIME + INR    Collection Time: 12/11/21  4:03 AM   Result Value Ref Range    Prothrombin time 23.1 (H) 12.6 - 14.5 sec    INR 2.0     CBC WITH AUTOMATED DIFF    Collection Time: 12/11/21  4:03 AM   Result Value Ref Range    WBC 4.3 4.3 - 11.1 K/uL    RBC 2.79 (L) 4.23 - 5.6 M/uL    HGB 8.7 (L) 13.6 - 17.2 g/dL    HCT 26.4 (L) 41.1 - 50.3 %    MCV 94.6 79.6 - 97.8 FL    MCH 31.2 26.1 - 32.9 PG    MCHC 33.0 31.4 - 35.0 g/dL    RDW 15.4 (H) 11.9 - 14.6 %    PLATELET 92 (L) 441 - 450 K/uL    MPV 13.5 (H) 9.4 - 12.3 FL    ABSOLUTE NRBC 0.00 0.0 - 0.2 K/uL    DF AUTOMATED      NEUTROPHILS 85 (H) 43 - 78 %    LYMPHOCYTES 8 (L) 13 - 44 %    MONOCYTES 7 4.0 - 12.0 %    EOSINOPHILS 0 (L) 0.5 - 7.8 %    BASOPHILS 0 0.0 - 2.0 %    IMMATURE GRANULOCYTES 1 0.0 - 5.0 %    ABS. NEUTROPHILS 3.6 1.7 - 8.2 K/UL    ABS. LYMPHOCYTES 0.4 (L) 0.5 - 4.6 K/UL    ABS. MONOCYTES 0.3 0.1 - 1.3 K/UL    ABS. EOSINOPHILS 0.0 0.0 - 0.8 K/UL    ABS. BASOPHILS 0.0 0.0 - 0.2 K/UL    ABS. IMM. GRANS. 0.0 0.0 - 0.5 K/UL   GLUCOSE, POC    Collection Time: 12/11/21  6:12 AM   Result Value Ref Range    Glucose (POC) 219 (H) 65 - 100 mg/dL    Performed by [employer]    GLUCOSE, POC    Collection Time: 12/11/21  6:33 AM   Result Value Ref Range    Glucose (POC) 227 (H) 65 - 100 mg/dL    Performed by [employer]    GLUCOSE, POC    Collection Time: 12/11/21 11:52 AM   Result Value Ref Range    Glucose (POC) 244 (H) 65 - 100 mg/dL    Performed by Yefri Spring        All Micro Results     Procedure Component Value Units Date/Time    CULTURE, Ardell Racer STAIN [[id number]] Collected: 12/10/21 1514    Order Status: Completed Specimen: Wound from Knee  Updated: 12/11/21 1459     Special Requests: --        LEFT KNEE  NO.1       GRAM STAIN 1 TO 8 WBCS SEEN PER OIF      NO DEFINITE ORGANISM SEEN        Culture result:       NO GROWTH AFTER SHORT PERIOD OF INCUBATION. FURTHER RESULTS TO FOLLOW AFTER OVERNIGHT INCUBATION. CULTURE, Ardell Racer STAIN [[id number]] Collected: 12/10/21 1533    Order Status: Completed Specimen: Wound from Knee  Updated: 12/11/21 1459     Special Requests: --        LEFT KNEE  NO.2       GRAM STAIN 0 TO 1 WBCS SEEN PER OIF      NO DEFINITE ORGANISM SEEN        Culture result:       NO GROWTH AFTER SHORT PERIOD OF INCUBATION. FURTHER RESULTS TO FOLLOW AFTER OVERNIGHT INCUBATION. CULTURE, Ardell Racer STAIN [[id number]] Collected: 12/10/21 1534    Order Status: Completed Specimen: Wound from Knee  Updated: 12/11/21 1458     Special Requests: --        LEFT KNEE  NO.3       GRAM STAIN 0 TO 1 WBCS SEEN PER OIF      NO DEFINITE ORGANISM SEEN        Culture result:       NO GROWTH AFTER SHORT PERIOD OF INCUBATION. FURTHER RESULTS TO FOLLOW AFTER OVERNIGHT INCUBATION.           Gumaro Hurtado [[id number]] Collected: 12/10/21 1533    Order Status: Completed Specimen: Knee  Updated: 12/10/21 2147    CULTURE, ANAEROBIC [[id number]] Collected: 12/10/21 1534    Order Status: Completed Specimen: Knee  Updated: 12/10/21 2147    CULTURE, ANAEROBIC [[id number]] Collected: 12/10/21 1514    Order Status: Completed Specimen: Knee  Updated: 12/10/21 2147    FUNGUS CULTURE AND SMEAR [[id number]] Collected: 12/10/21 1534    Order Status: Completed Updated: 12/10/21 1735    FUNGUS CULTURE AND SMEAR [[id number]] Collected: 12/10/21 1533    Order Status: Completed Updated: 12/10/21 1734    FUNGUS CULTURE AND SMEAR [[id number]] Collected: 12/10/21 1514    Order Status: Completed Updated: 12/10/21 1734    AFB CULTURE + SMEAR W/RFLX ID FROM CULTURE [[id number]] Collected: 12/10/21 1545    Order Status: Canceled     AFB CULTURE + SMEAR W/RFLX ID FROM CULTURE [[id number]] Collected: 12/10/21 1545    Order Status: Canceled     CULTURE, FUNGUS [[id number]]     Order Status: Sent Specimen: Knee Fluid     CULTURE, FUNGUS [[id number]]     Order Status: Sent Specimen: Knee Fluid     CULTURE, FUNGUS [[id number]]     Order Status: Sent Specimen: Knee Fluid     AFB CULTURE + SMEAR W/RFLX ID FROM CULTURE [[id number]] Collected: 12/10/21 1515    Order Status: Canceled     COVID-19 RAPID TEST [[id number]] Collected: 12/10/21 0637    Order Status: Completed Specimen: Nasopharyngeal Updated: 12/10/21 0713     Specimen source NASAL SWAB        COVID-19 rapid test Not detected        Comment:      The specimen is NEGATIVE for SARS-CoV-2, the novel coronavirus associated with COVID-19. A negative result does not rule out COVID-19. This test has been authorized by the FDA under an Emergency Use Authorization (EUA) for use by authorized laboratories.         Fact sheet for Healthcare Providers: kstattoo.com  Fact sheet for Patients: kstattoo.com       Methodology: Isothermal Nucleic Acid Amplification         CULTURE, BODY FLUID W Toni Wong [[id number]] Collected: 12/09/21 1937    Order Status: Canceled           Other Studies:  XR KNEE LT MAX 2 VWS    Result Date: 12/10/2021  History:  Pacu -Rt quad lumen and lt tka revision PORTABLE CHEST ONE VIEW: A right IJ catheter terminates in the SVC. A left shoulder prosthesis is present. EKG leads are present. Edema like interstitial densities are present and there is retrocardiac atelectasis or consolidation. LEFT KNEE 2 VIEWS: A knee prosthesis is present. Implanted antibiotic beads are present in the knee. Skin staples are present. 1. Retrocardiac atelectasis or consolidation. 2. Revision left knee arthroplasty with implantation of antibiotic beads. XR CHEST PORT    Result Date: 12/10/2021  History:  Pacu -Rt quad lumen and lt tka revision PORTABLE CHEST ONE VIEW: A right IJ catheter terminates in the SVC. A left shoulder prosthesis is present. EKG leads are present. Edema like interstitial densities are present and there is retrocardiac atelectasis or consolidation. LEFT KNEE 2 VIEWS: A knee prosthesis is present. Implanted antibiotic beads are present in the knee. Skin staples are present. 1. Retrocardiac atelectasis or consolidation. 2. Revision left knee arthroplasty with implantation of antibiotic beads.       Current Meds:  Current Facility-Administered Medications   Medication Dose Route Frequency    [COMPLETED] tuberculin injection 5 Units  5 Units IntraDERMal ONCE    acetaminophen (TYLENOL) tablet 1,000 mg  1,000 mg Oral Q6H PRN    insulin lispro (HUMALOG) injection   SubCUTAneous Q6H    0.9% sodium chloride infusion 250 mL  250 mL IntraVENous PRN    apixaban (ELIQUIS) tablet 5 mg  5 mg Oral BID    alcohol 62% (NOZIN) nasal  1 Ampule  1 Ampule Topical Q12H    0.9% sodium chloride infusion  100 mL/hr IntraVENous CONTINUOUS    sodium chloride (NS) flush 5-40 mL  5-40 mL IntraVENous Q8H    sodium chloride (NS) flush 5-40 mL  5-40 mL IntraVENous PRN    acetaminophen (TYLENOL) tablet 1,000 mg  1,000 mg Oral Q6H    oxyCODONE IR (ROXICODONE) tablet 10 mg  10 mg Oral Q4H PRN    HYDROmorphone (DILAUDID) injection 1 mg  1 mg IntraVENous Q3H PRN    naloxone (NARCAN) injection 0.2-0.4 mg  0.2-0.4 mg IntraVENous Q10MIN PRN    dexamethasone (DECADRON) 10 mg/mL injection 10 mg  10 mg IntraVENous ONCE    ondansetron (ZOFRAN ODT) tablet 4 mg  4 mg Oral Q4H PRN    diphenhydrAMINE (BENADRYL) capsule 25 mg  25 mg Oral Q4H PRN    senna-docusate (PERICOLACE) 8.6-50 mg per tablet 2 Tablet  2 Tablet Oral DAILY    vancomycin (VANCOCIN) 1,000 mg in 0.9% sodium chloride 250 mL (VIAL-MATE)  1,000 mg IntraVENous Q24H    0.9% sodium chloride infusion  75 mL/hr IntraVENous CONTINUOUS    traMADoL (ULTRAM) tablet 50 mg  50 mg Oral Q6H PRN    ondansetron (ZOFRAN ODT) tablet 8 mg  8 mg Oral Q8H PRN    diphenhydrAMINE (BENADRYL) capsule 25 mg  25 mg Oral Q6H PRN    atorvastatin (LIPITOR) tablet 40 mg  40 mg Oral QHS    latanoprost (XALATAN) 0.005 % ophthalmic solution 1 Drop  1 Drop Right Eye QPM    [Held by provider] glipiZIDE (GLUCOTROL) tablet 2.5 mg  2.5 mg Oral ACB    levothyroxine (SYNTHROID) tablet 50 mcg  50 mcg Oral ACB    pantoprazole (PROTONIX) tablet 40 mg  40 mg Oral ACB    simethicone (MYLICON) tablet 80 mg  80 mg Oral QID PRN    dorzolamide-timoloL (COSOPT) 22.3-6.8 mg/mL ophthalmic solution 1 Drop  1 Drop Right Eye BID    lip protectant (BLISTEX) ointment 1 Each  1 Each Topical PRN       Signed:  Angelia Butt MD    Part of this note may have been written by using a voice dictation software. The note has been proof read but may still contain some grammatical/other typographical errors.

## 2021-12-11 NOTE — PROGRESS NOTES
Problem: Falls - Risk of  Goal: *Absence of Falls  Description: Document Ava Hardin Fall Risk and appropriate interventions in the flowsheet.   Outcome: Progressing Towards Goal  Note: Fall Risk Interventions:  Mobility Interventions: OT consult for ADLs, Patient to call before getting OOB, PT Consult for mobility concerns, PT Consult for assist device competence, Strengthening exercises (ROM-active/passive), Utilize walker, cane, or other assistive device         Medication Interventions: Patient to call before getting OOB, Teach patient to arise slowly    Elimination Interventions: Call light in reach, Elevated toilet seat, Patient to call for help with toileting needs    History of Falls Interventions: Bed/chair exit alarm, Consult care management for discharge planning, Door open when patient unattended, Evaluate medications/consider consulting pharmacy, Investigate reason for fall, Room close to nurse's station         Problem: Patient Education: Go to Patient Education Activity  Goal: Patient/Family Education  Outcome: Progressing Towards Goal     Problem: Patient Education: Go to Patient Education Activity  Goal: Patient/Family Education  Outcome: Progressing Towards Goal     Problem: Patient Education: Go to Patient Education Activity  Goal: Patient/Family Education  Outcome: Progressing Towards Goal     Problem: Pain  Goal: *Control of Pain  Outcome: Progressing Towards Goal  Goal: *PALLIATIVE CARE:  Alleviation of Pain  Outcome: Progressing Towards Goal     Problem: Patient Education: Go to Patient Education Activity  Goal: Patient/Family Education  Outcome: Progressing Towards Goal

## 2021-12-11 NOTE — PROGRESS NOTES
2021         Post Op day: 1 Day Post-Op     Admit Date: 2021  Admit Diagnosis: Infection of total joint prosthesis ([address] [T84.50XA]  Removal Implant Left Shoulder \"press Fit Exactech Hemiarthroplasty\"   2. Revision Left Total Shoulder Arthroplasty With Reverse Delta Extend Prosthesis, Latissimus Dorsi And Teres Major Tendon Transfers Left Shoulder  - Left  10/13/2017    Subjective:  No SOB, No Chest Pain, No Nausea or Vomitting. Is a little hard of hearing so has to yell. Little anxious as well. Has issues with GI secondary to colon resection. Had questions about skilled nursing facility secondary to IV infusions as well. PT/OT:            Assistive Device: Walker (comment)                Weight Bearing Status: WBAT  BMP:  Recent Labs     12/11/21  0403 12/10/21  2045 12/09/21  2124   CREA 1.23 1.15 1.34   BUN 22 19 18    136 132*   K 4.2 4.1 3.9    104 102   CO2 22 24 27   AGAP 9 8 3*   * 136* 209*     Patient Vitals for the past 8 hrs:   BP Temp Pulse Resp SpO2   21 0432 (!) 165/81 97.9 °F (36.6 °C) 81 16 96 %     Temp (24hrs), Av.2 °F (36.8 °C), Min:97.2 °F (36.2 °C), Max:98.9 °F (37.2 °C)    CBC:  Recent Labs     12/11/21  0403 12/10/21  2045 12/09/21  2124 12/09/21  2124   WBC 4.3 5.6  --  7.6   GRANS 85* 89*   < > 69   MONOS 7 5   < > 20*   EOS 0* 0*   < > 0*   ANEU 3.6 4.9   < > 5.2   ABL 0.4* 0.3*   < > 0.8   HGB 8.7* 9.1*  --  10.2*   HCT 26.4* 28.2*  --  31.7*   PLT 92* 93*  --  91*    < > = values in this interval not displayed.        Microbiology:     All Micro Results     Procedure Component Value Units Date/Time    Bela Bautista [[id number]] Collected: 12/10/21 1533    Order Status: Completed Specimen: Wound from Knee  Updated: 12/10/21 2147    CULTURE, ANAEROBIC [[id number]] Collected: 12/10/21 1533    Order Status: Completed Specimen: Knee  Updated: 12/10/21 2147    CULTURE, Dash Merle STAIN [[id number]] Collected: 12/10/21 1534 Order Status: Completed Specimen: Wound from Knee  Updated: 12/10/21 2147    CULTURE, ANAEROBIC [[id number]] Collected: 12/10/21 1534    Order Status: Completed Specimen: Knee  Updated: 12/10/21 2147    CULTURE, Conda Nailer STAIN [[id number]] Collected: 12/10/21 1514    Order Status: Completed Specimen: Wound from Knee  Updated: 12/10/21 2147    CULTURE, ANAEROBIC [[id number]] Collected: 12/10/21 1514    Order Status: Completed Specimen: Knee  Updated: 12/10/21 2147    FUNGUS CULTURE AND SMEAR [[id number]] Collected: 12/10/21 1534    Order Status: Completed Updated: 12/10/21 1735    FUNGUS CULTURE AND SMEAR [[id number]] Collected: 12/10/21 1533    Order Status: Completed Updated: 12/10/21 1734    FUNGUS CULTURE AND SMEAR [[id number]] Collected: 12/10/21 1514    Order Status: Completed Updated: 12/10/21 1734    AFB CULTURE + SMEAR W/RFLX ID FROM CULTURE [[id number]] Collected: 12/10/21 1545    Order Status: Canceled     AFB CULTURE + SMEAR W/RFLX ID FROM CULTURE [[id number]] Collected: 12/10/21 1545    Order Status: Canceled     CULTURE, FUNGUS [[id number]]     Order Status: Sent Specimen: Knee Fluid     CULTURE, FUNGUS [[id number]]     Order Status: Sent Specimen: Knee Fluid     CULTURE, FUNGUS [[id number]]     Order Status: Sent Specimen: Knee Fluid     AFB CULTURE + SMEAR W/RFLX ID FROM CULTURE [[id number]] Collected: 12/10/21 1515    Order Status: Canceled     COVID-19 RAPID TEST [[id number]] Collected: 12/10/21 0637    Order Status: Completed Specimen: Nasopharyngeal Updated: 12/10/21 0713     Specimen source NASAL SWAB        COVID-19 rapid test Not detected        Comment:      The specimen is NEGATIVE for SARS-CoV-2, the novel coronavirus associated with COVID-19. A negative result does not rule out COVID-19. This test has been authorized by the FDA under an Emergency Use Authorization (EUA) for use by authorized laboratories.         Fact sheet for Healthcare Providers: ConventionUpdate.co.nz  Fact sheet for Patients: ConventionUpdate.co.nz       Methodology: Isothermal Nucleic Acid Amplification         CULTURE, BODY FLUID Carlitos Valdez [[id number]] Collected: 12/09/21 1937    Order Status: Canceled         Objective: Vital Signs are Stable, No Acute Distress, Alert and Oriented  Dressing is Dry,  Neurovascular exam is normal.    Assessment:  Patient Active Problem List   Diagnosis Code    Abscess of foot L02.619    Instability of prosthetic shoulder joint ([address] T84.028A, Z96.619    Rotator cuff tear arthropathy, left M75.102, M12.812    S/P shoulder hemiarthroplasty, left S18.049    Infection of total joint prosthesis ([address] T84.50XA    Atrial fibrillation ([address] I48.91    Hyperglycemia due to type 2 diabetes mellitus ([address] E11.65    Hyperlipidemia E78.5    Acquired hypothyroidism E03.9    Hearing loss H91.90    Coagulopathy (HCC) D68.9    Status post revision of total knee replacement, 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 Dispo soon  Discussed we would have a  talk with him about his discharge plan. Currently plans are for him to remain in the hospital over the weekend while we are following cultures. Eliquis is ordered to start again today.     Signed By: Kenji Bustillos MD

## 2021-12-12 PROBLEM — D69.6 THROMBOCYTOPENIA (HCC): Status: ACTIVE | Noted: 2021-01-01

## 2021-12-12 PROBLEM — D64.9 ANEMIA: Chronic | Status: ACTIVE | Noted: 2021-01-01

## 2021-12-12 PROBLEM — G93.40 ACUTE ENCEPHALOPATHY: Status: ACTIVE | Noted: 2021-01-01

## 2021-12-12 PROBLEM — R47.1 DYSARTHRIA: Status: ACTIVE | Noted: 2021-01-01

## 2021-12-12 NOTE — PROGRESS NOTES
Problem: Mobility Impaired (Adult and Pediatric)  Goal: *Acute Goals and Plan of Care (Insert Text)  Outcome: Progressing Towards Goal  Note: GOALS (1-4 days):  (1.)Mr. Abel Porter will move from supine to sit and sit to supine  in bed with STAND BY ASSIST.  (2.)Mr. Abel Porter will transfer from bed to chair and chair to bed with STAND BY ASSIST using the least restrictive device. (3.)Mr. Abel Porter will ambulate with STAND BY ASSIST for 350 feet with the least restrictive device. (4.)Mr. Abel Porter will increase left knee ROM to 0°-90°.  ________________________________________________________________________________________________        PHYSICAL THERAPY JOINT CAMP TKA: Daily Note and AM 12/12/2021  INPATIENT: Hospital Day: 4  [place] / [place] / Product Type: Medicare /      NAME/AGE/GENDER: Charisse Garrido is a 80 y.o. male   PRIMARY DIAGNOSIS:  Left knee infection   Procedure(s) and Anesthesia Type:     * INCISION AND DRAINAGE LEFT KNEE/ POLY EXCHANGE/ POSSIBLE PROSETHSIS EXPLANT W/ CEMENT SPACER - General     * POSS LEFT KNEE ARTHROPLASTY TOTAL - Spinal (Left  Left)  ICD-10: Treatment Diagnosis:    · Pain in Left Knee (M25.562)  · Stiffness of Left Knee, Not elsewhere classified (M25.662)  · Difficulty in walking, Not elsewhere classified (R26.2)      ASSESSMENT:     Mr. Abel Porter presents with decreased strength and range of motion left lower extremity and with decreased independence with functional mobility s/p left TKA. Pt will benefit from skilled PT interventions to maximize independence with functional mobility and TKA management. Pt did fairly well with assessment. He is slow and needs extra time to complete tasks. He was supine on contact. He transferred out of bed and ambulated to the bathroom for toileting then ambulated 175 ft in the hallways with RW and CGA. He returned to the room and sat in the recliner to perform therex. Pt instructed not to get up without assistance.  Hope to progress mobility and exercises in the morning. Pt plans to discharge to rehab with continued therapy for follow up. He wants to go to [facility] and is wanting to talk with the SW.  12/11- up in chair with daughter present. He was more sore this afternoon but agreeable to therapy. Co treat with OT due to increased need for mobility. He ambulated 100 ft with RW and CGA then returned to the room and went to the bathroom and then showered. He practiced scooting, sitting/ standing balance and sit to stand while showering then returned to the chair to kaylin his shoes. Left up in room with OT for grooming. Progressing therapy today. 12/12- patient hallucinating bugs today. Supine on contact and agreeable to therapy. Patient is pleasant but extra time needed to complete all tasks with therapy due to being easily distracted and perseveration on tasks. He transferred supine to sit insisting on using the trapeze bar even though I advised he would do better without it. He discovered he was unable to get up with the bar so he used the rails and required Min assist. Sit to stand with Mod assist and then ambulated into the bathroom for brushing teeth and combing hair. Ambulated 130 ft with RW and CGA then returned to the room and transferred into the chair for therex. Needed frequent breaks with therex, as RN was changing his central line dressing during therapy. He completed therex and ice was applied. Left up in chair awaiting tele-neurology consult for dysarthria. Progressed with gait distance today. This section established at most recent assessment   PROBLEM LIST (Impairments causing functional limitations):  1. Decreased Strength  2. Decreased ADL/Functional Activities  3. Decreased Transfer Abilities  4. Decreased Ambulation Ability/Technique  5. Decreased Flexibility/Joint Mobility  6. Edema/Girth  7.  Decreased [place] with Home Exercise Program   INTERVENTIONS PLANNED: (Benefits and precautions of physical therapy have been discussed with the patient.)  1. Bed Mobility  2. Cold  3. Gait Training  4. Home Exercise Program (HEP)  5. Range of Motion (ROM)  6. Therapeutic Activites  7. Therapeutic Exercise/Strengthening  8. Transfer Training     TREATMENT PLAN: Frequency/Duration: Follow patient BID for duration of hospital stay to address above goals. Rehabilitation Potential For Stated Goals: Good     RECOMMENDED REHABILITATION/EQUIPMENT: (at time of discharge pending progress): Continue Skilled Therapy and Rehab. HISTORY:   History of Present Injury/Illness (Reason for Referral):  Pt s/p total knee arthroplasty revision on L LE    Past Medical History/Comorbidities:   Mr. Anjel Manley  has a past medical history of Anemia (fall 2016), Arthritis, Atrial fibrillation ([address] (12/10/2021), CAD (coronary artery disease) (1997), Carotid stenosis (04/2017), Coagulopathy ([address] (12/10/2021), Diabetes ([address] (05/1997), Diverticulosis (12/2011), GERD (gastroesophageal reflux disease), Glaucoma, Heart murmur, Hiatal hernia, History of kidney stones, Hypercholesteremia, Hypertension, Hypothyroid, Infection of total joint prosthesis ([address] (12/9/2021), MI (myocardial infarction) ([address] TIA (transient ischemic attack) (07/2016), Unspecified sleep apnea, Urethral stenosis, and Vasovagal syncope. He also has no past medical history of Adverse effect of anesthesia, Aneurysm ([address] Asthma, Autoimmune disease ([address] Cancer ([address] Chronic kidney disease, Chronic pain, COPD, Dementia, Difficult intubation, Endocarditis, Gastrointestinal disorder, Heart failure ([address] Ill-defined condition, Liver disease, Malignant hyperthermia due to anesthesia, Morbid obesity ([address] Nausea & vomiting, Neurological disorder, Other ill-defined conditions(799.89), Pseudocholinesterase deficiency, Psychiatric disorder, PUD (peptic ulcer disease), Rheumatic fever, Seizures ([address] Sleep disorder, Stroke ([address] or Thromboembolus ([address]   Mr. Anjel Manley  has a past surgical history that includes hx heart catheterization (0427,7187, 2012, 2013); hx tonsillectomy (1938); hx cataract removal (1987 and 1990); hx lap cholecystectomy (2003); hx hernia repair (1944); hx urological (1996); hx orthopaedic (Left); hx orthopaedic (9223-5002); hx shoulder arthroscopy (Left, 2015); hx knee replacement (Bilateral, 2002, 2008); hx hemorrhoidectomy (1987); hx heent (Right); hx carpal tunnel release (Right, 1968); hx carpal tunnel release (Left, 2012); hx bunionectomy (Left); hx rotator cuff repair (Right, 1995); hx rotator cuff repair (Left, 2000); hx rotator cuff repair (Right, 2007); and hx total colectomy (01/13/2017). Social History/Living Environment:   Living Alone: Yes  Support Systems: Child(wojciech)  Patient Expects to be Discharged to[de-identified] Rehabilitation facility  Prior Level of Function/Work/Activity:  Independent prior to admit. Lives alone. May have caregivers that assist at home but unsure how frequently   Number of Personal Factors/Comorbidities that affect the Plan of Care: 0: LOW COMPLEXITY   EXAMINATION:   Most Recent Physical Functioning:                            Bed Mobility  Supine to Sit: Minimum assistance  Scooting: Stand-by assistance; Contact guard assistance    Transfers  Sit to Stand: Moderate assistance  Stand to Sit: Minimum assistance  Bed to Chair: Contact guard assistance    Balance  Sitting: Intact  Standing: Pull to stand; With support              Weight Bearing Status  Left Side Weight Bearing: As tolerated  Distance (ft): 130 Feet (ft)  Ambulation - Level of Assistance: Contact guard assistance  Assistive Device: Walker, rolling  Speed/Tracy: Pace decreased (<100 feet/min)  Step Length: Right shortened  Stance: Left decreased  Gait Abnormalities: Antalgic; Decreased step clearance; Step to gait  Interventions: Verbal cues;  Safety awareness training     Braces/Orthotics: none    Left Knee Cold  Type: Cryocuff      Body Structures Involved:  1. Joints  2. Muscles Body Functions Affected:  1. Movement Related Activities and Participation Affected:  1. Mobility  2. Self Care   Number of elements that affect the Plan of Care: 4+: HIGH COMPLEXITY   CLINICAL PRESENTATION:   Presentation: Stable and uncomplicated: LOW COMPLEXITY   CLINICAL DECISION MAKIN[address] [place] [id number] AM-PAC 6 Clicks   Basic Mobility Inpatient Short Form  How much difficulty does the patient currently have. .. Unable A Lot A Little None   1. Turning over in bed (including adjusting bedclothes, sheets and blankets)? [] 1   [] 2   [x] 3   [] 4   2. Sitting down on and standing up from a chair with arms ( e.g., wheelchair, bedside commode, etc.)   [] 1   [] 2   [x] 3   [] 4   3. Moving from lying on back to sitting on the side of the bed? [] 1   [] 2   [x] 3   [] 4   How much help from another person does the patient currently need. .. Total A Lot A Little None   4. Moving to and from a bed to a chair (including a wheelchair)? [] 1   [] 2   [x] 3   [] 4   5. Need to walk in hospital room? [] 1   [] 2   [x] 3   [] 4   6. Climbing 3-5 steps with a railing? [] 1   [] 2   [x] 3   [] 4   © , Trustees of [address] [place] [address] [address] [address], under license to [employer]. All rights reserved     Score:  Initial: 18 Most Recent: X (Date: -- )    Interpretation of Tool:  Represents activities that are increasingly more difficult (i.e. Bed mobility, Transfers, Gait). Medical Necessity:     · Patient is expected to demonstrate progress in   · strength, range of motion, and functional technique  ·  to   · decrease assistance required with functional mobility and TKA managment  · .  Reason for Services/Other Comments:  · Patient continues to require skilled intervention due to   · Inability to complete functional mobility and TKA management independently  · .    Use of outcome tool(s) and clinical judgement create a POC that gives a: Clear prediction of patient's progress: LOW COMPLEXITY            TREATMENT:   (In addition to Assessment/Re-Assessment sessions the following treatments were rendered)     Pre-treatment Symptoms/Complaints:  no complaints  Pain Initial:      Post Session:  0     Therapeutic Activity: (    60 minutes): Therapeutic activities including bed mobility, sit to stand, Chair transfers, standing balance, LE therex, and  Ambulation on level groundto improve mobility, strength, and balance. Required minimal Verbal cues; Safety awareness training to promote dynamic balance in standing. Date:  12/11 Date:  12/12 Date:     ACTIVITY/EXERCISE AM PM AM PM AM PM     []  []  []  []  []  []   Ankle Pumps 15  15      Quad Sets 15  15      Gluteal Sets 15  15      Hip ABd/ADduction 15  15      Straight Leg Raises 15  15      Knee Slides 15  15      Short Arc Quads 15  15      Chair Slides                           B = bilateral; AA = active assistive; A = active; P = passive      Treatment/Session Assessment:     Response to Treatment:  tolerated therapy well. Hallucinating today. Education:  [x] Home Exercises  [x] Fall Precautions  [x] Use of Cold Therapy Unit [] D/C Instruction Review  [] Knee Prosthesis Review  [x] Walker Management/Safety [] Adaptive Equipment as Needed  [x] No pillow under knee       Interdisciplinary Collaboration:   o Physical Therapist  o Registered Nurse  o Physician    After treatment position/precautions:   o Up in chair  o Bed/Chair-wheels locked  o Bed in low position  o Call light within reach  o RN notified  o Nurse at bedside    Compliance with Program/Exercises: Compliant all of the time. Recommendations/Intent for next treatment session:  Treatment next visit will focus on increasing Mr. Kane Melissa independence with bed mobility, transfers, gait training, strength/ROM exercises, modalities for pain, and patient education.       Total Treatment Duration:  PT Patient Time In/Time Out  Time In: 2043  Time Out: [id number] [facility] [address] [place] Cade

## 2021-12-12 NOTE — PROGRESS NOTES
Rapid response called due to fall in floor while ambulating alone, confused, denies head trauma, STAT CT head and left shoulder xray ordered, has left shoulder abrasion and skin tear, alert, confused, pupils equal and reactive  Jerry Lua MD         There is a high probability of acute organ impairment or life-threatening deterioration in the patient's condition from: fall  Critical care interventions:CT head and left shoulder xray  Total critical care time spent: 35  minutes. Time is indicative of direct patient attendance at bedside and on the patient's floor nearby. Includes time spent at bedside performing history and exam, performing chart review, discussing findings and treatment plan with patient and/or family, discussing patient with consultants and colleagues, ordering and reviewing pertinent laboratory and radiographic evaluations, and discussing patient with nursing staff. Time excludes procedures.       Jerry Lua MD

## 2021-12-12 NOTE — PROGRESS NOTES
Problem: Mobility Impaired (Adult and Pediatric)  Goal: *Acute Goals and Plan of Care (Insert Text)  Outcome: Progressing Towards Goal  Note: GOALS (1-4 days):  (1.)Mr. Indio Ronquillo will move from supine to sit and sit to supine  in bed with STAND BY ASSIST.  (2.)Mr. Indio Ronquillo will transfer from bed to chair and chair to bed with STAND BY ASSIST using the least restrictive device. (3.)Mr. Indio Ronquillo will ambulate with STAND BY ASSIST for 350 feet with the least restrictive device. (4.)Mr. Indio Ronquillo will increase left knee ROM to 0°-90°.  ________________________________________________________________________________________________        PHYSICAL THERAPY JOINT CAMP TKA: Daily Note and PM 12/12/2021  INPATIENT: Hospital Day: 4  [place] / [place] / Product Type: Medicare /      NAME/AGE/GENDER: Alisia Harley is a 80 y.o. male   PRIMARY DIAGNOSIS:  Left knee infection   Procedure(s) and Anesthesia Type:     * INCISION AND DRAINAGE LEFT KNEE/ POLY EXCHANGE/ POSSIBLE PROSETHSIS EXPLANT W/ CEMENT SPACER - General     * POSS LEFT KNEE ARTHROPLASTY TOTAL - Spinal (Left  Left)  ICD-10: Treatment Diagnosis:    · Pain in Left Knee (M25.562)  · Stiffness of Left Knee, Not elsewhere classified (M25.662)  · Difficulty in walking, Not elsewhere classified (R26.2)      ASSESSMENT:     Mr. Indio Ronquillo presents with decreased strength and range of motion left lower extremity and with decreased independence with functional mobility s/p left TKA. Pt will benefit from skilled PT interventions to maximize independence with functional mobility and TKA management. Pt did fairly well with assessment. He is slow and needs extra time to complete tasks. He was supine on contact. He transferred out of bed and ambulated to the bathroom for toileting then ambulated 175 ft in the hallways with RW and CGA. He returned to the room and sat in the recliner to perform therex. Pt instructed not to get up without assistance.  Hope to progress mobility and exercises in the morning. Pt plans to discharge to rehab with continued therapy for follow up. He wants to go to [facility] and is wanting to talk with the SW.  12/11- up in chair with daughter present. He was more sore this afternoon but agreeable to therapy. Co treat with OT due to increased need for mobility. He ambulated 100 ft with RW and CGA then returned to the room and went to the bathroom and then showered. He practiced scooting, sitting/ standing balance and sit to stand while showering then returned to the chair to kaylin his shoes. Left up in room with OT for grooming. Progressing therapy today. 12/12- patient hallucinating bugs today. Supine on contact and agreeable to therapy. Patient is pleasant but extra time needed to complete all tasks with therapy due to being easily distracted and perseveration on tasks. He transferred supine to sit insisting on using the trapeze bar even though I advised he would do better without it. He discovered he was unable to get up with the bar so he used the rails and required Min assist. Sit to stand with Mod assist and then ambulated into the bathroom for brushing teeth and combing hair. Ambulated 130 ft with RW and CGA then returned to the room and transferred into the chair for therex. Needed frequent breaks with therex, as RN was changing his central line dressing during therapy. He completed therex and ice was applied. Left up in chair awaiting tele-neurology consult for dysarthria. Progressed with gait distance today. PM- sitting on EOB with nursing wanting to go to the bathroom. Ambulated with therapist to the bathroom where he toileted, washed hands and combed his hair. He is very short of breath this afternoon with activity. He ambulated 80 ft with RW and then returned to the room. Practiced sit to stand multiple times to kaylin brief then returned supine and positioned for comfort. Tele-neuro back on to see patient this afternoon.      This section established at most recent assessment   PROBLEM LIST (Impairments causing functional limitations):  1. Decreased Strength  2. Decreased ADL/Functional Activities  3. Decreased Transfer Abilities  4. Decreased Ambulation Ability/Technique  5. Decreased Flexibility/Joint Mobility  6. Edema/Girth  7. Decreased [place] with Home Exercise Program   INTERVENTIONS PLANNED: (Benefits and precautions of physical therapy have been discussed with the patient.)  1. Bed Mobility  2. Cold  3. Gait Training  4. Home Exercise Program (HEP)  5. Range of Motion (ROM)  6. Therapeutic Activites  7. Therapeutic Exercise/Strengthening  8. Transfer Training     TREATMENT PLAN: Frequency/Duration: Follow patient BID for duration of hospital stay to address above goals. Rehabilitation Potential For Stated Goals: Good     RECOMMENDED REHABILITATION/EQUIPMENT: (at time of discharge pending progress): Continue Skilled Therapy and Rehab. HISTORY:   History of Present Injury/Illness (Reason for Referral):  Pt s/p total knee arthroplasty revision on L LE    Past Medical History/Comorbidities:   Mr. 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  He also has no past medical history of Adverse effect of anesthesia, Aneurysm ([address] Asthma, Autoimmune disease ([address] Cancer ([address] Chronic kidney disease, Chronic pain, COPD, Dementia, Difficult intubation, Endocarditis, Gastrointestinal disorder, Heart failure ([address] Ill-defined condition, Liver disease, Malignant hyperthermia due to anesthesia, Morbid obesity ([address] Nausea & vomiting, Other ill-defined conditions(799.89), Pseudocholinesterase deficiency, Psychiatric disorder, PUD (peptic ulcer disease), Rheumatic fever, Seizures ([address] Sleep disorder, Stroke ([address] or Thromboembolus ([address] Mr. Huan Wade  has a past surgical history that includes hx heart catheterization ([id number],5493, 2012, 2013); hx tonsillectomy (1938); hx cataract removal (1987 and 1990); hx lap cholecystectomy (2003); hx hernia repair (1944); hx urological (1996); hx orthopaedic (Left); hx orthopaedic (0554-4342); hx shoulder arthroscopy (Left, 2015); hx knee replacement (Bilateral, 2002, 2008); hx hemorrhoidectomy (1987); hx heent (Right); hx carpal tunnel release (Right, 1968); hx carpal tunnel release (Left, 2012); hx bunionectomy (Left); hx rotator cuff repair (Right, 1995); hx rotator cuff repair (Left, 2000); hx rotator cuff repair (Right, 2007); and hx total colectomy (01/13/2017). Social History/Living Environment:   Living Alone: Yes  Support Systems: Child(wojciech)  Patient Expects to be Discharged to[de-identified] Rehabilitation facility  Prior Level of Function/Work/Activity:  Independent prior to admit. Lives alone. May have caregivers that assist at home but unsure how frequently   Number of Personal Factors/Comorbidities that affect the Plan of Care: 0: LOW COMPLEXITY   EXAMINATION:   Most Recent Physical Functioning:                            Bed Mobility  Supine to Sit: Minimum assistance  Scooting: Minimum assistance    Transfers  Sit to Stand: Moderate assistance  Stand to Sit: Minimum assistance  Bed to Chair: Contact guard assistance    Balance  Sitting: Intact; High guard  Standing: Pull to stand;  With support              Weight Bearing Status  Left Side Weight Bearing: As tolerated  Distance (ft): 85 Feet (ft)  Ambulation - Level of Assistance: Contact guard assistance  Assistive Device: Walker, rolling  Speed/Tracy: Pace decreased (<100 feet/min)  Step Length: Right shortened  Stance: Left decreased  Gait Abnormalities: Antalgic; Decreased step clearance; Path deviations  Interventions: Verbal cues; Safety awareness training     Braces/Orthotics: none    Left Knee Cold  Type: Cryocuff      Body Structures Involved:  1. Joints  2. Muscles Body Functions Affected:  1. Movement Related Activities and Participation Affected:  1. Mobility  2. Self Care   Number of elements that affect the Plan of Care: 4+: HIGH COMPLEXITY   CLINICAL PRESENTATION:   Presentation: Stable and uncomplicated: LOW COMPLEXITY   CLINICAL DECISION MAKIN[address] [place] [address] AM-PAC 6 Clicks   Basic Mobility Inpatient Short Form  How much difficulty does the patient currently have. .. Unable A Lot A Little None   1. Turning over in bed (including adjusting bedclothes, sheets and blankets)? [] 1   [] 2   [x] 3   [] 4   2. Sitting down on and standing up from a chair with arms ( e.g., wheelchair, bedside commode, etc.)   [] 1   [] 2   [x] 3   [] 4   3. Moving from lying on back to sitting on the side of the bed? [] 1   [] 2   [x] 3   [] 4   How much help from another person does the patient currently need. .. Total A Lot A Little None   4. Moving to and from a bed to a chair (including a wheelchair)? [] 1   [] 2   [x] 3   [] 4   5. Need to walk in hospital room? [] 1   [] 2   [x] 3   [] 4   6. Climbing 3-5 steps with a railing? [] 1   [] 2   [x] 3   [] 4   © , Trustees of [address] [place] [address], under license to [employer]. All rights reserved     Score:  Initial: 18 Most Recent: X (Date: -- )    Interpretation of Tool:  Represents activities that are increasingly more difficult (i.e. Bed mobility, Transfers, Gait).     Medical Necessity:     · Patient is expected to demonstrate progress in   · strength, range of motion, and functional technique  ·  to   · decrease assistance required with functional mobility and TKA managment  · .  Reason for Services/Other Comments:  · Patient continues to require skilled intervention due to   · Inability to complete functional mobility and TKA management independently  · . Use of outcome tool(s) and clinical judgement create a POC that gives a: Clear prediction of patient's progress: LOW COMPLEXITY            TREATMENT:   (In addition to Assessment/Re-Assessment sessions the following treatments were rendered)     Pre-treatment Symptoms/Complaints:  no complaints  Pain Initial:      Post Session:  0     Therapeutic Activity: (    25 minutes): Therapeutic activities including bed mobility, sit to stand, Chair transfers, standing balance,, and  Ambulation on level groundto improve mobility, strength, and balance. Required minimal Verbal cues; Safety awareness training to promote dynamic balance in standing. Date:  12/11 Date:  12/12 Date:     ACTIVITY/EXERCISE AM PM AM PM AM PM     []  []  []  []  []  []   Ankle Pumps 15  15      Quad Sets 15  15      Gluteal Sets 15  15      Hip ABd/ADduction 15  15      Straight Leg Raises 15  15      Knee Slides 15  15      Short Arc Quads 15  15      Chair Slides                           B = bilateral; AA = active assistive; A = active; P = passive      Treatment/Session Assessment:     Response to Treatment:  tolerated therapy fairly well but much more short of breath this afternoon. RN notified.      Education:  [x] Home Exercises  [x] Fall Precautions  [x] Use of Cold Therapy Unit [] D/C Instruction Review  [] Knee Prosthesis Review  [x] Walker Management/Safety [] Adaptive Equipment as Needed  [x] No pillow under knee       Interdisciplinary Collaboration:   o Physical Therapist  o Registered Nurse    After treatment position/precautions:   o Supine in bed  o Bed/Chair-wheels locked  o Bed in low position  o Call light within reach  o RN notified  o Nurse at bedside    Compliance with Program/Exercises: Compliant all of the time. Recommendations/Intent for next treatment session:  Treatment next visit will focus on increasing Mr. Ayala Saez independence with bed mobility, transfers, gait training, strength/ROM exercises, modalities for pain, and patient education.       Total Treatment Duration:  PT Patient Time In/Time Out  Time In: 1340  Time Out: [address], PT

## 2021-12-12 NOTE — PROGRESS NOTES
Results for Lulu Elliott (MRN [id number]) as of 12/12/2021 12:18   Ref. Range 12/12/2021 03:38   WBC Latest Ref Range: 4.3 - 11.1 K/uL 9.0   RBC Latest Ref Range: 4.23 - 5.6 M/uL 2.73 (L)   HGB Latest Ref Range: 13.6 - 17.2 g/dL 8.2 (L)   HCT Latest Ref Range: 41.1 - 50.3 % 25.8 (L)   MCV Latest Ref Range: 79.6 - 97.8 FL 94.5   MCH Latest Ref Range: 26.1 - 32.9 PG 30.0   MCHC Latest Ref Range: 31.4 - 35.0 g/dL 31.8   RDW Latest Ref Range: 11.9 - 14.6 % 15.5 (H)   PLATELET Latest Ref Range: 150 - 450 K/uL 143 (L)   MPV Latest Ref Range: 9.4 - 12.3 FL 12.8 (H)   NEUTROPHILS Latest Ref Range: 43 - 78 % 84 (H)   LYMPHOCYTES Latest Ref Range: 13 - 44 % 7 (L)   MONOCYTES Latest Ref Range: 4.0 - 12.0 % 8   EOSINOPHILS Latest Ref Range: 0.5 - 7.8 % 0 (L)   BASOPHILS Latest Ref Range: 0.0 - 2.0 % 0   IMMATURE GRANULOCYTES Latest Ref Range: 0.0 - 5.0 % 1   DF Latest Units:   AUTOMATED   ABSOLUTE NRBC Latest Ref Range: 0.0 - 0.2 K/uL 0.00   ABS. NEUTROPHILS Latest Ref Range: 1.7 - 8.2 K/UL 7.6   ABS. IMM. GRANS. Latest Ref Range: 0.0 - 0.5 K/UL 0.1   ABS. LYMPHOCYTES Latest Ref Range: 0.5 - 4.6 K/UL 0.6   ABS. MONOCYTES Latest Ref Range: 0.1 - 1.3 K/UL 0.7   ABS. EOSINOPHILS Latest Ref Range: 0.0 - 0.8 K/UL 0.0   ABS.  BASOPHILS Latest Ref Range: 0.0 - 0.2 K/UL 0.0   INR Latest Units:   2.5   Prothrombin time Latest Ref Range: 12.6 - 14.5 sec 27.4 (H)   Sodium Latest Ref Range: 136 - 145 mmol/L 135 (L)   Potassium Latest Ref Range: 3.5 - 5.1 mmol/L 4.4   Chloride Latest Ref Range: 98 - 107 mmol/L 105   CO2 Latest Ref Range: 21 - 32 mmol/L 21   Anion gap Latest Ref Range: 7 - 16 mmol/L 9   Glucose Latest Ref Range: 65 - 100 mg/dL 165 (H)   BUN Latest Ref Range: 8 - 23 MG/DL 30 (H)   Creatinine Latest Ref Range: 0.8 - 1.5 MG/DL 1.48   Calcium Latest Ref Range: 8.3 - 10.4 MG/DL 9.1   GFR est non-AA Latest Ref Range: >60 ml/min/1.73m2 48 (L)   GFR est AA Latest Ref Range: >60 ml/min/1.73m2 58 (L)   Vancomycin, random Latest Units: UG/ML 19.7

## 2021-12-12 NOTE — PROGRESS NOTES
Post Fall Documentation      Karyn WilderVineland witnessed/unwitnessed fall occurred on t   (Date) at 12/12/2021 (Time). 1730    The answers to the following questions summarize the fall: In the patient's own words,:  · What were you attempting to do when you fell? \"go to the bathroom\"  · Do you know why you fell? \"I was trying to pull this diaper down\"  · Do you have any pain/discomfort or any other complaints? \"I hit my bottom\"   · Which part of your body made contact with the floor or other object? \"my bottom backside\"    Nurse:  Demar Was this an assisted fall? no   Was fall witnessed? No   If witnessed, what part of the body made contact with the floor or other object? Was not witnessed   Patients mental status after the fall/when found: Confused   Any apparent injury:  Other skin tear on left shoulder   Immediate interventions for injury/suspected injury? Stopped bleeding   Patient assisted back to bed? Mobility team   Name of provider notified and time, any comments? Aysha Dempsey MD        Name of family member notified and time: Dallin Bunn      Immediate VS and physical assessment documented in flow sheets. Neuro assessment every hour x 4 (for potential head injury or unwitnessed fall) documented in flow sheets.       Sree Mendez

## 2021-12-12 NOTE — PROGRESS NOTES
Hospitalist Progress Note   Admit Date:  2021  7:32 PM   Name:  Reilly Adams   Age:  80 y.o. Sex:  male  :  1933   MRN:  [id number]   Room:  [facility]/    Presenting Complaint: No chief complaint on file. 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 He last took eliquis 3-4 days prior to admit. INR 2.4 on 21. FFP  2 units ordered per orthopedics preop. S/p surgical intervention 12-10-21  ID consulted and on antibiotics. Postop cultures pending. Family noted he had increased slurring of speech since admit, he states for several days. CT head was without acute issue. ECHO pending. Neurology consulted and recommends MRI brain  SLP following. Barium swallow ordered. discharge plans pending to rehab. 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)    Active Problems:    Infection of total joint prosthesis ([address] (2021)  ·   Defer to orthopedics   · Antibiotics per ID   · followup op cultures         Atrial fibrillation ([address] (12/10/2021)  · Continued eliquis           Hyperglycemia due to type 2 diabetes mellitus ([address] (12/10/2021)  ·   POC and SSI  · Oral agents held         Hyperlipidemia (12/10/2021)  ·  continued lipitor      Acquired hypothyroidism (12/10/2021)  · continued synthroid         Coagulopathy ([address] (12/10/2021)  ·   S/p FFP      Anemia and thrombocytopenia:  · Trend CBC    Dysarthria:  · CT head negative  · neuro consulted  · MRI ordered   · ECHO pending   · Continued eliquis and lipitor  · SLP consulted recommends MBS  · Remote tele     Acute encephalopathy:  · followup mentation   · Likely delirium       Dispo/Discharge Planning:     pending to rehab    Diet:  ADULT DIET Regular; 4 carb choices (60 gm/meal);  Low Sodium (2 gm)  DVT PPx:eliquis   Code status: Full Code    Hospital Problems as of 12/12/2021 Date Reviewed: 12/10/2021          Codes Class Noted - Resolved POA    Atrial fibrillation (HCC) (Chronic) ICD-10-CM: I48.91  ICD-9-CM: 427.31  12/10/2021 - Present Yes        Hyperglycemia due to type 2 diabetes mellitus (HCC) (Chronic) ICD-10-CM: E11.65  ICD-9-CM: 250.00  12/10/2021 - Present Yes        Hyperlipidemia (Chronic) ICD-10-CM: E78.5  ICD-9-CM: 272.4  12/10/2021 - Present Yes        Acquired hypothyroidism (Chronic) ICD-10-CM: E03.9  ICD-9-CM: 244.9  12/10/2021 - Present Yes        Hearing loss (Chronic) ICD-10-CM: H91.90  ICD-9-CM: 389.9  12/10/2021 - Present Yes        Coagulopathy ([facility] ICD-10-CM: D68.9  ICD-9-CM: 286.9  12/10/2021 - Present Yes        * (Principal) Status post revision of total knee replacement, left ICD-10-CM: F60.876  ICD-9-CM: V43.65  12/10/2021 - Present Unknown        Infection of total joint prosthesis ([facility] [address] ICD-10-CM: T84.50XA  ICD-9-CM: 996.66  12/9/2021 - Present Unknown              Objective:     Patient Vitals for the past 24 hrs:   Temp Pulse Resp BP SpO2   12/12/21 1043 97.5 °F (36.4 °C) 78 20 113/63 97 %   12/12/21 0755 97.7 °F (36.5 °C) 65 20 (!) 161/78 95 %   12/12/21 0400  74      12/12/21 0330 98 °F (36.7 °C) 81 18 122/70 96 %   12/11/21 2301 98.6 °F (37 °C) 77 20 128/68 93 %   12/11/21 1953 98.3 °F (36.8 °C) 86 18 126/61 97 %   12/11/21 1556 98 °F (36.7 °C) 88 18 122/69 96 %     Oxygen Therapy  O2 Sat (%): 97 % (12/12/21 1043)  Pulse via Oximetry: 67 beats per minute (12/10/21 2234)  O2 Device: None (Room air) (12/10/21 2234)  O2 Flow Rate (L/min): 0 l/min (12/10/21 2234)    Estimated body mass index is 26.94 kg/m² as calculated from the following:    Height as of this encounter: 5' 7\" (1.702 m). Weight as of this encounter: 78 kg (172 lb). Intake/Output Summary (Last 24 hours) at 12/12/2021 1357  Last data filed at 12/12/2021 0646  Gross per 24 hour   Intake    Output 250 ml   Net -250 ml         Physical Exam:     Blood pressure 113/63, pulse 78, temperature 97.5 °F (36.4 °C), resp. rate 20, height 5' 7\" (1.702 m), weight 78 kg (172 lb), SpO2 97 %. General:    Well nourished. No overt distress, alert, elderly, hard of hearing  CV:   RRR. III/VI DENISSE, no edema   Lungs:   CTAB. No wheezing, rhonchi, or rales. Respirations even, unlabored   Abdomen: Bowel sounds present. Soft, nontender, nondistended. Extremities: No cyanosis or clubbing. No edema  Skin:     No rashes and normal coloration. Warm and dry. Neuro:   grossly intact. Alert, confused   Psych:  Confused      I have reviewed ordered lab tests and independently visualized imaging below:    Recent Labs:  Recent Results (from the past 48 hour(s))   CULTURE, [facility]    Collection Time: 12/10/21  3:14 PM    Specimen: Knee ; Wound   Result Value Ref Range    Special Requests: LEFT KNEE  NO.1        GRAM STAIN 1 TO 8 WBCS SEEN PER OIF     GRAM STAIN NO DEFINITE ORGANISM SEEN      Culture result: NO GROWTH 1 DAY     CULTURE, WOUND W GRAM STAIN    Collection Time: 12/10/21  3:33 PM    Specimen: Knee ; Wound   Result Value Ref Range    Special Requests: LEFT KNEE  NO.2        GRAM STAIN 0 TO 1 WBCS SEEN PER OIF     GRAM STAIN NO DEFINITE ORGANISM SEEN      Culture result: LIGHT STAPHYLOCOCCUS AUREUS SENSITIVITY TO FOLLOW (A)     CULTURE, WOUND W GRAM STAIN    Collection Time: 12/10/21  3:34 PM    Specimen: Knee ;  Wound   Result Value Ref Range    Special Requests: LEFT KNEE  NO.3        GRAM STAIN 0 TO 1 WBCS SEEN PER OIF     GRAM STAIN NO DEFINITE ORGANISM SEEN      Culture result: SCANT GRAM POSITIVE COCCI SUBCULTURE IN PROGRESS (A)     GLUCOSE, POC Collection Time: 12/10/21  4:54 PM   Result Value Ref Range    Glucose (POC) 133 (H) 65 - 100 mg/dL    Performed by [facility]    GLUCOSE, POC    Collection Time: 12/10/21  5:46 PM   Result Value Ref Range    Glucose (POC) 135 (H) 65 - 100 mg/dL    [facility]    METABOLIC PANEL, BASIC    Collection Time: 12/10/21  8:45 PM   Result Value Ref Range    Sodium 136 136 - 145 mmol/L    Potassium 4.1 3.5 - 5.1 mmol/L    Chloride 104 98 - 107 mmol/L    CO2 24 21 - 32 mmol/L    Anion gap 8 7 - 16 mmol/L    Glucose 136 (H) 65 - 100 mg/dL    BUN 19 8 - 23 MG/DL    Creatinine 1.15 0.8 - 1.5 MG/DL    GFR est AA >60 >60 ml/min/1.73m2    GFR est non-AA >60 >60 ml/min/1.73m2    Calcium 8.7 8.3 - 10.4 MG/DL   PROTHROMBIN TIME + INR    Collection Time: 12/10/21  8:45 PM   Result Value Ref Range    Prothrombin time 22.7 (H) 12.6 - 14.5 sec    INR 2.0     CBC WITH AUTOMATED DIFF    Collection Time: 12/10/21  8:45 PM   Result Value Ref Range    WBC 5.6 4.3 - 11.1 K/uL    RBC 2.92 (L) 4.23 - 5.6 M/uL    HGB 9.1 (L) 13.6 - 17.2 g/dL    HCT 28.2 (L) 41.1 - 50.3 %    MCV 96.6 79.6 - 97.8 FL    MCH 31.2 26.1 - 32.9 PG    MCHC 32.3 31.4 - 35.0 g/dL    RDW 15.4 (H) 11.9 - 14.6 %    PLATELET 93 (L) 078 - 450 K/uL    MPV 13.4 (H) 9.4 - 12.3 FL    ABSOLUTE NRBC 0.00 0.0 - 0.2 K/uL    NEUTROPHILS 89 (H) 43 - 78 %    LYMPHOCYTES 5 (L) 13 - 44 %    MONOCYTES 5 4.0 - 12.0 %    EOSINOPHILS 0 (L) 0.5 - 7.8 %    BASOPHILS 0 0.0 - 2.0 %    IMMATURE GRANULOCYTES 1 0.0 - 5.0 %    ABS. NEUTROPHILS 4.9 1.7 - 8.2 K/UL    ABS. LYMPHOCYTES 0.3 (L) 0.5 - 4.6 K/UL    ABS. MONOCYTES 0.3 0.1 - 1.3 K/UL    ABS. EOSINOPHILS 0.0 0.0 - 0.8 K/UL    ABS. BASOPHILS 0.0 0.0 - 0.2 K/UL    ABS. IMM.  GRANS. 0.1 0.0 - 0.5 K/UL    RBC COMMENTS OCCASIONAL  OVALOCYTES        RBC COMMENTS OCCASIONAL  ALLIE CELLS        RBC COMMENTS OCCASIONAL  MICROCYTOSIS        RBC COMMENTS OCCASIONAL  MACROCYTOSIS        WBC COMMENTS Result Confirmed By Smear PLATELET COMMENTS OCCASIONAL      DF AUTOMATED     GLUCOSE, POC    Collection Time: 12/10/21 10:15 PM   Result Value Ref Range    Glucose (POC) 178 (H) 65 - 100 mg/dL    Performed by [facility]    GLUCOSE, POC    Collection Time: 12/11/21 12:08 AM   Result Value Ref Range    Glucose (POC) 163 (H) 65 - 100 mg/dL    Performed by [facility]    METABOLIC PANEL, BASIC    Collection Time: 12/11/21  4:03 AM   Result Value Ref Range    Sodium 136 136 - 145 mmol/L    Potassium 4.2 3.5 - 5.1 mmol/L    Chloride 105 98 - 107 mmol/L    CO2 22 21 - 32 mmol/L    Anion gap 9 7 - 16 mmol/L    Glucose 179 (H) 65 - 100 mg/dL    BUN 22 8 - 23 MG/DL    Creatinine 1.23 0.8 - 1.5 MG/DL    GFR est AA >60 >60 ml/min/1.73m2    GFR est non-AA 59 (L) >60 ml/min/1.73m2    Calcium 8.3 8.3 - 10.4 MG/DL   PROTHROMBIN TIME + INR    Collection Time: 12/11/21  4:03 AM   Result Value Ref Range    Prothrombin time 23.1 (H) 12.6 - 14.5 sec    INR 2.0     CBC WITH AUTOMATED DIFF    Collection Time: 12/11/21  4:03 AM   Result Value Ref Range    WBC 4.3 4.3 - 11.1 K/uL    RBC 2.79 (L) 4.23 - 5.6 M/uL    HGB 8.7 (L) 13.6 - 17.2 g/dL    HCT 26.4 (L) 41.1 - 50.3 %    MCV 94.6 79.6 - 97.8 FL    MCH 31.2 26.1 - 32.9 PG    MCHC 33.0 31.4 - 35.0 g/dL    RDW 15.4 (H) 11.9 - 14.6 %    PLATELET 92 (L) 504 - 450 K/uL    MPV 13.5 (H) 9.4 - 12.3 FL    ABSOLUTE NRBC 0.00 0.0 - 0.2 K/uL    DF AUTOMATED      NEUTROPHILS 85 (H) 43 - 78 %    LYMPHOCYTES 8 (L) 13 - 44 %    MONOCYTES 7 4.0 - 12.0 %    EOSINOPHILS 0 (L) 0.5 - 7.8 %    BASOPHILS 0 0.0 - 2.0 %    IMMATURE GRANULOCYTES 1 0.0 - 5.0 %    ABS. NEUTROPHILS 3.6 1.7 - 8.2 K/UL    ABS. LYMPHOCYTES 0.4 (L) 0.5 - 4.6 K/UL    ABS. MONOCYTES 0.3 0.1 - 1.3 K/UL    ABS. EOSINOPHILS 0.0 0.0 - 0.8 K/UL    ABS. BASOPHILS 0.0 0.0 - 0.2 K/UL    ABS. IMM.  GRANS. 0.0 0.0 - 0.5 K/UL   GLUCOSE, POC    Collection Time: 12/11/21  6:12 AM   Result Value Ref Range    Glucose (POC) 219 (H) 65 - 100 mg/dL    Performed by Mike    GLUCOSE, POC    Collection Time: 12/11/21  6:33 AM   Result Value Ref Range    Glucose (POC) 227 (H) 65 - 100 mg/dL    Performed by Jimmy Levin    GLUCOSE, POC    Collection Time: 12/11/21 11:52 AM   Result Value Ref Range    Glucose (POC) 244 (H) 65 - 100 mg/dL    Performed by Yefri Spring    GLUCOSE, POC    Collection Time: 12/11/21  6:10 PM   Result Value Ref Range    Glucose (POC) 251 (H) 65 - 100 mg/dL    Performed by Yefri Spring    GLUCOSE, POC    Collection Time: 12/11/21  9:51 PM   Result Value Ref Range    Glucose (POC) 136 (H) 65 - 100 mg/dL    Performed by Mary    PROTHROMBIN TIME + INR    Collection Time: 12/12/21  3:38 AM   Result Value Ref Range    Prothrombin time 27.4 (H) 12.6 - 14.5 sec    INR 2.5     CBC WITH AUTOMATED DIFF    Collection Time: 12/12/21  3:38 AM   Result Value Ref Range    WBC 9.0 4.3 - 11.1 K/uL    RBC 2.73 (L) 4.23 - 5.6 M/uL    HGB 8.2 (L) 13.6 - 17.2 g/dL    HCT 25.8 (L) 41.1 - 50.3 %    MCV 94.5 79.6 - 97.8 FL    MCH 30.0 26.1 - 32.9 PG    MCHC 31.8 31.4 - 35.0 g/dL    RDW 15.5 (H) 11.9 - 14.6 %    PLATELET 833 (L) 250 - 450 K/uL    MPV 12.8 (H) 9.4 - 12.3 FL    ABSOLUTE NRBC 0.00 0.0 - 0.2 K/uL    DF AUTOMATED      NEUTROPHILS 84 (H) 43 - 78 %    LYMPHOCYTES 7 (L) 13 - 44 %    MONOCYTES 8 4.0 - 12.0 %    EOSINOPHILS 0 (L) 0.5 - 7.8 %    BASOPHILS 0 0.0 - 2.0 %    IMMATURE GRANULOCYTES 1 0.0 - 5.0 %    ABS. NEUTROPHILS 7.6 1.7 - 8.2 K/UL    ABS. LYMPHOCYTES 0.6 0.5 - 4.6 K/UL    ABS. MONOCYTES 0.7 0.1 - 1.3 K/UL    ABS. EOSINOPHILS 0.0 0.0 - 0.8 K/UL    ABS. BASOPHILS 0.0 0.0 - 0.2 K/UL    ABS. IMM.  GRANS. 0.1 0.0 - 0.5 K/UL   VANCOMYCIN, RANDOM    Collection Time: 12/12/21  3:38 AM   Result Value Ref Range    Vancomycin, random 71.8 UG/ML   METABOLIC PANEL, BASIC    Collection Time: 12/12/21  3:38 AM   Result Value Ref Range    Sodium 135 (L) 136 - 145 mmol/L    Potassium 4.4 3.5 - 5.1 mmol/L    Chloride 105 98 - 107 mmol/L    CO2 21 21 - 32 mmol/L    Anion gap 9 7 - 16 mmol/L    Glucose 165 (H) 65 - 100 mg/dL    BUN 30 (H) 8 - 23 MG/DL    Creatinine 1.48 0.8 - 1.5 MG/DL    GFR est AA 58 (L) >60 ml/min/1.73m2    GFR est non-AA 48 (L) >60 ml/min/1.73m2    Calcium 9.1 8.3 - 10.4 MG/DL   GLUCOSE, POC    Collection Time: 12/12/21  6:39 AM   Result Value Ref Range    Glucose (POC) 165 (H) 65 - 100 mg/dL    Performed by Mary    GLUCOSE, POC    Collection Time: 12/12/21 10:58 AM   Result Value Ref Range    Glucose (POC) 194 (H) 65 - 100 mg/dL    Performed by Ayala        All Micro Results     Procedure Component Value Units Date/Time    CULTURE, BLOOD [[id number]] Collected: 12/12/21 1304    Order Status: Completed Specimen: Blood Updated: 12/12/21 1350    CULTURE, BLOOD [[id number]]     Order Status: Sent Specimen: Blood     CULTURE, Mally Or STAIN [[id number]] Collected: 12/10/21 1514    Order Status: Completed Specimen: Wound from Knee  Updated: 12/12/21 0752     Special Requests: --        LEFT KNEE  NO.1       GRAM STAIN 1 TO 8 WBCS SEEN PER OIF      NO DEFINITE ORGANISM SEEN        Culture result: NO GROWTH 1 DAY       CULTURE, Mally Or STAIN [[id number]]  (Abnormal) Collected: 12/10/21 1534    Order Status: Completed Specimen: Wound from Knee  Updated: 12/12/21 0751     Special Requests: --        LEFT KNEE  NO.3       GRAM STAIN 0 TO 1 WBCS SEEN PER OIF      NO DEFINITE ORGANISM SEEN        Culture result:       SCANT GRAM POSITIVE COCCI SUBCULTURE IN PROGRESS          CULTURE, Mally Or STAIN [[id number]]  (Abnormal) Collected: 12/10/21 1533    Order Status: Completed Specimen: Wound from Knee  Updated: 12/12/21 0746     Special Requests: --        LEFT KNEE  NO.2       GRAM STAIN 0 TO 1 WBCS SEEN PER OIF      NO DEFINITE ORGANISM SEEN        Culture result:       LIGHT STAPHYLOCOCCUS AUREUS SENSITIVITY TO FOLLOW          CULTURE, ANAEROBIC [[id number]] Collected: 12/10/21 1533    Order Status: Completed Specimen: Knee Updated: 12/10/21 2147    CULTURE, ANAEROBIC [[id number]] Collected: 12/10/21 1534    Order Status: Completed Specimen: Knee  Updated: 12/10/21 2147    CULTURE, ANAEROBIC [[id number]] Collected: 12/10/21 1514    Order Status: Completed Specimen: Knee  Updated: 12/10/21 2147    FUNGUS CULTURE AND SMEAR [[id number]] Collected: 12/10/21 1534    Order Status: Completed Updated: 12/10/21 1735    FUNGUS CULTURE AND SMEAR [[id number]] Collected: 12/10/21 1533    Order Status: Completed Updated: 12/10/21 1734    FUNGUS CULTURE AND SMEAR [[id number]] Collected: 12/10/21 1514    Order Status: Completed Updated: 12/10/21 1734    AFB CULTURE + SMEAR W/RFLX ID FROM CULTURE [[id number]] Collected: 12/10/21 1545    Order Status: Canceled     AFB CULTURE + SMEAR W/RFLX ID FROM CULTURE [[id number]] Collected: 12/10/21 1545    Order Status: Canceled     CULTURE, FUNGUS [[id number]]     Order Status: Sent Specimen: Knee Fluid     CULTURE, FUNGUS [[id number]]     Order Status: Sent Specimen: Knee Fluid     CULTURE, FUNGUS [[id number]]     Order Status: Sent Specimen: Knee Fluid     AFB CULTURE + SMEAR W/RFLX ID FROM CULTURE [[id number]] Collected: 12/10/21 1515    Order Status: Canceled     COVID-19 RAPID TEST [[id number]] Collected: 12/10/21 0637    Order Status: Completed Specimen: Nasopharyngeal Updated: 12/10/21 0713     Specimen source NASAL SWAB        COVID-19 rapid test Not detected        Comment:      The specimen is NEGATIVE for SARS-CoV-2, the novel coronavirus associated with COVID-19. A negative result does not rule out COVID-19. This test has been authorized by the FDA under an Emergency Use Authorization (EUA) for use by authorized laboratories.         Fact sheet for Healthcare Providers: ConventionUpdate.co.nz  Fact sheet for Patients: ConventionUpdate.co.nz       Methodology: Isothermal Nucleic Acid Amplification         CULTURE, BODY FLUID W Anna Christiansen [[id number]] Collected: 12/09/21 1937    Order Status: Canceled           Other Studies:  CT HEAD WO CONT    Result Date: 12/11/2021  HEAD CT WITHOUT CONTRAST  12/11/2021 HISTORY:   slurred speech  Slurred speech -Status post knee revision TECHNIQUE: Noncontrast axial images were obtained through the brain. All CT scans at this facility used dose modulation, interactive reconstruction and/or weight based dosing when appropriate to reduce radiation dose to as low as reasonably achievable. COMPARISON: None FINDINGS: There is no acute intracranial hemorrhage, significant mass effect or CT evidence of acute large artery territorial infarction. Please note that a hyperacute infarct or small vessel infarct may not be apparent on initial CT imaging. Areas of decreased attenuation are present throughout the supratentorial white matter. This pattern is suggestive of chronic small vessel ischemic disease. Mild cerebral volume loss is present without disproportionate hippocampal atrophy. There is no hydrocephalus , intra-axial mass or abnormal extra-axial fluid collection. There are no displaced skull fractures. The mastoid air cells and paranasal sinuses are clear where imaged. White matter findings compatible with chronic small vessel ischemic disease.       Current Meds:  Current Facility-Administered Medications   Medication Dose Route Frequency    VANCOMYCIN per random dosing    Other PRN    netarsudiL-latanoprost 0.02-0.005 % drop 1 Drop (  ROCKLATAN) (Patient Supplied)  1 Drop Ophthalmic QPM    brimonidine (ALPHAGAN) 0.2 % ophthalmic solution 1 Drop  1 Drop Right Eye Q8H    acetaminophen (TYLENOL) tablet 1,000 mg  1,000 mg Oral Q6H PRN    insulin lispro (HUMALOG) injection   SubCUTAneous Q6H    0.9% sodium chloride infusion 250 mL  250 mL IntraVENous PRN    apixaban (ELIQUIS) tablet 5 mg  5 mg Oral BID    alcohol 62% (NOZIN) nasal  1 Ampule  1 Ampule Topical Q12H    sodium chloride (NS) flush 5-40 mL  5-40 mL IntraVENous Q8H    sodium chloride (NS) flush 5-40 mL  5-40 mL IntraVENous PRN    acetaminophen (TYLENOL) tablet 1,000 mg  1,000 mg Oral Q6H    oxyCODONE IR (ROXICODONE) tablet 10 mg  10 mg Oral Q4H PRN    HYDROmorphone (DILAUDID) injection 1 mg  1 mg IntraVENous Q3H PRN    naloxone (NARCAN) injection 0.2-0.4 mg  0.2-0.4 mg IntraVENous Q10MIN PRN    ondansetron (ZOFRAN ODT) tablet 4 mg  4 mg Oral Q4H PRN    diphenhydrAMINE (BENADRYL) capsule 25 mg  25 mg Oral Q4H PRN    senna-docusate (PERICOLACE) 8.6-50 mg per tablet 2 Tablet  2 Tablet Oral DAILY    0.9% sodium chloride infusion  75 mL/hr IntraVENous CONTINUOUS    traMADoL (ULTRAM) tablet 50 mg  50 mg Oral Q6H PRN    ondansetron (ZOFRAN ODT) tablet 8 mg  8 mg Oral Q8H PRN    diphenhydrAMINE (BENADRYL) capsule 25 mg  25 mg Oral Q6H PRN    atorvastatin (LIPITOR) tablet 40 mg  40 mg Oral QHS    [Held by provider] glipiZIDE (GLUCOTROL) tablet 2.5 mg  2.5 mg Oral ACB    levothyroxine (SYNTHROID) tablet 50 mcg  50 mcg Oral ACB    pantoprazole (PROTONIX) tablet 40 mg  40 mg Oral ACB    simethicone (MYLICON) tablet 80 mg  80 mg Oral QID PRN    dorzolamide-timoloL (COSOPT) 22.3-6.8 mg/mL ophthalmic solution 1 Drop  1 Drop Right Eye BID    lip protectant (BLISTEX) ointment 1 Each  1 Each Topical PRN       Signed:  Qian Mi MD    Part of this note may have been written by using a voice dictation software. The note has been proof read but may still contain some grammatical/other typographical errors.

## 2021-12-12 NOTE — PROGRESS NOTES
Patient's neuro tele consult not completed. Contacted Dr. Matt Hernandez and discussed CT results. She said to pursue neuro tele consult tomorrow during day shift.

## 2021-12-12 NOTE — PROGRESS NOTES
Problem: Dysphagia (Adult)  Goal: *Acute Goals and Plan of Care (Insert Text)  2021 1216 by Clarissa Díaz, SLP  Note: Pt. Will participate in speech/language/cognitive evaluation with 100% participation. 2021 1215 by Clarissa Díaz, SLP  Note: ST. Pt. Will tolerate regular diet/thin liquids with no overt s/sx of aspiration/penetration. 2. Patient will participate in tongue strength/coordination tasks to increase bolus movement and formation with 80% accuracy with min to no cues. 3. Pt. Will participate in modified barium swallow with 100% participation to fully evaluate swallow function. LTG: Pt. Will tolerate least restrictive diet without respiratory decline. SPEECH LANGUAGE PATHOLOGY: DYSPHAGIA- Initial Assessment    NAME/AGE/GENDER: Nicci Boogie is a 80 y.o. 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]  Procedure(s) (LRB):  INCISION AND DRAINAGE LEFT KNEE/ POLY EXCHANGE/ POSSIBLE PROSETHSIS EXPLANT W/ CEMENT SPACER (Left)  POSS LEFT KNEE ARTHROPLASTY TOTAL (Left) 2 Days Post-Op  ICD-10: Treatment Diagnosis: R13.12 Dysphagia, Oropharyngeal Phase    RECOMMENDATIONS   DIET:   Regular Consistency  Thin Liquids    MEDICATIONS: With liquid  Whole in puree  As tolerated     PRECAUTIONS, MODIFICATIONS, AND STRATEGIES  Slow rate of intake  Small bites/sips  Upright at 90 degrees during meal  Alternate liquids/solids  Small sips and bites     RECOMMENDATIONS FOR CONTINUED SPEECH THERAPY:   YES: Anticipate need for ongoing speech therapy during this hospitalization. ASSESSMENT   Patient presents with oropharyngeal dysphagia characterized by inconsistent s/sx of aspiration/penetration and reports of 3 coughing/choking episodes since surgical intervention. Patient also with changes s/p surgical intervention in speech production/articulatory preciseness per family (worsening overnight). Baseline word finding deficits per family. Recommend continue diet as prescribed with modified barium swallow study to fully evaluate pharyngeal function. Labial/lingual function intervention to improve bolus control/coordination. Full speech/language/cognitive evaluation. EDUCATION:  Recommendations discussed with Patient, Family, and RN    REHABILITATION POTENTIAL FOR STATED GOALS: Good    PLAN    FREQUENCY/DURATION:   Continue to follow patient 2 times a week for duration of hospital stay to address above goals. Recommendations for next treatment session: Next treatment session will address Modified Barium Swallow Study and assessment of cognitive-linguistic abilities     CONTINUATION OF SKILLED SERVICES/MEDICAL NECESSITY:  Patient is expected to demonstrate progress in  swallow strength, swallow timeliness, swallow function, diet tolerance, and swallow safety in order to  improve swallow safety, work toward diet advancement, and decrease aspiration risk. SUBJECTIVE   Pleasant, answers questions with significant detail at times requiring redirection/recasting to answer      History of Present Injury/Illness: Mr. Lloyd Rahman  has a past medical history of Anemia (fall 2016), Arthritis, Atrial fibrillation ([address] (12/10/2021), CAD (coronary artery disease) (1997), Carotid stenosis (04/2017), Coagulopathy ([address] (12/10/2021), Diabetes ([address] (05/1997), Diverticulosis (12/2011), GERD (gastroesophageal reflux disease), Glaucoma, Heart murmur, Hiatal hernia, History of kidney stones, Hypercholesteremia, Hypertension, Hypothyroid, Infection of total joint prosthesis ([address] (12/9/2021), MI (myocardial infarction) ([address] TIA (transient ischemic attack) (07/2016), Unspecified sleep apnea, Urethral stenosis, and Vasovagal syncope.  He also has no past medical history of Adverse effect of anesthesia, Aneurysm ([address] Asthma, Autoimmune disease ([address] Cancer ([address] Chronic kidney disease, Chronic pain, COPD, Dementia, Difficult intubation, Endocarditis, Gastrointestinal disorder, Heart failure ([address] Ill-defined condition, Liver disease, Malignant hyperthermia due to anesthesia, Morbid obesity ([address] Nausea & vomiting, Neurological disorder, Other ill-defined conditions(799.89), Pseudocholinesterase deficiency, Psychiatric disorder, PUD (peptic ulcer disease), Rheumatic fever, Seizures ([address] Sleep disorder, Stroke ([address] or Thromboembolus ([address] . He also  has a past surgical history that includes hx heart catheterization (6236,3468, 2012, 2013); hx tonsillectomy (1938); hx cataract removal (1987 and 1990); hx lap cholecystectomy (2003); hx hernia repair (1944); hx urological (1996); hx orthopaedic (Left); hx orthopaedic (9958-1751); hx shoulder arthroscopy (Left, 2015); hx knee replacement (Bilateral, 2002, 2008); hx hemorrhoidectomy (1987); hx heent (Right); hx carpal tunnel release (Right, 1968); hx carpal tunnel release (Left, 2012); hx bunionectomy (Left); hx rotator cuff repair (Right, 1995); hx rotator cuff repair (Left, 2000); hx rotator cuff repair (Right, 2007); and hx total colectomy (01/13/2017). PRECAUTIONS/ALLERGIES: Acetazolamide and Benzonatate     Problem List:  (Impairments causing functional limitations):  Dysphagia   Dysarthria     Previous Dysphagia: YES per patient was having increased difficulty with larger pills prior to surgical intervention  Diet Prior to Evaluation: regular/thin     Orientation:  Person  Place  Time  Situation    Cognitive-Linguistic Screening:  Alertness  Alert  Speech Production:   Some dysarthria, but able to understand - at times requiring context  Expressive Language:   Word finding deficits noted  Receptive Language:  Needs repetition and/or demonstration for command following  Cognition:   Per family word finding deficits at baseline   Prior Level of Function: home  Recommendations: Given results of screening, further evaluation is indicated to assess cognitive linguistic function, specifically dysarthria Mercedes Jacob OBJECTIVE   Oral Motor:   Labial: Asymmetric at rest. Right side tension. Dentition: Intact  Oral Hygiene: Adequate  Lingual: Impaired coordination, no significant deviation      Dysphagia evaluation:   Patient consumed trials of ice chip, thin liquids via tsp/cup/straw sip, mixed consistency fruit, cracker and medication provided by nursing. Patient reports choking with meats and \"coughing for 2 hours\" this am. Reports similar experience with saltine crackers yesterday post op. Nursing reports significant coughing episode with medication this am. Patient denotes difficulty with bolus movement and increased need for oral prep/mastication of bolus. Delayed cough with ice chip. Immediate, overt coughing with each presentation of tsp water. Tolerated cup and straw sips of thin liquids without overt s/sx. Increased mastication time with mixed consistency fruit and cracker. Minimal difficulty with medication provided by nursing with cup sip. Difficulty with bolus control/coordination may be impacting tolerance of ice chip/tsp thin liquids. Considering reports of coughing/choking in addition to inconsistent s/sx at bedside, modified barium swallow ordered to fully evaluate pharyngeal function. Therapeutic intervention indicated to increase lingual and labial control of bolus. Per son, speech is worse since yesterday but change was initially noted yesterday. Son and patient denote difficulty with articulatory preciseness. Prior to surgical intervention, son reports no difficulty with articulation/intelligibility. At times during session, required context to fully comprehend speech produced. Intermittent word finding difficulties which son denotes as baseline. Informally, speech is noted to have blended word boundaries, changes in rate (slowed/prolongations, other times blended), and word finding errors. Follow for full speech/language evaluation.      Tool Used: Dysphagia Outcome and Severity Scale (JOSEPH) Score Comments   Normal Diet  [] 7 With no strategies or extra time needed   Functional Swallow  [x] 6 May have mild oral or pharyngeal delay   Mild Dysphagia  [] 5 Which may require one diet consistency restricted    Mild-Moderate Dysphagia  [] 4 With 1-2 diet consistencies restricted   Moderate Dysphagia  [] 3 With 2 or more diet consistencies restricted   Moderate-Severe Dysphagia  [] 2 With partial PO strategies (trials with ST only)   Severe Dysphagia  [] 1 With inability to tolerate any PO safely      Score:  Initial: 6 Most Recent: x (Date 12/12/21 )   Interpretation of Tool: The Dysphagia Outcome and Severity Scale (JOSEPH) is a simple, easy-to-use, 7-point scale developed to systematically rate the functional severity of dysphagia based on objective assessment and make recommendations for diet level, independence level, and type of nutrition. Current Medications:   No current facility-administered medications on file prior to encounter. Current Outpatient Medications on File Prior to Encounter   Medication Sig Dispense Refill    apixaban (Eliquis) 5 mg tablet Take 5 mg by mouth two (2) times a day. simethicone (GAS-X) 125 mg capsule Take 125 mg by mouth four (4) times daily as needed for Flatulence. loperamide (IMODIUM) 2 mg capsule Take 2 mg by mouth four (4) times daily as needed for Diarrhea. diphenoxylate-atropine (LOMOTIL) 2.5-0.025 mg per tablet Take 1 Tab by mouth two (2) times daily as needed for Diarrhea. atorvastatin (LIPITOR) 40 mg tablet Take 40 mg by mouth nightly. levothyroxine (SYNTHROID) 50 mcg tablet Take 50 mcg by mouth Daily (before breakfast). Psyllium Husk-Sucrose 3.4 gram/7 gram powd Take 3 Caps by mouth two (2) times a day. omega-3 fatty acids-vitamin e (FISH OIL) 1,000 mg cap Take 2 Caps by mouth daily. bimatoprost (LUMIGAN) 0.01 % ophthalmic drops Administer 1 Drop to right eye nightly.       timolol (TIMOPTIC) 0.5 % ophthalmic solution Administer 1 Drop to right eye every morning. Omeprazole delayed release (PRILOSEC D/R) 20 mg tablet Take 20 mg by mouth Daily (before dinner). meloxicam (MOBIC) 15 mg tablet Take 15 mg by mouth daily. Indications: OSTEOARTHRITIS      brimonidine (ALPHAGAN P) 0.1 % ophthalmic solution Administer 1 Drop to right eye two (2) times a day. Take / use AM day of surgery  per anesthesia protocols. glimepiride (AMARYL) 2 mg tablet Take 1 mg by mouth daily. Indications: type 2 diabetes mellitus      b complex vitamins tablet Take 1 Tab by mouth daily. aspirin 81 mg Tab Take 81 mg by mouth daily. Take / use 81 mg AM day of surgery  per anesthesia protocols. INTERDISCIPLINARY COLLABORATION: RN    After treatment position/precautions:  Upright in bed  Call light within reach  RN notified  Sons at bedside    Total Treatment Duration:   Time In: 1134  Time Out: [facility].  Lani Burgos MS, CCC-SLP         Speech Language Pathologist          [facility]                   Contact: Emilee

## 2021-12-12 NOTE — PROGRESS NOTES
Orthopedic Joint Progress Note    2021  Admit Date: 2021  Admit Diagnosis: Infection of total joint prosthesis ([facility] [T84.50XA]    2 Days Post-Op    Subjective:     Jn Claw SITTING UP IN BED; DENIES PAIN IN THE LEFT KNEE; WORRIED ABOUT GETTING A PRIVATE ROOM FOR REHAB    Review of Systems: Pertinent items are noted in HPI. Objective:     PT/OT:     PATIENT MOBILITY    Bed Mobility  Supine to Sit: Contact guard assistance  Scooting: Stand-by assistance  Transfers  Sit to Stand: Minimum assistance  Stand to Sit: Minimum assistance  Bed to Chair: Contact guard assistance      Gait  Speed/Tracy: Pace decreased (<100 feet/min)  Step Length: Right shortened  Stance: Left decreased  Gait Abnormalities: Antalgic, Decreased step clearance, Step to gait  Ambulation - Level of Assistance: Contact guard assistance  Distance (ft): 100 Feet (ft)  Assistive Device: Walker, rolling  Interventions: Verbal cues, Safety awareness training   Weight Bearing Status  Left Side Weight Bearing: As tolerated        Vital Signs:    Blood pressure (!) 161/78, pulse 65, temperature 97.7 °F (36.5 °C), resp. rate 20, height 5' 7\" (1.702 m), weight 78 kg (172 lb), SpO2 95 %.   Temp (24hrs), Av °F (36.7 °C), Min:97.6 °F (36.4 °C), Max:98.6 °F (37 °C)      Pain Control:   Pain Assessment  Pain Scale 1: Numeric (0 - 10)  Pain Intensity 1: 0  Pain Location 1: Leg, Knee  Pain Orientation 1: Left  Pain Intervention(s) 1: Medication (see MAR)    Meds:  Current Facility-Administered Medications   Medication Dose Route Frequency    netarsudiL-latanoprost 0.02-0.005 % drop 1 Drop (  ROCKLATAN) (Patient Supplied)  1 Drop Ophthalmic QPM    brimonidine (ALPHAGAN) 0.2 % ophthalmic solution 1 Drop  1 Drop Right Eye Q8H    [COMPLETED] tuberculin injection 5 Units  5 Units IntraDERMal ONCE    acetaminophen (TYLENOL) tablet 1,000 mg  1,000 mg Oral Q6H PRN    insulin lispro (HUMALOG) injection   SubCUTAneous Q6H    0.9% sodium chloride infusion 250 mL  250 mL IntraVENous PRN    apixaban (ELIQUIS) tablet 5 mg  5 mg Oral BID    alcohol 62% (NOZIN) nasal  1 Ampule  1 Ampule Topical Q12H    sodium chloride (NS) flush 5-40 mL  5-40 mL IntraVENous Q8H    sodium chloride (NS) flush 5-40 mL  5-40 mL IntraVENous PRN    acetaminophen (TYLENOL) tablet 1,000 mg  1,000 mg Oral Q6H    oxyCODONE IR (ROXICODONE) tablet 10 mg  10 mg Oral Q4H PRN    HYDROmorphone (DILAUDID) injection 1 mg  1 mg IntraVENous Q3H PRN    naloxone (NARCAN) injection 0.2-0.4 mg  0.2-0.4 mg IntraVENous Q10MIN PRN    ondansetron (ZOFRAN ODT) tablet 4 mg  4 mg Oral Q4H PRN    diphenhydrAMINE (BENADRYL) capsule 25 mg  25 mg Oral Q4H PRN    senna-docusate (PERICOLACE) 8.6-50 mg per tablet 2 Tablet  2 Tablet Oral DAILY    vancomycin (VANCOCIN) 1,000 mg in 0.9% sodium chloride 250 mL (VIAL-MATE)  1,000 mg IntraVENous Q24H    0.9% sodium chloride infusion  75 mL/hr IntraVENous CONTINUOUS    traMADoL (ULTRAM) tablet 50 mg  50 mg Oral Q6H PRN    ondansetron (ZOFRAN ODT) tablet 8 mg  8 mg Oral Q8H PRN    diphenhydrAMINE (BENADRYL) capsule 25 mg  25 mg Oral Q6H PRN    atorvastatin (LIPITOR) tablet 40 mg  40 mg Oral QHS    [Held by provider] glipiZIDE (GLUCOTROL) tablet 2.5 mg  2.5 mg Oral ACB    levothyroxine (SYNTHROID) tablet 50 mcg  50 mcg Oral ACB    pantoprazole (PROTONIX) tablet 40 mg  40 mg Oral ACB    simethicone (MYLICON) tablet 80 mg  80 mg Oral QID PRN    dorzolamide-timoloL (COSOPT) 22.3-6.8 mg/mL ophthalmic solution 1 Drop  1 Drop Right Eye BID    lip protectant (BLISTEX) ointment 1 Each  1 Each Topical PRN        LAB:    Lab Results   Component Value Date/Time    INR 2.5 12/12/2021 03:38 AM    INR 2.0 12/11/2021 04:03 AM    INR 2.0 12/10/2021 08:45 PM     Lab Results   Component Value Date/Time    HGB 8.2 (L) 12/12/2021 03:38 AM    HGB 8.7 (L) 12/11/2021 04:03 AM    HGB 9.1 (L) 12/10/2021 08:45 PM       Wound Closed incision/surgical site Hand Left (Active)   Number of days: 3609       Wound Closed incision/surgical site Foot Left (Active)   Number of days: 3609       Wound Foot Left (Active)   Number of days: 3538       Wound Shoulder Left (Active)   Number of days: 2298       Wound Shoulder Left (Active)   Number of days: 1985       Wound Shoulder Left (Active)   Number of days: 1521       Incision 12/10/21 Knee Left (Active)   Dressing Status Clean; Dry; Intact 12/11/21 0815   Cleansed Other (Comment) 12/10/21 1300   Dressing/Treatment Negative Pressure Wound Therapy 12/11/21 0815   Drainage Amount None 12/10/21 2035   Number of days: 2         Physical Exam:  General: alert, cooperative, no distress, appears stated age  Musculoskeletal: able to move left knee, ankle, toes; calf soft and nontender  Neurological: n/v intact  Skin:incsion to left knee clean, dry, intact    Assessment:      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

## 2021-12-12 NOTE — CONSULTS
Consult    Patient: Lisa Bolanos MRN: [id number]     YOB: 1933  Age: 80 y.o. Sex: male      Subjective:      Lisa Bolanos is a 80 y.o. male who is being seen for slurred speech. He has many ortho related issues and says he has had many falls. He thinks his speech is more slurred. Duration is not clear. Mild in severity. Off Eliquis for 3-4 days for surgery. CT head without acute findings. History of atrial fibrillation. Associated symptoms included word finding difficulties. Past Medical History:   Diagnosis Date    Anemia fall 2016    r/t GI bleeds     Arthritis     osteo    Atrial fibrillation ([address] 12/10/2021    CAD (coronary artery disease) 1997    4 stents total (1997, 2012, 2013); \"ECHO 7/2016 normal with good EF\" per cardio office note; \"Stress test 6/2016 Findings consistent with very mild inferior ischemia\"    Carotid stenosis 04/2017    carotid u/s: Right internal carotid artery has 50-69% stenosis. Left internal carotid artery has <50% stenosis.  Coagulopathy ([address] 12/10/2021    Diabetes ([address] 05/1997    type 2; oral med; rare BG checks, last hgba1c- 6.9 (6/2017)    Diverticulosis 12/2011    with GI bleeding; no current problems    GERD (gastroesophageal reflux disease)     on med for control     Glaucoma     Heart murmur     ECHO 7/14/16- mild AS, mild MR, mild TR    Hiatal hernia     History of kidney stones     Hypercholesteremia     on med for control     Hypertension     hx of- no medications currently    Hypothyroid     managed with medication    Infection of total joint prosthesis ([address] 12/9/2021    MI (myocardial infarction) ([address]     x2, STEMI    TIA (transient ischemic attack) 07/2016    experienced numbness/tingling L arm and left lower face- \"only lasted a couple of hrs\"; 7/13 CT head: cerebral white matter changes have progressed since 4/19/13 likely indicative of chronic small vessel ischemic disease, no acute intracranial abnormality.       Unspecified sleep apnea     denies     Urethral stenosis     has had difficulty with placement/removal previously     Vasovagal syncope      Past Surgical History:   Procedure Laterality Date    HX BUNIONECTOMY Left     with hammer toe    HX CARPAL TUNNEL RELEASE Right 1968    HX CARPAL TUNNEL RELEASE Left 2012    HX CATARACT REMOVAL  1987 and 1990    Teofilo with IOLens implamts    HX HEART CATHETERIZATION  5292,9454, 2012, 2013    total 4 stents: 2 stents- 1997, 1 stent- 2012 (BMS to RCA), 1 stent- 2013 (ANGELLA to RCA)    HX HEENT Right     eye surgery to replace lens due to fall     HX HEMORRHOIDECTOMY  1987    HX HERNIA REPAIR  1944    L inguinal    HX KNEE REPLACEMENT Bilateral 2002, 2008    HX LAP CHOLECYSTECTOMY  6767    umbilical hernia repair    HX ORTHOPAEDIC Left     trigger finger     HX ORTHOPAEDIC  3840-3699    multiple knee surgery    HX ROTATOR CUFF REPAIR Right 1995    HX ROTATOR CUFF REPAIR Left 2000    HX ROTATOR CUFF REPAIR Right 2007    with martina tendon repair    HX SHOULDER ARTHROSCOPY Left 2015    HX TONSILLECTOMY  1938    HX TOTAL COLECTOMY  01/13/2017    HX UROLOGICAL  1996    cysto with stone extraction and stent      Family History   Problem Relation Age of Onset    Heart Disease Father     Heart Attack Father     Stroke Mother     Alzheimer's Disease Sister     Alzheimer's Disease Sister      Social History     Tobacco Use    Smoking status: Never Smoker    Smokeless tobacco: Never Used   Substance Use Topics    Alcohol use:  Yes     Alcohol/week: 7.0 standard drinks     Types: 7 Glasses of wine per week      Current Facility-Administered Medications   Medication Dose Route Frequency Provider Last Rate Last Admin    VANCOMYCIN per random dosing    Other PRN Fady Le MD        netarsudiL-latanoprost 0.02-0.005 % drop 1 Drop (  ROCKLATAN) (Patient Supplied)  1 Drop Ophthalmic QPM Candelario Yarbrough MD        brimonidine (ALPHAGAN) 0.2 % ophthalmic solution 1 Drop  1 Drop Right Eye Q8H Nunzio Rubinstein, MD   1 Drop at 12/12/21 0600    acetaminophen (TYLENOL) tablet 1,000 mg  1,000 mg Oral Q6H PRN GABE Cage        insulin lispro (HUMALOG) injection   SubCUTAneous Q6H Bert Yarbrough MD   2 Units at 12/12/21 0641    0.9% sodium chloride infusion 250 mL  250 mL IntraVENous PRN GABE Cage        apixaban (ELIQUIS) tablet 5 mg  5 mg Oral BID GABE Cage   5 mg at 12/12/21 0908    alcohol 62% (NOZIN) nasal  1 Ampule  1 Ampule Topical Q12H GABE Cage   1 Ampule at 12/12/21 0909    sodium chloride (NS) flush 5-40 mL  5-40 mL IntraVENous Q8H GABE Cage   10 mL at 12/12/21 0600    sodium chloride (NS) flush 5-40 mL  5-40 mL IntraVENous PRN GABE Cage        acetaminophen (TYLENOL) tablet 1,000 mg  1,000 mg Oral Q6H GABE Cage   1,000 mg at 12/12/21 0222    oxyCODONE IR (ROXICODONE) tablet 10 mg  10 mg Oral Q4H PRN GABE Cage        HYDROmorphone (DILAUDID) injection 1 mg  1 mg IntraVENous Q3H PRN GABE Cage   1 mg at 12/11/21 1556    naloxone (NARCAN) injection 0.2-0.4 mg  0.2-0.4 mg IntraVENous Q10MIN PRN GABE Cage        ondansetron (ZOFRAN ODT) tablet 4 mg  4 mg Oral Q4H PRN GABE Cage        diphenhydrAMINE (BENADRYL) capsule 25 mg  25 mg Oral Q4H PRN GABE Cage   25 mg at 12/12/21 5809    senna-docusate (PERICOLACE) 8.6-50 mg per tablet 2 Tablet  2 Tablet Oral DAILY GABE Cage   2 Tablet at 12/12/21 0908    0.9% sodium chloride infusion  75 mL/hr IntraVENous CONTINUOUS GABE Cage        traMADoL [place]) tablet 50 mg  50 mg Oral Q6H PRN GABE Cage        ondansetron (ZOFRAN ODT) tablet 8 mg  8 mg Oral Q8H PRN GABE Cage   8 mg at 12/10/21 2037    diphenhydrAMINE (BENADRYL) capsule 25 mg  25 mg Oral Q6H PRN GABE Cage        atorvastatin (LIPITOR) tablet 40 mg  40 mg Oral QHS GABE Cage   40 mg at 12/11/21 2049    [Held by provider] glipiZIDE (GLUCOTROL) tablet 2.5 mg  2.5 mg Oral ACB Yvonne Bruce, [address]        levothyroxine (SYNTHROID) tablet 50 mcg  50 mcg Oral ACB Marycruzjorge Bruce, [address]   50 mcg at 12/12/21 0909    pantoprazole (PROTONIX) tablet 40 mg  40 mg Oral ACB GABE Pierce   40 mg at 12/12/21 0908    simethicone (MYLICON) tablet 80 mg  80 mg Oral QID PRN GABE Pierce        dorzolamide-timoloL (COSOPT) 22.3-6.8 mg/mL ophthalmic solution 1 Drop  1 Drop Right Eye BID Adelineeben Bruce, [address]   1 Drop at 12/12/21 0910    lip protectant (BLISTEX) ointment 1 Each  1 Each Topical PRN Tim Jean-Baptiste MD            Allergies   Allergen Reactions    Acetazolamide Unknown (comments)     fatigue    Benzonatate Other (comments)       Review of Systems:  CONSTITUTIONAL: No weight loss, fever, chills, weakness or fatigue. HEENT: Eyes: No visual loss, blurred vision, double vision or yellow sclerae. Ears, Nose, Throat: No hearing loss, sneezing, congestion, runny nose or sore throat. SKIN: No rash or itching. CARDIOVASCULAR: No chest pain, chest pressure or chest discomfort. No palpitations or edema. RESPIRATORY: No shortness of breath, cough or sputum. GASTROINTESTINAL: No anorexia, nausea, vomiting or diarrhea. No abdominal pain or blood. GENITOURINARY: no burning with urination. NEUROLOGICAL: No headache, dizziness, syncope, paralysis, ataxia, numbness or tingling in the extremities. No change in bowel or bladder control. MUSCULOSKELETAL: Chronic joint pain and limited ROM  HEMATOLOGIC: Recent anemia  LYMPHATICS: No enlarged nodes. No history of splenectomy. PSYCHIATRIC: No history of depression or anxiety. ENDOCRINOLOGIC: No reports of sweating, cold or heat intolerance. No polyuria or polydipsia. ALLERGIES: No history of asthma, hives, eczema or rhinitis.         Objective:     Vitals:    12/11/21 2301 12/12/21 0330 12/12/21 0400 12/12/21 0755   BP: 128/68 122/70  (!) 161/78   Pulse: 77 81 74 65   Resp: 20 18  20   Temp: 98.6 °F (37 °C) 98 °F (36.7 °C)  97.7 °F (36.5 °C)   SpO2: 93% 96%  95%   Weight:       Height:            Physical Exam:  General - Well developed, well nourished, in no apparent distress. Pleasant and conversant. HEENT - Normocephalic, atraumatic. Neck -No masses   Lungs - Normal work of breathing   Abdomen - No masses   Extremities - No edema and no rashes. Psychiatric - Mood and affect are normal    Neurological examination - Comprehension, attention , memory and reasoning are intact. Language and speech are normal (he says he has dysarthria). On cranial nerve examination pupils are equal round and reactive to light (cranial nerve II and III). Visual acuity is adequate (cranial nerve II). Visual fields are full to finger confrontation (CN II). Extraocular motility is normal (CN III, IV, VI). Face is symmetric (CN VII). Hearing is grossly intact to verbal communication (CN VIII). Motor examination - There is normal bulk. Power is full throughout but limited ROM (with spontaneous movement against environmental objects). Cerebellar examination is normal with finger-no-nose testing. Gait and stance are normal.       NIHSS   NIHSS Score: 0  1a-Level of Consciousness 0  1b-What is Month/Age 0  1c-Open/Close Eyes&Hand 0  2 -Best Gaze 0  3 -Visual Fields 0  4 -Facial Palsy 0  5a-Motor-Left Arm 0  5b-Motor-Right Arm 0  6a-Motor-Left Leg 0  6b-Motor-Right Leg 0  7 -Limb Ataxia 0  8 -Sensory 0  9 -Best Language 0  10-Dysarthria 0  11-Extinction/Inattention 0        Lab Results   Component Value Date/Time    Hemoglobin A1c 6.4 (H) 12/09/2021 09:23 PM             Most recent CT Personally Reviewed  Results from [place] encounter on 12/09/21    CT HEAD WO CONT    Narrative  HEAD CT WITHOUT CONTRAST  12/11/2021    HISTORY:   slurred speech  Slurred speech -Status post knee revision    TECHNIQUE: Noncontrast axial images were obtained through the brain.   All CT  scans at this facility used dose modulation, interactive reconstruction and/or  weight based dosing when appropriate to reduce radiation dose to as low as  reasonably achievable. COMPARISON: None    FINDINGS: There is no acute intracranial hemorrhage, significant mass effect or  CT evidence of acute large artery territorial infarction. Please note that a  hyperacute infarct or small vessel infarct may not be apparent on initial CT  imaging. Areas of decreased attenuation are present throughout the supratentorial white  matter. This pattern is suggestive of chronic small vessel ischemic disease. Mild cerebral volume loss is present without disproportionate hippocampal  atrophy. There is no hydrocephalus , intra-axial mass or abnormal extra-axial fluid  collection. There are no displaced skull fractures. The mastoid air cells and  paranasal sinuses are clear where imaged. Impression  White matter findings compatible with chronic small vessel ischemic  disease. Assessment:     80year-old with dysarthria after holding Eliquis. Overall, dysarthria is very mild. No major neurological deficits.      Plan:     Continue [facility]    MRI brain to investigate stroke if patient is willing     Echo ordered     Continue statin     Signed By: Marcial Luke DO     December 12, 2021

## 2021-12-12 NOTE — PROGRESS NOTES
Administered scheduled medications this AM and pt was reporting that he \"saw floating bugs on the food and floor this morning\". Pt attempted to show me the \"floating bugs\" that was on his spoon. Pt appears confused this morning, Hospitalist Salud Morris was present when pt was reporting the \"floating bugs\". Dr. Corinne Dyke wanted the computer in the room to assess patient.

## 2021-12-13 NOTE — PROGRESS NOTES
Into patient room after request to speak with charge nurse. Patient has lost one of his hearing aides. One noted to be in hand upon entering room. Patient making disorganized thinking statements that \"someone after my stomach surgery must have cut my wire. \" Patient is trying to put in hearing aide and showing how aide fits in ear. Patient places hearing aide in nose. Direction given to patient that he is placing hearing aide into his nose. Patient continues to state he found his hearing aide in gown pocket and primary states patient was messing with aides earlier in the shift. Patient assistant reports only seeing one hearing aide. Will look with more detail when the patient is up out of bed.

## 2021-12-13 NOTE — PROGRESS NOTES
Infectious Disease Progress Note    Today's Date: 12/13/2021   Admit Date: 12/9/2021    Impression:   · Late onset MRSA L TKA infection. · S/p I&D, poly exchange, 12/10/21; Op Cx: Staph aureus  · L knee aspirate (12/9) NGTD; cell count with 72 k WBC. 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 Head CT without contrast without acute finding. Possible MRI and TTE ordred. · Atrial fibrillation - on anticoagulation  · Hard of hearing  · Elevated CRP  · CKD: CRT climbing: if up tomorrow would change to Daptomycin. · A fib    Plan:     · Continue vancomycin ; monitor renal fxn. · At this time I do not see a need for imaging of the left 2nd toe ulcer. · We will hold off on adding rifampin at this time due to interactions with his anticoagulation. · Hold off on PICC placement for now pending stroke workup which is ongoing  · He has signs aspiration and word finding is worsening per daughter  · I anticipate a prolonged course of antibiotics with oral suppression thereafter. Currently daughter feels he may need more support and supervision than has had and discussed that possibility of CVA/ final dx up may suggest need for rehab stay and may complete some or all  ABX there. Anti-infectives:   · IV vanc  · IV cefazolin    Subjective: Interval History: up in chair. Speech recommending honey liquids per daughter. He is having trouble finding word and sometimes using wrong word. Not able to do MRI ordered earlier. Knee not painful. Afebrile. Patient is a 80 y.o. male with history of Afib on Eliquis who presented to [facility] with left TKA infection. He has been evaluated by podiatry for a left 2nd toe ulcer and last week had purulent drainage from the toe. He was then referred to orthopedic surgery for subsequent left knee swelling.   Office aspirate was consistent with TKA infection and pt was taken to the OR for I&D with poly exchange on 12/10. He was started on vanc/cefazolin post-op. Yesterday he had development of dysarthria and was evaluated by neurology. An MRI is pending. Pt reports no fevers or chills. Operative cultures show Staph aureus, sens pending. ID is consulted for further recommendations. Patient Active Problem List   Diagnosis Code    Abscess of foot L02.619    Instability of prosthetic shoulder joint ([address] T84.028A, Z96.619    Rotator cuff tear arthropathy, left M75.102, M12.812    S/P shoulder hemiarthroplasty, left Z96.612    Infection of total joint prosthesis ([address] T84.50XA    Atrial fibrillation ([address] I48.91    Hyperglycemia due to type 2 diabetes mellitus ([address] E11.65    Hyperlipidemia E78.5    Acquired hypothyroidism E03.9    Hearing loss H91.90    Coagulopathy ([address] D68.9    Status post revision of total knee replacement, 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    Acute encephalopathy G93.40    Anemia D64.9    Thrombocytopenia (HCC) D69.6    Dysarthria R47.1     Past Medical History:   Diagnosis Date    Acute encephalopathy 12/12/2021    Anemia fall 2016    r/t GI bleeds     Arthritis     osteo    Atrial fibrillation ([address] 12/10/2021    CAD (coronary artery disease) 1997    4 stents total (1997, 2012, 2013); \"ECHO 7/2016 normal with good EF\" per cardio office note; \"Stress test 6/2016 Findings consistent with very mild inferior ischemia\"    Carotid stenosis 04/2017    carotid u/s: Right internal carotid artery has 50-69% stenosis. Left internal carotid artery has <50% stenosis.     Coagulopathy ([address] 12/10/2021    Diabetes ([address] 05/1997    type 2; oral med; rare BG checks, last hgba1c- 6.9 (6/2017)    Diverticulosis 12/2011    with GI bleeding; no current problems    GERD (gastroesophageal reflux disease)     on med for control     Glaucoma     Heart murmur     ECHO 7/14/16- mild AS, mild MR, mild TR    Hiatal hernia     History of kidney stones     Hypercholesteremia     on med for control     Hypertension     hx of- no medications currently    Hypothyroid     managed with medication    Infection of total joint prosthesis ([facility] 12/9/2021    MI (myocardial infarction) ([facility]     x2, STEMI    TIA (transient ischemic attack) 07/2016    experienced numbness/tingling L arm and left lower face- \"only lasted a couple of hrs\"; 7/13 CT head: cerebral white matter changes have progressed since 4/19/13 likely indicative of chronic small vessel ischemic disease, no acute intracranial abnormality.  Unspecified sleep apnea     denies     Urethral stenosis     has had difficulty with placement/removal previously     Vasovagal syncope       Family History   Problem Relation Age of Onset    Heart Disease Father     Heart Attack Father     Stroke Mother     Alzheimer's Disease Sister     Alzheimer's Disease Sister       Social History     Tobacco Use    Smoking status: Never Smoker    Smokeless tobacco: Never Used   Substance Use Topics    Alcohol use:  Yes     Alcohol/week: 7.0 standard drinks     Types: 7 Glasses of wine per week     Past Surgical History:   Procedure Laterality Date    HX BUNIONECTOMY Left     with hammer toe    HX CARPAL TUNNEL RELEASE Right 1968    HX CARPAL TUNNEL RELEASE Left 2012    HX CATARACT REMOVAL  1987 and 1990    Teofilo with IOLens implamts    HX HEART CATHETERIZATION  4547,2485, 2012, 2013    total 4 stents: 2 stents- 1997, 1 stent- 2012 (BMS to RCA), 1 stent- 2013 (ANGELLA to RCA)    HX HEENT Right     eye surgery to replace lens due to fall     HX HEMORRHOIDECTOMY  1987    HX HERNIA REPAIR  1944    L inguinal    HX KNEE REPLACEMENT Bilateral 2002, 2008    HX LAP CHOLECYSTECTOMY  9058    umbilical hernia repair    HX ORTHOPAEDIC Left     trigger finger     HX ORTHOPAEDIC  9531-2160    multiple knee surgery    HX ROTATOR CUFF REPAIR Right 1995    HX ROTATOR CUFF REPAIR Left 2000    HX ROTATOR CUFF REPAIR Right 2007    with martina tendon repair    HX SHOULDER ARTHROSCOPY Left 2015    HX TONSILLECTOMY  1938    HX TOTAL COLECTOMY  01/13/2017    HX UROLOGICAL  1996    cysto with stone extraction and stent      Prior to Admission medications    Medication Sig Start Date End Date Taking? Authorizing Provider   apixaban (Eliquis) 5 mg tablet Take 5 mg by mouth two (2) times a day. Yes Provider, Historical   simethicone (GAS-X) 125 mg capsule Take 125 mg by mouth four (4) times daily as needed for Flatulence. Yes Provider, Historical   loperamide (IMODIUM) 2 mg capsule Take 2 mg by mouth four (4) times daily as needed for Diarrhea. Yes Provider, Historical   diphenoxylate-atropine (LOMOTIL) 2.5-0.025 mg per tablet Take 1 Tab by mouth two (2) times daily as needed for Diarrhea. Yes Provider, Historical   atorvastatin (LIPITOR) 40 mg tablet Take 40 mg by mouth nightly. Yes Provider, Historical   levothyroxine (SYNTHROID) 50 mcg tablet Take 50 mcg by mouth Daily (before breakfast). Yes Provider, Historical   Psyllium Husk-Sucrose 3.4 gram/7 gram powd Take 3 Caps by mouth two (2) times a day. Yes Provider, Historical   omega-3 fatty acids-vitamin e (FISH OIL) 1,000 mg cap Take 2 Caps by mouth daily. Yes Provider, Historical   bimatoprost (LUMIGAN) 0.01 % ophthalmic drops Administer 1 Drop to right eye nightly. Yes Provider, Historical   timolol (TIMOPTIC) 0.5 % ophthalmic solution Administer 1 Drop to right eye every morning. Yes Provider, Historical   Omeprazole delayed release (PRILOSEC D/R) 20 mg tablet Take 20 mg by mouth Daily (before dinner). Yes Provider, Historical   meloxicam (MOBIC) 15 mg tablet Take 15 mg by mouth daily. Indications: OSTEOARTHRITIS   Yes Provider, Historical   brimonidine (ALPHAGAN P) 0.1 % ophthalmic solution Administer 1 Drop to right eye two (2) times a day. Take / use AM day of surgery  per anesthesia protocols. Yes Provider, Historical   glimepiride (AMARYL) 2 mg tablet Take 1 mg by mouth daily.  Indications: type 2 diabetes mellitus   Yes Provider, Historical   b complex vitamins tablet Take 1 Tab by mouth daily. Yes Provider, Historical   aspirin 81 mg Tab Take 81 mg by mouth daily. Take / use 81 mg AM day of surgery  per anesthesia protocols. Yes Other, MD Skip       Allergies   Allergen Reactions    Acetazolamide Unknown (comments)     fatigue    Benzonatate Other (comments)        Review of Systems:  A comprehensive review of systems was negative except for that written in the History of Present Illness. Objective:     Visit Vitals  BP (!) 158/69 (BP 1 Location: Left upper arm, BP Patient Position: At rest)   Pulse (!) 104   Temp 98.2 °F (36.8 °C)   Resp 18   Ht 5' 7\" (1.702 m)   Wt 78 kg (172 lb)   SpO2 92%   BMI 26.94 kg/m²     Temp (24hrs), Av.1 °F (36.7 °C), Min:97.5 °F (36.4 °C), Max:98.6 °F (37 °C)       Lines:  Central Venous Catheter:       Physical Exam:    General:  Alert, cooperative, well noursished, well developed but frail, appears stated age   Eyes:  Sclera anicteric. Pupils equally round and reactive to light. Mouth/Throat: Mucous membranes normal, oral pharynx clear; mild dysarthria noted   Neck: Supple   Lungs:   Clear to auscultation bilaterally, good effort   CV:  Regular rate and rhythm,no murmur, click, rub or gallop   Abdomen:   Soft, non-tender.  bowel sounds normal. non-distended   Extremities: No cyanosis; trace LE edema   Skin: Mottled skin with ecchymoses noted   Lymph nodes: Cervical and supraclavicular normal   Musculoskeletal: L TKA incision site dressed, not hot or tender; Left 2nd toe ulcer noted   Lines/Devices:  Intact, no erythema, drainage or tenderness   Psych: Alert, sometimes frustrated by his inability to find right word  normal mood affect given the setting       Data Review:     CBC:  Recent Labs     21  0543 21  0338 21  0403 21  0403   WBC 8.3 9.0  --  4.3   GRANS 72 84*   < > 85*   MONOS 16* 8   < > 7   EOS 0* 0*   < > 0*   ANEU 5.9 7.6 < > 3.6   ABL 1.0 0.6   < > 0.4*   HGB 9.1* 8.2*  --  8.7*   HCT 27.4* 25.8*  --  26.4*    143*  --  92*    < > = values in this interval not displayed. BMP:  Recent Labs     12/13/21  0543 12/12/21  0338 12/11/21  0403   CREA 1.81* 1.48 1.23   BUN 36* 30* 22   * 135* 136   K 4.4 4.4 4.2    105 105   CO2 22 21 22   AGAP 6* 9 9   * 165* 179*       LFTS:  No results for input(s): TBILI, ALT, AP, TP, ALB in the last 72 hours.     No lab exists for component: SGOT    Microbiology:     All Micro Results     Procedure Component Value Units Date/Time    CULTURE, ANAEROBIC [[id number]] Collected: 12/10/21 1514    Order Status: Completed Specimen: Knee  Updated: 12/13/21 1008     Special Requests: --        LEFT KNEE  NO.1       Culture result:       NO ANAEROBIC GROWTH IN 2 DAYS          CULTURE, ANAEROBIC [[id number]] Collected: 12/10/21 1534    Order Status: Completed Specimen: Knee  Updated: 12/13/21 1007     Special Requests: --        LEFT KNEE  NO.3       Culture result:       NO ANAEROBIC GROWTH IN 2 DAYS          CULTURE, ANAEROBIC [[id number]] Collected: 12/10/21 1533    Order Status: Completed Specimen: Knee  Updated: 12/13/21 1007     Special Requests: --        LEFT KNEE  NO.2       Culture result:       NO ANAEROBES ISOLATED 2 DAYS          CULTURE, Devoria [place] STAIN [[id number]] Collected: 12/10/21 1514    Order Status: Completed Specimen: Wound from Knee  Updated: 12/13/21 0931     Special Requests: --        LEFT KNEE  NO.1       GRAM STAIN 1 TO 8 WBCS SEEN PER OIF      NO DEFINITE ORGANISM SEEN        Culture result: NO GROWTH 2 DAYS       CULTURE, Devoria [place] STAIN [[id number]]  (Abnormal) Collected: 12/10/21 1534    Order Status: Completed Specimen: Wound from Knee  Updated: 12/13/21 0929     Special Requests: --        LEFT KNEE  NO.3       GRAM STAIN 0 TO 1 WBCS SEEN PER OIF      NO DEFINITE ORGANISM SEEN        Culture result:       SCANT STAPHYLOCOCCUS AUREUS For Susceptibility Refer to Culture ACCESSION [id number]          CULTURE, Isidro Oswaldo STAIN [[id number]]  (Abnormal)  (Susceptibility) Collected: 12/10/21 1533    Order Status: Completed Specimen: Wound from Knee  Updated: 12/13/21 0923     Special Requests: --        LEFT KNEE  NO.2       GRAM STAIN 0 TO 1 WBCS SEEN PER OIF      NO DEFINITE ORGANISM SEEN        Culture result:       LIGHT * METHICILLIN RESISTANT STAPHYLOCOCCUS AUREUS *            PATIENT IS A KNOWN MRSA       CULTURE, BLOOD [[id number]] Collected: 12/12/21 1531    Order Status: Completed Specimen: Blood Updated: 12/13/21 0757     Special Requests: --        JUGULAR VEIN  WHITE PORT       Culture result: NO GROWTH AFTER 15 HOURS       CULTURE, BLOOD [[id number]] Collected: 12/12/21 1304    Order Status: Completed Specimen: Blood Updated: 12/13/21 0757     Special Requests: --        RIGHT  Antecubital       Culture result: NO GROWTH AFTER 18 HOURS       FUNGUS CULTURE AND SMEAR [[id number]] Collected: 12/10/21 1534    Order Status: Completed Updated: 12/10/21 1735    FUNGUS CULTURE AND SMEAR [[id number]] Collected: 12/10/21 1533    Order Status: Completed Updated: 12/10/21 1734    FUNGUS CULTURE AND SMEAR [[id number]] Collected: 12/10/21 1514    Order Status: Completed Updated: 12/10/21 1734    AFB CULTURE + SMEAR W/RFLX ID FROM CULTURE [[id number]] Collected: 12/10/21 1545    Order Status: Canceled     AFB CULTURE + SMEAR W/RFLX ID FROM CULTURE [[id number]] Collected: 12/10/21 1545    Order Status: Canceled     CULTURE, FUNGUS [[id number]]     Order Status: Sent Specimen: Knee Fluid     CULTURE, FUNGUS [[id number]]     Order Status: Sent Specimen: Knee Fluid     CULTURE, FUNGUS [[id number]]     Order Status: Sent Specimen: Knee Fluid     AFB CULTURE + SMEAR W/RFLX ID FROM CULTURE [[id number]] Collected: 12/10/21 1515    Order Status: Canceled     COVID-19 RAPID TEST [[id number]] Collected: 12/10/21 0637    Order Status: Completed Specimen: Nasopharyngeal Updated: 12/10/21 0713     Specimen source NASAL SWAB        COVID-19 rapid test Not detected        Comment:      The specimen is NEGATIVE for SARS-CoV-2, the novel coronavirus associated with COVID-19. A negative result does not rule out COVID-19. This test has been authorized by the FDA under an Emergency Use Authorization (EUA) for use by authorized laboratories. Fact sheet for Healthcare Providers: [employer]  Fact sheet for Patients: [employer]       Methodology: Isothermal Nucleic Acid Amplification         CULTURE, BODY FLUID Francia [place] STAIN [[id number]] Collected: 21 1937    Order Status: Canceled           Imagin21 Brain MRI: pending    21 CT head: IMPRESSION  White matter findings compatible with chronic small vessel ischemic  Disease. 12/10/21 L knee: IMPRESSION     1. Retrocardiac atelectasis or consolidation.     2. Revision left knee arthroplasty with implantation of antibiotic beads. 12/10/21 CXR: IMPRESSION     1. Retrocardiac atelectasis or consolidation.     2. Revision left knee arthroplasty with implantation of antibiotic beads.     Signed By: Karl Babin MD     2021

## 2021-12-13 NOTE — PROGRESS NOTES
Patient called to go to bathroom. When I got patient up, one hearing aid was in the bed, I placed on  with other. When returning from bathroom patient is very adamant about having both of his hearing aids in his hands and he was not getting in the bed until he had them. Patient began yelling at me, so I called the charge nurse in the room to help; Orlando Hutton RN came. We got him in the bed and were replacing his heart leads when he dropped one, I picked it up and he began yelling again about \"having his $9000 dollar hearing aids in his hands. \"  Charge nurse spoke with him and he agreed that he wanted to hold them and it was his responsibility to keep up with them since we tried to charge them at the bedside for him.

## 2021-12-13 NOTE — PROGRESS NOTES
Problem: Mobility Impaired (Adult and Pediatric)  Goal: *Acute Goals and Plan of Care (Insert Text)  Outcome: Progressing Towards Goal  Note: GOALS (1-4 days):  (1.)Mr. Manda Rodriguez will move from supine to sit and sit to supine  in bed with STAND BY ASSIST.  (2.)Mr. Manda Rodriguez will transfer from bed to chair and chair to bed with STAND BY ASSIST using the least restrictive device. (3.)Mr. Manda Rodriguez will ambulate with STAND BY ASSIST for 350 feet with the least restrictive device. (4.)Mr. Manda Rodriguez will increase left knee ROM to 0°-90°.  ________________________________________________________________________________________________        PHYSICAL THERAPY JOINT CAMP TKA: Daily Note and AM 12/13/2021  INPATIENT: Hospital Day: 5  [place] / [place] / Product Type: Medicare /      NAME/AGE/GENDER: Lisa Bolanos is a 80 y.o. male   PRIMARY DIAGNOSIS:  Left knee infection   Procedure(s) and Anesthesia Type:     * INCISION AND DRAINAGE LEFT KNEE/ POLY EXCHANGE/ POSSIBLE PROSETHSIS EXPLANT W/ CEMENT SPACER - General     * POSS LEFT KNEE ARTHROPLASTY TOTAL - Spinal (Left  Left)  ICD-10: Treatment Diagnosis:    · Pain in Left Knee (M25.562)  · Stiffness of Left Knee, Not elsewhere classified (M25.662)  · Difficulty in walking, Not elsewhere classified (R26.2)      ASSESSMENT:     Mr. Madna Rodriguez presents with decreased strength and range of motion left lower extremity and with decreased independence with functional mobility s/p left TKA. Pt will benefit from skilled PT interventions to maximize independence with functional mobility and TKA management. Pt did fairly well with assessment. He is slow and needs extra time to complete tasks. He was supine on contact. He transferred out of bed and ambulated to the bathroom for toileting then ambulated 175 ft in the hallways with RW and CGA. He returned to the room and sat in the recliner to perform therex. Pt instructed not to get up without assistance.  Hope to progress mobility and exercises in the morning. Pt plans to discharge to rehab with continued therapy for follow up. He wants to go to [facility] and is wanting to talk with the SW.  12/11- up in chair with daughter present. He was more sore this afternoon but agreeable to therapy. Co treat with OT due to increased need for mobility. He ambulated 100 ft with RW and CGA then returned to the room and went to the bathroom and then showered. He practiced scooting, sitting/ standing balance and sit to stand while showering then returned to the chair to kaylin his shoes. Left up in room with OT for grooming. Progressing therapy today. 12/12- patient hallucinating bugs today. Supine on contact and agreeable to therapy. Patient is pleasant but extra time needed to complete all tasks with therapy due to being easily distracted and perseveration on tasks. He transferred supine to sit insisting on using the trapeze bar even though I advised he would do better without it. He discovered he was unable to get up with the bar so he used the rails and required Min assist. Sit to stand with Mod assist and then ambulated into the bathroom for brushing teeth and combing hair. Ambulated 130 ft with RW and CGA then returned to the room and transferred into the chair for therex. Needed frequent breaks with therex, as RN was changing his central line dressing during therapy. He completed therex and ice was applied. Left up in chair awaiting tele-neurology consult for dysarthria. Progressed with gait distance today. PM- sitting on EOB with nursing wanting to go to the bathroom. Ambulated with therapist to the bathroom where he toileted, washed hands and combed his hair. He is very short of breath this afternoon with activity. He ambulated 80 ft with RW and then returned to the room. Practiced sit to stand multiple times to kaylin brief then returned supine and positioned for comfort. Tele-neuro back on to see patient this afternoon.    12/13 sitting in the chair with alarm on. Therapist had to keep redirecting him during L knee exercises in the chair. Work on sit>stand with Mod A. Then walk 90 ft using RW with CGA and verbal cues. Rested few min then headed back to the room. Return to the chair with needs in reach, alarm on, ice to L knee and instructed to call for assist.  Going to rehab. This section established at most recent assessment   PROBLEM LIST (Impairments causing functional limitations):  1. Decreased Strength  2. Decreased ADL/Functional Activities  3. Decreased Transfer Abilities  4. Decreased Ambulation Ability/Technique  5. Decreased Flexibility/Joint Mobility  6. Edema/Girth  7. Decreased [place] with Home Exercise Program   INTERVENTIONS PLANNED: (Benefits and precautions of physical therapy have been discussed with the patient.)  1. Bed Mobility  2. Cold  3. Gait Training  4. Home Exercise Program (HEP)  5. Range of Motion (ROM)  6. Therapeutic Activites  7. Therapeutic Exercise/Strengthening  8. Transfer Training     TREATMENT PLAN: Frequency/Duration: Follow patient BID for duration of hospital stay to address above goals. Rehabilitation Potential For Stated Goals: Good     RECOMMENDED REHABILITATION/EQUIPMENT: (at time of discharge pending progress): Continue Skilled Therapy and Rehab.               HISTORY:   History of Present Injury/Illness (Reason for Referral):  Pt s/p total knee arthroplasty revision on L LE    Past Medical History/Comorbidities:   Mr. Aubree Schroeder  has a past medical history of Acute encephalopathy (12/12/2021), Anemia (fall 2016), Arthritis, Atrial fibrillation ([address] (12/10/2021), CAD (coronary artery disease) (1997), Carotid stenosis (04/2017), Coagulopathy ([address] (12/10/2021), Diabetes ([address] (05/1997), Diverticulosis (12/2011), GERD (gastroesophageal reflux disease), Glaucoma, Heart murmur, Hiatal hernia, History of kidney stones, Hypercholesteremia, Hypertension, Hypothyroid, Infection of total joint prosthesis ([address] (12/9/2021), MI (myocardial infarction) ([address] TIA (transient ischemic attack) (07/2016), Unspecified sleep apnea, Urethral stenosis, and Vasovagal syncope. He also has no past medical history of Adverse effect of anesthesia, Aneurysm ([address] Asthma, Autoimmune disease ([address] Cancer ([address] Chronic kidney disease, Chronic pain, COPD, Dementia, Difficult intubation, Endocarditis, Gastrointestinal disorder, Heart failure ([address] Ill-defined condition, Liver disease, Malignant hyperthermia due to anesthesia, Morbid obesity ([address] Nausea & vomiting, Other ill-defined conditions(799.89), Pseudocholinesterase deficiency, Psychiatric disorder, PUD (peptic ulcer disease), Rheumatic fever, Seizures ([address] Sleep disorder, Stroke ([address] or Thromboembolus ([address] Mr. Hallie Beckham  has a past surgical history that includes hx heart catheterization (4636,0524, 2012, 2013); hx tonsillectomy (1938); hx cataract removal (1987 and 1990); hx lap cholecystectomy (2003); hx hernia repair (1944); hx urological (1996); hx orthopaedic (Left); hx orthopaedic (1099-3164); hx shoulder arthroscopy (Left, 2015); hx knee replacement (Bilateral, 2002, 2008); hx hemorrhoidectomy (1987); hx heent (Right); hx carpal tunnel release (Right, 1968); hx carpal tunnel release (Left, 2012); hx bunionectomy (Left); hx rotator cuff repair (Right, 1995); hx rotator cuff repair (Left, 2000); hx rotator cuff repair (Right, 2007); and hx total colectomy (01/13/2017). Social History/Living Environment:   Living Alone: Yes  Support Systems: Child(wojciech)  Patient Expects to be Discharged to[de-identified] Rehabilitation facility  Prior Level of Function/Work/Activity:  Independent prior to admit. Lives alone.  May have caregivers that assist at home but unsure how frequently   Number of Personal Factors/Comorbidities that affect the Plan of Care: 0: LOW COMPLEXITY   EXAMINATION:   Most Recent Physical Functioning:                                 Transfers  Sit to Stand: Moderate assistance  Stand to Sit: Minimum assistance  Bed to Chair: Contact guard assistance    Balance  Sitting: Intact; High guard  Standing: Pull to stand; With support              Weight Bearing Status  Left Side Weight Bearing: As tolerated  Distance (ft): 90 Feet (ft)  Ambulation - Level of Assistance: Contact guard assistance  Assistive Device: Walker, rolling  Speed/Tracy: Pace decreased (<100 feet/min)  Step Length: Right shortened  Stance: Left decreased  Gait Abnormalities: Antalgic; Decreased step clearance  Interventions: Safety awareness training; Verbal cues     Braces/Orthotics: none    Left Knee Cold  Type: Cryocuff      Body Structures Involved:  1. Joints  2. Muscles Body Functions Affected:  1. Movement Related Activities and Participation Affected:  1. Mobility  2. Self Care   Number of elements that affect the Plan of Care: 4+: HIGH COMPLEXITY   CLINICAL PRESENTATION:   Presentation: Stable and uncomplicated: LOW COMPLEXITY   CLINICAL DECISION MAKIN[address] [place] [address] AM-PAC 6 Clicks   Basic Mobility Inpatient Short Form  How much difficulty does the patient currently have. .. Unable A Lot A Little None   1. Turning over in bed (including adjusting bedclothes, sheets and blankets)? [] 1   [] 2   [x] 3   [] 4   2. Sitting down on and standing up from a chair with arms ( e.g., wheelchair, bedside commode, etc.)   [] 1   [] 2   [x] 3   [] 4   3. Moving from lying on back to sitting on the side of the bed? [] 1   [] 2   [x] 3   [] 4   How much help from another person does the patient currently need. .. Total A Lot A Little None   4. Moving to and from a bed to a chair (including a wheelchair)? [] 1   [] 2   [x] 3   [] 4   5. Need to walk in hospital room? [] 1   [] 2   [x] 3   [] 4   6. Climbing 3-5 steps with a railing? [] 1   [] 2   [x] 3   [] 4   © , Trustees of [address] [place], [place] [id number], under license to [employer].  All rights reserved     Score:  Initial: 18 Most Recent: X (Date: -- )    Interpretation of Tool:  Represents activities that are increasingly more difficult (i.e. Bed mobility, Transfers, Gait). Medical Necessity:     · Patient is expected to demonstrate progress in   · strength, range of motion, and functional technique  ·  to   · decrease assistance required with functional mobility and TKA managment  · .  Reason for Services/Other Comments:  · Patient continues to require skilled intervention due to   · Inability to complete functional mobility and TKA management independently  · . Use of outcome tool(s) and clinical judgement create a POC that gives a: Clear prediction of patient's progress: LOW COMPLEXITY            TREATMENT:   (In addition to Assessment/Re-Assessment sessions the following treatments were rendered)     Pre-treatment Symptoms/Complaints:  no complaints  Pain Initial:   Pain Intensity 1: 0  Post Session:  0     Therapeutic Activity: (  23 Minutes ):  Therapeutic activities including bed mobility, sit to stand, Chair transfers, standing balance,, and  Ambulation on level groundto improve mobility, strength, and balance. Required minimal Safety awareness training; Verbal cues to promote dynamic balance in standing. Therapeutic Exercise: (15 Minutes):  Exercises per grid below to improve strength and balance. Required minimal verbal cues to promote proper body alignment. Progressed range as indicated. Gait Training ( ):  Gait training to improve and/or restore physical functioning as related to mobility. Ambulated 90 Feet (ft) with Contact guard assistance using a Walker, rolling and minimal Safety awareness training; Verbal cues related to their knee position and motion to promote proper body alignment.            Date:  12/11 Date:  12/12 Date:  12/13     ACTIVITY/EXERCISE AM PM AM PM AM PM     []  []  []  []  []  []   Ankle Pumps 15  15  20    Quad Sets 15  15  20    Gluteal Sets 15  15  20    Hip ABd/ADduction 15  15  20    Straight Leg Raises 15  15  20    Knee Slides 15  15  20    Short Arc Quads 15  15  20    Chair Slides     20                      B = bilateral; AA = active assistive; A = active; P = passive      Treatment/Session Assessment:     Response to Treatment:  Tolerated well, need redirection to stay on track. Education:  [x] Home Exercises  [x] Fall Precautions  [x] Use of Cold Therapy Unit [] D/C Instruction Review  [] Knee Prosthesis Review  [x] Walker Management/Safety [] Adaptive Equipment as Needed  [x] No pillow under knee       Interdisciplinary Collaboration:   o Physical Therapy Assistant  o Registered Nurse    After treatment position/precautions:   o Up in chair  o Bed/Chair-wheels locked  o Bed in low position  o Call light within reach  o RN notified  o Nurse at bedside    Compliance with Program/Exercises: Compliant all of the time. Recommendations/Intent for next treatment session:  Treatment next visit will focus on increasing Mr. Beatris Zendejsa independence with bed mobility, transfers, gait training, strength/ROM exercises, modalities for pain, and patient education.       Total Treatment Duration:  PT Patient Time In/Time Out  Time In: 0715  Time Out: [address] [facility] [address] R[facility]y PTA

## 2021-12-13 NOTE — PROGRESS NOTES
Care Management Interventions  PCP Verified by CM: Yes  Transition of Care Consult (CM Consult): SNF  Support Systems: Child(wojciech)  The Plan for Transition of Care is Related to the Following Treatment Goals : Increase independence  The Patient and/or Patient Representative was Provided with a Choice of Provider and Agrees with the Discharge Plan?: Yes  Freedom of Choice List was Provided with Basic Dialogue that Supports the Patient's Individualized Plan of Care/Goals, Treatment Preferences and Shares the Quality Data Associated with the Providers?: Yes  Discharge Location  Discharge Placement: Skilled nursing facility    Patient/ dtr accepted semi-pvt bed at [facility]. PPD and COVID PCR ordered/ we should have results on Tues 12-. Please advise when medically ready. Dtr to bring his COVID vaccine card for [facility] records.

## 2021-12-13 NOTE — PROGRESS NOTES
Hospitalist Progress Note   Admit Date:  2021  7:32 PM   Name:  Aj Devlin   Age:  80 y.o. Sex:  male  :  1933   MRN:  [id number]   Room:  [facility]/    Presenting Complaint: No chief complaint on file. 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 He last took eliquis 3-4 days prior to admit. INR 2.4 on 21. FFP  2 units ordered per orthopedics preop. S/p surgical intervention 12-10-21  ID consulted and on antibiotics. Postop cultures MRSA. Family noted he had increased slurring of speech since admit, he states for several days. CT head was without acute issue. ECHO pending. Neurology consulted and recommends MRI brain though he was too agitated for this. SLP following. Barium swallow ordered. discharge plans pending to rehab. 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)    Active Problems:    Infection of total joint prosthesis ([address] (2021)  ·   Defer to orthopedics   · Antibiotics per ID   · followup op cultures         Atrial fibrillation ([address] (12/10/2021)  · Continued eliquis           Hyperglycemia due to type 2 diabetes mellitus ([address] (12/10/2021)  ·   POC and SSI  · Oral agents held         Hyperlipidemia (12/10/2021)  ·  continued lipitor      Acquired hypothyroidism (12/10/2021)  · continued synthroid         Coagulopathy ([address] (12/10/2021)  ·   S/p FFP      Anemia and thrombocytopenia:  · Trend CBC    Dysarthria:  · CT head negative  · neuro consulted  · MRI ordered though too agitated to complete   · ECHO pending   · Continued eliquis and lipitor  · SLP consulted recommends MBS  · Remote tele     Acute encephalopathy:  · followup mentation   · Likely delirium         YOEL:  · On NS at 75 cc/hr, increase to 100 cc/hr   · followup BMP  · Add flomax for possible BPH and prior reported decreased urine output      Dispo/Discharge Planning:     pending to rehab    Diet:  ADULT DIET Regular; 4 carb choices (60 gm/meal);  Low Sodium (2 gm)  DVT PPx:eliquis   Code status: Full Code    Hospital Problems as of 12/13/2021 Date Reviewed: 12/10/2021          Codes Class Noted - Resolved POA    Acute encephalopathy ICD-10-CM: G93.40  ICD-9-CM: 348.30  12/12/2021 - Present Yes        Anemia (Chronic) ICD-10-CM: D64.9  ICD-9-CM: 285.9  12/12/2021 - Present Yes        Thrombocytopenia ([facility] ICD-10-CM: D69.6  ICD-9-CM: 287.5  12/12/2021 - Present Yes        Dysarthria ICD-10-CM: R47.1  ICD-9-CM: 784.51  12/12/2021 - Present Yes        Atrial fibrillation (HCC) (Chronic) ICD-10-CM: I48.91  ICD-9-CM: 427.31  12/10/2021 - Present Yes        Hyperglycemia due to type 2 diabetes mellitus (HCC) (Chronic) ICD-10-CM: E11.65  ICD-9-CM: 250.00  12/10/2021 - Present Yes        Hyperlipidemia (Chronic) ICD-10-CM: E78.5  ICD-9-CM: 272.4  12/10/2021 - Present Yes        Acquired hypothyroidism (Chronic) ICD-10-CM: E03.9  ICD-9-CM: 244.9  12/10/2021 - Present Yes        Hearing loss (Chronic) ICD-10-CM: H91.90  ICD-9-CM: 389.9  12/10/2021 - Present Yes        Coagulopathy ([facility] ICD-10-CM: D68.9  ICD-9-CM: 286.9  12/10/2021 - Present Yes        * (Principal) Status post revision of total knee replacement, left ICD-10-CM: L50.591  ICD-9-CM: V43.65  12/10/2021 - Present Unknown        Infection of total joint prosthesis ([facility] ICD-10-CM: T84.50XA  ICD-9-CM: 996.66  12/9/2021 - Present Unknown              Objective:     Patient Vitals for the past 24 hrs:   Temp Pulse Resp BP SpO2   12/13/21 1215 98.2 °F (36.8 °C) 97 18 (!) 148/72 96 %   12/13/21 1145    (!) 158/69  12/13/21 0400  (!) 104      12/13/21 0318 98.2 °F (36.8 °C) 84 18 (!) 158/69 92 %   12/13/21 0000  82      12/12/21 2324 98.6 °F (37 °C) 80 18 (!) 169/97    12/12/21 1956 98.2 °F (36.8 °C) 65 18 132/80 90 %   12/12/21 1730  80 22 (!) 189/94 96 %     Oxygen Therapy  O2 Sat (%): 96 % (12/13/21 1215)  Pulse via Oximetry: 67 beats per minute (12/10/21 2234)  O2 Device: None (Room air) (12/10/21 2234)  O2 Flow Rate (L/min): 0 l/min (12/10/21 2234)    Estimated body mass index is 26.94 kg/m² as calculated from the following:    Height as of this encounter: 5' 7\" (1.702 m). Weight as of this encounter: 78 kg (172 lb). No intake or output data in the 24 hours ending 12/13/21 1315      Physical Exam:     Blood pressure (!) 148/72, pulse 97, temperature 98.2 °F (36.8 °C), resp. rate 18, height 5' 7\" (1.702 m), weight 78 kg (172 lb), SpO2 96 %. General:    Well nourished. No overt distress, alert, elderly, hard of hearing, pleasant   CV:   RRR. III/VI DENISSE, no edema   Lungs:   CTAB. No wheezing, rhonchi, or rales. Respirations even, unlabored   Abdomen: Bowel sounds present. Soft, nontender, nondistended. Extremities: No cyanosis or clubbing. No edema  Skin:     No rashes and normal coloration. Warm and dry. Neuro:   grossly intact.    Alert, fluent speech  Psych:  Normal mood and affect     I have reviewed ordered lab tests and independently visualized imaging below:    Recent Labs:  Recent Results (from the past 48 hour(s))   GLUCOSE, POC    Collection Time: 12/11/21  6:10 PM   Result Value Ref Range    Glucose (POC) 251 (H) 65 - 100 mg/dL    Performed by Frances Sharma, POC    Collection Time: 12/11/21  9:51 PM   Result Value Ref Range    Glucose (POC) 136 (H) 65 - 100 mg/dL    Performed by Mary    PROTHROMBIN TIME + INR    Collection Time: 12/12/21  3:38 AM   Result Value Ref Range    Prothrombin time 27.4 (H) 12.6 - 14.5 sec    INR 2.5     CBC WITH AUTOMATED DIFF Collection Time: 12/12/21  3:38 AM   Result Value Ref Range    WBC 9.0 4.3 - 11.1 K/uL    RBC 2.73 (L) 4.23 - 5.6 M/uL    HGB 8.2 (L) 13.6 - 17.2 g/dL    HCT 25.8 (L) 41.1 - 50.3 %    MCV 94.5 79.6 - 97.8 FL    MCH 30.0 26.1 - 32.9 PG    MCHC 31.8 31.4 - 35.0 g/dL    RDW 15.5 (H) 11.9 - 14.6 %    PLATELET 407 (L) 866 - 450 K/uL    MPV 12.8 (H) 9.4 - 12.3 FL    ABSOLUTE NRBC 0.00 0.0 - 0.2 K/uL    DF AUTOMATED      NEUTROPHILS 84 (H) 43 - 78 %    LYMPHOCYTES 7 (L) 13 - 44 %    MONOCYTES 8 4.0 - 12.0 %    EOSINOPHILS 0 (L) 0.5 - 7.8 %    BASOPHILS 0 0.0 - 2.0 %    IMMATURE GRANULOCYTES 1 0.0 - 5.0 %    ABS. NEUTROPHILS 7.6 1.7 - 8.2 K/UL    ABS. LYMPHOCYTES 0.6 0.5 - 4.6 K/UL    ABS. MONOCYTES 0.7 0.1 - 1.3 K/UL    ABS. EOSINOPHILS 0.0 0.0 - 0.8 K/UL    ABS. BASOPHILS 0.0 0.0 - 0.2 K/UL    ABS. IMM.  GRANS. 0.1 0.0 - 0.5 K/UL   VANCOMYCIN, RANDOM    Collection Time: 12/12/21  3:38 AM   Result Value Ref Range    Vancomycin, random 98.2 UG/ML   METABOLIC PANEL, BASIC    Collection Time: 12/12/21  3:38 AM   Result Value Ref Range    Sodium 135 (L) 136 - 145 mmol/L    Potassium 4.4 3.5 - 5.1 mmol/L    Chloride 105 98 - 107 mmol/L    CO2 21 21 - 32 mmol/L    Anion gap 9 7 - 16 mmol/L    Glucose 165 (H) 65 - 100 mg/dL    BUN 30 (H) 8 - 23 MG/DL    Creatinine 1.48 0.8 - 1.5 MG/DL    GFR est AA 58 (L) >60 ml/min/1.73m2    GFR est non-AA 48 (L) >60 ml/min/1.73m2    Calcium 9.1 8.3 - 10.4 MG/DL   GLUCOSE, POC    Collection Time: 12/12/21  6:39 AM   Result Value Ref Range    Glucose (POC) 165 (H) 65 - 100 mg/dL    Performed by Mary    GLUCOSE, POC    Collection Time: 12/12/21 10:58 AM   Result Value Ref Range    Glucose (POC) 194 (H) 65 - 100 mg/dL    Performed by [address], BLOOD    Collection Time: 12/12/21  1:04 PM    Specimen: Blood   Result Value Ref Range    Special Requests: RIGHT  Antecubital        Culture result: NO GROWTH AFTER 18 HOURS     CULTURE, BLOOD    Collection Time: 12/12/21 3:31 PM    Specimen: Blood   Result Value Ref Range    Special Requests: JUGULAR VEIN  WHITE PORT        Culture result: NO GROWTH AFTER 15 HOURS     GLUCOSE, POC    Collection Time: 12/13/21 12:07 AM   Result Value Ref Range    Glucose (POC) 217 (H) 65 - 100 mg/dL    Performed by AbercrombieTaylorBSN    PROTHROMBIN TIME + INR    Collection Time: 12/13/21  5:43 AM   Result Value Ref Range    Prothrombin time 24.4 (H) 12.6 - 14.5 sec    INR 2.2     METABOLIC PANEL, BASIC    Collection Time: 12/13/21  5:43 AM   Result Value Ref Range    Sodium 134 (L) 136 - 145 mmol/L    Potassium 4.4 3.5 - 5.1 mmol/L    Chloride 106 98 - 107 mmol/L    CO2 22 21 - 32 mmol/L    Anion gap 6 (L) 7 - 16 mmol/L    Glucose 183 (H) 65 - 100 mg/dL    BUN 36 (H) 8 - 23 MG/DL    Creatinine 1.81 (H) 0.8 - 1.5 MG/DL    GFR est AA 46 (L) >60 ml/min/1.73m2    GFR est non-AA 38 (L) >60 ml/min/1.73m2    Calcium 9.7 8.3 - 10.4 MG/DL   CBC WITH AUTOMATED DIFF    Collection Time: 12/13/21  5:43 AM   Result Value Ref Range    WBC 8.3 4.3 - 11.1 K/uL    RBC 2.94 (L) 4.23 - 5.6 M/uL    HGB 9.1 (L) 13.6 - 17.2 g/dL    HCT 27.4 (L) 41.1 - 50.3 %    MCV 93.2 79.6 - 97.8 FL    MCH 31.0 26.1 - 32.9 PG    MCHC 33.2 31.4 - 35.0 g/dL    RDW 15.4 (H) 11.9 - 14.6 %    PLATELET 196 809 - 949 K/uL    MPV 12.4 (H) 9.4 - 12.3 FL    ABSOLUTE NRBC 0.00 0.0 - 0.2 K/uL    DF AUTOMATED      NEUTROPHILS 72 43 - 78 %    LYMPHOCYTES 12 (L) 13 - 44 %    MONOCYTES 16 (H) 4.0 - 12.0 %    EOSINOPHILS 0 (L) 0.5 - 7.8 %    BASOPHILS 0 0.0 - 2.0 %    IMMATURE GRANULOCYTES 1 0.0 - 5.0 %    ABS. NEUTROPHILS 5.9 1.7 - 8.2 K/UL    ABS. LYMPHOCYTES 1.0 0.5 - 4.6 K/UL    ABS. MONOCYTES 1.3 0.1 - 1.3 K/UL    ABS. EOSINOPHILS 0.0 0.0 - 0.8 K/UL    ABS. BASOPHILS 0.0 0.0 - 0.2 K/UL    ABS. IMM.  GRANS. 0.0 0.0 - 0.5 K/UL   VANCOMYCIN, RANDOM    Collection Time: 12/13/21  5:43 AM   Result Value Ref Range    Vancomycin, random 15.0 UG/ML   GLUCOSE, POC    Collection Time: 12/13/21  6:08 AM Result Value Ref Range    Glucose (POC) 196 (H) 65 - 100 mg/dL    Performed by AbercrombieTaylorBSN    ECHO ADULT COMPLETE    Collection Time: 12/13/21 10:45 AM   Result Value Ref Range    LA Minor Axis 6.7 cm    LA Major Axis 7.1 cm    LA Area 2C 30.1 cm2    LA Area 4C 31.8 cm2    LA Volume  (A) 18 - 58 mL    LA Diameter 4.5 cm    LV EDV A4C 44 mL    LV ESV A4C 23 mL    IVSd 1.4 (A) 0.6 - 1.0 cm    LVIDd 4.5 4.2 - 5.9 cm    LVIDs 3.4 cm    LVOT Diameter 2.2 cm    LVOT Mean Gradient 1 mmHg    LVOT VTI 16.9 cm    LVOT Peak Velocity 0.9 m/s    LVOT Peak Gradient 3 mmHg    LVPWd 1.3 (A) 0.6 - 1.0 cm    LV E' Lateral Velocity 7 cm/s    LV E' Septal Velocity 7 cm/s    LV Ejection Fraction A4C 48 %    LVOT Area 3.8 cm2    LVOT SV 64.2 ml    AR .0 ms    AR Max Velocity PISA 4.2 m/s    AV Peak Velocity 3.0 m/s    AV Peak Gradient 35 mmHg    AV Cusp Mmode 1.2 cm    AV Mean Gradient 15 mmHg    AV VTI 58.6 cm    AV Mean Velocity 1.8 m/s    AV Area by VTI 1.1 cm2    AV Area by Peak Velocity 1.2 cm2    MV Nyquist Velocity 36 cm/s    MR .0 cm    MV Mean Gradient 3 mmHg    MV VTI 35.2 cm    MV Mean Velocity 0.7 m/s    MV E Wave Deceleration Time 130.0 ms    MV A Velocity 0.62 m/s    MV E Velocity 1.29 m/s    MV Area by VTI 1.8 cm2    RVIDd 3.5 cm    TR Max Velocity 3.48 m/s    TR Peak Gradient 49 mmHg    TAPSE 2.5 (A) 1.5 - 2.0 cm    Fractional Shortening 2D 24 28 - 44 %    LV ESV Index A4C 12 mL/m2    LV EDV Index A4C 23 mL/m2    LVIDd Index 2.37 cm/m2    LVIDs Index 1.79 cm/m2    LV RWT Ratio 0.58     LV Mass 2D 235.3 (A) 88 - 224 g    LV Mass 2D Index 123.9 (A) 49 - 115 g/m2    MV E/A 2.08     E/E' Ratio (Averaged) 18.43     E/E' Lateral 18.43     E/E' Septal 18.43     LA Volume Index BP 59 (A) 16 - 28 ml/m2    LVOT Stroke Volume Index 33.8 mL/m2    LA Size Index 2.37 cm/m2    AV Velocity Ratio 0.30     LVOT:AV VTI Index 0.29     IRINA/BSA VTI 0.6 cm2/m2    IRINA/BSA Peak Velocity 0.6 cm2/m2    MV:LVOT VTI Index 2.08     Est. RA Pressure 8 mmHg    RVSP 56 mmHg   GLUCOSE, POC    Collection Time: 12/13/21 12:52 PM   Result Value Ref Range    Glucose (POC) 171 (H) 65 - 100 mg/dL    Performed by [facility]        All Micro Results     Procedure Component Value Units Date/Time    CULTURE, ANAEROBIC [[id number]] Collected: 12/10/21 1514    Order Status: Completed Specimen: Knee  Updated: 12/13/21 1008     Special Requests: --        LEFT KNEE  NO.1       Culture result:       NO ANAEROBIC GROWTH IN 2 DAYS          CULTURE, ANAEROBIC [[id number]] Collected: 12/10/21 1534    Order Status: Completed Specimen: Knee  Updated: 12/13/21 1007     Special Requests: --        LEFT KNEE  NO.3       Culture result:       NO ANAEROBIC GROWTH IN 2 DAYS          CULTURE, ANAEROBIC [[id number]] Collected: 12/10/21 1533    Order Status: Completed Specimen: Knee  Updated: 12/13/21 1007     Special Requests: --        LEFT KNEE  NO.2       Culture result:       NO ANAEROBES ISOLATED 2 DAYS          CULTURE, Korene Mater STAIN [[id number]] Collected: 12/10/21 1514    Order Status: Completed Specimen: Wound from Knee  Updated: 12/13/21 0931     Special Requests: --        LEFT KNEE  NO.1       GRAM STAIN 1 TO 8 WBCS SEEN PER OIF      NO DEFINITE ORGANISM SEEN        Culture result: NO GROWTH 2 DAYS       CULTURE, Korene Mater STAIN [[id number]]  (Abnormal) Collected: 12/10/21 1534    Order Status: Completed Specimen: Wound from Knee  Updated: 12/13/21 0929     Special Requests: --        LEFT KNEE  NO.3       GRAM STAIN 0 TO 1 WBCS SEEN PER OIF      NO DEFINITE ORGANISM SEEN        Culture result:       SCANT STAPHYLOCOCCUS AUREUS For Susceptibility Refer to Culture ACCESSION [id number]          CULTURE, Korene Mater STAIN [[id number]]  (Abnormal)  (Susceptibility) Collected: 12/10/21 1533    Order Status: Completed Specimen: Wound from Knee  Updated: 12/13/21 0923     Special Requests: --        LEFT KNEE  NO.2       GRAM STAIN 0 TO 1 WBCS SEEN PER OIF      NO DEFINITE ORGANISM SEEN        Culture result:       LIGHT * METHICILLIN RESISTANT STAPHYLOCOCCUS AUREUS *            PATIENT IS A KNOWN MRSA       CULTURE, BLOOD [[id number]] Collected: 12/12/21 1531    Order Status: Completed Specimen: Blood Updated: 12/13/21 0757     Special Requests: --        JUGULAR VEIN  WHITE PORT       Culture result: NO GROWTH AFTER 15 HOURS       CULTURE, BLOOD [[id number]] Collected: 12/12/21 1304    Order Status: Completed Specimen: Blood Updated: 12/13/21 0757     Special Requests: --        RIGHT  Antecubital       Culture result: NO GROWTH AFTER 18 HOURS       FUNGUS CULTURE AND SMEAR [[id number]] Collected: 12/10/21 1534    Order Status: Completed Updated: 12/10/21 1735    FUNGUS CULTURE AND SMEAR [[id number]] Collected: 12/10/21 1533    Order Status: Completed Updated: 12/10/21 1734    FUNGUS CULTURE AND SMEAR [[id number]] Collected: 12/10/21 1514    Order Status: Completed Updated: 12/10/21 1734    AFB CULTURE + SMEAR W/RFLX ID FROM CULTURE [[id number]] Collected: 12/10/21 1545    Order Status: Canceled     AFB CULTURE + SMEAR W/RFLX ID FROM CULTURE [[id number]] Collected: 12/10/21 1545    Order Status: Canceled     CULTURE, FUNGUS [[id number]]     Order Status: Sent Specimen: Knee Fluid     CULTURE, FUNGUS [[id number]]     Order Status: Sent Specimen: Knee Fluid     CULTURE, FUNGUS [[id number]]     Order Status: Sent Specimen: Knee Fluid     AFB CULTURE + SMEAR W/RFLX ID FROM CULTURE [[id number]] Collected: 12/10/21 1515    Order Status: Canceled     COVID-19 RAPID TEST [[id number]] Collected: 12/10/21 0637    Order Status: Completed Specimen: Nasopharyngeal Updated: 12/10/21 0713     Specimen source NASAL SWAB        COVID-19 rapid test Not detected        Comment:      The specimen is NEGATIVE for SARS-CoV-2, the novel coronavirus associated with COVID-19. A negative result does not rule out COVID-19.        This test has been authorized by the FDA under an Emergency Use Authorization (EUA) for use by authorized laboratories. 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 There is a small left pleural effusion. There is AC joint arthrosis. Reverse left shoulder arthroplasty with small left pleural effusion. No evidence of acute posttraumatic sequela. CT HEAD WO CONT    Result Date: 12/12/2021  EXAM: CT HEAD WO CONT INDICATION: fall on eliquis COMPARISON: CT December 11, 2021. . TECHNIQUE: Unenhanced CT of the head was performed using 5 mm images. Brain and bone windows were generated. CT dose reduction was achieved through use of a standardized protocol tailored for this examination and automatic exposure control for dose modulation. FINDINGS: No acute intracranial hemorrhage, acute territorial infarct, mass effect, or midline shift. Patchy hypodensities of the subcortical and periventricular white matter compatible with chronic microangiopathy. No hydrocephalus. No extra-axial collections. The subarachnoid basal cisterns are symmetric. Paranasal sinuses and mastoid air cells are well-aerated. No calvarial fracture or soft tissue calculi,. No acute intracranial process or calvarial fracture.       Current Meds:  Current Facility-Administered Medications   Medication Dose Route Frequency    vancomycin (VANCOCIN) 1250 mg in  ml infusion  1,250 mg IntraVENous ONCE    VANCOMYCIN per random dosing    Other PRN    netarsudiL-latanoprost 0.02-0.005 % drop 1 Drop (  ROCKLATAN) (Patient Supplied)  1 Drop Ophthalmic QPM    brimonidine (ALPHAGAN) 0.2 % ophthalmic solution 1 Drop  1 Drop Right Eye Q8H    acetaminophen (TYLENOL) tablet 1,000 mg  1,000 mg Oral Q6H PRN    insulin lispro (HUMALOG) injection   SubCUTAneous Q6H    0.9% sodium chloride infusion 250 mL  250 mL IntraVENous PRN    apixaban (ELIQUIS) tablet 5 mg  5 mg Oral BID    alcohol 62% (NOZIN) nasal  1 Ampule  1 Ampule Topical Q12H    sodium chloride (NS) flush 5-40 mL  5-40 mL IntraVENous Q8H    sodium chloride (NS) flush 5-40 mL  5-40 mL IntraVENous PRN    acetaminophen (TYLENOL) tablet 1,000 mg  1,000 mg Oral Q6H    oxyCODONE IR (ROXICODONE) tablet 10 mg  10 mg Oral Q4H PRN    HYDROmorphone (DILAUDID) injection 1 mg  1 mg IntraVENous Q3H PRN    naloxone (NARCAN) injection 0.2-0.4 mg  0.2-0.4 mg IntraVENous Q10MIN PRN    ondansetron (ZOFRAN ODT) tablet 4 mg  4 mg Oral Q4H PRN    diphenhydrAMINE (BENADRYL) capsule 25 mg  25 mg Oral Q4H PRN    senna-docusate (PERICOLACE) 8.6-50 mg per tablet 2 Tablet  2 Tablet Oral DAILY    0.9% sodium chloride infusion  75 mL/hr IntraVENous CONTINUOUS    traMADoL (ULTRAM) tablet 50 mg  50 mg Oral Q6H PRN    ondansetron (ZOFRAN ODT) tablet 8 mg  8 mg Oral Q8H PRN    diphenhydrAMINE (BENADRYL) capsule 25 mg  25 mg Oral Q6H PRN    atorvastatin (LIPITOR) tablet 40 mg  40 mg Oral QHS    [Held by provider] glipiZIDE (GLUCOTROL) tablet 2.5 mg  2.5 mg Oral ACB    levothyroxine (SYNTHROID) tablet 50 mcg  50 mcg Oral ACB    pantoprazole (PROTONIX) tablet 40 mg  40 mg Oral ACB    simethicone (MYLICON) tablet 80 mg  80 mg Oral QID PRN    dorzolamide-timoloL (COSOPT) 22.3-6.8 mg/mL ophthalmic solution 1 Drop  1 Drop Right Eye BID    lip protectant (BLISTEX) ointment 1 Each  1 Each Topical PRN       Signed:  Jerry Lua MD    Part of this note may have been written by using a voice dictation software.   The note has been proof read but may still contain some grammatical/other typographical errors.

## 2021-12-13 NOTE — PROGRESS NOTES
Patient called out for assistance to bathroom. After standing up out of bed, 2nd hearing aide found and placed on charging dock with other hearing aide. Notified patient both hearing aides are charging on bedside table. Verbal understanding noted.

## 2021-12-13 NOTE — PROGRESS NOTES
VANCO DAILY FOLLOW UP RENAL INSUFFICIENCY PATIENT   [address] [facility] Pharmacokinetic Monitoring Service - Vancomycin    Consulting Provider: Cynthia Chavez   Indication: Infected total knee (bone and joint)  Target Concentration: Random level ? 15 mg/L  Day of Therapy: 4  Additional Antimicrobials: none    Pertinent Laboratory Values: Wt Readings from Last 1 Encounters:   12/10/21 78 kg (172 lb)     Temp Readings from Last 1 Encounters:   12/13/21 98.2 °F (36.8 °C)     Recent Labs     12/13/21  0543 12/12/21  0338 12/11/21  0403   BUN 36* 30* 22   CREA 1.81* 1.48 1.23   WBC 8.3 9.0 4.3       Lab Results   Component Value Date/Time    Vancomycin, random 15.0 12/13/2021 05:43 AM       MRSA Nasal Swab: N/A. Non-respiratory infection. .    Assessment:  Date:  Dose/Freq Admin Times Level/Time:   12/10 1250mg x 1 1533    12/11 1000mg x 1 1744    12/12 No dose  03:38 19.7   12/13 1250mg x 1 (12) 05:43  15           Plan:  Concentration-guided dosing due to renal impairment  Random level is therapeutic   Give vancomycin 1250mg x 1 dose  Vancomycin concentration ordered for 12/14 @ 0400  Pharmacy will continue to monitor patient and adjust therapy as indicated    Thank you for the consult,  SHAYLA Meyers

## 2021-12-13 NOTE — PROGRESS NOTES
After patient returned from bathroom, patient is adamant that both hearing aides be placed in hand and is refusing care until he has them in his possession, stating, \"they are $9000 hearing aides, I WANT THEM IN MY HAND! \" After unsuccessful redirection that both hearing aides were safer on the , both hearing aides placed into patient hand and notified patient would have a note in chart stating to have both hearing aides in personal possession and if they get lost, we were not liable. Verbal understanding noted.

## 2021-12-13 NOTE — PROGRESS NOTES
Problem: Falls - Risk of  Goal: *Absence of Falls  Description: Document Saray Counts Fall Risk and appropriate interventions in the flowsheet.   Outcome: Progressing Towards Goal  Note: Fall Risk Interventions:  Mobility Interventions: Bed/chair exit alarm, Communicate number of staff needed for ambulation/transfer, OT consult for ADLs, Patient to call before getting OOB, PT Consult for mobility concerns, PT Consult for assist device competence, Strengthening exercises (ROM-active/passive), Utilize walker, cane, or other assistive device    Mentation Interventions: Adequate sleep, hydration, pain control, Bed/chair exit alarm, Door open when patient unattended, More frequent rounding, Reorient patient, Update white board    Medication Interventions: Bed/chair exit alarm, Evaluate medications/consider consulting pharmacy, Patient to call before getting OOB, Teach patient to arise slowly    Elimination Interventions: Bed/chair exit alarm, Call light in reach, Elevated toilet seat, Patient to call for help with toileting needs, Urinal in reach    History of Falls Interventions: Bed/chair exit alarm, Consult care management for discharge planning, Door open when patient unattended, Evaluate medications/consider consulting pharmacy, Investigate reason for fall, Room close to nurse's station, Utilize gait belt for transfer/ambulation         Problem: Patient Education: Go to Patient Education Activity  Goal: Patient/Family Education  Outcome: Progressing Towards Goal     Problem: Patient Education: Go to Patient Education Activity  Goal: Patient/Family Education  Outcome: Progressing Towards Goal     Problem: Patient Education: Go to Patient Education Activity  Goal: Patient/Family Education  Outcome: Progressing Towards Goal     Problem: Pain  Goal: *Control of Pain  Outcome: Progressing Towards Goal  Goal: *PALLIATIVE CARE:  Alleviation of Pain  Outcome: Progressing Towards Goal     Problem: Patient Education: Go to Patient Education Activity  Goal: Patient/Family Education  Outcome: Progressing Towards Goal     Problem: Patient Education: Go to Patient Education Activity  Goal: Patient/Family Education  Outcome: Progressing Towards Goal

## 2021-12-13 NOTE — PROGRESS NOTES
Problem: Mobility Impaired (Adult and Pediatric)  Goal: *Acute Goals and Plan of Care (Insert Text)  Outcome: Progressing Towards Goal  Note: GOALS (1-4 days):  (1.)Mr. Philipp Thapa will move from supine to sit and sit to supine  in bed with STAND BY ASSIST.  (2.)Mr. Philipp Thapa will transfer from bed to chair and chair to bed with STAND BY ASSIST using the least restrictive device. (3.)Mr. Philipp Thapa will ambulate with STAND BY ASSIST for 350 feet with the least restrictive device. (4.)Mr. Philipp Thapa will increase left knee ROM to 0°-90°.  ________________________________________________________________________________________________        PHYSICAL THERAPY JOINT CAMP TKA: Daily Note and PM 12/13/2021  INPATIENT: Hospital Day: 5  [place] / [place] / Product Type: Medicare /      NAME/AGE/GENDER: Juan Ramon Diop is a 80 y.o. male   PRIMARY DIAGNOSIS:  Left knee infection   Procedure(s) and Anesthesia Type:     * INCISION AND DRAINAGE LEFT KNEE/ POLY EXCHANGE/ POSSIBLE PROSETHSIS EXPLANT W/ CEMENT SPACER - General     * POSS LEFT KNEE ARTHROPLASTY TOTAL - Spinal (Left  Left)  ICD-10: Treatment Diagnosis:    · Pain in Left Knee (M25.562)  · Stiffness of Left Knee, Not elsewhere classified (M25.662)  · Difficulty in walking, Not elsewhere classified (R26.2)      ASSESSMENT:     Mr. Philipp Thapa presents with decreased strength and range of motion left lower extremity and with decreased independence with functional mobility s/p left TKA. Pt will benefit from skilled PT interventions to maximize independence with functional mobility and TKA management. Pt did fairly well with assessment. He is slow and needs extra time to complete tasks. He was supine on contact. He transferred out of bed and ambulated to the bathroom for toileting then ambulated 175 ft in the hallways with RW and CGA. He returned to the room and sat in the recliner to perform therex. Pt instructed not to get up without assistance.  Hope to progress mobility and exercises in the morning. Pt plans to discharge to rehab with continued therapy for follow up. He wants to go to [facility] and is wanting to talk with the SW.  12/11- up in chair with daughter present. He was more sore this afternoon but agreeable to therapy. Co treat with OT due to increased need for mobility. He ambulated 100 ft with RW and CGA then returned to the room and went to the bathroom and then showered. He practiced scooting, sitting/ standing balance and sit to stand while showering then returned to the chair to kaylin his shoes. Left up in room with OT for grooming. Progressing therapy today. 12/12- patient hallucinating bugs today. Supine on contact and agreeable to therapy. Patient is pleasant but extra time needed to complete all tasks with therapy due to being easily distracted and perseveration on tasks. He transferred supine to sit insisting on using the trapeze bar even though I advised he would do better without it. He discovered he was unable to get up with the bar so he used the rails and required Min assist. Sit to stand with Mod assist and then ambulated into the bathroom for brushing teeth and combing hair. Ambulated 130 ft with RW and CGA then returned to the room and transferred into the chair for therex. Needed frequent breaks with therex, as RN was changing his central line dressing during therapy. He completed therex and ice was applied. Left up in chair awaiting tele-neurology consult for dysarthria. Progressed with gait distance today. PM- sitting on EOB with nursing wanting to go to the bathroom. Ambulated with therapist to the bathroom where he toileted, washed hands and combed his hair. He is very short of breath this afternoon with activity. He ambulated 80 ft with RW and then returned to the room. Practiced sit to stand multiple times to kaylin brief then returned supine and positioned for comfort. Tele-neuro back on to see patient this afternoon.    12/13 sitting in the chair with alarm on. Therapist had to keep redirecting him during L knee exercises in the chair. Work on sit>stand with Mod A. Then walk 90 ft using RW with CGA and verbal cues. Rested few min then headed back to the room. Return to the chair with needs in reach, alarm on, ice to L knee and instructed to call for assist.  Going to rehab.  12/13 pm sitting in the chair upon arrival.  Performs B knee exercises with guidance. Then walk 30 ft to the stretcher using RW with CGA and verbal cues. Got on the stretcher with Min A with B LE. Left to go down stairs. This section established at most recent assessment   PROBLEM LIST (Impairments causing functional limitations):  1. Decreased Strength  2. Decreased ADL/Functional Activities  3. Decreased Transfer Abilities  4. Decreased Ambulation Ability/Technique  5. Decreased Flexibility/Joint Mobility  6. Edema/Girth  7. Decreased Carbon with Home Exercise Program   INTERVENTIONS PLANNED: (Benefits and precautions of physical therapy have been discussed with the patient.)  1. Bed Mobility  2. Cold  3. Gait Training  4. Home Exercise Program (HEP)  5. Range of Motion (ROM)  6. Therapeutic Activites  7. Therapeutic Exercise/Strengthening  8. Transfer Training     TREATMENT PLAN: Frequency/Duration: Follow patient BID for duration of hospital stay to address above goals. Rehabilitation Potential For Stated Goals: Good     RECOMMENDED REHABILITATION/EQUIPMENT: (at time of discharge pending progress): Continue Skilled Therapy and Rehab.               HISTORY:   History of Present Injury/Illness (Reason for Referral):  Pt s/p total knee arthroplasty revision on L LE    Past Medical History/Comorbidities:   Mr. Manda Rodriguez  has a past medical history of Acute encephalopathy (12/12/2021), Anemia (fall 2016), Arthritis, Atrial fibrillation ([facility] [address] (12/10/2021), CAD (coronary artery disease) (1997), Carotid stenosis (04/2017), Coagulopathy ([address] (12/10/2021), Diabetes ([address] (05/1997), Diverticulosis (12/2011), GERD (gastroesophageal reflux disease), Glaucoma, Heart murmur, Hiatal hernia, History of kidney stones, Hypercholesteremia, Hypertension, Hypothyroid, Infection of total joint prosthesis ([address] (12/9/2021), MI (myocardial infarction) ([address] TIA (transient ischemic attack) (07/2016), Unspecified sleep apnea, Urethral stenosis, and Vasovagal syncope. He also has no past medical history of Adverse effect of anesthesia, Aneurysm ([address] Asthma, Autoimmune disease ([address] Cancer ([address] Chronic kidney disease, Chronic pain, COPD, Dementia, Difficult intubation, Endocarditis, Gastrointestinal disorder, Heart failure ([address] Ill-defined condition, Liver disease, Malignant hyperthermia due to anesthesia, Morbid obesity ([address] Nausea & vomiting, Other ill-defined conditions(799.89), Pseudocholinesterase deficiency, Psychiatric disorder, PUD (peptic ulcer disease), Rheumatic fever, Seizures ([address] Sleep disorder, Stroke ([address] or Thromboembolus ([address] Mr. Aubree Schroeder  has a past surgical history that includes hx heart catheterization (2327,2587, 2012, 2013); hx tonsillectomy (1938); hx cataract removal (1987 and 1990); hx lap cholecystectomy (2003); hx hernia repair (1944); hx urological (1996); hx orthopaedic (Left); hx orthopaedic (7760-4861); hx shoulder arthroscopy (Left, 2015); hx knee replacement (Bilateral, 2002, 2008); hx hemorrhoidectomy (1987); hx heent (Right); hx carpal tunnel release (Right, 1968); hx carpal tunnel release (Left, 2012); hx bunionectomy (Left); hx rotator cuff repair (Right, 1995); hx rotator cuff repair (Left, 2000); hx rotator cuff repair (Right, 2007); and hx total colectomy (01/13/2017). Social History/Living Environment:   Living Alone: Yes  Support Systems: Child(wojciech)  Patient Expects to be Discharged to[de-identified] Rehabilitation facility  Prior Level of Function/Work/Activity:  Independent prior to admit. Lives alone.  May have caregivers that assist at home but unsure how frequently   Number of Personal Factors/Comorbidities that affect the Plan of Care: 0: LOW COMPLEXITY   EXAMINATION:   Most Recent Physical Functioning:                                 Transfers  Sit to Stand: Moderate assistance  Stand to Sit: Minimum assistance  Bed to Chair:  (stretcher to for x-ray)    Balance  Sitting: Intact; High guard  Standing: Pull to stand; With support              Weight Bearing Status  Left Side Weight Bearing: As tolerated  Distance (ft): 30 Feet (ft) (to stretcher for x-ray)  Ambulation - Level of Assistance: Contact guard assistance  Assistive Device: Walker, rolling  Speed/Tracy: Pace decreased (<100 feet/min)  Step Length: Right shortened  Stance: Left decreased  Gait Abnormalities: Antalgic; Decreased step clearance  Interventions: Safety awareness training     Braces/Orthotics: none    Left Knee Cold  Type: Cryocuff      Body Structures Involved:  1. Joints  2. Muscles Body Functions Affected:  1. Movement Related Activities and Participation Affected:  1. Mobility  2. Self Care   Number of elements that affect the Plan of Care: 4+: HIGH COMPLEXITY   CLINICAL PRESENTATION:   Presentation: Stable and uncomplicated: LOW COMPLEXITY   CLINICAL DECISION MAKIN[address] [place] [address] AM-PAC 6 Clicks   Basic Mobility Inpatient Short Form  How much difficulty does the patient currently have. .. Unable A Lot A Little None   1. Turning over in bed (including adjusting bedclothes, sheets and blankets)? [] 1   [] 2   [x] 3   [] 4   2. Sitting down on and standing up from a chair with arms ( e.g., wheelchair, bedside commode, etc.)   [] 1   [] 2   [x] 3   [] 4   3. Moving from lying on back to sitting on the side of the bed? [] 1   [] 2   [x] 3   [] 4   How much help from another person does the patient currently need. .. Total A Lot A Little None   4. Moving to and from a bed to a chair (including a wheelchair)? [] 1   [] 2   [x] 3   [] 4   5.   Need to walk in hospital room? [] 1   [] 2   [x] 3   [] 4   6. Climbing 3-5 steps with a railing? [] 1   [] 2   [x] 3   [] 4   © 2007, Trustees of [address] [place] [address] [address] [id number], under license to [employer]. All rights reserved     Score:  Initial: 18 Most Recent: X (Date: -- )    Interpretation of Tool:  Represents activities that are increasingly more difficult (i.e. Bed mobility, Transfers, Gait). Medical Necessity:     · Patient is expected to demonstrate progress in   · strength, range of motion, and functional technique  ·  to   · decrease assistance required with functional mobility and TKA managment  · .  Reason for Services/Other Comments:  · Patient continues to require skilled intervention due to   · Inability to complete functional mobility and TKA management independently  · . Use of outcome tool(s) and clinical judgement create a POC that gives a: Clear prediction of patient's progress: LOW COMPLEXITY            TREATMENT:   (In addition to Assessment/Re-Assessment sessions the following treatments were rendered)     Pre-treatment Symptoms/Complaints:  no complaints  Pain Initial:   Pain Intensity 1: 0  Post Session:  0     Therapeutic Activity: (  15 Minutes ):  Therapeutic activities including bed mobility, sit to stand, Chair transfers, standing balance,, and  Ambulation on level groundto improve mobility, strength, and balance. Required minimal Safety awareness training to promote dynamic balance in standing. Therapeutic Exercise: (15 Minutes):  Exercises per grid below to improve strength and balance. Required minimal verbal cues to promote proper body alignment. Progressed range as indicated. Gait Training ( ):  Gait training to improve and/or restore physical functioning as related to mobility.   Ambulated 30 Feet (ft) (to stretcher for x-ray) with Contact guard assistance using a Walker, rolling and minimal Safety awareness training related to their knee position and motion to promote proper body alignment. Date:  12/11 Date:  12/12 Date:  12/13     ACTIVITY/EXERCISE AM PM AM PM AM PM     []  []  []  []  []  []   Ankle Pumps 15  15  20 20   Quad Sets 15  15  20 20   Gluteal Sets 15  15  20 20   Hip ABd/ADduction 15  15  20 20   Straight Leg Raises 15  15  20 20   Knee Slides 15  15  20 20   Short Arc Quads 15  15  20 20   Chair Slides     20                      B = bilateral; AA = active assistive; A = active; P = passive      Treatment/Session Assessment:     Response to Treatment:  Tolerated well, did better this afternoon. Education:  [x] Home Exercises  [x] Fall Precautions  [x] Use of Cold Therapy Unit [] D/C Instruction Review  [] Knee Prosthesis Review  [x] Walker Management/Safety [] Adaptive Equipment as Needed  [x] No pillow under knee       Interdisciplinary Collaboration:   o Physical Therapy Assistant  o Registered Nurse    After treatment position/precautions:   o RN notified  o gone down stairs for x-ray    Compliance with Program/Exercises: Compliant all of the time. Recommendations/Intent for next treatment session:  Treatment next visit will focus on increasing Mr. Yue Kyle independence with bed mobility, transfers, gait training, strength/ROM exercises, modalities for pain, and patient education.       Total Treatment Duration:  PT Patient Time In/Time Out  Time In: 1300  Time Out: 4801 Wilfrid Ramey PTA

## 2021-12-13 NOTE — PROGRESS NOTES
LTG: Patient will tolerate least restrictive diet without overt signs or symptoms of airway compromise. STG: Patient will tolerate regular diet and moderately thick liquids by cup without overt signs or symptoms of airway compromise. STG: Patient will participate in modified barium swallow study as clinically indicated. SPEECH LANGUAGE PATHOLOGY: MODIFIED BARIUM SWALLOW STUDY  Initial Assessment    NAME/AGE/GENDER: Juan Ramon Diop is a 80 y.o. 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 Torin Storm  PRECAUTIONS/ALLERGIES: Acetazolamide and Benzonatate     RECOMMENDATIONS/PLAN   DIET:    Regular Consistency   Moderately Thick Liquids (Honey)    Can have water (thin) by cup only between meals for pleasure    MEDICATIONS: floated or crushed in puree/applesauce     COMPENSATORY STRATEGIES/MODIFICATIONS INCLUDING:  · Fully awake/alert  · Small bites and sips  · Cup/sip  · No straws  · Remain upright for 20-30 min after any PO     OTHER RECOMMENDATIONS (including follow up treatment recommendations):   · Training in laryngeal strengthening and coordination exercises  · Training in use of compensatory safe swallowing strategies/feeding guidelines  · Patient/family education  · Follow-up MBS as deemed necessary following therapy     RECOMMENDATIONS for CONTINUED SPEECH THERAPY:   YES: Anticipate need for ongoing speech therapy during this hospitalization and at next level of care. FREQUENCY/DURATION: Continue to follow patient 3 times a week for duration of hospital stay to address above goals.  Recommendations for next treatment session: Next treatment session will address diet tolerance and dysphagia exercises       ASSESSMENT   Mr. Radha Tao presents with mild oral and moderate pharyngeal dysphagia. Slowed oral prep and bolus propulsion with pudding and chewables with patient rocking bolus anteriorly/posteriorly prior to swallow. Reduced tongue base retraction, epiglottic inversion, hyolaryngeal elevation/excursion and delayed swallow initiation resulted in nonclearing penetration with thin and mildly thick liquids by cup. Thin by straw spilled to laryngeal vestibule pre-swallow and was SILENTLY aspirated during the swallow. No aspiration/penetration occurred with moderately thick by cup, pudding, mixed, or cracker. Mild vallecular residue post swallow with moderately thick, pudding, and cracker. Recommend regular consistency diet and moderately thick liquids (HONEY). Meds floated or crushed in puree. Will continue to follow for diet tolerance and laryngeal strengthening exercises. Consider ENT consult for direct visualization of vocal fold if hoarseness persist.     SUBJECTIVE   Patient alert upright in [facility] chair for assessment. Friendly and talkative. Confused stating, \"you use to work for me. \"    History of Present Injury/Illness: Mr. Radha Tao  has a past medical history of Acute encephalopathy (12/12/2021), Anemia (fall 2016), Arthritis, Atrial fibrillation ([address] (12/10/2021), CAD (coronary artery disease) (1997), Carotid stenosis (04/2017), Coagulopathy ([address] (12/10/2021), Diabetes ([address] (05/1997), Diverticulosis (12/2011), GERD (gastroesophageal reflux disease), Glaucoma, Heart murmur, Hiatal hernia, History of kidney stones, Hypercholesteremia, Hypertension, Hypothyroid, Infection of total joint prosthesis ([address] (12/9/2021), MI (myocardial infarction) ([address] TIA (transient ischemic attack) (07/2016), Unspecified sleep apnea, Urethral stenosis, and Vasovagal syncope.  He also has no past medical history of Adverse effect of anesthesia, Aneurysm ([address] Asthma, Autoimmune disease ([facility] Cancer ([facility] Chronic kidney disease, Chronic pain, COPD, Dementia, Difficult intubation, Endocarditis, Gastrointestinal disorder, Heart failure ([address] Ill-defined condition, Liver disease, Malignant hyperthermia due to anesthesia, Morbid obesity ([address] Nausea & vomiting, Other ill-defined conditions(799.89), Pseudocholinesterase deficiency, Psychiatric disorder, PUD (peptic ulcer disease), Rheumatic fever, Seizures ([address] Sleep disorder, Stroke ([address] or Thromboembolus ([facility] . He also  has a past surgical history that includes hx heart catheterization (3996,8425, 2012, 2013); hx tonsillectomy (1938); hx cataract removal (1987 and 1990); hx lap cholecystectomy (2003); hx hernia repair (1944); hx urological (1996); hx orthopaedic (Left); hx orthopaedic (4892-0727); hx shoulder arthroscopy (Left, 2015); hx knee replacement (Bilateral, 2002, 2008); hx hemorrhoidectomy (1987); hx heent (Right); hx carpal tunnel release (Right, 1968); hx carpal tunnel release (Left, 2012); hx bunionectomy (Left); hx rotator cuff repair (Right, 1995); hx rotator cuff repair (Left, 2000); hx rotator cuff repair (Right, 2007); and hx total colectomy (01/13/2017). Pain:  Pain Intensity 1: 0  Pain Location 1: Leg, Knee  Pain Orientation 1: Left  Pain Intervention(s) 1: Medication (see MAR)    Current dietary status prior to evaluation today:  Regular/thin    Previous Modified Barium Swallow studies: N/a    Current Medications:   No current facility-administered medications on file prior to encounter. Current Outpatient Medications on File Prior to Encounter   Medication Sig Dispense Refill    apixaban (Eliquis) 5 mg tablet Take 5 mg by mouth two (2) times a day.  simethicone (GAS-X) 125 mg capsule Take 125 mg by mouth four (4) times daily as needed for Flatulence.  loperamide (IMODIUM) 2 mg capsule Take 2 mg by mouth four (4) times daily as needed for Diarrhea.       diphenoxylate-atropine (LOMOTIL) 2.5-0.025 mg per tablet Take 1 Tab by mouth two (2) times daily as needed for Diarrhea.  atorvastatin (LIPITOR) 40 mg tablet Take 40 mg by mouth nightly.  levothyroxine (SYNTHROID) 50 mcg tablet Take 50 mcg by mouth Daily (before breakfast).  Psyllium Husk-Sucrose 3.4 gram/7 gram powd Take 3 Caps by mouth two (2) times a day.  omega-3 fatty acids-vitamin e (FISH OIL) 1,000 mg cap Take 2 Caps by mouth daily.  bimatoprost (LUMIGAN) 0.01 % ophthalmic drops Administer 1 Drop to right eye nightly.  timolol (TIMOPTIC) 0.5 % ophthalmic solution Administer 1 Drop to right eye every morning.  Omeprazole delayed release (PRILOSEC D/R) 20 mg tablet Take 20 mg by mouth Daily (before dinner).  meloxicam (MOBIC) 15 mg tablet Take 15 mg by mouth daily. Indications: OSTEOARTHRITIS      brimonidine (ALPHAGAN P) 0.1 % ophthalmic solution Administer 1 Drop to right eye two (2) times a day. Take / use AM day of surgery  per anesthesia protocols.  glimepiride (AMARYL) 2 mg tablet Take 1 mg by mouth daily. Indications: type 2 diabetes mellitus      b complex vitamins tablet Take 1 Tab by mouth daily.  aspirin 81 mg Tab Take 81 mg by mouth daily. Take / use 81 mg AM day of surgery  per anesthesia protocols. OBJECTIVE   Orientation:    Person   Place    Oral Assessment:  Labial: No impairment  Lingual: No impairment  Dentition: Natural and Intact  Oral Hygiene: Adequate  Vocal Quality: Hoarse and Strained    Modified barium swallow study was performed in the radiology suite using c-arm with Mr. Kassie Figueredo in the lateral plane seated upright 90° in the [facility] chair. To evaluate his swallow function, barium coated liquid and food was administered in the form of thin liquids (by spoon, cup sip, cup gulp and straw sip), mildly-thick liquids (by spoon, cup sip and cup gulp), moderately-thick liquids (by spoon, cup sip and cup gulp), pudding, mixed consistency and cracker.     Oral phase of swallow:  prolonged mastication   slow oral transit    Pharyngeal phase of swallow:    Thin by teaspoon- swallow triggered at posterior epiglottis. No aspiration/penetration.  Thin by cup- swallow triggered at pyriforms. Penetration before the swallow, which did not completely clear. Remained above cords   Thin by straw- spilled to laryngeal vestibule pre-swallow. Swallow triggered at pyriforms. SILENT aspiration during the swallow   Mildly thick by teaspoon- swallow triggered at pyriforms. No aspiration/penetration.  Mildly thick by cup (single sip)- swallow triggered at pyriforms. Transient penetration during swallow.  Mildly thick by cup- swallow triggered at pyriforms. Penetration pre-swallow, which did not clear.  Moderately thick by teaspoon/cup- triggered at vallecula. No aspiration/penetration.  Moderately thick by straw- difficulty drawing liquid throughout straw   Pudding- swallow triggered at vallecula. No aspiration/penetration. Mild vallecular residue post swallow   Mixed fruit- swallow triggered at vallecula. . No aspiration/penetration.  Cracker- swallow triggered at vallecula. No aspiration/penetration. Mild tongue base and vallecular residue post swallow    Pharyngeal characteristics:   delayed and decreased retraction of base of tongue   delayed and decreased hyolaryngeal elevation/excursion   delayed and decreased epiglottic inversion   adequate constriction of posterior pharyngeal wall   delayed and decreased laryngeal closure  Attempted strategies:    none  Effective strategies:    none  Aspiration/Penetration Scale: 7 (Aspiration. Contrast passes below the folds/glottis, but is not cleared despite attempt.)    Cervical esophageal phase of swallow:   signs of reduced bolus clearance through cervical esophagus    Minimal retrograde flow to pyriforms post swallow with liquids and with cracker.   **Distal esophagus not assessed due to limitations of MBS study.    Assessment/Reassessment only, no treatment provided today. Tool Used: Dysphagia Outcome and Severity Scale (JOSEPH)    Comments   Normal Diet With no strategies or extra time needed   Functional Swallow May have mild oral or pharyngeal delay   Mild Dysphagia Which may require one diet consistency restricted    Mild-Moderate Dysphagia With 1-2 diet consistencies restricted   Moderate Dysphagia With 2 or more diet consistencies restricted   Moderately Severe Dysphagia With partial PO strategies (trials with ST only)   Severe Dysphagia With inability to tolerate any PO safely      Score:  Initial: 4 Most Recent: x (Date:12/13/2021)   Interpretation of Tool: The Dysphagia Outcome and Severity Scale (JOSEPH) is a simple, easy-to-use, 7-point scale developed to systematically rate the functional severity of dysphagia based on objective assessment and make recommendations for diet level, independence level, and type of nutrition. [place] / [place] / Product Type: Medicare /     Education:  · Recommendations discussed with patient, patient's daughter, RN, and MD.    Safety:   After treatment position/precautions:  · Patient waiting in radiology holding bay for transport back to room.     Total Treatment Duration:  Time In: 1330   Time Out: [address] [facility] [address], [id number] [facility]

## 2021-12-13 NOTE — PROGRESS NOTES
December 13, 2021         Post Op day: 3 Days Post-OpProcedure(s) (LRB):  INCISION AND DRAINAGE LEFT KNEE/ POLY EXCHANGE/ POSSIBLE PROSETHSIS EXPLANT W/ CEMENT SPACER (Left)  POSS LEFT KNEE ARTHROPLASTY TOTAL (Left)      Admit Date: 12/9/2021  Admit Diagnosis: Infection of total joint prosthesis ([address] [T84.50XA]    LAB:    Recent Results (from the past 24 hour(s))   GLUCOSE, POC    Collection Time: 12/12/21 10:58 AM   Result Value Ref Range    Glucose (POC) 194 (H) 65 - 100 mg/dL    Performed by Ayala    CULTURE, BLOOD    Collection Time: 12/12/21  1:04 PM    Specimen: Blood   Result Value Ref Range    Special Requests: RIGHT  Antecubital        Culture result: NO GROWTH AFTER 18 HOURS     CULTURE, BLOOD    Collection Time: 12/12/21  3:31 PM    Specimen: Blood   Result Value Ref Range    Special Requests: JUGULAR VEIN  WHITE PORT        Culture result: NO GROWTH AFTER 15 HOURS     GLUCOSE, POC    Collection Time: 12/13/21 12:07 AM   Result Value Ref Range    Glucose (POC) 217 (H) 65 - 100 mg/dL    Performed by AbercrombieTaylorBSN    PROTHROMBIN TIME + INR    Collection Time: 12/13/21  5:43 AM   Result Value Ref Range    Prothrombin time 24.4 (H) 12.6 - 14.5 sec    INR 2.2     METABOLIC PANEL, BASIC    Collection Time: 12/13/21  5:43 AM   Result Value Ref Range    Sodium 134 (L) 136 - 145 mmol/L    Potassium 4.4 3.5 - 5.1 mmol/L    Chloride 106 98 - 107 mmol/L    CO2 22 21 - 32 mmol/L    Anion gap 6 (L) 7 - 16 mmol/L    Glucose 183 (H) 65 - 100 mg/dL    BUN 36 (H) 8 - 23 MG/DL    Creatinine 1.81 (H) 0.8 - 1.5 MG/DL    GFR est AA 46 (L) >60 ml/min/1.73m2    GFR est non-AA 38 (L) >60 ml/min/1.73m2    Calcium 9.7 8.3 - 10.4 MG/DL   CBC WITH AUTOMATED DIFF    Collection Time: 12/13/21  5:43 AM   Result Value Ref Range    WBC 8.3 4.3 - 11.1 K/uL    RBC 2.94 (L) 4.23 - 5.6 M/uL    HGB 9.1 (L) 13.6 - 17.2 g/dL    HCT 27.4 (L) 41.1 - 50.3 %    MCV 93.2 79.6 - 97.8 FL    MCH 31.0 26.1 - 32.9 PG    MCHC 33.2 31.4 - 35.0 g/dL    RDW 15.4 (H) 11.9 - 14.6 %    PLATELET 261 207 - 963 K/uL    MPV 12.4 (H) 9.4 - 12.3 FL    ABSOLUTE NRBC 0.00 0.0 - 0.2 K/uL    DF AUTOMATED      NEUTROPHILS 72 43 - 78 %    LYMPHOCYTES 12 (L) 13 - 44 %    MONOCYTES 16 (H) 4.0 - 12.0 %    EOSINOPHILS 0 (L) 0.5 - 7.8 %    BASOPHILS 0 0.0 - 2.0 %    IMMATURE GRANULOCYTES 1 0.0 - 5.0 %    ABS. NEUTROPHILS 5.9 1.7 - 8.2 K/UL    ABS. LYMPHOCYTES 1.0 0.5 - 4.6 K/UL    ABS. MONOCYTES 1.3 0.1 - 1.3 K/UL    ABS. EOSINOPHILS 0.0 0.0 - 0.8 K/UL    ABS. BASOPHILS 0.0 0.0 - 0.2 K/UL    ABS. IMM. GRANS. 0.0 0.0 - 0.5 K/UL   VANCOMYCIN, RANDOM    Collection Time: 21  5:43 AM   Result Value Ref Range    Vancomycin, random 15.0 UG/ML   GLUCOSE, POC    Collection Time: 21  6:08 AM   Result Value Ref Range    Glucose (POC) 196 (H) 65 - 100 mg/dL    Performed by AbercrombieTaylorBSN      Vital Signs:    Patient Vitals for the past 8 hrs:   BP Temp Pulse Resp SpO2   21 0400   (!) 104     21 0318 (!) 158/69 98.2 °F (36.8 °C) 84 18 92 %   21 0000   82       Temp (24hrs), Av.1 °F (36.7 °C), Min:97.5 °F (36.4 °C), Max:98.6 °F (37 °C)    Body mass index is 26.94 kg/m². Pain Control:   Pain Assessment  Pain Scale 1: Numeric (0 - 10)  Pain Intensity 1: 0  Pain Location 1: Leg, Knee  Pain Orientation 1: Left  Pain Intervention(s) 1: Medication (see MAR)    Subjective:  No complaints. Objective: Vital Signs are Stable, No Acute Distress, still confused, Dressing is dry,  Neurovascular intact.        PT/OT:            Assistive Device: Walker (comment)                Wieght Bearing Status: WBAT    Meds:  [unfilled]  [unfilled]  [unfilled]    Assessment:   Patient Active Problem List   Diagnosis Code    Abscess of foot L02.619    Instability of prosthetic shoulder joint ([facility] T84.028A, Z96.619    Rotator cuff tear arthropathy, left M75.102, M12.812    S/P shoulder hemiarthroplasty, left C90.098    Infection of total joint prosthesis [facility]) T84.50XA    Atrial fibrillation ([facility] I48.91    Hyperglycemia due to type 2 diabetes mellitus ([facility] E11.65    Hyperlipidemia E78.5    Acquired hypothyroidism E03.9    Hearing loss H91.90    Coagulopathy (HCC) D68.9    Status post revision of total knee replacement, left D52.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 MRI brain and barium swallow ordered. Echo results pending. Final ID rec's pendings. Following cultures. Staph growing. Written as scribe for Dr. J Luis Vazquez.         Signed By: GABE Sanchez

## 2021-12-14 NOTE — PROGRESS NOTES
SPEECH PATHOLOGY NOTE:    Attempted to see patient for dysphagia management this PM; however, patient politely declined treatment at this time stating he is exhausted and needs to rest. Daughter at bedside. Will follow up at later date.       Monica Zhu MS, CCC-SLP

## 2021-12-14 NOTE — PROGRESS NOTES
Spoke with Eveline Reyes rn to inform her that due to large number of picc orders downtown we would place picc tomorrow.

## 2021-12-14 NOTE — PROGRESS NOTES
Problem: Mobility Impaired (Adult and Pediatric)  Goal: *Acute Goals and Plan of Care (Insert Text)  Outcome: Progressing Towards Goal  Note: GOALS (1-4 days):  (1.)Mr. Leyla Mera will move from supine to sit and sit to supine  in bed with STAND BY ASSIST.  (2.)Mr. Leyla Mera will transfer from bed to chair and chair to bed with STAND BY ASSIST using the least restrictive device. (3.)Mr. Leyla Mera will ambulate with STAND BY ASSIST for 350 feet with the least restrictive device. (4.)Mr. Leyla Mera will increase left knee ROM to 0°-90°.  ________________________________________________________________________________________________        PHYSICAL THERAPY JOINT CAMP TKA: Daily Note and AM 12/14/2021  INPATIENT: Hospital Day: 6  [place] / [place] / Product Type: Medicare /      NAME/AGE/GENDER: Kera Jang is a 80 y.o. male   PRIMARY DIAGNOSIS:  Left knee infection   Procedure(s) and Anesthesia Type:     * INCISION AND DRAINAGE LEFT KNEE/ POLY EXCHANGE/ POSSIBLE PROSETHSIS EXPLANT W/ CEMENT SPACER - General     * POSS LEFT KNEE ARTHROPLASTY TOTAL - Spinal (Left  Left)  ICD-10: Treatment Diagnosis:    · Pain in Left Knee (M25.562)  · Stiffness of Left Knee, Not elsewhere classified (M25.662)  · Difficulty in walking, Not elsewhere classified (R26.2)      ASSESSMENT:     Mr. Leyla Mera presents with decreased strength and range of motion left lower extremity and with decreased independence with functional mobility s/p left TKA. Pt will benefit from skilled PT interventions to maximize independence with functional mobility and TKA management. Pt did fairly well with assessment. He is slow and needs extra time to complete tasks. He was supine on contact. He transferred out of bed and ambulated to the bathroom for toileting then ambulated 175 ft in the hallways with RW and CGA. He returned to the room and sat in the recliner to perform therex. Pt instructed not to get up without assistance.  Hope to progress mobility and exercises in the morning. Pt plans to discharge to rehab with continued therapy for follow up. He wants to go to [facility] and is wanting to talk with the SW.  12/11- up in chair with daughter present. He was more sore this afternoon but agreeable to therapy. Co treat with OT due to increased need for mobility. He ambulated 100 ft with RW and CGA then returned to the room and went to the bathroom and then showered. He practiced scooting, sitting/ standing balance and sit to stand while showering then returned to the chair to kaylin his shoes. Left up in room with OT for grooming. Progressing therapy today. 12/12- patient hallucinating bugs today. Supine on contact and agreeable to therapy. Patient is pleasant but extra time needed to complete all tasks with therapy due to being easily distracted and perseveration on tasks. He transferred supine to sit insisting on using the trapeze bar even though I advised he would do better without it. He discovered he was unable to get up with the bar so he used the rails and required Min assist. Sit to stand with Mod assist and then ambulated into the bathroom for brushing teeth and combing hair. Ambulated 130 ft with RW and CGA then returned to the room and transferred into the chair for therex. Needed frequent breaks with therex, as RN was changing his central line dressing during therapy. He completed therex and ice was applied. Left up in chair awaiting tele-neurology consult for dysarthria. Progressed with gait distance today. PM- sitting on EOB with nursing wanting to go to the bathroom. Ambulated with therapist to the bathroom where he toileted, washed hands and combed his hair. He is very short of breath this afternoon with activity. He ambulated 80 ft with RW and then returned to the room. Practiced sit to stand multiple times to kaylin brief then returned supine and positioned for comfort. Tele-neuro back on to see patient this afternoon.    12/13 sitting in the chair with alarm on. Therapist had to keep redirecting him during L knee exercises in the chair. Work on sit>stand with Mod A. Then walk 90 ft using RW with CGA and verbal cues. Rested few min then headed back to the room. Return to the chair with needs in reach, alarm on, ice to L knee and instructed to call for assist.  Going to rehab.  12/13 pm sitting in the chair upon arrival.  Performs B knee exercises with guidance. Then walk 30 ft to the stretcher using RW with CGA and verbal cues. Got on the stretcher with Min A with B LE. Left to go down stairs. 12/14 sitting in the chair upon arrival.    Performs L knee exercises with guidance, because he kept falling a sleep. Work on sit>stand with Mod A and (extra time). Did not walk, because we could not get to standing position. This section established at most recent assessment   PROBLEM LIST (Impairments causing functional limitations):  1. Decreased Strength  2. Decreased ADL/Functional Activities  3. Decreased Transfer Abilities  4. Decreased Ambulation Ability/Technique  5. Decreased Flexibility/Joint Mobility  6. Edema/Girth  7. Decreased Dahinda with Home Exercise Program   INTERVENTIONS PLANNED: (Benefits and precautions of physical therapy have been discussed with the patient.)  1. Bed Mobility  2. Cold  3. Gait Training  4. Home Exercise Program (HEP)  5. Range of Motion (ROM)  6. Therapeutic Activites  7. Therapeutic Exercise/Strengthening  8. Transfer Training     TREATMENT PLAN: Frequency/Duration: Follow patient BID for duration of hospital stay to address above goals. Rehabilitation Potential For Stated Goals: Good     RECOMMENDED REHABILITATION/EQUIPMENT: (at time of discharge pending progress): Continue Skilled Therapy and Rehab.               HISTORY:   History of Present Injury/Illness (Reason for Referral):  Pt s/p total knee arthroplasty revision on L LE    Past Medical History/Comorbidities:   Mr. 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 He also has no past medical history of Adverse effect of anesthesia, Aneurysm ([address] Asthma, Autoimmune disease ([address] Cancer ([address] Chronic kidney disease, Chronic pain, COPD, Dementia, Difficult intubation, Endocarditis, Gastrointestinal disorder, Heart failure ([address] Ill-defined condition, Liver disease, Malignant hyperthermia due to anesthesia, Morbid obesity ([address] Nausea & vomiting, Other ill-defined conditions(799.89), Pseudocholinesterase deficiency, Psychiatric disorder, PUD (peptic ulcer disease), Rheumatic fever, Seizures ([address] Sleep disorder, Stroke ([address] or Thromboembolus ([address] Mr. Melody Ruiz  has a past surgical history that includes hx heart catheterization (3675,1829, 2012, 2013); hx tonsillectomy (1938); hx cataract removal (1987 and 1990); hx lap cholecystectomy (2003); hx hernia repair (1944); hx urological (1996); hx orthopaedic (Left); hx orthopaedic (2665-1666); hx shoulder arthroscopy (Left, 2015); hx knee replacement (Bilateral, 2002, 2008); hx hemorrhoidectomy (1987); hx heent (Right); hx carpal tunnel release (Right, 1968); hx carpal tunnel release (Left, 2012); hx bunionectomy (Left); hx rotator cuff repair (Right, 1995); hx rotator cuff repair (Left, 2000); hx rotator cuff repair (Right, 2007); and hx total colectomy (01/13/2017).   Social History/Living Environment: Living Alone: Yes  Support Systems: Child(wojciech)  Patient Expects to be Discharged to[de-identified] Rehabilitation facility  Prior Level of Function/Work/Activity:  Independent prior to admit. Lives alone. May have caregivers that assist at home but unsure how frequently   Number of Personal Factors/Comorbidities that affect the Plan of Care: 0: LOW COMPLEXITY   EXAMINATION:   Most Recent Physical Functioning:                                 Transfers  Sit to Stand: Moderate assistance; Additional time (having hard time sit>stand)  Stand to Sit: Moderate assistance; Additional time    Balance  Sitting: Intact; High guard  Standing: Pull to stand; With support              Weight Bearing Status  Left Side Weight Bearing: As tolerated           Braces/Orthotics: none           Body Structures Involved:  1. Joints  2. Muscles Body Functions Affected:  1. Movement Related Activities and Participation Affected:  1. Mobility  2. Self Care   Number of elements that affect the Plan of Care: 4+: HIGH COMPLEXITY   CLINICAL PRESENTATION:   Presentation: Stable and uncomplicated: LOW COMPLEXITY   CLINICAL DECISION MAKIN [facility] [address] [id number] AM-PAC 6 Clicks   Basic Mobility Inpatient Short Form  How much difficulty does the patient currently have. .. Unable A Lot A Little None   1. Turning over in bed (including adjusting bedclothes, sheets and blankets)? [] 1   [] 2   [x] 3   [] 4   2. Sitting down on and standing up from a chair with arms ( e.g., wheelchair, bedside commode, etc.)   [] 1   [] 2   [x] 3   [] 4   3. Moving from lying on back to sitting on the side of the bed? [] 1   [] 2   [x] 3   [] 4   How much help from another person does the patient currently need. .. Total A Lot A Little None   4. Moving to and from a bed to a chair (including a wheelchair)? [] 1   [] 2   [x] 3   [] 4   5. Need to walk in hospital room? [] 1   [] 2   [x] 3   [] 4   6. Climbing 3-5 steps with a railing?    [] 1   [] 2   [x] 3   [] 4   © 2007, Trustees of [address] [place] [address] [address] [id number], under license to [employer]. All rights reserved     Score:  Initial: 18 Most Recent: X (Date: -- )    Interpretation of Tool:  Represents activities that are increasingly more difficult (i.e. Bed mobility, Transfers, Gait). Medical Necessity:     · Patient is expected to demonstrate progress in   · strength, range of motion, and functional technique  ·  to   · decrease assistance required with functional mobility and TKA managment  · .  Reason for Services/Other Comments:  · Patient continues to require skilled intervention due to   · Inability to complete functional mobility and TKA management independently  · . Use of outcome tool(s) and clinical judgement create a POC that gives a: Clear prediction of patient's progress: LOW COMPLEXITY            TREATMENT:   (In addition to Assessment/Re-Assessment sessions the following treatments were rendered)     Pre-treatment Symptoms/Complaints:  no complaints  Pain Initial:   Pain Intensity 1: 1 (same after therapy)  Post Session:  0     Therapeutic Activity: (  15 Minutes ):  Therapeutic activities including bed mobility, sit to stand, Chair transfers, standing balance,, and  Ambulation on level groundto improve mobility, strength, and balance. Required minimal   to promote dynamic balance in standing. Therapeutic Exercise: (15 Minutes):  Exercises per grid below to improve strength and balance. Required minimal verbal cues to promote proper body alignment. Progressed range as indicated. Gait Training ( ):  Gait training to improve and/or restore physical functioning as related to mobility. Ambulated   with   using a   and minimal   related to their knee position and motion to promote proper body alignment.            Date:  12/11 Date:  12/12 Date:  12/13   Date:  12/14    ACTIVITY/EXERCISE AM PM AM PM AM PM       []  []  []  []  []  []     Ankle Pumps 15  15  20 20 20    Quad Sets 15  15  20 20 20    Gluteal Sets 15  15 20 20 20    Hip ABd/ADduction 15  15  20 20 20 aa    Straight Leg Raises 15  15  20 20 20 aa    Knee Slides 15  15  20 20 20 aa    Short Arc Quads 15  15  20 20 20 aa    Chair Slides     20  20                          B = bilateral; AA = active assistive; A = active; P = passive      Treatment/Session Assessment:     Response to Treatment:  Pt kept falling a sleep    Education:  [x] Home Exercises  [x] Fall Precautions  [x] Use of Cold Therapy Unit [] D/C Instruction Review  [] Knee Prosthesis Review  [x] Walker Management/Safety [] Adaptive Equipment as Needed  [x] No pillow under knee       Interdisciplinary Collaboration:   o Physical Therapy Assistant  o Registered Nurse    After treatment position/precautions:   o RN notified  o gone down stairs for x-ray    Compliance with Program/Exercises: Compliant all of the time. Recommendations/Intent for next treatment session:  Treatment next visit will focus on increasing Mr. Reji Tarango independence with bed mobility, transfers, gait training, strength/ROM exercises, modalities for pain, and patient education.       Total Treatment Duration:  PT Patient Time In/Time Out  Time In: 0945  Time Out: 92 Arash Ramey PTA

## 2021-12-14 NOTE — PROGRESS NOTES
December 14, 2021         Post Op day: 4 Days Post-OpProcedure(s) (LRB):  INCISION AND DRAINAGE LEFT KNEE/ POLY EXCHANGE/ POSSIBLE PROSETHSIS EXPLANT W/ CEMENT SPACER (Left)  POSS LEFT KNEE ARTHROPLASTY TOTAL (Left)      Admit Date: 12/9/2021  Admit Diagnosis: Infection of total joint prosthesis (HCC) [T84.50XA]    LAB:    Recent Results (from the past 24 hour(s))   ECHO ADULT COMPLETE    Collection Time: 12/13/21 10:45 AM   Result Value Ref Range    LA Minor Axis 6.7 cm    LA Major Axis 7.1 cm    LA Area 2C 30.1 cm2    LA Area 4C 31.8 cm2    LA Volume  (A) 18 - 58 mL    LA Diameter 4.5 cm    LV EDV A4C 44 mL    LV ESV A4C 23 mL    IVSd 1.4 (A) 0.6 - 1.0 cm    LVIDd 4.5 4.2 - 5.9 cm    LVIDs 3.4 cm    LVOT Diameter 2.2 cm    LVOT Mean Gradient 1 mmHg    LVOT VTI 16.9 cm    LVOT Peak Velocity 0.9 m/s    LVOT Peak Gradient 3 mmHg    LVPWd 1.3 (A) 0.6 - 1.0 cm    LV E' Lateral Velocity 7 cm/s    LV E' Septal Velocity 7 cm/s    LV Ejection Fraction A4C 48 %    LVOT Area 3.8 cm2    LVOT SV 64.2 ml    AR .0 ms    AR Max Velocity PISA 4.2 m/s    AV Peak Velocity 3.0 m/s    AV Peak Gradient 35 mmHg    AV Cusp Mmode 1.2 cm    AV Mean Gradient 15 mmHg    AV VTI 58.6 cm    AV Mean Velocity 1.8 m/s    AV Area by VTI 1.1 cm2    AV Area by Peak Velocity 1.2 cm2    MV Nyquist Velocity 36 cm/s    MR .0 cm    MV Mean Gradient 3 mmHg    MV VTI 35.2 cm    MV Mean Velocity 0.7 m/s    MV E Wave Deceleration Time 130.0 ms    MV A Velocity 0.62 m/s    MV E Velocity 1.29 m/s    MV Area by VTI 1.8 cm2    RVIDd 3.5 cm    TR Max Velocity 3.48 m/s    TR Peak Gradient 49 mmHg    TAPSE 2.5 (A) 1.5 - 2.0 cm    Fractional Shortening 2D 24 28 - 44 %    LV ESV Index A4C 12 mL/m2    LV EDV Index A4C 23 mL/m2    LVIDd Index 2.37 cm/m2    LVIDs Index 1.79 cm/m2    LV RWT Ratio 0.58     LV Mass 2D 235.3 (A) 88 - 224 g    LV Mass 2D Index 123.9 (A) 49 - 115 g/m2    MV E/A 2.08     E/E' Ratio (Averaged) 18.43     E/E' Lateral 18.43 E/E' Septal 18.43     LA Volume Index BP 59 (A) 16 - 28 ml/m2    LVOT Stroke Volume Index 33.8 mL/m2    LA Size Index 2.37 cm/m2    AV Velocity Ratio 0.30     LVOT:AV VTI Index 0.29     IRINA/BSA VTI 0.6 cm2/m2    IRINA/BSA Peak Velocity 0.6 cm2/m2    MV:LVOT VTI Index 2.08     Est. RA Pressure 8 mmHg    RVSP 56 mmHg   GLUCOSE, POC    Collection Time: 12/13/21 12:52 PM   Result Value Ref Range    Glucose (POC) 171 (H) 65 - 100 mg/dL    Performed by [facility]    SARS-COV-2    Collection Time: 12/13/21  3:13 PM   Result Value Ref Range    SARS-CoV-2 Please find results under separate order     GLUCOSE, POC    Collection Time: 12/13/21  4:55 PM   Result Value Ref Range    Glucose (POC) 159 (H) 65 - 100 mg/dL    Performed by [facility]    GLUCOSE, POC    Collection Time: 12/13/21 11:09 PM   Result Value Ref Range    Glucose (POC) 177 (H) 65 - 100 mg/dL    Performed by [facility]    METABOLIC PANEL, BASIC    Collection Time: 12/14/21  4:19 AM   Result Value Ref Range    Sodium 137 136 - 145 mmol/L    Potassium 4.1 3.5 - 5.1 mmol/L    Chloride 109 (H) 98 - 107 mmol/L    CO2 21 21 - 32 mmol/L    Anion gap 7 7 - 16 mmol/L    Glucose 121 (H) 65 - 100 mg/dL    BUN 33 (H) 8 - 23 MG/DL    Creatinine 1.53 (H) 0.8 - 1.5 MG/DL    GFR est AA 56 (L) >60 ml/min/1.73m2    GFR est non-AA 46 (L) >60 ml/min/1.73m2    Calcium 9.4 8.3 - 10.4 MG/DL   CBC WITH AUTOMATED DIFF    Collection Time: 12/14/21  4:19 AM   Result Value Ref Range    WBC 7.3 4.3 - 11.1 K/uL    RBC 2.66 (L) 4.23 - 5.6 M/uL    HGB 8.1 (L) 13.6 - 17.2 g/dL    HCT 24.8 (L) 41.1 - 50.3 %    MCV 93.2 79.6 - 97.8 FL    MCH 30.5 26.1 - 32.9 PG    MCHC 32.7 31.4 - 35.0 g/dL    RDW 15.1 (H) 11.9 - 14.6 %    PLATELET 486 326 - 567 K/uL    MPV 12.2 9.4 - 12.3 FL    ABSOLUTE NRBC 0.00 0.0 - 0.2 K/uL    DF AUTOMATED      NEUTROPHILS 78 43 - 78 %    LYMPHOCYTES 10 (L) 13 - 44 %    MONOCYTES 10 4.0 - 12.0 %    EOSINOPHILS 1 0.5 - 7.8 %    BASOPHILS 0 0.0 - 2.0 %    IMMATURE GRANULOCYTES 1 0.0 - 5.0 %    ABS. NEUTROPHILS 5.7 1.7 - 8.2 K/UL    ABS. LYMPHOCYTES 0.7 0.5 - 4.6 K/UL    ABS. MONOCYTES 0.7 0.1 - 1.3 K/UL    ABS. EOSINOPHILS 0.1 0.0 - 0.8 K/UL    ABS. BASOPHILS 0.0 0.0 - 0.2 K/UL    ABS. IMM. GRANS. 0.1 0.0 - 0.5 K/UL   VANCOMYCIN, RANDOM    Collection Time: 21  4:19 AM   Result Value Ref Range    Vancomycin, random 20.3 UG/ML   GLUCOSE, POC    Collection Time: 21  6:01 AM   Result Value Ref Range    Glucose (POC) 133 (H) 65 - 100 mg/dL    Performed by [facility]      Vital Signs:    Patient Vitals for the past 8 hrs:   BP Temp Pulse Resp SpO2   21 0400   84     21 0353 128/72 97.8 °F (36.6 °C) 80 18 95 %   21 2354 119/66 98.1 °F (36.7 °C) 81 18 95 %     Temp (24hrs), Av.9 °F (36.6 °C), Min:97.7 °F (36.5 °C), Max:98.2 °F (36.8 °C)    Body mass index is 26.94 kg/m². Pain Control:   Pain Assessment  Pain Scale 1: Numeric (0 - 10)  Pain Intensity 1: 0  Pain Location 1: Leg, Knee  Pain Orientation 1: Left  Pain Intervention(s) 1: Medication (see MAR)    Subjective: Doing well, No complaints, No SOB, No Chest Pain, No nausea or vomiting     Objective: Vital Signs are Stable, No Acute Distress,  Dressing is dry,  Wound vac empty. Opens eyes to voice. Moving all 4 extremities spontaneously but not following commands. Making inaudible sounds spontaneously.       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    Rotator cuff tear arthropathy, left M75.102, M12.812    S/P shoulder hemiarthroplasty, left R40.836    Infection of total joint prosthesis ([address] T84.50XA    Atrial fibrillation ([address] I48.91    Hyperglycemia due to type 2 diabetes mellitus ([address] E11.65    Hyperlipidemia E78.5    Acquired hypothyroidism E03.9    Hearing loss H91.90    Coagulopathy (HCC) D68.9    Status post revision of total knee replacement, left C02.113    Acute encephalopathy G93.40    Anemia D64.9    Thrombocytopenia (HCC) D69.6    Dysarthria R47.1             Plan: Continue Physical Therapy, Monitor labs, moderately thick lick liquids recommended by speech and they will continue to follow. Unable to obtain MRI brain yesterday due to compliance/agitation, Further Neurology rec's appreciated--echo was completed. ID following, they were wtg for stroke workup to complete prior to PICC placement. May need to move forward if unable to complete stroke workup--appreciate ID continued Rec's. Per prior notes family looking for placement after hospitalization given his current needs. Knee culture growing MRSA. Preferred Dispo at this time is likely LTAC vs. IP Rehab. Help from  determining is appreciated. Consider starting placement process for SNF in case does not qualify for either of those two.ok to order IP rehab consult if needed for evaluation. Further neurology and ID rec's appreciated given clinical course  Pursue dispo to LTAC vs. IP rehab as noted above.            Signed By: GABE El

## 2021-12-14 NOTE — PROGRESS NOTES
Problem: Mobility Impaired (Adult and Pediatric)  Goal: *Acute Goals and Plan of Care (Insert Text)  Outcome: Progressing Towards Goal  Note: GOALS (1-4 days):  (1.)Mr. Delano Gutierrez will move from supine to sit and sit to supine  in bed with STAND BY ASSIST.  (2.)Mr. Delano Gutierrez will transfer from bed to chair and chair to bed with STAND BY ASSIST using the least restrictive device. (3.)Mr. Delano Gutierrez will ambulate with STAND BY ASSIST for 350 feet with the least restrictive device. (4.)Mr. Delano Gutierrez will increase left knee ROM to 0°-90°.  ________________________________________________________________________________________________        PHYSICAL THERAPY JOINT CAMP TKA: Daily Note and PM 12/14/2021  INPATIENT: Hospital Day: 6  [place] / [place] / Product Type: Medicare /      NAME/AGE/GENDER: Khoi West is a 80 y.o. male   PRIMARY DIAGNOSIS:  Left knee infection   Procedure(s) and Anesthesia Type:     * INCISION AND DRAINAGE LEFT KNEE/ POLY EXCHANGE/ POSSIBLE PROSETHSIS EXPLANT W/ CEMENT SPACER - General     * POSS LEFT KNEE ARTHROPLASTY TOTAL - Spinal (Left  Left)  ICD-10: Treatment Diagnosis:    · Pain in Left Knee (M25.562)  · Stiffness of Left Knee, Not elsewhere classified (M25.662)  · Difficulty in walking, Not elsewhere classified (R26.2)      ASSESSMENT:     Mr. Delano Gutierrez presents with decreased strength and range of motion left lower extremity and with decreased independence with functional mobility s/p left TKA. Pt will benefit from skilled PT interventions to maximize independence with functional mobility and TKA management. Pt did fairly well with assessment. He is slow and needs extra time to complete tasks. He was supine on contact. He transferred out of bed and ambulated to the bathroom for toileting then ambulated 175 ft in the hallways with RW and CGA. He returned to the room and sat in the recliner to perform therex. Pt instructed not to get up without assistance.  Hope to progress mobility and exercises in the morning. Pt plans to discharge to rehab with continued therapy for follow up. He wants to go to [facility] and is wanting to talk with the SW.  12/11- up in chair with daughter present. He was more sore this afternoon but agreeable to therapy. Co treat with OT due to increased need for mobility. He ambulated 100 ft with RW and CGA then returned to the room and went to the bathroom and then showered. He practiced scooting, sitting/ standing balance and sit to stand while showering then returned to the chair to kaylin his shoes. Left up in room with OT for grooming. Progressing therapy today. 12/12- patient hallucinating bugs today. Supine on contact and agreeable to therapy. Patient is pleasant but extra time needed to complete all tasks with therapy due to being easily distracted and perseveration on tasks. He transferred supine to sit insisting on using the trapeze bar even though I advised he would do better without it. He discovered he was unable to get up with the bar so he used the rails and required Min assist. Sit to stand with Mod assist and then ambulated into the bathroom for brushing teeth and combing hair. Ambulated 130 ft with RW and CGA then returned to the room and transferred into the chair for therex. Needed frequent breaks with therex, as RN was changing his central line dressing during therapy. He completed therex and ice was applied. Left up in chair awaiting tele-neurology consult for dysarthria. Progressed with gait distance today. PM- sitting on EOB with nursing wanting to go to the bathroom. Ambulated with therapist to the bathroom where he toileted, washed hands and combed his hair. He is very short of breath this afternoon with activity. He ambulated 80 ft with RW and then returned to the room. Practiced sit to stand multiple times to kaylin brief then returned supine and positioned for comfort. Tele-neuro back on to see patient this afternoon.    12/13 sitting in the chair with alarm on. Therapist had to keep redirecting him during L knee exercises in the chair. Work on sit>stand with Mod A. Then walk 90 ft using RW with CGA and verbal cues. Rested few min then headed back to the room. Return to the chair with needs in reach, alarm on, ice to L knee and instructed to call for assist.  Going to rehab.  12/13 pm sitting in the chair upon arrival.  Performs B knee exercises with guidance. Then walk 30 ft to the stretcher using RW with CGA and verbal cues. Got on the stretcher with Min A with B LE. Left to go down stairs. 12/14 sitting in the chair upon arrival.    Performs L knee exercises with guidance, because he kept falling a sleep. Work on sit>stand with Mod A and (extra time). Did not walk, because we could not get to standing position. 12/14 sitting in the chair upon arrival.  Performs L knee exercises with guidance, to stay awake. Work on sit>stand x 2 with Mod A x 2 extra time. Took few steps forward/backward with Mod A x 2 extra time. Pt was left in chair with needs in reach and instructed to call for assist.  Daughter told therapist the reason why he need more assist, because he was exhausted and had no sleep. Daughter stated when you finish, he is going to take a nap. This section established at most recent assessment   PROBLEM LIST (Impairments causing functional limitations):  1. Decreased Strength  2. Decreased ADL/Functional Activities  3. Decreased Transfer Abilities  4. Decreased Ambulation Ability/Technique  5. Decreased Flexibility/Joint Mobility  6. Edema/Girth  7. Decreased [place] with Home Exercise Program   INTERVENTIONS PLANNED: (Benefits and precautions of physical therapy have been discussed with the patient.)  1. Bed Mobility  2. Cold  3. Gait Training  4. Home Exercise Program (HEP)  5. Range of Motion (ROM)  6. Therapeutic Activites  7. Therapeutic Exercise/Strengthening  8.  Transfer Training     TREATMENT PLAN: Frequency/Duration: Follow patient BID for duration of hospital stay to address above goals. Rehabilitation Potential For Stated Goals: Good     RECOMMENDED REHABILITATION/EQUIPMENT: (at time of discharge pending progress): Continue Skilled Therapy and Rehab. HISTORY:   History of Present Injury/Illness (Reason for Referral):  Pt s/p total knee arthroplasty revision on L LE    Past Medical History/Comorbidities:   Mr. 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 He also has no past medical history of Adverse effect of anesthesia, Aneurysm ([address] Asthma, Autoimmune disease ([address] Cancer ([address] Chronic kidney disease, Chronic pain, COPD, Dementia, Difficult intubation, Endocarditis, Gastrointestinal disorder, Heart failure ([address] Ill-defined condition, Liver disease, Malignant hyperthermia due to anesthesia, Morbid obesity ([address] Nausea & vomiting, Other ill-defined conditions(799.89), Pseudocholinesterase deficiency, Psychiatric disorder, PUD (peptic ulcer disease), Rheumatic fever, Seizures ([address] Sleep disorder, Stroke ([address] or Thromboembolus ([address]   Mr. Jonah Diaz  has a past surgical history that includes hx heart catheterization (0084,1490, 2012, 2013); hx tonsillectomy (1938); hx cataract removal (1987 and 1990); hx lap cholecystectomy (2003); hx hernia repair (1944); hx urological (1996); hx orthopaedic (Left); hx orthopaedic (6145-9154); hx shoulder arthroscopy (Left, 2015); hx knee replacement (Bilateral, 2002, 2008); hx hemorrhoidectomy (1987); hx heent (Right); hx carpal tunnel release (Right, 1968); hx carpal tunnel release (Left, 2012); hx bunionectomy (Left); hx rotator cuff repair (Right, 1995); hx rotator cuff repair (Left, 2000); hx rotator cuff repair (Right, 2007); and hx total colectomy (01/13/2017). Social History/Living Environment:   Living Alone: Yes  Support Systems: Child(wojciech)  Patient Expects to be Discharged to[de-identified] Rehabilitation facility  Prior Level of Function/Work/Activity:  Independent prior to admit. Lives alone. May have caregivers that assist at home but unsure how frequently   Number of Personal Factors/Comorbidities that affect the Plan of Care: 0: LOW COMPLEXITY   EXAMINATION:   Most Recent Physical Functioning:                                 Transfers  Sit to Stand: Moderate assistance; Additional time; Assist x2  Stand to Sit: Moderate assistance; Additional time; Assist x2    Balance  Sitting: Intact; High guard  Standing: Pull to stand; With support              Weight Bearing Status  Left Side Weight Bearing: As tolerated           Braces/Orthotics: none           Body Structures Involved:  1. Joints  2. Muscles Body Functions Affected:  1. Movement Related Activities and Participation Affected:  1. Mobility  2. Self Care   Number of elements that affect the Plan of Care: 4+: HIGH COMPLEXITY   CLINICAL PRESENTATION:   Presentation: Stable and uncomplicated: LOW COMPLEXITY   CLINICAL DECISION MAKING:   [facility] AM-PAC 6 Clicks   Basic Mobility Inpatient Short Form  How much difficulty does the patient currently have. .. Unable A Lot A Little None   1. Turning over in bed (including adjusting bedclothes, sheets and blankets)? [] 1   [] 2   [x] 3   [] 4   2.   Sitting down on and standing up from a chair with arms ( e.g., wheelchair, bedside commode, etc.)   [] 1   [] 2   [x] 3   [] 4   3. Moving from lying on back to sitting on the side of the bed? [] 1   [] 2   [x] 3   [] 4   How much help from another person does the patient currently need. .. Total A Lot A Little None   4. Moving to and from a bed to a chair (including a wheelchair)? [] 1   [] 2   [x] 3   [] 4   5. Need to walk in hospital room? [] 1   [] 2   [x] 3   [] 4   6. Climbing 3-5 steps with a railing? [] 1   [] 2   [x] 3   [] 4   © 2007, Trustees of [address] [place] [address] [address] [id number], under license to [employer]. All rights reserved     Score:  Initial: 18 Most Recent: X (Date: -- )    Interpretation of Tool:  Represents activities that are increasingly more difficult (i.e. Bed mobility, Transfers, Gait). Medical Necessity:     · Patient is expected to demonstrate progress in   · strength, range of motion, and functional technique  ·  to   · decrease assistance required with functional mobility and TKA managment  · .  Reason for Services/Other Comments:  · Patient continues to require skilled intervention due to   · Inability to complete functional mobility and TKA management independently  · . Use of outcome tool(s) and clinical judgement create a POC that gives a: Clear prediction of patient's progress: LOW COMPLEXITY            TREATMENT:   (In addition to Assessment/Re-Assessment sessions the following treatments were rendered)     Pre-treatment Symptoms/Complaints:  agreeable  Pain Initial:   Pain Intensity 1: 0  Post Session:      Therapeutic Activity: (  15 Minutes ):  Therapeutic activities including bed mobility, sit to stand, Chair transfers, standing balance,, and  Ambulation on level groundto improve mobility, strength, and balance. Required minimal   to promote dynamic balance in standing. Therapeutic Exercise: (15 Minutes):  Exercises per grid below to improve strength and balance. Required minimal verbal cues to promote proper body alignment. Progressed range as indicated.   Gait Training ( ):  Gait training to improve and/or restore physical functioning as related to mobility. Ambulated   with   using a   and minimal   related to their knee position and motion to promote proper body alignment. Date:  12/11 Date:  12/12 Date:  12/13   Date:  12/14 Date:  12/14    ACTIVITY/EXERCISE AM PM AM PM AM PM  pm     []  []  []  []  []  []     Ankle Pumps 15  15  20 20 20 20   Quad Sets 15  15  20 20 20 20   Gluteal Sets 15  15  20 20 20 20   Hip ABd/ADduction 15  15  20 20 20 aa 20 aa   Straight Leg Raises 15  15  20 20 20 aa 20 aa   Knee Slides 15  15  20 20 20 aa 20 aa   Short Arc Quads 15  15  20 20 20 aa 20 aa   Chair Slides     20  20 20                         B = bilateral; AA = active assistive; A = active; P = passive      Treatment/Session Assessment:     Response to Treatment:  Pt kept falling a sleep    Education:  [x] Home Exercises  [x] Fall Precautions  [x] Use of Cold Therapy Unit [] D/C Instruction Review  [] Knee Prosthesis Review  [x] Walker Management/Safety [] Adaptive Equipment as Needed  [x] No pillow under knee       Interdisciplinary Collaboration:   o Physical Therapy Assistant  o Registered Nurse    After treatment position/precautions:   o RN notified  o gone down stairs for x-ray    Compliance with Program/Exercises: Compliant all of the time. Recommendations/Intent for next treatment session:  Treatment next visit will focus on increasing Mr. Ashley Beck independence with bed mobility, transfers, gait training, strength/ROM exercises, modalities for pain, and patient education.       Total Treatment Duration:  PT Patient Time In/Time Out  Time In: 1345  Time Out: [address] DANIELLE Ramey

## 2021-12-14 NOTE — CONSULTS
Cam Pozo MD  Medical Director  [address] [facility], [address] [facility]  Tel: [phone]       Physical Medicine & Rehabilitation Consult  EHR Review  Subjective:     Date of Consultation:  December 14, 2021  Referring Provider:Dr Elena Desir is a 80 y.o. male who is being evaluated at the request of the Ortho service for consideration for inpatient rehabilitation following L TKA infxn with post op dysarthria, Stroke r/o. HPI: Patient is a 80 y.o. male with history of Afib on Eliquis who presented to [facility] with left TKA infection. He has been evaluated by podiatry for a left 2nd toe ulcer and last week had purulent drainage from the toe. He was then referred to orthopedic surgery for subsequent left knee swelling. Office aspirate was consistent with TKA infection and pt was taken to the OR for I&D with poly exchange on 12/10. He was started on vanc/cefazolin post-op. 12/11 he had development of dysarthria and was evaluated by neurology. An MRI was attempted but pt too agitated, f/u trial  pending. Pt reported no fevers or chills. Operative cultures showed Staph aureus, sens pending. ID was consulted for further recommendations. The are anticipating a long course of antibiotics with oral suppression thereafter. IRC now consulted due to possible stroke. Initial plan was for SNF placement due to fact that he lives alone. He was wanting to go to [facility]. POD 1 he ambulated 100ft with RW and CGA. The following day he was hallucinating and seeing bugs. He required moderate assist for STS but able to amb 130ft with RW and CGA. He became more sob that afternoon . Neuro consulted due to dysartfria. POD 4, he has a hard time staying awake to participate and required moderate assist for STS and extra time. Unable to ambulate bc he could not get to a standing position. Head CT neg. F/u MRI pending. ECHO with preserved EF.  He has dyphagia and an MBS was performed 12/13; requiring Honey thick liquids. Pt lives independently. Has children in the community. Did have caregivers assist in the home but to what extent is unclear. Per family notation, his word finding issues and dysphagia seem worse. 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)    Active Problems:    Infection of total joint prosthesis ([address] (12/9/2021)      Atrial fibrillation ([address] (12/10/2021)      Hyperglycemia due to type 2 diabetes mellitus ([address] (12/10/2021)      Hyperlipidemia (12/10/2021)      Acquired hypothyroidism (12/10/2021)      Hearing loss (12/10/2021)      Coagulopathy ([address] (12/10/2021)      Acute encephalopathy (12/12/2021)      Anemia (12/12/2021)      Thrombocytopenia ([address] (12/12/2021)      Dysarthria (12/12/2021)      Past Medical History:   Diagnosis Date    Acute encephalopathy 12/12/2021    Anemia fall 2016    r/t GI bleeds     Arthritis     osteo    Atrial fibrillation ([address] 12/10/2021    CAD (coronary artery disease) 1997    4 stents total (1997, 2012, 2013); \"ECHO 7/2016 normal with good EF\" per cardio office note; \"Stress test 6/2016 Findings consistent with very mild inferior ischemia\"    Carotid stenosis 04/2017    carotid u/s: Right internal carotid artery has 50-69% stenosis. Left internal carotid artery has <50% stenosis.     Coagulopathy ([address] 12/10/2021    Diabetes ([address] 05/1997    type 2; oral med; rare BG checks, last hgba1c- 6.9 (6/2017)    Diverticulosis 12/2011    with GI bleeding; no current problems    GERD (gastroesophageal reflux disease)     on med for control     Glaucoma     Heart murmur     ECHO 7/14/16- mild AS, mild MR, mild TR    Hiatal hernia     History of kidney stones     Hypercholesteremia     on med for control     Hypertension     hx of- no medications currently    Hypothyroid     managed with medication    Infection of total joint prosthesis ([address] 12/9/2021    MI (myocardial infarction) [facility]     x2, STEMI    TIA (transient ischemic attack) 07/2016    experienced numbness/tingling L arm and left lower face- \"only lasted a couple of hrs\"; 7/13 CT head: cerebral white matter changes have progressed since 4/19/13 likely indicative of chronic small vessel ischemic disease, no acute intracranial abnormality.  Unspecified sleep apnea     denies     Urethral stenosis     has had difficulty with placement/removal previously     Vasovagal syncope       Past Surgical History:   Procedure Laterality Date    HX BUNIONECTOMY Left     with hammer toe    HX CARPAL TUNNEL RELEASE Right 1968    HX CARPAL TUNNEL RELEASE Left 2012    HX CATARACT REMOVAL  1987 and 1990    Teofilo with IOLens implamts    HX HEART CATHETERIZATION  2957,8359, 2012, 2013    total 4 stents: 2 stents- 1997, 1 stent- 2012 (BMS to RCA), 1 stent- 2013 (ANGELLA to RCA)    HX HEENT Right     eye surgery to replace lens due to fall     HX HEMORRHOIDECTOMY  1987    HX HERNIA REPAIR  1944    L inguinal    HX KNEE REPLACEMENT Bilateral 2002, 2008    HX LAP CHOLECYSTECTOMY  2379    umbilical hernia repair    HX ORTHOPAEDIC Left     trigger finger     HX ORTHOPAEDIC  4224-3853    multiple knee surgery    HX ROTATOR CUFF REPAIR Right 1995    HX ROTATOR CUFF REPAIR Left 2000    HX ROTATOR CUFF REPAIR Right 2007    with martina tendon repair    HX SHOULDER ARTHROSCOPY Left 2015    HX TONSILLECTOMY  1938    HX TOTAL COLECTOMY  01/13/2017    HX UROLOGICAL  1996    cysto with stone extraction and stent      Family History   Problem Relation Age of Onset    Heart Disease Father     Heart Attack Father     Stroke Mother     Alzheimer's Disease Sister     Alzheimer's Disease Sister       Social History     Tobacco Use    Smoking status: Never Smoker    Smokeless tobacco: Never Used   Substance Use Topics    Alcohol use:  Yes     Alcohol/week: 7.0 standard drinks     Types: 7 Glasses of wine per week     Prior to Admission medications    Medication Sig Start Date End Date Taking? Authorizing Provider   apixaban (Eliquis) 5 mg tablet Take 5 mg by mouth two (2) times a day. Yes Provider, Historical   simethicone (GAS-X) 125 mg capsule Take 125 mg by mouth four (4) times daily as needed for Flatulence. Yes Provider, Historical   loperamide (IMODIUM) 2 mg capsule Take 2 mg by mouth four (4) times daily as needed for Diarrhea. Yes Provider, Historical   diphenoxylate-atropine (LOMOTIL) 2.5-0.025 mg per tablet Take 1 Tab by mouth two (2) times daily as needed for Diarrhea. Yes Provider, Historical   atorvastatin (LIPITOR) 40 mg tablet Take 40 mg by mouth nightly. Yes Provider, Historical   levothyroxine (SYNTHROID) 50 mcg tablet Take 50 mcg by mouth Daily (before breakfast). Yes Provider, Historical   Psyllium Husk-Sucrose 3.4 gram/7 gram powd Take 3 Caps by mouth two (2) times a day. Yes Provider, Historical   omega-3 fatty acids-vitamin e (FISH OIL) 1,000 mg cap Take 2 Caps by mouth daily. Yes Provider, Historical   bimatoprost (LUMIGAN) 0.01 % ophthalmic drops Administer 1 Drop to right eye nightly. Yes Provider, Historical   timolol (TIMOPTIC) 0.5 % ophthalmic solution Administer 1 Drop to right eye every morning. Yes Provider, Historical   Omeprazole delayed release (PRILOSEC D/R) 20 mg tablet Take 20 mg by mouth Daily (before dinner). Yes Provider, Historical   meloxicam (MOBIC) 15 mg tablet Take 15 mg by mouth daily. Indications: OSTEOARTHRITIS   Yes Provider, Historical   brimonidine (ALPHAGAN P) 0.1 % ophthalmic solution Administer 1 Drop to right eye two (2) times a day. Take / use AM day of surgery  per anesthesia protocols. Yes Provider, Historical   glimepiride (AMARYL) 2 mg tablet Take 1 mg by mouth daily. Indications: type 2 diabetes mellitus   Yes Provider, Historical   b complex vitamins tablet Take 1 Tab by mouth daily.    Yes Provider, Historical   aspirin 81 mg Tab Take 81 mg by mouth daily. Take / use 81 mg AM day of surgery  per anesthesia protocols. Yes Other, MD Skip     Allergies   Allergen Reactions    Acetazolamide Unknown (comments)     fatigue    Benzonatate Other (comments)          Objective:     Vitals:  Blood pressure (!) 150/69, pulse 77, temperature 97.4 °F (36.3 °C), resp. rate 20, height 5' 7\" (1.702 m), weight 172 lb (78 kg), SpO2 99 %. Temp (24hrs), Av.7 °F (36.5 °C), Min:97.1 °F (36.2 °C), Max:98.2 °F (36.8 °C)      Intake and Output:   1901 -  0700  In: -   Out: 150 [Urine:150]    Labs/Studies:  Recent Results (from the past 72 hour(s))   GLUCOSE, POC    Collection Time: 21  6:10 PM   Result Value Ref Range    Glucose (POC) 251 (H) 65 - 100 mg/dL    Performed by Albert Cyr    GLUCOSE, POC    Collection Time: 21  9:51 PM   Result Value Ref Range    Glucose (POC) 136 (H) 65 - 100 mg/dL    Performed by Mary    PROTHROMBIN TIME + INR    Collection Time: 21  3:38 AM   Result Value Ref Range    Prothrombin time 27.4 (H) 12.6 - 14.5 sec    INR 2.5     CBC WITH AUTOMATED DIFF    Collection Time: 21  3:38 AM   Result Value Ref Range    WBC 9.0 4.3 - 11.1 K/uL    RBC 2.73 (L) 4.23 - 5.6 M/uL    HGB 8.2 (L) 13.6 - 17.2 g/dL    HCT 25.8 (L) 41.1 - 50.3 %    MCV 94.5 79.6 - 97.8 FL    MCH 30.0 26.1 - 32.9 PG    MCHC 31.8 31.4 - 35.0 g/dL    RDW 15.5 (H) 11.9 - 14.6 %    PLATELET 529 (L) 158 - 450 K/uL    MPV 12.8 (H) 9.4 - 12.3 FL    ABSOLUTE NRBC 0.00 0.0 - 0.2 K/uL    DF AUTOMATED      NEUTROPHILS 84 (H) 43 - 78 %    LYMPHOCYTES 7 (L) 13 - 44 %    MONOCYTES 8 4.0 - 12.0 %    EOSINOPHILS 0 (L) 0.5 - 7.8 %    BASOPHILS 0 0.0 - 2.0 %    IMMATURE GRANULOCYTES 1 0.0 - 5.0 %    ABS. NEUTROPHILS 7.6 1.7 - 8.2 K/UL    ABS. LYMPHOCYTES 0.6 0.5 - 4.6 K/UL    ABS. MONOCYTES 0.7 0.1 - 1.3 K/UL    ABS. EOSINOPHILS 0.0 0.0 - 0.8 K/UL    ABS. BASOPHILS 0.0 0.0 - 0.2 K/UL    ABS. IMM.  GRANS. 0.1 0.0 - 0.5 K/UL   VANCOMYCIN, RANDOM    Collection Time: 12/12/21  3:38 AM   Result Value Ref Range    Vancomycin, random 61.3 UG/ML   METABOLIC PANEL, BASIC    Collection Time: 12/12/21  3:38 AM   Result Value Ref Range    Sodium 135 (L) 136 - 145 mmol/L    Potassium 4.4 3.5 - 5.1 mmol/L    Chloride 105 98 - 107 mmol/L    CO2 21 21 - 32 mmol/L    Anion gap 9 7 - 16 mmol/L    Glucose 165 (H) 65 - 100 mg/dL    BUN 30 (H) 8 - 23 MG/DL    Creatinine 1.48 0.8 - 1.5 MG/DL    GFR est AA 58 (L) >60 ml/min/1.73m2    GFR est non-AA 48 (L) >60 ml/min/1.73m2    Calcium 9.1 8.3 - 10.4 MG/DL   GLUCOSE, POC    Collection Time: 12/12/21  6:39 AM   Result Value Ref Range    Glucose (POC) 165 (H) 65 - 100 mg/dL    Performed by Mary    GLUCOSE, POC    Collection Time: 12/12/21 10:58 AM   Result Value Ref Range    Glucose (POC) 194 (H) 65 - 100 mg/dL    Performed by Ayala    CULTURE, BLOOD    Collection Time: 12/12/21  1:04 PM    Specimen: Blood   Result Value Ref Range    Special Requests: RIGHT  Antecubital        Culture result: NO GROWTH 2 DAYS     CULTURE, BLOOD    Collection Time: 12/12/21  3:31 PM    Specimen: Blood   Result Value Ref Range    Special Requests: JUGULAR VEIN  WHITE PORT        Culture result: NO GROWTH 2 DAYS     GLUCOSE, POC    Collection Time: 12/13/21 12:07 AM   Result Value Ref Range    Glucose (POC) 217 (H) 65 - 100 mg/dL    Performed by AbercrombieTaylorBSN    PROTHROMBIN TIME + INR    Collection Time: 12/13/21  5:43 AM   Result Value Ref Range    Prothrombin time 24.4 (H) 12.6 - 14.5 sec    INR 2.2     METABOLIC PANEL, BASIC    Collection Time: 12/13/21  5:43 AM   Result Value Ref Range    Sodium 134 (L) 136 - 145 mmol/L    Potassium 4.4 3.5 - 5.1 mmol/L    Chloride 106 98 - 107 mmol/L    CO2 22 21 - 32 mmol/L    Anion gap 6 (L) 7 - 16 mmol/L    Glucose 183 (H) 65 - 100 mg/dL    BUN 36 (H) 8 - 23 MG/DL    Creatinine 1.81 (H) 0.8 - 1.5 MG/DL    GFR est AA 46 (L) >60 ml/min/1.73m2    GFR est non-AA 38 (L) >60 ml/min/1.73m2    Calcium 9.7 8.3 - 10.4 MG/DL   CBC WITH AUTOMATED DIFF    Collection Time: 12/13/21  5:43 AM   Result Value Ref Range    WBC 8.3 4.3 - 11.1 K/uL    RBC 2.94 (L) 4.23 - 5.6 M/uL    HGB 9.1 (L) 13.6 - 17.2 g/dL    HCT 27.4 (L) 41.1 - 50.3 %    MCV 93.2 79.6 - 97.8 FL    MCH 31.0 26.1 - 32.9 PG    MCHC 33.2 31.4 - 35.0 g/dL    RDW 15.4 (H) 11.9 - 14.6 %    PLATELET 555 332 - 792 K/uL    MPV 12.4 (H) 9.4 - 12.3 FL    ABSOLUTE NRBC 0.00 0.0 - 0.2 K/uL    DF AUTOMATED      NEUTROPHILS 72 43 - 78 %    LYMPHOCYTES 12 (L) 13 - 44 %    MONOCYTES 16 (H) 4.0 - 12.0 %    EOSINOPHILS 0 (L) 0.5 - 7.8 %    BASOPHILS 0 0.0 - 2.0 %    IMMATURE GRANULOCYTES 1 0.0 - 5.0 %    ABS. NEUTROPHILS 5.9 1.7 - 8.2 K/UL    ABS. LYMPHOCYTES 1.0 0.5 - 4.6 K/UL    ABS. MONOCYTES 1.3 0.1 - 1.3 K/UL    ABS. EOSINOPHILS 0.0 0.0 - 0.8 K/UL    ABS. BASOPHILS 0.0 0.0 - 0.2 K/UL    ABS. IMM.  GRANS. 0.0 0.0 - 0.5 K/UL   VANCOMYCIN, RANDOM    Collection Time: 12/13/21  5:43 AM   Result Value Ref Range    Vancomycin, random 15.0 UG/ML   GLUCOSE, POC    Collection Time: 12/13/21  6:08 AM   Result Value Ref Range    Glucose (POC) 196 (H) 65 - 100 mg/dL    Performed by AbercrombieTaylorBSN    ECHO ADULT COMPLETE    Collection Time: 12/13/21 10:45 AM   Result Value Ref Range    LA Minor Axis 6.7 cm    LA Major Axis 7.1 cm    LA Area 2C 30.1 cm2    LA Area 4C 31.8 cm2    LA Volume  (A) 18 - 58 mL    LA Diameter 4.5 cm    LV EDV A4C 44 mL    LV ESV A4C 23 mL    IVSd 1.4 (A) 0.6 - 1.0 cm    LVIDd 4.5 4.2 - 5.9 cm    LVIDs 3.4 cm    LVOT Diameter 2.2 cm    LVOT Mean Gradient 1 mmHg    LVOT VTI 16.9 cm    LVOT Peak Velocity 0.9 m/s    LVOT Peak Gradient 3 mmHg    LVPWd 1.3 (A) 0.6 - 1.0 cm    LV E' Lateral Velocity 7 cm/s    LV E' Septal Velocity 7 cm/s    LV Ejection Fraction A4C 48 %    LVOT Area 3.8 cm2    LVOT SV 64.2 ml    AR .0 ms    AR Max Velocity PISA 4.2 m/s    AV Peak Velocity 3.0 m/s    AV Peak Gradient 35 mmHg    AV Cusp Mmode 1.2 cm    AV Mean Gradient 15 mmHg    AV VTI 58.6 cm    AV Mean Velocity 1.8 m/s    AV Area by VTI 1.1 cm2    AV Area by Peak Velocity 1.2 cm2    MV Nyquist Velocity 36 cm/s    MR .0 cm    MV Mean Gradient 3 mmHg    MV VTI 35.2 cm    MV Mean Velocity 0.7 m/s    MV E Wave Deceleration Time 130.0 ms    MV A Velocity 0.62 m/s    MV E Velocity 1.29 m/s    MV Area by VTI 1.8 cm2    RVIDd 3.5 cm    TR Max Velocity 3.48 m/s    TR Peak Gradient 49 mmHg    TAPSE 2.5 (A) 1.5 - 2.0 cm    Fractional Shortening 2D 24 28 - 44 %    LV ESV Index A4C 12 mL/m2    LV EDV Index A4C 23 mL/m2    LVIDd Index 2.37 cm/m2    LVIDs Index 1.79 cm/m2    LV RWT Ratio 0.58     LV Mass 2D 235.3 (A) 88 - 224 g    LV Mass 2D Index 123.9 (A) 49 - 115 g/m2    MV E/A 2.08     E/E' Ratio (Averaged) 18.43     E/E' Lateral 18.43     E/E' Septal 18.43     LA Volume Index BP 59 (A) 16 - 28 ml/m2    LVOT Stroke Volume Index 33.8 mL/m2    LA Size Index 2.37 cm/m2    AV Velocity Ratio 0.30     LVOT:AV VTI Index 0.29     IRINA/BSA VTI 0.6 cm2/m2    IRINA/BSA Peak Velocity 0.6 cm2/m2    MV:LVOT VTI Index 2.08     Est. RA Pressure 8 mmHg    RVSP 56 mmHg   GLUCOSE, POC    Collection Time: 12/13/21 12:52 PM   Result Value Ref Range    Glucose (POC) 171 (H) 65 - 100 mg/dL    Performed by [facility]    SARS-COV-2    Collection Time: 12/13/21  3:13 PM   Result Value Ref Range    SARS-CoV-2 Please find results under separate order     SARS-COV-2, PCR    Collection Time: 12/13/21  3:13 PM    Specimen: Nasopharyngeal   Result Value Ref Range    Specimen source Nasopharyngeal      SARS-CoV-2 Not detected NOTD     GLUCOSE, POC    Collection Time: 12/13/21  4:55 PM   Result Value Ref Range    Glucose (POC) 159 (H) 65 - 100 mg/dL    Performed by Rod    GLUCOSE, POC    Collection Time: 12/13/21 11:09 PM   Result Value Ref Range    Glucose (POC) 177 (H) 65 - 100 mg/dL    Performed by Sulma Saenz    METABOLIC PANEL, BASIC Collection Time: 12/14/21  4:19 AM   Result Value Ref Range    Sodium 137 136 - 145 mmol/L    Potassium 4.1 3.5 - 5.1 mmol/L    Chloride 109 (H) 98 - 107 mmol/L    CO2 21 21 - 32 mmol/L    Anion gap 7 7 - 16 mmol/L    Glucose 121 (H) 65 - 100 mg/dL    BUN 33 (H) 8 - 23 MG/DL    Creatinine 1.53 (H) 0.8 - 1.5 MG/DL    GFR est AA 56 (L) >60 ml/min/1.73m2    GFR est non-AA 46 (L) >60 ml/min/1.73m2    Calcium 9.4 8.3 - 10.4 MG/DL   CBC WITH AUTOMATED DIFF    Collection Time: 12/14/21  4:19 AM   Result Value Ref Range    WBC 7.3 4.3 - 11.1 K/uL    RBC 2.66 (L) 4.23 - 5.6 M/uL    HGB 8.1 (L) 13.6 - 17.2 g/dL    HCT 24.8 (L) 41.1 - 50.3 %    MCV 93.2 79.6 - 97.8 FL    MCH 30.5 26.1 - 32.9 PG    MCHC 32.7 31.4 - 35.0 g/dL    RDW 15.1 (H) 11.9 - 14.6 %    PLATELET 932 731 - 233 K/uL    MPV 12.2 9.4 - 12.3 FL    ABSOLUTE NRBC 0.00 0.0 - 0.2 K/uL    DF AUTOMATED      NEUTROPHILS 78 43 - 78 %    LYMPHOCYTES 10 (L) 13 - 44 %    MONOCYTES 10 4.0 - 12.0 %    EOSINOPHILS 1 0.5 - 7.8 %    BASOPHILS 0 0.0 - 2.0 %    IMMATURE GRANULOCYTES 1 0.0 - 5.0 %    ABS. NEUTROPHILS 5.7 1.7 - 8.2 K/UL    ABS. LYMPHOCYTES 0.7 0.5 - 4.6 K/UL    ABS. MONOCYTES 0.7 0.1 - 1.3 K/UL    ABS. EOSINOPHILS 0.1 0.0 - 0.8 K/UL    ABS. BASOPHILS 0.0 0.0 - 0.2 K/UL    ABS. IMM.  GRANS. 0.1 0.0 - 0.5 K/UL   VANCOMYCIN, RANDOM    Collection Time: 12/14/21  4:19 AM   Result Value Ref Range    Vancomycin, random 20.3 UG/ML   GLUCOSE, POC    Collection Time: 12/14/21  6:01 AM   Result Value Ref Range    Glucose (POC) 133 (H) 65 - 100 mg/dL    Performed by Kenney    GLUCOSE, POC    Collection Time: 12/14/21 11:47 AM   Result Value Ref Range    Glucose (POC) 129 (H) 65 - 100 mg/dL    Performed by [facility]    Functional Assessment:  Functional Assessment  Baseline Functional Status: Ambulated with assistive devices  Fall in Past 12 Months: Yes  Fall With Injury: Yes (Describe) (forehead laceration; right elbow skin tear)  Decline in Gait/Transfer/Balance: Yes (comment)  Decline in Capacity to Feed/Dress/Bathe: No  Developmental Delay: No  Chewing/Swallowing Problems: No  Difficulty with Secretions: No  Speech Slurred/Thick/Garbled: No     Ambulation:  Activity and Safety  Activity Level: Up with Assistance  Ambulate: Ambulate to bathroom  Activity: In bed  Activity Assistance: Partial (one person)  Weight Bearing Status: WBAT (Weight Bearing as Tolerated)  Mode of Transportation: Stretcher  Repositioned: Head of bed elevated (degrees)  Patient Turned: Turns self  Head of Bed Elevated: Self regulated  Assistive Device: Walker (comment)  Safety Measures: Bed/Chair alarm on, Bed in low position, Bed/Chair-Wheels locked, Call light within reach, Gripper socks, Side rails X 3     Impression / Assessment:     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)    Active Problems:    Infection of total joint prosthesis ([address] (12/9/2021)      Atrial fibrillation ([address] (12/10/2021)      Hyperglycemia due to type 2 diabetes mellitus ([address] (12/10/2021)      Hyperlipidemia (12/10/2021)      Acquired hypothyroidism (12/10/2021)      Hearing loss (12/10/2021)      Coagulopathy ([address] (12/10/2021)      Acute encephalopathy (12/12/2021)      Anemia (12/12/2021)      Thrombocytopenia ([address] (12/12/2021)      Dysarthria (12/12/2021)      L TKA infxn s/p I&D; Staph Aureus with post op dysarthria/dysphagia r/o CVA    Plan / Recommendations / Medical Decision Making:     Recommendations:   Continue Acute Rehab Program  Coordination of rehab / medical care  Counseling of PM&R care issues management  Monitoring and management of medical conditions per plan of care / orders  -has declined post op. Started hallucinating POD 1 with word finding deficits, swallowing deficits and decline in functional abilities.  Will need long term IV abx and po suppression  -stroke w/u pending MRI; was too agitated to perform earlier  -IRC vs SNF; in a Skilled environment he could likely benefit from the extended stay in order to complete IV abx and maximize functional status  -will await MRI; if CVA w/u neg, there is no real medical necessity for [facility]  >30min chart review  Discussion Staff  Reviewed Therapies / Tia Spain / Derrick Polo / Records  Thank you very much for this referral. We appreciate the opportunity to participate in this patient's care. We will continue to follow. Amy R. Laurian Libman, MD, Medical Director  [address] [place] [address]

## 2021-12-14 NOTE — PROGRESS NOTES
VANCO DAILY FOLLOW UP RENAL INSUFFICIENCY PATIENT   [address] [facility] Pharmacokinetic Monitoring Service - Vancomycin    Consulting Provider: Dr. Kristi Stevens  Indication: Infected total knee (bone and joint)  Target Concentration: Random level ? 15 mg/L  Day of Therapy: 5  Additional Antimicrobials: none    Pertinent Laboratory Values: Wt Readings from Last 1 Encounters:   12/13/21 78 kg (172 lb)     Temp Readings from Last 1 Encounters:   12/14/21 97.4 °F (36.3 °C)     Recent Labs     12/14/21  0419 12/13/21  0543 12/12/21  0338   BUN 33* 36* 30*   CREA 1.53* 1.81* 1.48   WBC 7.3 8.3 9.0       Lab Results   Component Value Date/Time    Vancomycin, random 20.3 12/14/2021 04:19 AM       MRSA Nasal Swab: N/A. Non-respiratory infection. .    Assessment:  Date:  Dose/Freq Admin Times Level/Time:   12/10 1250mg x 1 1533     12/11 1000mg x 1 1744     12/12 No dose   03:38 19.7   12/13 1250mg x 1 (12) 05:43  15    12/14  No Dose  04:19 20.3       Plan:  Concentration-guided dosing due to renal impairment  Random level is supra-therapeutic   No dose needed at this time  Vancomycin concentration ordered for 12/15 @ 0400  Pharmacy will continue to monitor patient and adjust therapy as indicated    Thank you for the consult,  SHAYLA Jameson

## 2021-12-14 NOTE — PROGRESS NOTES
Hospitalist Progress Note   Admit Date:  2021  7:32 PM   Name:  Luz Elena Mcdonnell   Age:  80 y.o. Sex:  male  :  1933   MRN:  [id number]   Room:  [facility]/    Presenting Complaint: No chief complaint on file. 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 He last took eliquis 3-4 days prior to admit. INR 2.4 on 21. FFP  2 units ordered per orthopedics preop. S/p surgical intervention 12-10-21  ID consulted and on antibiotics. Postop cultures MRSA. Plans are for SNF. Family noted he had increased slurring of speech since admit, he states for several days. CT head was without acute issue. ECHO preserved EF. Neurology consulted and recommends MRI brain though he was too agitated for this. SLP following. Barium swallow completed and modified diet ordered. discharge plans pending to rehab.       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)    Active Problems:    Infection of total joint prosthesis ([address] (2021)  ·   Defer to orthopedics   · Antibiotics per ID         Atrial fibrillation ([address] (12/10/2021)  · Continued eliquis           Hyperglycemia due to type 2 diabetes mellitus ([address] (12/10/2021)  ·   POC and SSI  · Oral agents held         Hyperlipidemia (12/10/2021)  ·  continued lipitor      Acquired hypothyroidism (12/10/2021)  · continued synthroid         Coagulopathy ([address] (12/10/2021)  ·   S/p FFP      Anemia and thrombocytopenia:  · Trend CBC  · Current HGB 8.1  · Platelets 112E     Dysarthria:  · CT head negative  · neuro consulted  · MRI ordered though too agitated to complete 12-13, re attempting on 12-14  · ECHO completed    · Continued eliquis and lipitor  · Diet modified per SLP   · Remote tele     Acute encephalopathy:  · followup mentation   · Likely delirium   · A and O x 3 on 12-14         YOEL:  · On  cc/hr   · followup BMP, improved 12-14   · Added  flomax for possible BPH and prior reported decreased urine output      Dispo/Discharge Planning:     pending to rehab    Diet:  ADULT DIET Regular; 4 carb choices (60 gm/meal); Low Sodium (2 gm); Moderately Thick (Honey);  No Drinking Straws  DVT PPx:eliquis   Code status: Full Code    Hospital Problems as of 12/14/2021 Date Reviewed: 12/10/2021          Codes Class Noted - Resolved POA    Acute encephalopathy ICD-10-CM: G93.40  ICD-9-CM: 348.30  12/12/2021 - Present Yes        Anemia (Chronic) ICD-10-CM: D64.9  ICD-9-CM: 285.9  12/12/2021 - Present Yes        Thrombocytopenia ([facility] ICD-10-CM: D69.6  ICD-9-CM: 287.5  12/12/2021 - Present Yes        Dysarthria ICD-10-CM: R47.1  ICD-9-CM: 784.51  12/12/2021 - Present Yes        Atrial fibrillation (HCC) (Chronic) ICD-10-CM: I48.91  ICD-9-CM: 427.31  12/10/2021 - Present Yes        Hyperglycemia due to type 2 diabetes mellitus (HCC) (Chronic) ICD-10-CM: E11.65  ICD-9-CM: 250.00  12/10/2021 - Present Yes        Hyperlipidemia (Chronic) ICD-10-CM: E78.5  ICD-9-CM: 272.4  12/10/2021 - Present Yes        Acquired hypothyroidism (Chronic) ICD-10-CM: E03.9  ICD-9-CM: 244.9  12/10/2021 - Present Yes        Hearing loss (Chronic) ICD-10-CM: H91.90  ICD-9-CM: 389.9  12/10/2021 - Present Yes        Coagulopathy ([facility] ICD-10-CM: D68.9  ICD-9-CM: 286.9  12/10/2021 - Present Yes        * (Principal) Status post revision of total knee replacement, left ICD-10-CM: S24.536  ICD-9-CM: V43.65  12/10/2021 - Present Unknown        Infection of total joint prosthesis ([facility] ICD-10-CM: T84.50XA  ICD-9-CM: 996.66  12/9/2021 - Present Unknown Objective:     Patient Vitals for the past 24 hrs:   Temp Pulse Resp BP SpO2   12/14/21 0712 97.1 °F (36.2 °C) 76 22 122/66 100 %   12/14/21 0400  84      12/14/21 0353 97.8 °F (36.6 °C) 80 18 128/72 95 %   12/13/21 2354 98.1 °F (36.7 °C) 81 18 119/66 95 %   12/13/21 2007 97.7 °F (36.5 °C) 88 18 (!) 148/87 97 %   12/13/21 2000  72      12/13/21 1640 97.9 °F (36.6 °C) 98 18 (!) 138/59 98 %   12/13/21 1215 98.2 °F (36.8 °C) 97 18 (!) 148/72 96 %   12/13/21 1145    (!) 158/69      Oxygen Therapy  O2 Sat (%): 100 % (12/14/21 0712)  Pulse via Oximetry: 67 beats per minute (12/10/21 2234)  O2 Device: None (Room air) (12/10/21 2234)  O2 Flow Rate (L/min): 0 l/min (12/10/21 2234)    Estimated body mass index is 26.94 kg/m² as calculated from the following:    Height as of this encounter: 5' 7\" (1.702 m). Weight as of this encounter: 78 kg (172 lb). Intake/Output Summary (Last 24 hours) at 12/14/2021 1005  Last data filed at 12/14/2021 0248  Gross per 24 hour   Intake    Output 150 ml   Net -150 ml         Physical Exam:     Blood pressure 122/66, pulse 76, temperature 97.1 °F (36.2 °C), resp. rate 22, height 5' 7\" (1.702 m), weight 78 kg (172 lb), SpO2 100 %. General:    Well nourished. No overt distress, hard of bearing   CV:   RRR. III/VI DENISSE, no edema   Lungs:   CTAB. No wheezing, rhonchi, or rales. Respirations even, unlabored anterior    Abdomen: Bowel sounds present. Soft, nontender, nondistended. Extremities: No cyanosis or clubbing. Trace left lower  edema  Skin:     No rashes and normal coloration. Warm and dry. Neuro:   grossly intact.    Alert and oriented x 3 , fluent speech  Psych:  Normal mood and affect     I have reviewed ordered lab tests and independently visualized imaging below:    Recent Labs:  Recent Results (from the past 48 hour(s))   GLUCOSE, POC    Collection Time: 12/12/21 10:58 AM   Result Value Ref Range    Glucose (POC) 194 (H) 65 - 100 mg/dL    Performed by Ayala    CULTURE, BLOOD    Collection Time: 12/12/21  1:04 PM    Specimen: Blood   Result Value Ref Range    Special Requests: RIGHT  Antecubital        Culture result: NO GROWTH 2 DAYS     CULTURE, BLOOD    Collection Time: 12/12/21  3:31 PM    Specimen: Blood   Result Value Ref Range    Special Requests: JUGULAR VEIN  WHITE PORT        Culture result: NO GROWTH 2 DAYS     GLUCOSE, POC    Collection Time: 12/13/21 12:07 AM   Result Value Ref Range    Glucose (POC) 217 (H) 65 - 100 mg/dL    Performed by AbercrombieTaylorBSN    PROTHROMBIN TIME + INR    Collection Time: 12/13/21  5:43 AM   Result Value Ref Range    Prothrombin time 24.4 (H) 12.6 - 14.5 sec    INR 2.2     METABOLIC PANEL, BASIC    Collection Time: 12/13/21  5:43 AM   Result Value Ref Range    Sodium 134 (L) 136 - 145 mmol/L    Potassium 4.4 3.5 - 5.1 mmol/L    Chloride 106 98 - 107 mmol/L    CO2 22 21 - 32 mmol/L    Anion gap 6 (L) 7 - 16 mmol/L    Glucose 183 (H) 65 - 100 mg/dL    BUN 36 (H) 8 - 23 MG/DL    Creatinine 1.81 (H) 0.8 - 1.5 MG/DL    GFR est AA 46 (L) >60 ml/min/1.73m2    GFR est non-AA 38 (L) >60 ml/min/1.73m2    Calcium 9.7 8.3 - 10.4 MG/DL   CBC WITH AUTOMATED DIFF    Collection Time: 12/13/21  5:43 AM   Result Value Ref Range    WBC 8.3 4.3 - 11.1 K/uL    RBC 2.94 (L) 4.23 - 5.6 M/uL    HGB 9.1 (L) 13.6 - 17.2 g/dL    HCT 27.4 (L) 41.1 - 50.3 %    MCV 93.2 79.6 - 97.8 FL    MCH 31.0 26.1 - 32.9 PG    MCHC 33.2 31.4 - 35.0 g/dL    RDW 15.4 (H) 11.9 - 14.6 %    PLATELET 283 653 - 155 K/uL    MPV 12.4 (H) 9.4 - 12.3 FL    ABSOLUTE NRBC 0.00 0.0 - 0.2 K/uL    DF AUTOMATED      NEUTROPHILS 72 43 - 78 %    LYMPHOCYTES 12 (L) 13 - 44 %    MONOCYTES 16 (H) 4.0 - 12.0 %    EOSINOPHILS 0 (L) 0.5 - 7.8 %    BASOPHILS 0 0.0 - 2.0 %    IMMATURE GRANULOCYTES 1 0.0 - 5.0 %    ABS. NEUTROPHILS 5.9 1.7 - 8.2 K/UL    ABS. LYMPHOCYTES 1.0 0.5 - 4.6 K/UL    ABS. MONOCYTES 1.3 0.1 - 1.3 K/UL    ABS. EOSINOPHILS 0.0 0.0 - 0.8 K/UL    ABS. BASOPHILS 0.0 0.0 - 0.2 K/UL    ABS. IMM.  GRANS. 0.0 0.0 - 0.5 K/UL   VANCOMYCIN, RANDOM    Collection Time: 12/13/21  5:43 AM   Result Value Ref Range    Vancomycin, random 15.0 UG/ML   GLUCOSE, POC    Collection Time: 12/13/21  6:08 AM   Result Value Ref Range    Glucose (POC) 196 (H) 65 - 100 mg/dL    Performed by AbercrombieTaylorBSN    ECHO ADULT COMPLETE    Collection Time: 12/13/21 10:45 AM   Result Value Ref Range    LA Minor Axis 6.7 cm    LA Major Axis 7.1 cm    LA Area 2C 30.1 cm2    LA Area 4C 31.8 cm2    LA Volume  (A) 18 - 58 mL    LA Diameter 4.5 cm    LV EDV A4C 44 mL    LV ESV A4C 23 mL    IVSd 1.4 (A) 0.6 - 1.0 cm    LVIDd 4.5 4.2 - 5.9 cm    LVIDs 3.4 cm    LVOT Diameter 2.2 cm    LVOT Mean Gradient 1 mmHg    LVOT VTI 16.9 cm    LVOT Peak Velocity 0.9 m/s    LVOT Peak Gradient 3 mmHg    LVPWd 1.3 (A) 0.6 - 1.0 cm    LV E' Lateral Velocity 7 cm/s    LV E' Septal Velocity 7 cm/s    LV Ejection Fraction A4C 48 %    LVOT Area 3.8 cm2    LVOT SV 64.2 ml    AR .0 ms    AR Max Velocity PISA 4.2 m/s    AV Peak Velocity 3.0 m/s    AV Peak Gradient 35 mmHg    AV Cusp Mmode 1.2 cm    AV Mean Gradient 15 mmHg    AV VTI 58.6 cm    AV Mean Velocity 1.8 m/s    AV Area by VTI 1.1 cm2    AV Area by Peak Velocity 1.2 cm2    MV Nyquist Velocity 36 cm/s    MR .0 cm    MV Mean Gradient 3 mmHg    MV VTI 35.2 cm    MV Mean Velocity 0.7 m/s    MV E Wave Deceleration Time 130.0 ms    MV A Velocity 0.62 m/s    MV E Velocity 1.29 m/s    MV Area by VTI 1.8 cm2    RVIDd 3.5 cm    TR Max Velocity 3.48 m/s    TR Peak Gradient 49 mmHg    TAPSE 2.5 (A) 1.5 - 2.0 cm    Fractional Shortening 2D 24 28 - 44 %    LV ESV Index A4C 12 mL/m2    LV EDV Index A4C 23 mL/m2    LVIDd Index 2.37 cm/m2    LVIDs Index 1.79 cm/m2    LV RWT Ratio 0.58     LV Mass 2D 235.3 (A) 88 - 224 g    LV Mass 2D Index 123.9 (A) 49 - 115 g/m2    MV E/A 2.08     E/E' Ratio (Averaged) 18.43     E/E' Lateral 18.43     E/E' Septal 18.43     LA Volume Index BP 59 (A) 16 - 28 ml/m2    LVOT Stroke Volume Index 33.8 mL/m2    LA Size Index 2.37 cm/m2    AV Velocity Ratio 0.30     LVOT:AV VTI Index 0.29     IRINA/BSA VTI 0.6 cm2/m2    IRINA/BSA Peak Velocity 0.6 cm2/m2    MV:LVOT VTI Index 2.08     Est. RA Pressure 8 mmHg    RVSP 56 mmHg   GLUCOSE, POC    Collection Time: 12/13/21 12:52 PM   Result Value Ref Range    Glucose (POC) 171 (H) 65 - 100 mg/dL    Performed by [employer]    SARS-COV-2    Collection Time: 12/13/21  3:13 PM   Result Value Ref Range    SARS-CoV-2 Please find results under separate order     SARS-COV-2, PCR    Collection Time: 12/13/21  3:13 PM    Specimen: Nasopharyngeal   Result Value Ref Range    Specimen source Nasopharyngeal      SARS-CoV-2 Not detected NOTD     GLUCOSE, POC    Collection Time: 12/13/21  4:55 PM   Result Value Ref Range    Glucose (POC) 159 (H) 65 - 100 mg/dL    Performed by [employer]    GLUCOSE, POC    Collection Time: 12/13/21 11:09 PM   Result Value Ref Range    Glucose (POC) 177 (H) 65 - 100 mg/dL    Performed by Valentina Huang    METABOLIC PANEL, BASIC    Collection Time: 12/14/21  4:19 AM   Result Value Ref Range    Sodium 137 136 - 145 mmol/L    Potassium 4.1 3.5 - 5.1 mmol/L    Chloride 109 (H) 98 - 107 mmol/L    CO2 21 21 - 32 mmol/L    Anion gap 7 7 - 16 mmol/L    Glucose 121 (H) 65 - 100 mg/dL    BUN 33 (H) 8 - 23 MG/DL    Creatinine 1.53 (H) 0.8 - 1.5 MG/DL    GFR est AA 56 (L) >60 ml/min/1.73m2    GFR est non-AA 46 (L) >60 ml/min/1.73m2    Calcium 9.4 8.3 - 10.4 MG/DL   CBC WITH AUTOMATED DIFF    Collection Time: 12/14/21  4:19 AM   Result Value Ref Range    WBC 7.3 4.3 - 11.1 K/uL    RBC 2.66 (L) 4.23 - 5.6 M/uL    HGB 8.1 (L) 13.6 - 17.2 g/dL    HCT 24.8 (L) 41.1 - 50.3 %    MCV 93.2 79.6 - 97.8 FL    MCH 30.5 26.1 - 32.9 PG    MCHC 32.7 31.4 - 35.0 g/dL    RDW 15.1 (H) 11.9 - 14.6 %    PLATELET 222 774 - 941 K/uL    MPV 12.2 9.4 - 12.3 FL    ABSOLUTE NRBC 0.00 0.0 - 0.2 K/uL    DF AUTOMATED      NEUTROPHILS 78 43 - 78 %    LYMPHOCYTES 10 (L) 13 - 44 %    MONOCYTES 10 4.0 - 12.0 %    EOSINOPHILS 1 0.5 - 7.8 %    BASOPHILS 0 0.0 - 2.0 %    IMMATURE GRANULOCYTES 1 0.0 - 5.0 %    ABS. NEUTROPHILS 5.7 1.7 - 8.2 K/UL    ABS. LYMPHOCYTES 0.7 0.5 - 4.6 K/UL    ABS. MONOCYTES 0.7 0.1 - 1.3 K/UL    ABS. EOSINOPHILS 0.1 0.0 - 0.8 K/UL    ABS. BASOPHILS 0.0 0.0 - 0.2 K/UL    ABS. IMM. GRANS. 0.1 0.0 - 0.5 K/UL   VANCOMYCIN, RANDOM    Collection Time: 12/14/21  4:19 AM   Result Value Ref Range    Vancomycin, random 20.3 UG/ML   GLUCOSE, POC    Collection Time: 12/14/21  6:01 AM   Result Value Ref Range    Glucose (POC) 133 (H) 65 - 100 mg/dL    Performed by Kenney        All Micro Results     Procedure Component Value Units Date/Time    SARS-COV-2, PCR [[id number]] Collected: 12/13/21 1513    Order Status: Completed Specimen: Nasopharyngeal Updated: 12/14/21 0852     Specimen source Nasopharyngeal        SARS-CoV-2 Not detected        Comment:      The specimen is NEGATIVE for SARS-CoV-2, the novel coronavirus associated with COVID-19. This test has been authorized by the FDA under an Emergency Use Authorization (EUA) for use by authorized laboratories.         Fact sheet for Healthcare Providers: ConventionUpdate.co.nz       Fact sheet for Patients: ConventionUpdate.co.nz       Methodology: RT-PCR         CULTURE, BLOOD [[id number]] Collected: 12/12/21 1304    Order Status: Completed Specimen: Blood Updated: 12/14/21 0756     Special Requests: --        RIGHT  Antecubital       Culture result: NO GROWTH 2 DAYS       CULTURE, BLOOD [[id number]] Collected: 12/12/21 1531    Order Status: Completed Specimen: Blood Updated: 12/14/21 0756     Special Requests: --        JUGULAR VEIN  WHITE PORT       Culture result: NO GROWTH 2 DAYS       CULTURE, ANAEROBIC [[id number]] Collected: 12/10/21 1514    Order Status: Completed Specimen: Knee  Updated: 12/13/21 1008 Special Requests: --        LEFT KNEE  NO.1       Culture result:       NO ANAEROBIC GROWTH IN 2 DAYS          CULTURE, ANAEROBIC [[id number]] Collected: 12/10/21 1534    Order Status: Completed Specimen: Knee  Updated: 12/13/21 1007     Special Requests: --        LEFT KNEE  NO.3       Culture result:       NO ANAEROBIC GROWTH IN 2 DAYS          CULTURE, ANAEROBIC [[id number]] Collected: 12/10/21 1533    Order Status: Completed Specimen: Knee  Updated: 12/13/21 1007     Special Requests: --        LEFT KNEE  NO.2       Culture result:       NO ANAEROBES ISOLATED 2 DAYS          CULTURE, Leonard Belling STAIN [[id number]] Collected: 12/10/21 1514    Order Status: Completed Specimen: Wound from Knee  Updated: 12/13/21 0931     Special Requests: --        LEFT KNEE  NO.1       GRAM STAIN 1 TO 8 WBCS SEEN PER OIF      NO DEFINITE ORGANISM SEEN        Culture result: NO GROWTH 2 DAYS       CULTURE, WOUND Karley Monteiro STAIN [[id number]]  (Abnormal) Collected: 12/10/21 1534    Order Status: Completed Specimen: Wound from Knee  Updated: 12/13/21 0929     Special Requests: --        LEFT KNEE  NO.3       GRAM STAIN 0 TO 1 WBCS SEEN PER OIF      NO DEFINITE ORGANISM SEEN        Culture result:       SCANT STAPHYLOCOCCUS AUREUS For Susceptibility Refer to Culture ACCESSION [id number]          CULTURE, Leonard Belling STAIN [[id number]]  (Abnormal)  (Susceptibility) Collected: 12/10/21 1533    Order Status: Completed Specimen: Wound from Knee  Updated: 12/13/21 0923     Special Requests: --        LEFT KNEE  NO.2       GRAM STAIN 0 TO 1 WBCS SEEN PER OIF      NO DEFINITE ORGANISM SEEN        Culture result:       LIGHT * METHICILLIN RESISTANT STAPHYLOCOCCUS AUREUS *            PATIENT IS A KNOWN MRSA       FUNGUS CULTURE AND SMEAR [[id number]] Collected: 12/10/21 1534    Order Status: Completed Updated: 12/10/21 1735    FUNGUS CULTURE AND SMEAR [[id number]] Collected: 12/10/21 1533    Order Status: Completed Updated: 12/10/21 1734    FUNGUS CULTURE AND SMEAR [[id number]] Collected: 12/10/21 1514    Order Status: Completed Updated: 12/10/21 1734    AFB CULTURE + SMEAR W/RFLX ID FROM CULTURE [[id number]] Collected: 12/10/21 1545    Order Status: Canceled     AFB CULTURE + SMEAR W/RFLX ID FROM CULTURE [[id number]] Collected: 12/10/21 1545    Order Status: Canceled     CULTURE, FUNGUS [[id number]]     Order Status: Sent Specimen: Knee Fluid     CULTURE, FUNGUS [[id number]]     Order Status: Sent Specimen: Knee Fluid     CULTURE, FUNGUS [[id number]]     Order Status: Sent Specimen: Knee Fluid     AFB CULTURE + SMEAR W/RFLX ID FROM CULTURE [[id number]] Collected: 12/10/21 1515    Order Status: Canceled     COVID-19 RAPID TEST [[id number]] Collected: 12/10/21 0637    Order Status: Completed Specimen: Nasopharyngeal Updated: 12/10/21 0713     Specimen source NASAL SWAB        COVID-19 rapid test Not detected        Comment:      The specimen is NEGATIVE for SARS-CoV-2, the novel coronavirus associated with COVID-19. A negative result does not rule out COVID-19. This test has been authorized by the FDA under an Emergency Use Authorization (EUA) for use by authorized laboratories. Fact sheet for Healthcare Providers: ConventionUpdate.co.nz  Fact sheet for Patients: ConventionUpdate.co.nz       Methodology: Isothermal Nucleic Acid Amplification         CULTURE, BODY FLUID [place] STAIN [[id number]] Collected: 12/09/21 1937    Order Status: Canceled           Other Studies:  XR SWALLOW FUN VIDEO    Result Date: 12/13/2021  Modified Barium Swallow, 12/13/2021 History: Slurred speech, coarse voice, and choking with feeds. Technique: Real time fluoroscopic imaging was provided for the speech pathologist. Multiple consistencies of barium were given. No spot images were obtained as part of today's study.  Findings: The patient's ability to swallow was evaluated with thin, nectar, honey, pudding, mixed, and cracker consistencies. Penetration and aspiration were noted with thin consistency. Inconsistent penetration was noted with nectar consistency from a cup. No episodes of penetration or aspiration were noted with the additional consistencies tested. Fluoroscopy time: 3.1 minutes. 1. Imaging findings as described above. Please see speech pathologist report for further details. ECHO ADULT COMPLETE    Result Date: 12/13/2021    Left Ventricle: Left ventricle size is normal. Normal wall thickness. Findings consistent with mild concentric hypertrophy. Normal wall motion. Normal left ventricular systolic function with a visually estimated EF of 55 - 60%. Normal diastolic function.   Aortic Valve: Mild stenosis. Mild transvalvular regurgitation with a centrally directed jet.   Mitral Valve: Moderate transvalvular regurgitation with a centrally directed jet. Valve structure is normal. Mildly calcified anterior leaflet.   Left Atrium: Left atrium is moderately dilated.   Pericardium: Left pleural effusion.        Current Meds:  Current Facility-Administered Medications   Medication Dose Route Frequency    tamsulosin (FLOMAX) capsule 0.4 mg  0.4 mg Oral DAILY    VANCOMYCIN per random dosing    Other PRN    netarsudiL-latanoprost 0.02-0.005 % drop 1 Drop (  ROCKLATAN) (Patient Supplied)  1 Drop Ophthalmic QPM    brimonidine (ALPHAGAN) 0.2 % ophthalmic solution 1 Drop  1 Drop Right Eye Q8H    acetaminophen (TYLENOL) tablet 1,000 mg  1,000 mg Oral Q6H PRN    insulin lispro (HUMALOG) injection   SubCUTAneous Q6H    0.9% sodium chloride infusion 250 mL  250 mL IntraVENous PRN    apixaban (ELIQUIS) tablet 5 mg  5 mg Oral BID    alcohol 62% (NOZIN) nasal  1 Ampule  1 Ampule Topical Q12H    sodium chloride (NS) flush 5-40 mL  5-40 mL IntraVENous Q8H    sodium chloride (NS) flush 5-40 mL  5-40 mL IntraVENous PRN    acetaminophen (TYLENOL) tablet 1,000 mg  1,000 mg Oral Q6H    oxyCODONE IR (ROXICODONE) tablet 10 mg  10 mg Oral Q4H PRN    HYDROmorphone (DILAUDID) injection 1 mg  1 mg IntraVENous Q3H PRN    naloxone (NARCAN) injection 0.2-0.4 mg  0.2-0.4 mg IntraVENous Q10MIN PRN    ondansetron (ZOFRAN ODT) tablet 4 mg  4 mg Oral Q4H PRN    diphenhydrAMINE (BENADRYL) capsule 25 mg  25 mg Oral Q4H PRN    senna-docusate (PERICOLACE) 8.6-50 mg per tablet 2 Tablet  2 Tablet Oral DAILY    0.9% sodium chloride infusion  100 mL/hr IntraVENous CONTINUOUS    traMADoL (ULTRAM) tablet 50 mg  50 mg Oral Q6H PRN    ondansetron (ZOFRAN ODT) tablet 8 mg  8 mg Oral Q8H PRN    diphenhydrAMINE (BENADRYL) capsule 25 mg  25 mg Oral Q6H PRN    atorvastatin (LIPITOR) tablet 40 mg  40 mg Oral QHS    [Held by provider] glipiZIDE (GLUCOTROL) tablet 2.5 mg  2.5 mg Oral ACB    levothyroxine (SYNTHROID) tablet 50 mcg  50 mcg Oral ACB    pantoprazole (PROTONIX) tablet 40 mg  40 mg Oral ACB    simethicone (MYLICON) tablet 80 mg  80 mg Oral QID PRN    dorzolamide-timoloL (COSOPT) 22.3-6.8 mg/mL ophthalmic solution 1 Drop  1 Drop Right Eye BID    lip protectant (BLISTEX) ointment 1 Each  1 Each Topical PRN       Signed:  Brice Carpio MD    Part of this note may have been written by using a voice dictation software. The note has been proof read but may still contain some grammatical/other typographical errors.

## 2021-12-14 NOTE — PROGRESS NOTES
Impression:   · Late onset MRSA L TKA infection. JESSICA to Vanc 1  ? S/p I&D, poly exchange, 12/10/21; Op Cx: MRSA  ? L knee aspirate (12/9) NGTD; cell count with 72 k WBC. 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 · Atrial fibrillation - on anticoagulation  · Hard of hearing  · Elevated CRP  · CKD: CRT labile; down today; similar values in past  · A fib     Plan:      · Continue vancomycin ; monitor renal fxn closely given age. · At this time I do not see a need for imaging of the left 2nd toe ulcer. · We will hold off on adding rifampin at this time due to interactions with his anticoagulation. · He needs IV Vanc or Dapto thru 1/22/22 for 6 weeks primary treatment and would then suppress lifelong with doxy 100 mg bid or minocycline 100 mg bid. · He will need PICC: will order today. At his age I do not see his mild elevated CRT as contra-indication to PICC placement. · Appears dispo will be to SNF. We recommend twice weekly Creatine and Vanc troughs along with once weekly CBC with diff, sed rate and C-reactive protein for monitoring purposes. · Will need ID outpatient follow up at end of IV course     Anti-infectives:   · IV vanc 12/10-12/11, 12/13-  · cefazolin 12/10-12/11    Chart reviewed; patient not seen. MRI without sign acute event. Rehab Med notes reviewed, at this point seems dispo likely to be SNF. CRT is stable; pharmacy following. Overall, despite hardware, I do not think adding Rifampin is indicated with drug interaction with Eliquis. Diet modified for aspiration risks. He needs ( EOT 1/22/22) 6 weeks IV ABX followed by chronic suppressive ABX: favor Doxy or minocycline given age and other medical conditions. TTE with  Normal EF; mild AS, moderate MR.

## 2021-12-15 NOTE — PROGRESS NOTES
PICC Placement Note    PRE-PROCEDURE VERIFICATION  Correct Procedure: yes. Time out completed with assistant Elena Morton rn and all persons present in agreement with time out. Correct Site:  yes  Temperature: Temp: 97.7 °F (36.5 °C), Temperature Source: Temp Source: Oral  Recent Labs     12/15/21  0503 12/14/21  0419 12/13/21  0543   BUN 29*   < > 36*   CREA 1.38   < > 1.81*      < > 201   INR  --   --  2.2   WBC 7.2   < > 8.3    < > = values in this interval not displayed. Allergies: Acetazolamide and Benzonatate  Education materials for [facility] given to patient or family. PROCEDURE DETAIL  A single lumen PICC line was started for antibiotic therapy. The following documentation is in addition to the PICC properties in the lines/airways flowsheet :  Lot #: [id number]  xylocaine used: yes  Mid-Arm Circumference: 29 (cm)  Internal Catheter Length: 42 (cm)  Internal Catheter Total Length: 42 (cm)  Vein Selection for PICC:right basilic  Central Line Bundle followed yes  Complication Related to Insertion: none  Both the insertion guidewire and ECG guidewire were removed intact all ports have positive blood return and were flush well with normal saline. The location of the tip of the PICC is verified using ECG technology. The tip is in the SVC per ECG reading. See image below.                            Line is okay to use: yes

## 2021-12-15 NOTE — DISCHARGE SUMMARY
Discharge Summary      Patient ID:  Juan Ramon Diop  [id number]  80 y.o.  1/18/1933    Allergies: Acetazolamide and Benzonatate    Admit date: 12/9/2021    Discharge date and time: No discharge date for patient encounter.      Admitting Physician: Omkar Garcia MD     Discharge Physician: Maggie Cruz    * Admission Diagnoses: Infection of total joint prosthesis ([facility] Luis Lam    * Discharge Diagnoses:   Hospital Problems as of 12/15/2021 Date Reviewed: 12/10/2021          Codes Class Noted - Resolved POA    Acute encephalopathy ICD-10-CM: G93.40  ICD-9-CM: 348.30  12/12/2021 - Present Yes        Anemia (Chronic) ICD-10-CM: D64.9  ICD-9-CM: 285.9  12/12/2021 - Present Yes        Thrombocytopenia ([facility] ICD-10-CM: D69.6  ICD-9-CM: 287.5  12/12/2021 - Present Yes        Dysarthria ICD-10-CM: R47.1  ICD-9-CM: 784.51  12/12/2021 - Present Yes        Atrial fibrillation ([facility] (Chronic) ICD-10-CM: I48.91  ICD-9-CM: 427.31  12/10/2021 - Present Yes        Hyperglycemia due to type 2 diabetes mellitus ([facility] (Chronic) ICD-10-CM: E11.65  ICD-9-CM: 250.00  12/10/2021 - Present Yes        Hyperlipidemia (Chronic) ICD-10-CM: E78.5  ICD-9-CM: 272.4  12/10/2021 - Present Yes        Acquired hypothyroidism (Chronic) ICD-10-CM: E03.9  ICD-9-CM: 244.9  12/10/2021 - Present Yes        Hearing loss (Chronic) ICD-10-CM: H91.90  ICD-9-CM: 389.9  12/10/2021 - Present Yes        Coagulopathy ([facility] ICD-10-CM: D68.9  ICD-9-CM: 286.9  12/10/2021 - Present Yes        * (Principal) Status post revision of total knee replacement, left ICD-10-CM: L09.610  ICD-9-CM: V43.65  12/10/2021 - Present Unknown        Infection of total joint prosthesis ([address] ICD-10-CM: T84.50XA  ICD-9-CM: 996.66  12/9/2021 - Present Unknown              Surgeon: Maggie Cruz      * Procedure: Procedure(s):  INCISION AND DRAINAGE LEFT KNEE/ POLY EXCHANGE/ POSSIBLE PROSETHSIS EXPLANT W/ CEMENT SPACER  POSS LEFT KNEE ARTHROPLASTY TOTAL           Perioperative Antibiotics: Ancef _x__                                               Vancomycin  __x_      Postoperative Pain Management: tylenol    DVT Prophylaxis:  Eliquis                                  SCD    Postoperative transfusions:     none Banked PRBCs     Post Op complications: none    Hemoglobin at discharge: __7.7_    * Discharge Condition: stable  Wound appears to be healing without any evidence of infection. Physical Therapy started on the day following surgery and progressed with therapy to walking with rolling walker. At the time of discharge, able to go up and down stairs and had understanding of precautions needed following surgery. Patient had postop confusion and delirium with swallowing difficulty and speech eval recommended moderately thick liquids. Stroke workup was negative. His cognitive function was good at time of discharge alert and oriented with sense of humor. ID consulted and arranged IV antibiotics for infected total joint. Pt denied any source of bleeding at time of discharge. Pt started on Iron PO for low hgb. * Discharged to: [facility]    * Follow-up Care/Discharge instructions:  - Resume pre hospital diet            - Resume home medications per medical continuation form  - remove staples by postop day 10-14 and apply steristrips across incision.      - tylenol for pain to avoid confusion/delirium  - call Dr. Tahira Mcguire office for any wound care concerns or problems. - follow up with Infectious Disease as scheduled for IV antibiotics. - follow up with Dr. Tahira Mcguire in 4-6 weeks. · ID recommendations[de-identified] Vancomycin 1 g IV q 24 hr with closer monitoring on renal function and Vancomycin level. Please notify ID office if Creatinine is rising for alternative antibiotic therapy. · Hold off on adding rifampin at this time due to interactions with his anticoagulation.    · He needs IV antibiotics for 6 weeks primary treatment and would then suppress lifelong with doxy 100 mg bid or minocycline 100 mg bid. · Appears dispo will be to SNF. · Lab recommendations on Vancomycin: twice weekly Creatine and Vanc troughs along with once weekly CBC with diff, sed rate and C-reactive protein for monitoring purposes. · ID outpatient follow up on 1/24/22 at 10:40 am with Dr. Christine Parker. CONTINUE PHYSICAL THERAPY  - Follow up in office as scheduled       Signed:   Denia Hinkle, [id number]  12/15/2021  9:23 AM

## 2021-12-15 NOTE — PROGRESS NOTES
Hospitalist Progress Note   Admit Date:  2021  7:32 PM   Name:  Lisa Bolanos   Age:  80 y.o. Sex:  male  :  1933   MRN:  [id number]   Room:  [facility]/01    Presenting Complaint: No chief complaint on file. 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 He last took eliquis 3-4 days prior to admit. INR 2.4 on 21. FFP  2 units ordered per orthopedics preop. S/p surgical intervention 12-10-21  ID consulted and on antibiotics. Postop cultures MRSA. Plans are for SNF. Family noted he had increased slurring of speech since admit, he states for several days. CT head was without acute issue. ECHO preserved EF. Neurology consulted and recommends MRI brain though he was too agitated for this. SLP following. Barium swallow completed and modified diet ordered. discharge plans pending to rehab. Subjective (12/15/21): Much improved. Sitting in chair about to work with PT. Complains of mouth/tongue sores. Denies pain. Denies CP/SOB. Denies F/C.     Assessment & Plan:     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)    Active Problems:    Infection of total joint prosthesis ([address] (2021)  ·   Defer to orthopedics   · Antibiotics per ID         Atrial fibrillation ([address] (12/10/2021)  · Continued eliquis           Hyperglycemia due to type 2 diabetes mellitus ([address] (12/10/2021)  ·   POC and SSI  · Oral agents held         Hyperlipidemia (12/10/2021)  ·  continued lipitor      Acquired hypothyroidism (12/10/2021)  · continued synthroid         Coagulopathy ([address] (12/10/2021)  ·   S/p FFP      Anemia and thrombocytopenia:  · Trend CBC  · Current HGB 7.7  · Platelets 870C     Dysarthria:  · CT head negative  · neuro consulted  · MRI with chronic changes, but no acute issues  · ECHO normal  · Continued eliquis and lipitor  · Diet modified per SLP   · Remote tele     Acute encephalopathy:  · followup mentation   · Likely delirium   · A and O x 3        YOEL:  · Resolved      Dispo/Discharge Planning:     pending to rehab today    Diet:  ADULT DIET Regular; 4 carb choices (60 gm/meal); Low Sodium (2 gm); Moderately Thick (Honey);  No Drinking Straws  DVT PPx:eliquis   Code status: Full Code    Hospital Problems as of 12/15/2021 Date Reviewed: 12/10/2021          Codes Class Noted - Resolved POA    Acute encephalopathy ICD-10-CM: G93.40  ICD-9-CM: 348.30  12/12/2021 - Present Yes        Anemia (Chronic) ICD-10-CM: D64.9  ICD-9-CM: 285.9  12/12/2021 - Present Yes        Thrombocytopenia ([facility] ICD-10-CM: D69.6  ICD-9-CM: 287.5  12/12/2021 - Present Yes        Dysarthria ICD-10-CM: R47.1  ICD-9-CM: 784.51  12/12/2021 - Present Yes        Atrial fibrillation (H[facility]) (Chronic) ICD-10-CM: I48.91  ICD-9-CM: 427.31  12/10/2021 - Present Yes        Hyperglycemia due to type 2 diabetes mellitus (HCC) (Chronic) ICD-10-CM: E11.65  ICD-9-CM: 250.00  12/10/2021 - Present Yes        Hyperlipidemia (Chronic) ICD-10-CM: E78.5  ICD-9-CM: 272.4  12/10/2021 - Present Yes        Acquired hypothyroidism (Chronic) ICD-10-CM: E03.9  ICD-9-CM: 244.9  12/10/2021 - Present Yes        Hearing loss (Chronic) ICD-10-CM: H91.90  ICD-9-CM: 389.9  12/10/2021 - Present Yes        Coagulopathy ([address] ICD-10-CM: D68.9  ICD-9-CM: 286.9  12/10/2021 - Present Yes        * (Principal) Status post revision of total knee replacement, left ICD-10-CM: C14.022  ICD-9-CM: V43.65  12/10/2021 - Present Unknown        Infection of total joint prosthesis ([facility] ICD-10-CM: T84.50XA  ICD-9-CM: 996.66  12/9/2021 - Present Unknown              Objective:     Patient Vitals for the past 24 hrs:   Temp Pulse Resp BP SpO2   12/15/21 1100 97.7 °F (36.5 °C) 71 18 123/72 97 % 12/15/21 0703 98.1 °F (36.7 °C) 74 18 (!) 132/57 95 %   12/15/21 0300 98.1 °F (36.7 °C) 84 18 132/80 95 %   12/14/21 2259 98.2 °F (36.8 °C) 86 18 136/74 93 %   12/14/21 1953 97.6 °F (36.4 °C) 92 18 128/66 97 %   12/14/21 1616 98 °F (36.7 °C) 86 20 (!) 159/66 96 %     Oxygen Therapy  O2 Sat (%): 97 % (12/15/21 1100)  Pulse via Oximetry: 67 beats per minute (12/10/21 2234)  O2 Device: None (Room air) (12/14/21 1945)  O2 Flow Rate (L/min): 0 l/min (12/10/21 2234)    Estimated body mass index is 26.94 kg/m² as calculated from the following:    Height as of this encounter: 5' 7\" (1.702 m). Weight as of this encounter: 78 kg (172 lb). Intake/Output Summary (Last 24 hours) at 12/15/2021 1145  Last data filed at 12/14/2021 1858  Gross per 24 hour   Intake    Output 425 ml   Net -425 ml         Physical Exam:     Blood pressure 123/72, pulse 71, temperature 97.7 °F (36.5 °C), resp. rate 18, height 5' 7\" (1.702 m), weight 78 kg (172 lb), SpO2 97 %. General:    Well nourished. No overt distress, hard of bearing   CV:   RRR. III/VI DENISSE, no edema   Lungs:   CTAB. No wheezing, rhonchi, or rales. Respirations even, unlabored anterior    Abdomen: Bowel sounds present. Soft, nontender, nondistended. Extremities: No cyanosis or clubbing. Trace left lower  edema  Skin:     No rashes and normal coloration. Warm and dry. Neuro:   grossly intact.    Alert and oriented x 3 , fluent speech  Psych:  Normal mood and affect     I have reviewed ordered lab tests and independently visualized imaging below:    Recent Labs:  Recent Results (from the past 48 hour(s))   GLUCOSE, POC    Collection Time: 12/13/21 12:52 PM   Result Value Ref Range    Glucose (POC) 171 (H) 65 - 100 mg/dL    Performed by Rod    SARS-COV-2    Collection Time: 12/13/21  3:13 PM   Result Value Ref Range    SARS-CoV-2 Please find results under separate order     SARS-COV-2, PCR    Collection Time: 12/13/21  3:13 PM    Specimen: Nasopharyngeal   Result Value Ref Range    Specimen source Nasopharyngeal      SARS-CoV-2 Not detected NOTD     GLUCOSE, POC    Collection Time: 12/13/21  4:55 PM   Result Value Ref Range    Glucose (POC) 159 (H) 65 - 100 mg/dL    Performed by [address], POC    Collection Time: 12/13/21 11:09 PM   Result Value Ref Range    Glucose (POC) 177 (H) 65 - 100 mg/dL    Performed by Otis Larson    METABOLIC PANEL, BASIC    Collection Time: 12/14/21  4:19 AM   Result Value Ref Range    Sodium 137 136 - 145 mmol/L    Potassium 4.1 3.5 - 5.1 mmol/L    Chloride 109 (H) 98 - 107 mmol/L    CO2 21 21 - 32 mmol/L    Anion gap 7 7 - 16 mmol/L    Glucose 121 (H) 65 - 100 mg/dL    BUN 33 (H) 8 - 23 MG/DL    Creatinine 1.53 (H) 0.8 - 1.5 MG/DL    GFR est AA 56 (L) >60 ml/min/1.73m2    GFR est non-AA 46 (L) >60 ml/min/1.73m2    Calcium 9.4 8.3 - 10.4 MG/DL   CBC WITH AUTOMATED DIFF    Collection Time: 12/14/21  4:19 AM   Result Value Ref Range    WBC 7.3 4.3 - 11.1 K/uL    RBC 2.66 (L) 4.23 - 5.6 M/uL    HGB 8.1 (L) 13.6 - 17.2 g/dL    HCT 24.8 (L) 41.1 - 50.3 %    MCV 93.2 79.6 - 97.8 FL    MCH 30.5 26.1 - 32.9 PG    MCHC 32.7 31.4 - 35.0 g/dL    RDW 15.1 (H) 11.9 - 14.6 %    PLATELET 073 131 - 922 K/uL    MPV 12.2 9.4 - 12.3 FL    ABSOLUTE NRBC 0.00 0.0 - 0.2 K/uL    DF AUTOMATED      NEUTROPHILS 78 43 - 78 %    LYMPHOCYTES 10 (L) 13 - 44 %    MONOCYTES 10 4.0 - 12.0 %    EOSINOPHILS 1 0.5 - 7.8 %    BASOPHILS 0 0.0 - 2.0 %    IMMATURE GRANULOCYTES 1 0.0 - 5.0 %    ABS. NEUTROPHILS 5.7 1.7 - 8.2 K/UL    ABS. LYMPHOCYTES 0.7 0.5 - 4.6 K/UL    ABS. MONOCYTES 0.7 0.1 - 1.3 K/UL    ABS. EOSINOPHILS 0.1 0.0 - 0.8 K/UL    ABS. BASOPHILS 0.0 0.0 - 0.2 K/UL    ABS. IMM.  GRANS. 0.1 0.0 - 0.5 K/UL   VANCOMYCIN, RANDOM    Collection Time: 12/14/21  4:19 AM   Result Value Ref Range    Vancomycin, random 20.3 UG/ML   GLUCOSE, POC    Collection Time: 12/14/21  6:01 AM   Result Value Ref Range    Glucose (POC) 133 (H) 65 - 100 mg/dL    Performed by Kenney    GLUCOSE, POC    Collection Time: 12/14/21 11:47 AM   Result Value Ref Range    Glucose (POC) 129 (H) 65 - 100 mg/dL    Performed by Rui    GLUCOSE, POC    Collection Time: 12/14/21  4:06 PM   Result Value Ref Range    Glucose (POC) 160 (H) 65 - 100 mg/dL    Performed by Rui    GLUCOSE, POC    Collection Time: 12/14/21  9:45 PM   Result Value Ref Range    Glucose (POC) 175 (H) 65 - 100 mg/dL    Performed by [facility]    CBC WITH AUTOMATED DIFF    Collection Time: 12/15/21  5:03 AM   Result Value Ref Range    WBC 7.2 4.3 - 11.1 K/uL    RBC 2.49 (L) 4.23 - 5.6 M/uL    HGB 7.7 (L) 13.6 - 17.2 g/dL    HCT 23.6 (L) 41.1 - 50.3 %    MCV 94.8 79.6 - 97.8 FL    MCH 30.9 26.1 - 32.9 PG    MCHC 32.6 31.4 - 35.0 g/dL    RDW 15.3 (H) 11.9 - 14.6 %    PLATELET 055 628 - 647 K/uL    MPV 11.9 9.4 - 12.3 FL    ABSOLUTE NRBC 0.04 0.0 - 0.2 K/uL    DF AUTOMATED      NEUTROPHILS 77 43 - 78 %    LYMPHOCYTES 11 (L) 13 - 44 %    MONOCYTES 9 4.0 - 12.0 %    EOSINOPHILS 1 0.5 - 7.8 %    BASOPHILS 0 0.0 - 2.0 %    IMMATURE GRANULOCYTES 1 0.0 - 5.0 %    ABS. NEUTROPHILS 5.6 1.7 - 8.2 K/UL    ABS. LYMPHOCYTES 0.8 0.5 - 4.6 K/UL    ABS. MONOCYTES 0.7 0.1 - 1.3 K/UL    ABS. EOSINOPHILS 0.1 0.0 - 0.8 K/UL    ABS. BASOPHILS 0.0 0.0 - 0.2 K/UL    ABS. IMM.  GRANS. 0.1 0.0 - 0.5 K/UL   METABOLIC PANEL, BASIC    Collection Time: 12/15/21  5:03 AM   Result Value Ref Range    Sodium 139 136 - 145 mmol/L    Potassium 3.9 3.5 - 5.1 mmol/L    Chloride 109 (H) 98 - 107 mmol/L    CO2 22 21 - 32 mmol/L    Anion gap 8 7 - 16 mmol/L    Glucose 133 (H) 65 - 100 mg/dL    BUN 29 (H) 8 - 23 MG/DL    Creatinine 1.38 0.8 - 1.5 MG/DL    GFR est AA >60 >60 ml/min/1.73m2    GFR est non-AA 52 (L) >60 ml/min/1.73m2    Calcium 9.9 8.3 - 10.4 MG/DL   VANCOMYCIN, RANDOM    Collection Time: 12/15/21  5:03 AM   Result Value Ref Range    Vancomycin, random 14.4 UG/ML   GLUCOSE, POC Collection Time: 12/15/21  5:14 AM   Result Value Ref Range    Glucose (POC) 129 (H) 65 - 100 mg/dL    Performed by [facility]        All Micro Results     Procedure Component Value Units Date/Time    CULTURE, ANAEROBIC [[id number]] Collected: 12/10/21 1514    Order Status: Completed Specimen: Knee  Updated: 12/15/21 0939     Special Requests: --        LEFT KNEE  NO.1       Culture result:       SUBCULTURE IS NECESSARY TO DETERMINE PRESENCE OR ABSENCE OF ANAEROBIC BACTERIA IN THIS CULTURE. FURTHER REPORT TO FOLLOW AFTER INCUBATION OF SUBCULTURE. CULTURE, ANAEROBIC [[id number]] Collected: 12/10/21 1534    Order Status: Completed Specimen: Knee  Updated: 12/15/21 0939     Special Requests: --        LEFT KNEE  NO.3       Culture result:       NO ANAEROBIC GROWTH IN 4 DAYS          CULTURE, ANAEROBIC [[id number]] Collected: 12/10/21 1533    Order Status: Completed Specimen: Knee  Updated: 12/15/21 0938     Special Requests: --        LEFT KNEE  NO.2       Culture result:       NO ANAEROBIC GROWTH IN 4 DAYS          CULTURE, BLOOD [[id number]] Collected: 12/12/21 1304    Order Status: Completed Specimen: Blood Updated: 12/15/21 0758     Special Requests: --        RIGHT  Antecubital       Culture result: NO GROWTH 3 DAYS       CULTURE, BLOOD [[id number]] Collected: 12/12/21 1531    Order Status: Completed Specimen: Blood Updated: 12/15/21 0758     Special Requests: --        JUGULAR VEIN  WHITE PORT       Culture result: NO GROWTH 3 DAYS       SARS-COV-2, PCR [[id number]] Collected: 12/13/21 1513    Order Status: Completed Specimen: Nasopharyngeal Updated: 12/14/21 0852     Specimen source Nasopharyngeal        SARS-CoV-2 Not detected        Comment:      The specimen is NEGATIVE for SARS-CoV-2, the novel coronavirus associated with COVID-19. This test has been authorized by the FDA under an Emergency Use Authorization (EUA) for use by authorized laboratories.         Fact sheet for Healthcare Providers: Fandate.co.nz       Fact sheet for Patients: ConventionUpdate.co.nz       Methodology: RT-PCR         CULTURE, Lantigua Contreras STAIN [[id number]] Collected: 12/10/21 1514    Order Status: Completed Specimen: Wound from Knee  Updated: 12/13/21 0931     Special Requests: --        LEFT KNEE  NO.1       GRAM STAIN 1 TO 8 WBCS SEEN PER OIF      NO DEFINITE ORGANISM SEEN        Culture result: NO GROWTH 2 DAYS       CULTURE, WOUND Brown Galaviz STAIN [[id number]]  (Abnormal) Collected: 12/10/21 1534    Order Status: Completed Specimen: Wound from Knee  Updated: 12/13/21 0929     Special Requests: --        LEFT KNEE  NO.3       GRAM STAIN 0 TO 1 WBCS SEEN PER OIF      NO DEFINITE ORGANISM SEEN        Culture result:       SCANT STAPHYLOCOCCUS AUREUS For Susceptibility Refer to Culture ACCESSION [id number]          CULTURE, Lantigua Contreras STAIN [[id number]]  (Abnormal)  (Susceptibility) Collected: 12/10/21 1533    Order Status: Completed Specimen: Wound from Knee  Updated: 12/13/21 0923     Special Requests: --        LEFT KNEE  NO.2       GRAM STAIN 0 TO 1 WBCS SEEN PER OIF      NO DEFINITE ORGANISM SEEN        Culture result:       LIGHT * METHICILLIN RESISTANT STAPHYLOCOCCUS AUREUS *            PATIENT IS A KNOWN MRSA       FUNGUS CULTURE AND SMEAR [[id number]] Collected: 12/10/21 1534    Order Status: Completed Updated: 12/10/21 1735    FUNGUS CULTURE AND SMEAR [[id number]] Collected: 12/10/21 1533    Order Status: Completed Updated: 12/10/21 1734    FUNGUS CULTURE AND SMEAR [[id number]] Collected: 12/10/21 1514    Order Status: Completed Updated: 12/10/21 1734    AFB CULTURE + SMEAR W/RFLX ID FROM CULTURE [[id number]] Collected: 12/10/21 1545    Order Status: Canceled     AFB CULTURE + SMEAR W/RFLX ID FROM CULTURE [[id number]] Collected: 12/10/21 1545    Order Status: Canceled     CULTURE, FUNGUS [[id number]]     Order Status: Sent Specimen: Knee Fluid     CULTURE, FUNGUS [[id number]]     Order Status: Sent Specimen: Knee Fluid     CULTURE, FUNGUS [[id number]]     Order Status: Sent Specimen: Knee Fluid     AFB CULTURE + SMEAR W/RFLX ID FROM CULTURE [[id number]] Collected: 12/10/21 1515    Order Status: Canceled     COVID-19 RAPID TEST [[id number]] Collected: 12/10/21 0637    Order Status: Completed Specimen: Nasopharyngeal Updated: 12/10/21 0713     Specimen source NASAL SWAB        COVID-19 rapid test Not detected        Comment:      The specimen is NEGATIVE for SARS-CoV-2, the novel coronavirus associated with COVID-19. A negative result does not rule out COVID-19. This test has been authorized by the FDA under an Emergency Use Authorization (EUA) for use by authorized laboratories. Fact sheet for Healthcare Providers: ConventionUpdate.co.nz  Fact sheet for Patients: ConventionUpdate.co.nz       Methodology: Isothermal Nucleic Acid Amplification         CULTURE, BODY FLUID Walker Face STAIN [[id number]] Collected: 12/09/21 1937    Order Status: Canceled           Other Studies:  No results found.     Current Meds:  Current Facility-Administered Medications   Medication Dose Route Frequency    ferrous sulfate tablet 325 mg  1 Tablet Oral DAILY WITH BREAKFAST    tamsulosin (FLOMAX) capsule 0.4 mg  0.4 mg Oral DAILY    VANCOMYCIN per random dosing    Other PRN    netarsudiL-latanoprost 0.02-0.005 % drop 1 Drop (  ROCKLATAN) (Patient Supplied)  1 Drop Ophthalmic QPM    brimonidine (ALPHAGAN) 0.2 % ophthalmic solution 1 Drop  1 Drop Right Eye Q8H    acetaminophen (TYLENOL) tablet 1,000 mg  1,000 mg Oral Q6H PRN    insulin lispro (HUMALOG) injection   SubCUTAneous Q6H    0.9% sodium chloride infusion 250 mL  250 mL IntraVENous PRN    apixaban (ELIQUIS) tablet 5 mg  5 mg Oral BID    alcohol 62% (NOZIN) nasal  1 Ampule  1 Ampule Topical Q12H    sodium chloride (NS) flush 5-40 mL  5-40 mL IntraVENous Q8H    sodium chloride (NS) flush 5-40 mL  5-40 mL IntraVENous PRN    acetaminophen (TYLENOL) tablet 1,000 mg  1,000 mg Oral Q6H    oxyCODONE IR (ROXICODONE) tablet 10 mg  10 mg Oral Q4H PRN    HYDROmorphone (DILAUDID) injection 1 mg  1 mg IntraVENous Q3H PRN    naloxone (NARCAN) injection 0.2-0.4 mg  0.2-0.4 mg IntraVENous Q10MIN PRN    ondansetron (ZOFRAN ODT) tablet 4 mg  4 mg Oral Q4H PRN    diphenhydrAMINE (BENADRYL) capsule 25 mg  25 mg Oral Q4H PRN    senna-docusate (PERICOLACE) 8.6-50 mg per tablet 2 Tablet  2 Tablet Oral DAILY    0.9% sodium chloride infusion  100 mL/hr IntraVENous CONTINUOUS    traMADoL (ULTRAM) tablet 50 mg  50 mg Oral Q6H PRN    ondansetron (ZOFRAN ODT) tablet 8 mg  8 mg Oral Q8H PRN    diphenhydrAMINE (BENADRYL) capsule 25 mg  25 mg Oral Q6H PRN    atorvastatin (LIPITOR) tablet 40 mg  40 mg Oral QHS    [Held by provider] glipiZIDE (GLUCOTROL) tablet 2.5 mg  2.5 mg Oral ACB    levothyroxine (SYNTHROID) tablet 50 mcg  50 mcg Oral ACB    pantoprazole (PROTONIX) tablet 40 mg  40 mg Oral ACB    simethicone (MYLICON) tablet 80 mg  80 mg Oral QID PRN    dorzolamide-timoloL (COSOPT) 22.3-6.8 mg/mL ophthalmic solution 1 Drop  1 Drop Right Eye BID    lip protectant (BLISTEX) ointment 1 Each  1 Each Topical PRN       Signed:  Yolanda Gudino DO    Part of this note may have been written by using a voice dictation software. The note has been proof read but may still contain some grammatical/other typographical errors.

## 2021-12-15 NOTE — PROGRESS NOTES
Infectious Disease Progress Note    Today's Date: 12/15/2021   Admit Date: 12/9/2021    Impression:   · Late onset MRSA L TKA infection. · S/p I&D, poly exchange, 12/10/21; Op Cx: Staph aureus  · L knee aspirate (12/9) NGTD; cell count with 72 k WBC. 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 Head CT without contrast without acute finding. Possible MRI and TTE ordred. · Atrial fibrillation - on anticoagulation  · Hard of hearing  · Elevated CRP  · CKD  · A fib    Plan:     ·  ID recommendations[de-identified] Vancomycin 1 g IV q 24 hr with closer monitoring on renal function and Vancomycin level. Please notify ID office if Creatinine is rising for alternative antibiotic therapy. · Hold off on adding rifampin at this time due to interactions with his anticoagulation. · He needs IV antibiotics for 6 weeks primary treatment and would then suppress lifelong with doxy 100 mg bid or minocycline 100 mg bid. · Appears dispo will be to SNF. · Lab recommendations on Vancomycin: twice weekly Creatine and Vanc troughs along with once weekly CBC with diff, sed rate and C-reactive protein for monitoring purposes. · ID outpatient follow up on 1/24/22 at 10:40 am with Dr. Uyen Durham. Anti-infectives:   · IV vanc  · IV cefazolin    Subjective: Interval History: Afebrile, CRT 1.38, seen sitting up in recliner. Denies any complaints today. Awaiting line placement. Patient is a 80 y.o. male with history of Afib on Eliquis who presented to [facility] with left TKA infection. He has been evaluated by podiatry for a left 2nd toe ulcer and last week had purulent drainage from the toe. He was then referred to orthopedic surgery for subsequent left knee swelling. Office aspirate was consistent with TKA infection and pt was taken to the OR for I&D with poly exchange on 12/10. He was started on vanc/cefazolin post-op.   Yesterday he had development of dysarthria and was evaluated by neurology. An MRI is pending. Pt reports no fevers or chills. Operative cultures show Staph aureus, sens pending. ID is consulted for further recommendations. Allergies   Allergen Reactions    Acetazolamide Unknown (comments)     fatigue    Benzonatate Other (comments)        Review of Systems:  A comprehensive review of systems was negative except for that written in the History of Present Illness. Objective:     Visit Vitals  BP (!) 132/57   Pulse 74   Temp 98.1 °F (36.7 °C)   Resp 18   Ht 5' 7\" (1.702 m)   Wt 78 kg (172 lb)   SpO2 95%   BMI 26.94 kg/m²     Temp (24hrs), Av.9 °F (36.6 °C), Min:97.4 °F (36.3 °C), Max:98.2 °F (36.8 °C)     Patient seen and examined on 20, no changes to physical exam since yesterday, unless noted below:    Lines:  Central Venous Catheter:       Physical Exam:    General:  Alert, cooperative, well noursished, well developed but frail, appears stated age   Eyes:  Sclera anicteric. Pupils equally round and reactive to light. Mouth/Throat: Mucous membranes normal, oral pharynx clear; mild dysarthria noted   Neck: Supple   Lungs:   Clear to auscultation bilaterally, good effort   CV:  Regular rate and rhythm,no murmur, click, rub or gallop   Abdomen:   Soft, non-tender.  bowel sounds normal. non-distended   Extremities: No cyanosis; trace LE edema   Skin: Mottled skin with ecchymoses noted   Lymph nodes: Cervical and supraclavicular normal   Musculoskeletal: L TKA incision site dressed, not hot or tender; Left 2nd toe ulcer noted   Lines/Devices:  Intact, no erythema, drainage or tenderness   Psych: Alert, sometimes frustrated by his inability to find right word  normal mood affect given the setting       Data Review:     CBC:  Recent Labs     12/15/21  0503 21  0419 21  0543 21  0543   WBC 7.2 7.3  --  8.3   GRANS 77 78   < > 72   MONOS 9 10   < > 16*   EOS 1 1   < > 0*   ANEU 5.6 5.7   < > 5.9 ABL 0.8 0.7   < > 1.0   HGB 7.7* 8.1*  --  9.1*   HCT 23.6* 24.8*  --  27.4*    171  --  201    < > = values in this interval not displayed. BMP:  Recent Labs     12/15/21  0503 12/14/21  0419 12/13/21  0543   CREA 1.38 1.53* 1.81*   BUN 29* 33* 36*    137 134*   K 3.9 4.1 4.4   * 109* 106   CO2 22 21 22   AGAP 8 7 6*   * 121* 183*       LFTS:  No results for input(s): TBILI, ALT, AP, TP, ALB in the last 72 hours. No lab exists for component: SGOT    Microbiology:     All Micro Results     Procedure Component Value Units Date/Time    CULTURE, BLOOD [[id number]] Collected: 12/12/21 1304    Order Status: Completed Specimen: Blood Updated: 12/15/21 0758     Special Requests: --        RIGHT  Antecubital       Culture result: NO GROWTH 3 DAYS       CULTURE, BLOOD [[id number]] Collected: 12/12/21 1531    Order Status: Completed Specimen: Blood Updated: 12/15/21 0758     Special Requests: --        JUGULAR VEIN  WHITE PORT       Culture result: NO GROWTH 3 DAYS       SARS-COV-2, PCR [[id number]] Collected: 12/13/21 1513    Order Status: Completed Specimen: Nasopharyngeal Updated: 12/14/21 0852     Specimen source Nasopharyngeal        SARS-CoV-2 Not detected        Comment:      The specimen is NEGATIVE for SARS-CoV-2, the novel coronavirus associated with COVID-19. This test has been authorized by the FDA under an Emergency Use Authorization (EUA) for use by authorized laboratories.         Fact sheet for Healthcare Providers: ConventionUpdate.co.nz       Fact sheet for Patients: ConventionUpdate.co.nz       Methodology: RT-PCR         CULTURE, ANAEROBIC [[id number]] Collected: 12/10/21 1514    Order Status: Completed Specimen: Knee  Updated: 12/13/21 1008     Special Requests: --        LEFT KNEE  NO.1       Culture result:       NO ANAEROBIC GROWTH IN 2 DAYS          CULTURE, ANAEROBIC [[id number]] Collected: 12/10/21 1534    Order Status: Completed Specimen: Knee  Updated: 12/13/21 1007     Special Requests: --        LEFT KNEE  NO.3       Culture result:       NO ANAEROBIC GROWTH IN 2 DAYS          CULTURE, ANAEROBIC [[id number]] Collected: 12/10/21 1533    Order Status: Completed Specimen: Knee  Updated: 12/13/21 1007     Special Requests: --        LEFT KNEE  NO.2       Culture result:       NO ANAEROBES ISOLATED 2 DAYS          CULTURE, Alexa Henle STAIN [[id number]] Collected: 12/10/21 1514    Order Status: Completed Specimen: Wound from Knee  Updated: 12/13/21 0931     Special Requests: --        LEFT KNEE  NO.1       GRAM STAIN 1 TO 8 WBCS SEEN PER OIF      NO DEFINITE ORGANISM SEEN        Culture result: NO GROWTH 2 DAYS       CULTURE, WOUND Laurian De La Torre STAIN [[id number]]  (Abnormal) Collected: 12/10/21 1534    Order Status: Completed Specimen: Wound from Knee  Updated: 12/13/21 0929     Special Requests: --        LEFT KNEE  NO.3       GRAM STAIN 0 TO 1 WBCS SEEN PER OIF      NO DEFINITE ORGANISM SEEN        Culture result:       SCANT STAPHYLOCOCCUS AUREUS For Susceptibility Refer to Culture ACCESSION [id number]          CULTURE, Alexa Henle STAIN [[id number]]  (Abnormal)  (Susceptibility) Collected: 12/10/21 1533    Order Status: Completed Specimen: Wound from Knee  Updated: 12/13/21 0923     Special Requests: --        LEFT KNEE  NO.2       GRAM STAIN 0 TO 1 WBCS SEEN PER OIF      NO DEFINITE ORGANISM SEEN        Culture result:       LIGHT * METHICILLIN RESISTANT STAPHYLOCOCCUS AUREUS *            PATIENT IS A KNOWN MRSA       FUNGUS CULTURE AND SMEAR [[id number]] Collected: 12/10/21 1534    Order Status: Completed Updated: 12/10/21 1735    FUNGUS CULTURE AND SMEAR [[id number]] Collected: 12/10/21 1533    Order Status: Completed Updated: 12/10/21 1734    FUNGUS CULTURE AND SMEAR [[id number]] Collected: 12/10/21 1514    Order Status: Completed Updated: 12/10/21 1734    AFB CULTURE + SMEAR W/RFLX ID FROM CULTURE [[id number]] Collected: 12/10/21 1545 Order Status: Canceled     AFB CULTURE + SMEAR W/RFLX ID FROM CULTURE [[id number]] Collected: 12/10/21 1545    Order Status: Canceled     CULTURE, FUNGUS [[id number]]     Order Status: Sent Specimen: Knee Fluid     CULTURE, FUNGUS [[id number]]     Order Status: Sent Specimen: Knee Fluid     CULTURE, FUNGUS [[id number]]     Order Status: Sent Specimen: Knee Fluid     AFB CULTURE + SMEAR W/RFLX ID FROM CULTURE [[id number]] Collected: 12/10/21 1515    Order Status: Canceled     COVID-19 RAPID TEST [[id number]] Collected: 12/10/21 0637    Order Status: Completed Specimen: Nasopharyngeal Updated: 12/10/21 0713     Specimen source NASAL SWAB        COVID-19 rapid test Not detected        Comment:      The specimen is NEGATIVE for SARS-CoV-2, the novel coronavirus associated with COVID-19. A negative result does not rule out COVID-19. This test has been authorized by the FDA under an Emergency Use Authorization (EUA) for use by authorized laboratories. Fact sheet for Healthcare Providers: iTendency.uy  Fact sheet for Patients: iTendency.uy       Methodology: Isothermal Nucleic Acid Amplification         CULTURE, BODY FLUID Brown Galaviz STAIN [[id number]] Collected: 21 193    Order Status: Canceled           Imagin21 Brain MRI: pending    21 CT head: IMPRESSION  White matter findings compatible with chronic small vessel ischemic  Disease. 12/10/21 L knee: IMPRESSION     1. Retrocardiac atelectasis or consolidation.     2. Revision left knee arthroplasty with implantation of antibiotic beads. 12/10/21 CXR: IMPRESSION     1. Retrocardiac atelectasis or consolidation.     2. Revision left knee arthroplasty with implantation of antibiotic beads.     Signed By: Gerald Fay MD     December 15, 2021

## 2021-12-15 NOTE — DISCHARGE INSTRUCTIONS
[facility]   Patient Discharge Instructions    [facility] / [id number] : 1933    Admitted 2021 Discharged: 12/15/2021     IF YOU HAVE ANY PROBLEMS ONCE YOU ARE AT HOME CALL THE FOLLOWING NUMBERS:   Main office number: [phone]      Medications    · The medications you are to continue on are listed on the medication reconciliation sheet. · Narcotic pain medications as well as supplemental iron can cause constipation. If this occurs try stopping the narcotic pain medication and/or the iron. · It is important that you take the medication exactly as they are prescribed. · Medications which increase your risk of blood clots are listed to stop for 5 weeks after surgery as well as medications or supplements which increase your risk of bleeding complications. · Keep your medication in the bottles provided by the pharmacist and keep a list of the medication names, dosages, and times to be taken in your wallet. · Do not take other medications without consulting your doctor. Important Information    Do NOT smoke as this will greatly increase your risk of infection! Resume your prehospital diet. If you have excessive nausea or vomitting call your doctor's office     Leg swelling and warmth is normal for 6 months after surgery. If you experience swelling in your leg elevate you leg while laying down with your toes above your heart. If you have sudden onset severe swelling with leg pain call our office. Use Judson Hose stockings until we see you in the office for your follow up appointment. The stitches deep inside take approximately 6 months to dissolve. There will be sharp shooting, stinging and burning pain. This is normal and will resolve between 3-6 months after surgery. Difficulty sleeping is normal following total Knee and Hip replacement. You may try melatonin, an over-the-counter sleep aid or benadryl to help with sleep.  Most patients will resume sleeping through the night 8 weeks after surgery. Home Physical Therapy is arranged. Home Health will contact you within 48 hrs of discharge that you have chosen. If you have not received a call within this time frame please contact that provider you chose. You should be given this information before you leave the hospital.     You are at a risk for falls. Use the rolling walker when walking. Patients who have had a joint replacement should not drive if they are still taking narcotic pain mediation during the daytime hours. Most patients wean themselves off of pain medication within 2-5 weeks after surgery. When to Call the office    - If you have a temperature greater then 101  - Uncontrolled vomiting   - Loose control of your bladder or bowel function  - Are unable to bear any wieght   - Need a pain medication refill     Information obtained by :  I understand that if any problems occur once I am at home I am to contact my physician. I understand and acknowledge receipt of the instructions indicated above.                                                                                                                                            Physician's or R.N.'s Signature                                                                  Date/Time                                                                                                                                              Patient or Representative Signature                                                          Date/Time

## 2021-12-15 NOTE — PROGRESS NOTES
Problem: Mobility Impaired (Adult and Pediatric)  Goal: *Acute Goals and Plan of Care (Insert Text)  Outcome: Progressing Towards Goal  Note: GOALS (1-4 days):  (1.)Mr. Philipp Thapa will move from supine to sit and sit to supine  in bed with STAND BY ASSIST.  (2.)Mr. Philipp Thapa will transfer from bed to chair and chair to bed with STAND BY ASSIST using the least restrictive device. (3.)Mr. Philipp Thapa will ambulate with STAND BY ASSIST for 350 feet with the least restrictive device. (4.)Mr. Philipp Thapa will increase left knee ROM to 0°-90°.  ________________________________________________________________________________________________        PHYSICAL THERAPY JOINT CAMP TKA: Daily Note and AM 12/15/2021  INPATIENT: Hospital Day: 7  [place] / [place] B / Product Type: Medicare /      NAME/AGE/GENDER: Juan Ramon Diop is a 80 y.o. male   PRIMARY DIAGNOSIS:  Left knee infection   Procedure(s) and Anesthesia Type:     * INCISION AND DRAINAGE LEFT KNEE/ POLY EXCHANGE/ POSSIBLE PROSETHSIS EXPLANT W/ CEMENT SPACER - General     * POSS LEFT KNEE ARTHROPLASTY TOTAL - Spinal (Left  Left)  ICD-10: Treatment Diagnosis:    · Pain in Left Knee (M25.562)  · Stiffness of Left Knee, Not elsewhere classified (M25.662)  · Difficulty in walking, Not elsewhere classified (R26.2)      ASSESSMENT:     Mr. Philipp Thapa presents with decreased strength and range of motion left lower extremity and with decreased independence with functional mobility s/p left TKA. Pt will benefit from skilled PT interventions to maximize independence with functional mobility and TKA management. Pt did fairly well with assessment. He is slow and needs extra time to complete tasks. He was supine on contact. He transferred out of bed and ambulated to the bathroom for toileting then ambulated 175 ft in the hallways with RW and CGA. He returned to the room and sat in the recliner to perform therex. Pt instructed not to get up without assistance.  Hope to progress mobility and exercises in the morning. Pt plans to discharge to rehab with continued therapy for follow up. He wants to go to [facility] and is wanting to talk with the SW.  12/11- up in chair with daughter present. He was more sore this afternoon but agreeable to therapy. Co treat with OT due to increased need for mobility. He ambulated 100 ft with RW and CGA then returned to the room and went to the bathroom and then showered. He practiced scooting, sitting/ standing balance and sit to stand while showering then returned to the chair to kaylin his shoes. Left up in room with OT for grooming. Progressing therapy today. 12/12- patient hallucinating bugs today. Supine on contact and agreeable to therapy. Patient is pleasant but extra time needed to complete all tasks with therapy due to being easily distracted and perseveration on tasks. He transferred supine to sit insisting on using the trapeze bar even though I advised he would do better without it. He discovered he was unable to get up with the bar so he used the rails and required Min assist. Sit to stand with Mod assist and then ambulated into the bathroom for brushing teeth and combing hair. Ambulated 130 ft with RW and CGA then returned to the room and transferred into the chair for therex. Needed frequent breaks with therex, as RN was changing his central line dressing during therapy. He completed therex and ice was applied. Left up in chair awaiting tele-neurology consult for dysarthria. Progressed with gait distance today. PM- sitting on EOB with nursing wanting to go to the bathroom. Ambulated with therapist to the bathroom where he toileted, washed hands and combed his hair. He is very short of breath this afternoon with activity. He ambulated 80 ft with RW and then returned to the room. Practiced sit to stand multiple times to kaylin brief then returned supine and positioned for comfort. Tele-neuro back on to see patient this afternoon.    12/13 sitting in the chair with alarm on. Therapist had to keep redirecting him during L knee exercises in the chair. Work on sit>stand with Mod A. Then walk 90 ft using RW with CGA and verbal cues. Rested few min then headed back to the room. Return to the chair with needs in reach, alarm on, ice to L knee and instructed to call for assist.  Going to rehab.  12/13 pm sitting in the chair upon arrival.  Performs B knee exercises with guidance. Then walk 30 ft to the stretcher using RW with CGA and verbal cues. Got on the stretcher with Min A with B LE. Left to go down stairs. 12/14 sitting in the chair upon arrival.    Performs L knee exercises with guidance, because he kept falling a sleep. Work on sit>stand with Mod A and (extra time). Did not walk, because we could not get to standing position. 12/14 sitting in the chair upon arrival.  Performs L knee exercises with guidance, to stay awake. Work on sit>stand x 2 with Mod A x 2 extra time. Took few steps forward/backward with Mod A x 2 extra time. Pt was left in chair with needs in reach and instructed to call for assist.  Daughter told therapist the reason why he need more assist, because he was exhausted and had no sleep. Daughter stated when you finish, he is going to take a nap.  12/15 lying on right side upon arrival.  Work on bed mobility as follows:supine>eob with Mod A x 2 (extra time). Work on sit>stand x 3 with Mod-Min A with verbal cues for hand placement and stay on task. Stood @ the walk to void in the urinal with Min A.  Nurse and therapist clean bottom and applied a new brief. Then pt started talking about his earring aides, therapist had to redirect to stay on task. Then went 12 ft to the chair using RW with Min A x 2 (extra time). Then work on L knee exercises with constant verbal verbal cues to stay on task. He kept falling a sleep during exercises.   Left in chair with needs in reach, alarm on and instructed to call for assist. Hopefully go to rehab today. This section established at most recent assessment   PROBLEM LIST (Impairments causing functional limitations):  1. Decreased Strength  2. Decreased ADL/Functional Activities  3. Decreased Transfer Abilities  4. Decreased Ambulation Ability/Technique  5. Decreased Flexibility/Joint Mobility  6. Edema/Girth  7. Decreased Hughes with Home Exercise Program   INTERVENTIONS PLANNED: (Benefits and precautions of physical therapy have been discussed with the patient.)  1. Bed Mobility  2. Cold  3. Gait Training  4. Home Exercise Program (HEP)  5. Range of Motion (ROM)  6. Therapeutic Activites  7. Therapeutic Exercise/Strengthening  8. Transfer Training     TREATMENT PLAN: Frequency/Duration: Follow patient BID for duration of hospital stay to address above goals. Rehabilitation Potential For Stated Goals: Good     RECOMMENDED REHABILITATION/EQUIPMENT: (at time of discharge pending progress): Continue Skilled Therapy and Rehab. HISTORY:   History of Present Injury/Illness (Reason for Referral):  Pt s/p total knee arthroplasty revision on L LE    Past Medical History/Comorbidities:   Mr. 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     He has no past medical history of Adverse effect of anesthesia, Aneurysm ([address] Asthma, Autoimmune disease ([address] Cancer ([address] Chronic kidney disease, Chronic pain, COPD, Dementia, Difficult intubation, Endocarditis, Gastrointestinal disorder, Heart failure ([address] Ill-defined condition, Liver disease, Malignant hyperthermia due to anesthesia, Morbid obesity ([address] Nausea & vomiting, Other ill-defined conditions(799.89), Pseudocholinesterase deficiency, Psychiatric disorder, PUD (peptic ulcer disease), Rheumatic fever, Seizures ([address] Sleep disorder, Stroke ([address] or Thromboembolus ([address] Mr. Radha Tao  has a past surgical history that includes hx heart catheterization (1433,4471, 2012, 2013); hx tonsillectomy (1938); hx cataract removal (1987 and 1990); hx lap cholecystectomy (2003); hx hernia repair (1944); hx urological (1996); hx orthopaedic (Left); hx orthopaedic (9211-9995); hx shoulder arthroscopy (Left, 2015); hx knee replacement (Bilateral, 2002, 2008); hx hemorrhoidectomy (1987); hx heent (Right); hx carpal tunnel release (Right, 1968); hx carpal tunnel release (Left, 2012); hx bunionectomy (Left); hx rotator cuff repair (Right, 1995); hx rotator cuff repair (Left, 2000); hx rotator cuff repair (Right, 2007); and hx total colectomy (01/13/2017). Social History/Living Environment:   Living Alone: Yes  Support Systems: Child(wojciech)  Patient Expects to be Discharged to[de-identified] Rehabilitation facility  Prior Level of Function/Work/Activity:  Independent prior to admit. Lives alone. May have caregivers that assist at home but unsure how frequently   Number of Personal Factors/Comorbidities that affect the Plan of Care: 0: LOW COMPLEXITY   EXAMINATION:   Most Recent Physical Functioning:                            Bed Mobility  Supine to Sit: Moderate assistance; Additional time;Assist x2    Transfers  Sit to Stand: Minimum assistance; Moderate assistance; Additional time;Assist x2 (x 3)  Stand to Sit: Minimum assistance; Moderate assistance; Additional time;Assist x2 (x 3)    Balance  Sitting: Intact; High guard  Standing: Pull to stand; With support Weight Bearing Status  Left Side Weight Bearing: As tolerated  Distance (ft): 12 Feet (ft)  Ambulation - Level of Assistance: Minimal assistance; Additional time;Assist x2 (with verbal cues to stay on track)  Assistive Device: Walker, rolling  Speed/Tracy: Pace decreased (<100 feet/min)  Step Length: Left shortened;Right shortened  Stance: Left decreased  Gait Abnormalities: Decreased step clearance  Interventions: Safety awareness training     Braces/Orthotics: none    Left Knee Cold  Type:  (does not like ice)      Body Structures Involved:  1. Joints  2. Muscles Body Functions Affected:  1. Movement Related Activities and Participation Affected:  1. Mobility  2. Self Care   Number of elements that affect the Plan of Care: 4+: HIGH COMPLEXITY   CLINICAL PRESENTATION:   Presentation: Stable and uncomplicated: LOW COMPLEXITY   CLINICAL DECISION MAKING:   [facility] AM-PAC 6 Clicks   Basic Mobility Inpatient Short Form  How much difficulty does the patient currently have. .. Unable A Lot A Little None   1. Turning over in bed (including adjusting bedclothes, sheets and blankets)? [] 1   [] 2   [x] 3   [] 4   2. Sitting down on and standing up from a chair with arms ( e.g., wheelchair, bedside commode, etc.)   [] 1   [] 2   [x] 3   [] 4   3. Moving from lying on back to sitting on the side of the bed? [] 1   [] 2   [x] 3   [] 4   How much help from another person does the patient currently need. .. Total A Lot A Little None   4. Moving to and from a bed to a chair (including a wheelchair)? [] 1   [] 2   [x] 3   [] 4   5. Need to walk in hospital room? [] 1   [] 2   [x] 3   [] 4   6. Climbing 3-5 steps with a railing? [] 1   [] 2   [x] 3   [] 4   © 2007, Trustees of [facility], under license to [employer].  All rights reserved     Score:  Initial: 18 Most Recent: X (Date: -- )    Interpretation of Tool:  Represents activities that are increasingly more difficult (i.e. Bed mobility, Transfers, Gait). Medical Necessity:     · Patient is expected to demonstrate progress in   · strength, range of motion, and functional technique  ·  to   · decrease assistance required with functional mobility and TKA managment  · .  Reason for Services/Other Comments:  · Patient continues to require skilled intervention due to   · Inability to complete functional mobility and TKA management independently  · . Use of outcome tool(s) and clinical judgement create a POC that gives a: Clear prediction of patient's progress: LOW COMPLEXITY            TREATMENT:   (In addition to Assessment/Re-Assessment sessions the following treatments were rendered)     Pre-treatment Symptoms/Complaints:  agreeable  Pain Initial:   Pain Intensity 1: 1 (more sore after therapy)  Post Session:      Therapeutic Activity: (  38 Minutes ):  Therapeutic activities including bed mobility, sit to stand, Chair transfers, standing balance,, and  Ambulation on level groundto improve mobility, strength, and balance. Required minimal Safety awareness training to promote dynamic balance in standing. Therapeutic Exercise: (15 Minutes):  Exercises per grid below to improve strength and balance. Required minimal verbal cues to promote proper body alignment. Progressed range as indicated. Gait Training ( ):  Gait training to improve and/or restore physical functioning as related to mobility. Ambulated 12 Feet (ft) with Minimal assistance; Additional time;Assist x2 (with verbal cues to stay on track) using a Walker, rolling and minimal Safety awareness training related to their knee position and motion to promote proper body alignment.            Date:  12/11 Date:  12/12 Date:  12/13   Date:  12/15    ACTIVITY/EXERCISE AM PM AM PM AM PM       []  []  []  []  []  []     Ankle Pumps 15  15  20 20 25    Quad Sets 15  15  20 20 25    Gluteal Sets 15  15  20 20 2    Hip ABd/ADduction 15  15  20 20 25 aa    Straight Leg Raises 15  15  20 20 25 aa Knee Slides 15  15  20 20 25 aa    Short Arc Quads 15  15  20 20 25 aa    Chair Slides     20 25                          B = bilateral; AA = active assistive; A = active; P = passive      Treatment/Session Assessment:     Response to Treatment:  Pt kept falling a sleep during exercises and constant cues to stay on track. Education:  [x] Home Exercises  [x] Fall Precautions  [x] Use of Cold Therapy Unit [] D/C Instruction Review  [] Knee Prosthesis Review  [x] Walker Management/Safety [] Adaptive Equipment as Needed  [x] No pillow under knee       Interdisciplinary Collaboration:   o Physical Therapy Assistant  o Registered Nurse    After treatment position/precautions:   o Up in chair  o RN notified    Compliance with Program/Exercises: Compliant all of the time. Recommendations/Intent for next treatment session:  Treatment next visit will focus on increasing Mr. Salina Walden independence with bed mobility, transfers, gait training, strength/ROM exercises, modalities for pain, and patient education.       Total Treatment Duration:  PT Patient Time In/Time Out  Time In: 3883  Time Out: [address] DANIELLE Ramey

## 2021-12-15 NOTE — PROGRESS NOTES
Care Management Interventions  PCP Verified by CM: Yes  Transition of Care Consult (CM Consult): SNF  Support Systems: Child(wojciech)  The Plan for Transition of Care is Related to the Following Treatment Goals : Increase independence  The Patient and/or Patient Representative was Provided with a Choice of Provider and Agrees with the Discharge Plan?: Yes  Freedom of Choice List was Provided with Basic Dialogue that Supports the Patient's Individualized Plan of Care/Goals, Treatment Preferences and Shares the Quality Data Associated with the Providers?: Yes  Discharge Location  Discharge Placement: Skilled nursing facility    Orders rec'd to d/c today to [facility]. Shazia at [facility] approved his admission with IV vanco q24. 5pm ambulance  arranged. Orders faxed prior to d/c and son and dtr aware of d/c and  time.

## 2021-12-15 NOTE — PROGRESS NOTES
December 15, 2021         Post Op day: 5 Days Post-OpProcedure(s) (LRB):  INCISION AND DRAINAGE LEFT KNEE/ POLY EXCHANGE/ POSSIBLE PROSETHSIS EXPLANT W/ CEMENT SPACER (Left)  POSS LEFT KNEE ARTHROPLASTY TOTAL (Left)      Admit Date: 12/9/2021  Admit Diagnosis: Infection of total joint prosthesis ([address] [T84.50XA]    LAB:    Recent Results (from the past 24 hour(s))   GLUCOSE, POC    Collection Time: 12/14/21 11:47 AM   Result Value Ref Range    Glucose (POC) 129 (H) 65 - 100 mg/dL    Performed by SharetivityNabil    GLUCOSE, POC    Collection Time: 12/14/21  4:06 PM   Result Value Ref Range    Glucose (POC) 160 (H) 65 - 100 mg/dL    Performed by Rui    GLUCOSE, POC    Collection Time: 12/14/21  9:45 PM   Result Value Ref Range    Glucose (POC) 175 (H) 65 - 100 mg/dL    Performed by ZoranCommariama    CBC WITH AUTOMATED DIFF    Collection Time: 12/15/21  5:03 AM   Result Value Ref Range    WBC 7.2 4.3 - 11.1 K/uL    RBC 2.49 (L) 4.23 - 5.6 M/uL    HGB 7.7 (L) 13.6 - 17.2 g/dL    HCT 23.6 (L) 41.1 - 50.3 %    MCV 94.8 79.6 - 97.8 FL    MCH 30.9 26.1 - 32.9 PG    MCHC 32.6 31.4 - 35.0 g/dL    RDW 15.3 (H) 11.9 - 14.6 %    PLATELET 368 814 - 784 K/uL    MPV 11.9 9.4 - 12.3 FL    ABSOLUTE NRBC 0.04 0.0 - 0.2 K/uL    DF AUTOMATED      NEUTROPHILS 77 43 - 78 %    LYMPHOCYTES 11 (L) 13 - 44 %    MONOCYTES 9 4.0 - 12.0 %    EOSINOPHILS 1 0.5 - 7.8 %    BASOPHILS 0 0.0 - 2.0 %    IMMATURE GRANULOCYTES 1 0.0 - 5.0 %    ABS. NEUTROPHILS 5.6 1.7 - 8.2 K/UL    ABS. LYMPHOCYTES 0.8 0.5 - 4.6 K/UL    ABS. MONOCYTES 0.7 0.1 - 1.3 K/UL    ABS. EOSINOPHILS 0.1 0.0 - 0.8 K/UL    ABS. BASOPHILS 0.0 0.0 - 0.2 K/UL    ABS. IMM.  GRANS. 0.1 0.0 - 0.5 K/UL   METABOLIC PANEL, BASIC    Collection Time: 12/15/21  5:03 AM   Result Value Ref Range    Sodium 139 136 - 145 mmol/L    Potassium 3.9 3.5 - 5.1 mmol/L    Chloride 109 (H) 98 - 107 mmol/L    CO2 22 21 - 32 mmol/L    Anion gap 8 7 - 16 mmol/L    Glucose 133 (H) 65 - 100 mg/dL    BUN 29 (H) 8 - 23 MG/DL    Creatinine 1.38 0.8 - 1.5 MG/DL    GFR est AA >60 >60 ml/min/1.73m2    GFR est non-AA 52 (L) >60 ml/min/1.73m2    Calcium 9.9 8.3 - 10.4 MG/DL   VANCOMYCIN, RANDOM    Collection Time: 12/15/21  5:03 AM   Result Value Ref Range    Vancomycin, random 14.4 UG/ML   GLUCOSE, POC    Collection Time: 12/15/21  5:14 AM   Result Value Ref Range    Glucose (POC) 129 (H) 65 - 100 mg/dL    Performed by [facility]      Vital Signs:    Patient Vitals for the past 8 hrs:   BP Temp Pulse Resp SpO2   12/15/21 0703 (!) 132/57 98.1 °F (36.7 °C) 74 18 95 %   12/15/21 0300 132/80 98.1 °F (36.7 °C) 84 18 95 %     Temp (24hrs), Av.9 °F (36.6 °C), Min:97.4 °F (36.3 °C), Max:98.2 °F (36.8 °C)    Body mass index is 26.94 kg/m².   Pain Control:   Pain Assessment  Pain Scale 1: Numeric (0 - 10)  Pain Intensity 1: 1 (more sore after therapy)  Pain Location 1: Leg,Knee  Pain Orientation 1: Left  Pain Intervention(s) 1: Medication (see MAR)    Subjective: Doing well, No complaints, No SOB, No Chest Pain, No nausea or vomiting     Objective: Vital Signs are Stable, No Acute Distress, Alert and Oriented, Dressing is dry,  Neurovascular exam is normal.       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    Rotator cuff tear arthropathy, left M75.102, M12.812    S/P shoulder hemiarthroplasty, left Y38.150    Infection of total joint prosthesis (HCC) T84.50XA    Atrial fibrillation (HCC) I48.91    Hyperglycemia due to type 2 diabetes mellitus ([address] E11.65    Hyperlipidemia E78.5    Acquired hypothyroidism E03.9    Hearing loss H91.90    Coagulopathy (HCC) D68.9    Status post revision of total knee replacement, left M85.555    Acute encephalopathy G93.40    Anemia D64.9    Thrombocytopenia (HCC) D69.6    Dysarthria R47.1             Plan: Continue Physical Therapy, Monitor labs, wound vac not working. No output to date. Pt is much better neurologically conversing normally with sense of humor. Change to aquacel dressing today. Start iron PO for hgb of 7.7. transfuse for less than 7. Hopefully gets PICC line today and can be discharged to SNF or LTAC if approved today.          Signed By: GABE Osuna

## 2021-12-15 NOTE — PROGRESS NOTES
Prevena wound vac removed and Aquacel dressing applied utilizing sterile technique per physician's orders.

## 2021-12-15 NOTE — PROGRESS NOTES
TRANSFER - OUT REPORT:    Verbal report given to Gertrudis GARDNER on Charisse Leak  being transferred to [facility]  for routine post - op       Report consisted of patients Situation, Background, Assessment and   Recommendations(SBAR). Information from the following report(s) SBAR was reviewed with the receiving nurse. Lines:   PICC Single Lumen 17/70/63 Right;Basilic (Active)       Quad Lumen 12/10/21 Right (Active)   Central Line Being Utilized Yes 12/15/21 0730   Criteria for Appropriate Use Limited/no vessel suitable for conventional peripheral access 12/15/21 0730   Site Assessment Clean, dry, & intact 12/15/21 0730   Infiltration Assessment 0 12/15/21 0730   Affected Extremity/Extremities Color distal to insertion site pink (or appropriate for race); Pulses palpable;Range of motion performed 12/12/21 2010   Date of Last Dressing Change 12/10/21 12/14/21 0833   Dressing Status Clean, dry, & intact 12/14/21 1945   Dressing Type Disk with Chlorhexadine gluconate (CHG); Stabilization/securement device;Transparent 12/14/21 1230   Action Taken Other (comment) 12/14/21 0833   Proximal Hub Color/Line Status White;Patent 12/14/21 0833   Positive Blood Return (Medial Site) Yes 12/12/21 0736   Medial 1 Hub Color/Line Status Gray;Patent 12/14/21 0833   Positive Blood Return (Lateral Site) Yes 12/12/21 0736   Medial 2 Hub Color/Line Status Blue;Patent 12/14/21 0833   Positive Blood Return (Site #3) Yes 12/12/21 0736   Distal Hub Color/Line Status Hayley Desanctis; Infusing;Patent 12/14/21 0833   Positive Blood Return (Site #4) Yes 12/12/21 0736   Alcohol Cap Used No 12/14/21 0231        Opportunity for questions and clarification was provided.       Patient transported with:

## 2021-12-15 NOTE — PROGRESS NOTES
Physician Progress Note      Venu PITTS #:                  [id number]  :                       [id number]  ADMIT DATE:       2021 7:32 PM  DISCH DATE:  RESPONDING  PROVIDER #:        EDIS Hurley MD        QUERY TEXT:    Type of Encephalopathy: Please provide further specificity, if known. Clinical indicators include: infection, hyperglycemia, acute, encephalopathy, intracranial, hemorrhage, alcohol, fevers, alcohol use  Options provided:  -- Anoxic/hypoxic encephalopathy  -- Metabolic encephalopathy  -- Toxic encephalopathy  -- Hepatic encephalopathy  -- Hypertensive encephalopathy  -- Other - I will add my own diagnosis  -- Disagree - Not applicable / Not valid  -- Disagree - Clinically Unable to determine / Unknown        PROVIDER RESPONSE TEXT:    The patient has metabolic encephalopathy.       Electronically signed by:  Sagar Ruby MD 12/15/2021 6:16 AM

## 2021-12-22 NOTE — PROGRESS NOTES
Physician Progress Note      Jorge Costello  [facility] #:                  [id number]  :                       1933  ADMIT DATE:       2021 7:32 PM  [address] [facility] DATE:        12/15/2021 4:41 PM  RESPONDING  PROVIDER #:        Sebastian WOMACK MD        QUERY TEXT:    Stage of Chronic Kidney Disease: Please provide further specificity, if known. Clinical indicators include:  Options provided:  -- Chronic kidney disease stage 1  -- Chronic kidney disease stage 2  -- Chronic kidney disease stage 3  -- Chronic kidney disease stage 3a  -- Chronic kidney disease stage 3b  -- Chronic kidney disease stage 4  -- Chronic kidney disease stage 5  -- Chronic kidney disease stage 5, requiring dialysis  -- End stage renal disease  -- Other - I will add my own diagnosis  -- Disagree - Not applicable / Not valid  -- Disagree - Clinically Unable to determine / Unknown        PROVIDER RESPONSE TEXT:    The patient has chronic kidney disease stage 3.       Electronically signed by:  Sebastian WOMACK MD 2021 1:57 PM

## 2022-01-01 ENCOUNTER — HOSPITAL ENCOUNTER (OUTPATIENT)
Dept: LAB | Age: 87
Discharge: HOME | End: 2022-01-06

## 2022-01-01 ENCOUNTER — HOSPITAL ENCOUNTER (OUTPATIENT)
Dept: LAB | Age: 87
Discharge: HOME | End: 2022-01-03

## 2022-01-01 ENCOUNTER — HOSPITAL ENCOUNTER (OUTPATIENT)
Dept: LAB | Age: 87
Discharge: HOME | End: 2022-01-24

## 2022-01-01 ENCOUNTER — HOSPITAL ENCOUNTER (OUTPATIENT)
Dept: LAB | Age: 87
Discharge: HOME | End: 2022-01-13

## 2022-01-01 ENCOUNTER — HOSPITAL ENCOUNTER (INPATIENT)
Age: 87
LOS: 3 days | End: 2022-03-13
Attending: INTERNAL MEDICINE | Admitting: INTERNAL MEDICINE
Payer: MEDICARE

## 2022-01-01 ENCOUNTER — HOSPICE ADMISSION (OUTPATIENT)
Dept: HOSPICE | Facility: HOSPICE | Age: 87
End: 2022-01-01
Payer: MEDICARE

## 2022-01-01 VITALS
RESPIRATION RATE: 22 BRPM | HEART RATE: 74 BPM | TEMPERATURE: 97.1 F | DIASTOLIC BLOOD PRESSURE: 59 MMHG | SYSTOLIC BLOOD PRESSURE: 138 MMHG

## 2022-01-01 LAB
BASOPHILS # BLD: 0.1 K/UL (ref 0–0.2)
BASOPHILS NFR BLD: 1 % (ref 0–2)
CREAT SERPL-MCNC: 1.43 MG/DL (ref 0.8–1.5)
CREAT SERPL-MCNC: 1.67 MG/DL (ref 0.8–1.5)
CREAT SERPL-MCNC: 1.8 MG/DL (ref 0.8–1.5)
CREAT SERPL-MCNC: 1.83 MG/DL (ref 0.8–1.5)
DIFFERENTIAL METHOD BLD: ABNORMAL
EOSINOPHIL # BLD: 0.1 K/UL (ref 0–0.8)
EOSINOPHIL NFR BLD: 3 % (ref 0.5–7.8)
ERYTHROCYTE [DISTWIDTH] IN BLOOD BY AUTOMATED COUNT: 17.9 % (ref 11.9–14.6)
HCT VFR BLD AUTO: 32.4 % (ref 41.1–50.3)
HGB BLD-MCNC: 9.8 G/DL (ref 13.6–17.2)
IMM GRANULOCYTES # BLD AUTO: 0 K/UL (ref 0–0.5)
IMM GRANULOCYTES NFR BLD AUTO: 1 % (ref 0–5)
LYMPHOCYTES # BLD: 1.8 K/UL (ref 0.5–4.6)
LYMPHOCYTES NFR BLD: 40 % (ref 13–44)
MCH RBC QN AUTO: 30.2 PG (ref 26.1–32.9)
MCHC RBC AUTO-ENTMCNC: 30.2 G/DL (ref 31.4–35)
MCV RBC AUTO: 100 FL (ref 79.6–97.8)
MONOCYTES # BLD: 0.5 K/UL (ref 0.1–1.3)
MONOCYTES NFR BLD: 10 % (ref 4–12)
NEUTS SEG # BLD: 2 K/UL (ref 1.7–8.2)
NEUTS SEG NFR BLD: 45 % (ref 43–78)
NRBC # BLD: 0 K/UL (ref 0–0.2)
PLATELET # BLD AUTO: 192 K/UL (ref 150–450)
PMV BLD AUTO: 12.3 FL (ref 9.4–12.3)
RBC # BLD AUTO: 3.24 M/UL (ref 4.23–5.6)
VANCOMYCIN TROUGH SERPL-MCNC: 10.6 UG/ML (ref 5–20)
VANCOMYCIN TROUGH SERPL-MCNC: 17.4 UG/ML (ref 5–20)
VANCOMYCIN TROUGH SERPL-MCNC: 18.5 UG/ML (ref 5–20)
VANCOMYCIN TROUGH SERPL-MCNC: 19.5 UG/ML (ref 5–20)
WBC # BLD AUTO: 4.5 K/UL (ref 4.3–11.1)

## 2022-01-01 PROCEDURE — 3336500001 HSPC ELECTION

## 2022-01-01 PROCEDURE — 80202 ASSAY OF VANCOMYCIN: CPT

## 2022-01-01 PROCEDURE — 0656 HSPC GENERAL INPATIENT

## 2022-01-01 PROCEDURE — 74011250636 HC RX REV CODE- 250/636: Performed by: INTERNAL MEDICINE

## 2022-01-01 PROCEDURE — 74011000250 HC RX REV CODE- 250: Performed by: NURSE PRACTITIONER

## 2022-01-01 PROCEDURE — 74011250636 HC RX REV CODE- 250/636: Performed by: NURSE PRACTITIONER

## 2022-01-01 PROCEDURE — 74011000250 HC RX REV CODE- 250: Performed by: INTERNAL MEDICINE

## 2022-01-01 PROCEDURE — G0299 HHS/HOSPICE OF RN EA 15 MIN: HCPCS

## 2022-01-01 PROCEDURE — 85025 COMPLETE CBC W/AUTO DIFF WBC: CPT

## 2022-01-01 PROCEDURE — 82565 ASSAY OF CREATININE: CPT

## 2022-01-01 RX ORDER — MORPHINE SULFATE 100 MG/5ML
10 SOLUTION ORAL
Status: DISCONTINUED | OUTPATIENT
Start: 2022-01-01 | End: 2022-01-01

## 2022-01-01 RX ORDER — GLYCOPYRROLATE 0.2 MG/ML
0.2 INJECTION INTRAMUSCULAR; INTRAVENOUS
Status: DISCONTINUED | OUTPATIENT
Start: 2022-01-01 | End: 2022-01-01 | Stop reason: HOSPADM

## 2022-01-01 RX ORDER — SODIUM CHLORIDE 0.9 % (FLUSH) 0.9 %
3 SYRINGE (ML) INJECTION AS NEEDED
Status: DISCONTINUED | OUTPATIENT
Start: 2022-01-01 | End: 2022-01-01 | Stop reason: HOSPADM

## 2022-01-01 RX ORDER — SODIUM CHLORIDE 0.9 % (FLUSH) 0.9 %
3 SYRINGE (ML) INJECTION EVERY 12 HOURS
Status: DISCONTINUED | OUTPATIENT
Start: 2022-01-01 | End: 2022-01-01 | Stop reason: HOSPADM

## 2022-01-01 RX ORDER — LORAZEPAM 2 MG/ML
1 INJECTION INTRAMUSCULAR
Status: DISCONTINUED | OUTPATIENT
Start: 2022-01-01 | End: 2022-01-01 | Stop reason: HOSPADM

## 2022-01-01 RX ORDER — MORPHINE SULFATE 4 MG/ML
4 INJECTION INTRAVENOUS
Status: DISCONTINUED | OUTPATIENT
Start: 2022-01-01 | End: 2022-01-01

## 2022-01-01 RX ORDER — HYDROMORPHONE HYDROCHLORIDE 1 MG/ML
0.5 INJECTION, SOLUTION INTRAMUSCULAR; INTRAVENOUS; SUBCUTANEOUS
Status: DISCONTINUED | OUTPATIENT
Start: 2022-01-01 | End: 2022-01-01 | Stop reason: HOSPADM

## 2022-01-01 RX ORDER — ACETAMINOPHEN 650 MG/1
650 SUPPOSITORY RECTAL
Status: DISCONTINUED | OUTPATIENT
Start: 2022-01-01 | End: 2022-01-01 | Stop reason: HOSPADM

## 2022-01-01 RX ORDER — HALOPERIDOL 5 MG/ML
2 INJECTION INTRAMUSCULAR
Status: DISCONTINUED | OUTPATIENT
Start: 2022-01-01 | End: 2022-01-01 | Stop reason: HOSPADM

## 2022-01-01 RX ORDER — GLYCOPYRROLATE 0.2 MG/ML
0.1 INJECTION INTRAMUSCULAR; INTRAVENOUS
Status: DISCONTINUED | OUTPATIENT
Start: 2022-01-01 | End: 2022-01-01

## 2022-01-01 RX ORDER — HALOPERIDOL 2 MG/ML
2 SOLUTION ORAL EVERY 12 HOURS
Status: DISCONTINUED | OUTPATIENT
Start: 2022-01-01 | End: 2022-01-01

## 2022-01-01 RX ORDER — ACETAMINOPHEN 325 MG/1
650 TABLET ORAL
Status: DISCONTINUED | OUTPATIENT
Start: 2022-01-01 | End: 2022-01-01

## 2022-01-01 RX ORDER — LORAZEPAM 2 MG/ML
1 INJECTION INTRAMUSCULAR
Status: DISCONTINUED | OUTPATIENT
Start: 2022-01-01 | End: 2022-01-01

## 2022-01-01 RX ADMIN — HYDROMORPHONE HYDROCHLORIDE 0.5 MG: 1 INJECTION, SOLUTION INTRAMUSCULAR; INTRAVENOUS; SUBCUTANEOUS at 02:20

## 2022-01-01 RX ADMIN — MORPHINE SULFATE 4 MG: 4 INJECTION INTRAVENOUS at 01:57

## 2022-01-01 RX ADMIN — MORPHINE SULFATE 4 MG: 4 INJECTION INTRAVENOUS at 18:03

## 2022-01-01 RX ADMIN — Medication 3 ML: at 12:11

## 2022-01-01 RX ADMIN — GLYCOPYRROLATE 0.2 MG: 0.2 INJECTION, SOLUTION INTRAMUSCULAR; INTRAVENOUS at 13:31

## 2022-01-01 RX ADMIN — HYDROMORPHONE HYDROCHLORIDE 0.5 MG: 1 INJECTION, SOLUTION INTRAMUSCULAR; INTRAVENOUS; SUBCUTANEOUS at 13:32

## 2022-01-01 RX ADMIN — GLYCOPYRROLATE 0.2 MG: 0.2 INJECTION, SOLUTION INTRAMUSCULAR; INTRAVENOUS at 23:26

## 2022-01-01 RX ADMIN — LORAZEPAM 1 MG: 2 INJECTION INTRAMUSCULAR; INTRAVENOUS at 15:22

## 2022-01-01 RX ADMIN — SODIUM CHLORIDE, PRESERVATIVE FREE 3 ML: 5 INJECTION INTRAVENOUS at 18:03

## 2022-01-01 RX ADMIN — Medication 3 ML: at 21:06

## 2022-01-01 RX ADMIN — Medication 3 ML: at 08:54

## 2022-01-01 RX ADMIN — GLYCOPYRROLATE 0.1 MG: 0.2 INJECTION, SOLUTION INTRAMUSCULAR; INTRAVENOUS at 18:05

## 2022-01-01 RX ADMIN — LORAZEPAM 1 MG: 2 INJECTION INTRAMUSCULAR; INTRAVENOUS at 18:03

## 2022-01-01 RX ADMIN — HYDROMORPHONE HYDROCHLORIDE 0.5 MG: 1 INJECTION, SOLUTION INTRAMUSCULAR; INTRAVENOUS; SUBCUTANEOUS at 11:29

## 2022-01-01 RX ADMIN — LORAZEPAM 1 MG: 2 INJECTION INTRAMUSCULAR; INTRAVENOUS at 01:58

## 2022-01-01 RX ADMIN — HALOPERIDOL LACTATE 2 MG: 5 INJECTION, SOLUTION INTRAMUSCULAR at 01:19

## 2022-01-01 RX ADMIN — HYDROMORPHONE HYDROCHLORIDE 0.5 MG: 1 INJECTION, SOLUTION INTRAMUSCULAR; INTRAVENOUS; SUBCUTANEOUS at 04:33

## 2022-01-01 RX ADMIN — Medication 3 ML: at 20:21

## 2022-01-01 RX ADMIN — SODIUM CHLORIDE, PRESERVATIVE FREE 3 ML: 5 INJECTION INTRAVENOUS at 01:58

## 2022-01-01 RX ADMIN — SODIUM CHLORIDE, PRESERVATIVE FREE 3 ML: 5 INJECTION INTRAVENOUS at 23:26

## 2022-01-01 RX ADMIN — GLYCOPYRROLATE 0.2 MG: 0.2 INJECTION, SOLUTION INTRAMUSCULAR; INTRAVENOUS at 08:53

## 2022-01-01 RX ADMIN — HYDROMORPHONE HYDROCHLORIDE 0.5 MG: 1 INJECTION, SOLUTION INTRAMUSCULAR; INTRAVENOUS; SUBCUTANEOUS at 08:53

## 2022-03-10 PROBLEM — G92.8 TOXIC METABOLIC ENCEPHALOPATHY: Status: ACTIVE | Noted: 2021-01-01

## 2022-03-10 NOTE — [facility] IDG NURSE NOTES
Patient: Tim Rios    Date: 03/10/22  Time: 6:54 PM    [address] Nurse Notes    Dr. Modesto Gannon has been approved for [facility]. Please send room number and transport time. Payer source:  Medicare     Referring physician if not [facility] MD:  JAIME      Consents, Code Status, TB screening tool:  DNR and consents will be signed with liaison prior to transport     Security Code:      Infection Control/Covid:  no precautions  covid test negative 3/7/22     Mobility/Fall risk: FALL RISK patient is agitated and had many falls at home     Condition/treatments/Managing Symptoms: agitation, secretions, respiratory distress     Report Nurse and phone: [phone]     [facility] name, address and relationship to patient:    Hemanth Merchant, son  [phone] cell     Psychosocial/Family Information if needed to share: Patient is a retired local pediatrician. Spiritual assessment of semi/un responsive patient:      On 3/10/2022 Dr. Ronny Mcintyre verbally certified that Dr. Modesto Gannon is terminally ill with a life expectancy of 6 months or less if the terminal illness runs its normal course. Verbal certification received by: Nolan Roland RN on 3/10/2022 at 1350. 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 He has had several hospitalizations for AMS usually related to urinary and or articular sepsis. He has been on suppressive doxycycline for good long time. He comes into the hospital again with AMS and worsening though not critical renal failure. He has stable heart failure. Brain scanning shows no acute process. Inflammatory markers are benign.   Nevertheless he has gradually gotten more confused and increasing agitated, unable to care for himself, unable to swallow safely and started to require sedatives for safety. My suspicion would be persistent articular sepsis and a possible hematogenous source. Nevertheless it appears that further aggressive diagnostic or therapeutic interventions would not be of any benefit. His family is desiring comfort measures at this time. He has been changed to a DNR status. CERTIFICATION: Hermila Nieves is appropriate for hospice level care. I would anticipate death in the next few weeks but have informed his family through our liaison that he might once again stabilize and require an alternate location of care. Due to his agitated delirium he will be admitted to [facility] for appropriate parenteral medication management and daily monitoring and reassessment. Pt arrived via ems on 2 liters of oxygen. Pt alert but very confused. Pt lungs very congested. Pt having lots of secretions. Pt kicking, biting, and swatting. Pt told me he was going to do so and he did. Pt given medication for pain, agitation, and secretions. Signed by:  Jodi Tinajero RN

## 2022-03-11 NOTE — [facility] IDG CHAPLAIN NOTES
Patient: Yadiel Farias    Date: 03/11/22  Time: 10:45 AM    [facility]  Notes    Assessment pending for spiritual and bereavement support.         Signed by: Nikia Posey

## 2022-03-11 NOTE — [facility] IDG MASTER NOTE
[facility]  Date: 03/11/22  Time: 11:46 AM    ___________________    Patient: Adia Posey  Coverage Information:     Pay[place]: [place]ARE     Plan: [place] MEDICARE PART A AND B     Subscriber ID: [id number]     Phone Number:   MRN: [id number]  Current Benefit Period: Benefit Period 1  Start Date: 3/10/2022  End Date: 6/7/2022        Hospice Attending Provider: Margareth Acevedo [address] [place] [address]  [id number]  Phone: [phone]  Fax: [phone]    Level of Care: General Inpatient Care      ___________________    Diagnoses: There were no encounter diagnoses. Current Medications:    Current Facility-Administered Medications:     HYDROmorphone (DILAUDID) syringe 0.5 mg, 0.5 mg, IntraVENous, Q30MIN PRN **OR** HYDROmorphone (DILAUDID) syringe 0.5 mg, 0.5 mg, SubCUTAneous, Q30MIN PRN, Toño Mccormick NP    LORazepam (ATIVAN) injection 1 mg, 1 mg, IntraVENous, Q2H PRN, Toño Mccormick NP    sodium chloride (NS) flush 3 mL, 3 mL, IntraVENous, PRN, Sukhdev Cardenas MD, 3 mL at 03/11/22 0158    acetaminophen (TYLENOL) suppository 650 mg, 650 mg, Rectal, Q3H PRN, Sukhdev Cardenas MD    glycopyrrolate (ROBINUL) injection 0.2 mg, 0.2 mg, IntraVENous, Q4H PRN, Sukhdev Cardenas MD    Orders:  Orders Placed This Encounter    IP CONSULT TO SPIRITUAL CARE     Standing Status:   Standing     Number of Occurrences:   1     Order Specific Question:   Reason for Consult: Answer: Once on week one, then PRN. For [facility] Only. For contracted patients, their primary hospice will continue to manage spiritual care needs.     DIET PLEASURE     Hold tray     Standing Status:   Standing     Number of Occurrences:   1    VITAL SIGNS     Standing Status:   Standing     Number of Occurrences:   71193    NURSING-MISCELLANEOUS: Comfort Care Measures CONTINUOUS     Standing Status:   Standing     Number of Occurrences:   1     Order Specific Question:   Description of Order:     Answer:   Comfort Care Measures    BLADDER CHECKS     May scan bladder PRN for urinary retention and or patient discomfort     Standing Status:   Standing     Number of Occurrences:   28040    NURSING ASSESSMENT:  SPECIFY Assess for GIP, routine, or respite level of care. Q SHIFT Routine     Standing Status:   Standing     Number of Occurrences:   1     Order Specific Question:   Please describe the test or procedure you would like to order. Answer:   Assess for GIP, routine, or respite level of care.  PAIN ASSESSMENT Pain and Symptoms: Assess ever 4 hours and PRN, for GIP level of care. PRN Routine     Standing Status:   Standing     Number of Occurrences:   54312     Order Specific Question:   Please describe the test or procedure you would like to order. Answer:   Pain and Symptoms: Assess ever 4 hours and PRN, for GIP level of care.  Turn or assist with turn approximately every 2 hours if patient is unable to turn self. Remind patient to turn if necessary.      Standing Status:   Standing     Number of Occurrences:   1    Assess skin per unit guidelines     Standing Status:   Standing     Number of Occurrences:   1    NURSING-MISCELLANEOUS: Pad/offload medical devices CONTINUOUS     Standing Status:   Standing     Number of Occurrences:   1     Order Specific Question:   Description of Order:     Answer:   Pad/offload medical devices    NURSING-MISCELLANEOUS: Maintain HOB at the lowest elevation consistent with medical plan of care CONTINUOUS     Standing Status:   Standing     Number of Occurrences:   1     Order Specific Question:   Description of Order:     Answer:   Maintain HOB at the lowest elevation consistent with medical plan of care    NURSING-MISCELLANEOUS: Use lift equipment for lifting patient CONTINUOUS     Standing Status:   Standing     Number of Occurrences:   1     Order Specific Question:   Description of Order:     Answer:   Use lift equipment for lifting patient    Maintain heels off of bed at all times     Standing Status:   Standing     Number of Occurrences:   1    CROW CATHETER, CARE     1. Crow Catheter care every shift and PRN  2. Notify Physician of Crow Catheter leakage, occlusion, gross adherent sediment or accidental removal  3. Change Crow 30 days after insertion. 4. May flush catheter bid and prn leakage or gross adherent sediment or mucus. Standing Status:   Standing     Number of Occurrences:   1    NURSING-MISCELLANEOUS: Peripheral IV access: 1. May use peripheral IV if present. If no IV access present, may place peripheral IV for medication administration. 2. Maintain IV access per hospital policy. CONTINUOUS     1. May use peripheral IV if present. If no IV access present, may place peripheral IV for medication administration. 2. Maintain IV access per hospital policy. Standing Status:   Standing     Number of Occurrences:   1     Order Specific Question:   Description of Order:     Answer:   Peripheral IV access:    VITAL SIGNS PER UNIT ROUTINE     Standing Status:   Standing     Number of Occurrences:   1    BLADDER CHECKS     May scan bladder PRN for urinary retention and or patient discomfort     Standing Status:   Standing     Number of Occurrences:   [id number]    NURSING ASSESSMENT:  SPECIFY Assess for GIP, routine, or respite level of care. Q SHIFT Routine     Standing Status:   Standing     Number of Occurrences:   1     Order Specific Question:   Please describe the test or procedure you would like to order. Answer:   Assess for GIP, routine, or respite level of care.  BEDREST, COMPLETE     Standing Status:   Standing     Number of Occurrences:   1    DO NOT RESUSCITATE     Standing Status:   Standing     Number of Occurrences:   1     Order Specific Question:   Comfort Measures Only? Answer:    Yes    OXYGEN CANNULA Liters per minute: 3; Indications for O2 therapy: RESPIRATORY DISTRESS PRN Routine Administer oxygen via nasal cannula. Standing Status:   Standing     Number of Occurrences:   04449     Order Specific Question:   Liters per minute: Answer:   3     Order Specific Question:   Indications for O2 therapy     Answer:   RESPIRATORY DISTRESS    sodium chloride (NS) flush 3 mL    DISCONTD: morphine injection 4 mg    DISCONTD: morphine injection 4 mg    DISCONTD: morphine (ROXANOL) concentrated oral syringe 10 mg    DISCONTD: morphine (ROXANOL) concentrated oral syringe 10 mg    DISCONTD: acetaminophen (TYLENOL) tablet 650 mg    acetaminophen (TYLENOL) suppository 650 mg    DISCONTD: LORazepam (ATIVAN) injection 1 mg    DISCONTD: haloperidol (HALDOL) 2 mg/mL oral solution 2 mg    DISCONTD: glycopyrrolate (ROBINUL) injection 0.1 mg    glycopyrrolate (ROBINUL) injection 0.2 mg    OR Linked Order Group     HYDROmorphone (DILAUDID) syringe 0.5 mg     HYDROmorphone (DILAUDID) syringe 0.5 mg    LORazepam (ATIVAN) injection 1 mg    INITIAL PHYSICIAN ORDER: HOSPICE Level Of Care: General Inpatient; Reason for Admission: 80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 .. 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  Patient receiving treatment that can only be provided in an inpatient setting (further clarification in H&P documentation)     Order Specific Question:   Admitting Diagnosis Answer:   Toxic metabolic encephalopathy [[id number]]     Order Specific Question:   Terminal Prognosis Diagnosis(es)     Answer:   Toxic metabolic encephalopathy [[id number]]     Order Specific Question:   Admitting Physician     Answer:   Liliam Garcia     Order Specific Question:   Attending Physician     Answer:   Nick Mccarthy [[id number]]    IP CONSULT TO SOCIAL WORK     Standing Status:   Standing     Number of Occurrences:   1     Order Specific Question:   Reason for Consult: Answer: For [facility] Only. For contracted patients, their primary hospice will continue to manage social work needs. Allergies: Allergies   Allergen Reactions    Acetazolamide Unknown (comments)     fatigue    Benzonatate Other (comments)       Care Plan:  Encounter Problems (Active)     Problem: Anxiety/Agitation     Dates: Start: 03/11/22       Disciplines: Interdisciplinary    Goal: Verbalize or staff assess the ability to manage anxiety     Dates: Start: 03/11/22   Expected End: 03/13/22       Description: Kurt Jackson will demonstrate appropriate motor behavior AEB less than 2 episodes of fidgety, picking or pulling at clothes on devices, yelling out, getting out of bed, etc. each shift during inpatient hospice stay. Disciplines: Interdisciplinary    Intervention: Assess for anxiety/agitation     Dates: Start: 03/11/22       Description: Assess for signs and symptoms of anxiety and agitation. Intervention: Instruct/Implement strategies to reduce anxiety/agitation     Dates: Start: 03/11/22       Description: Instruct patient/caregiver on strategies to reduce anxiety/agitation.              Problem: Dyspnea Due to End of Life     Dates: Start: 03/10/22       Disciplines: Interdisciplinary    Goal: Demonstrate understanding of and ability to manage respiratory symptoms at end of life     Dates: Start: 03/10/22   Expected End: 03/13/22       Description: Kurt Jackson will indicate effective breathing pattern AEB absence of respiratory distress each shift during inpatient hospice stay. Disciplines: Interdisciplinary    Intervention: Assess for signs and symptoms of dyspnea     Dates: Start: 03/10/22          Intervention: Instruct on causes/symptoms of dyspnea     Dates: Start: 03/10/22          Intervention: Instruct patient/caregiver/family on strategies to effectively manage dyspnea     Dates: Start: 03/10/22                Problem: Emotional Support Needs     Dates: Start: 03/11/22       Disciplines: Interdisciplinary    Goal: Patient/family is receiving emotional support     Dates: Start: 03/11/22   Expected End: 03/18/22       Description: Pt, pt's son Mikki Quach, and pt's family will receive emotional support from SW weekly through education on the hospice philosophy, weekly check-ins, validation of feelings, active listening, and rapport building throughout pt's hospice journey as evidenced by Mikki Quach and family's understanding of LOC changes and room and board charges, acknowledgement of SW services available to the family, and lack of heightened emotional responses. Disciplines: Interdisciplinary    Intervention: Assess for emotional distress     Dates: Start: 03/11/22       Description: SW will assess for emotional distress in pt, Mikki Quach, and family through the use of observation and assessment of verbal/nonverbal cues, open ended questions, and motivational interviewing techniques. Intervention: Provide emotional support of the family's cultural expressions of grief and loss     Dates: Start: 03/11/22       Description: SW will provide emotional support of the family's cultural expression of grief, loss, and response to illness.              Problem: Falls - Risk of     Dates: Start: 03/10/22       Disciplines: Interdisciplinary    Goal: *Absence of Falls     Dates: Start: 03/10/22   Expected End: 03/24/22       Description: Patient Peggy Patel will remain free from falls AEB no injuries r/t falls each shift during inpatient hospice stay. Disciplines: Interdisciplinary          Problem: General Wound Care     Dates: Start: 03/11/22       Disciplines: Interdisciplinary    Goal: *Non-infected wound: Improvement of existing wound, absence of infection, and maintenance of skin integrity     Dates: Start: 03/11/22   Expected End: 03/14/22       Description: Patient Juan Ramon Diop wounds will be free from further complications AEB absence of signs and symptoms of infection each shift during inpatient hospice stay.      Disciplines: Interdisciplinary       Goal: Interventions     Dates: Start: 03/11/22       Disciplines: Interdisciplinary    Intervention: Pain management     Dates: Start: 03/11/22          Intervention: Wound assessment, including wound bed description and dimensions     Dates: Start: 03/11/22          Intervention: Wound dressing change, sterile or clean     Dates: Start: 03/11/22          Intervention: Wound care (eg: Remove necrotic tissue; infection control/protection; absorb exudate; fill dead space; maintain moisture; maintain constant temperature of wound)     Dates: Start: 03/11/22          Intervention: Skin assessment     Dates: Start: 03/11/22          Intervention: Skin monitoring and care (e.g. skin cleansing; keep dry, moisturize, utilization of  lotion and/or skin barrier cream)     Dates: Start: 03/11/22          Intervention: Peripheral circulation assessment     Dates: Start: 03/11/22          Intervention: Incontinence management     Dates: Start: 03/11/22          Intervention: Wound-healing assessment     Dates: Start: 03/11/22                Problem: Infection - Risk of, Urinary Catheter-Associated Urinary Tract Infection     Dates: Start: 03/11/22       Disciplines: Interdisciplinary    Goal: *Absence of infection signs and symptoms     Dates: Start: 03/11/22   Expected End: 03/14/22       Description: Patient Juan Ramon Diop will remain free from infection R/T urinary catheter AEB absence of signs and symptoms of infection each shift during inpatient hospice stay. Disciplines: Interdisciplinary    Intervention: Urinary catheter needs assessment     Dates: Start: 03/11/22       Description: Medically/surgically unstable; Chemically paralyzed; Obstruction/retention; Strict I/Os; Surgical procedure; Comfort Care; Multiple Stage 3 or 4 pressure ulcers, chest to knees       Intervention: Urine assessment (eg: Clarity; color; odor; volume)     Dates: Start: 03/11/22          Intervention: Infection signs and symptoms monitoring - urinary tract (eg: Flank or suprapubic pain; burning; fever; dysuria; frequency; urgency; cloudy/bloody/foul odor urine; confusion/agitation; incontinence)     Dates: Start: 03/11/22          Intervention: Lab monitoring (eg: Urinalysis; culture and sensitivity; complete blood count with differential)     Dates: Start: 03/11/22          Intervention: Urinary catheter management (eg: Closed urinary catheter system maintenance; urinary catheter patency with free downhill flow; perineal care; monitor intake and output)     Dates: Start: 03/11/22          Intervention: Urinary catheter discontinuation     Dates: Start: 03/11/22                Problem: Pain     Dates: Start: 03/10/22       Disciplines: Nurse, Interdisciplinary, RT    Goal: *Control of Pain     Dates: Start: 03/10/22   Expected End: 03/13/22       Description: Patient Isa Harvey will exhibit decrease in pain AEB rating pain less than 3 on 1-10 scale or 3 FLACC score within each shift of receiving pain medication during inpatient hospice stay.        Disciplines: Nurse, Interdisciplinary, RT    Intervention: Assess pain characteristics (eg: Intensity scale; onset; location; quality; severity; duration; frequency; radiation)     Dates: Start: 03/10/22          Intervention: Assess pain management - barriers (eg: Past pain experiences)     Dates: Start: 03/10/22 Intervention: Identify pain expectations (eg: Patient's pain goal; somatic experiences; behavioral changes; affect)     Dates: Start: 03/10/22          Intervention: Identify pain medication concerns (eg: Cultural considerations; addiction concerns)     Dates: Start: 03/10/22          Intervention: Support system identification (eg: Caregiver; community resource; family; friends; Yarsani; support group)     Dates: Start: 03/10/22          Intervention: Monitor for change in patient condition (eg:  Vital signs changes; changes in level of consciousness; nausea; behavioral changes)     Dates: Start: 03/10/22          Intervention: Medication side-effect assessment     Dates: Start: 03/10/22          Intervention: Pain-relief response reassessment (eg: Frequency based on route of administration; effectiveness)     Dates: Start: 03/10/22                Problem: Pressure Injury - Risk of     Dates: Start: 03/10/22       Disciplines: Interdisciplinary    Goal: *Prevention of pressure injury     Dates: Start: 03/10/22   Expected End: 03/24/22       Description: Patient Charisse Leak will remain free from acquired pressure injuries AEB zero acquired pressure injuries during assessment of skin each shift during inpatient hospice stay.       Disciplines: Interdisciplinary         Care Plan Problems/Goals  Report    0 of 9 Goals Met 0 of 9 Met     Progressing Towards Goal (7)      *Absence of Falls (Falls - Risk of)    Disciplines:  Interdisciplinary Expected end:  03/24/22        Outcome: Progressing Towards Goal By Dimitri Juan RN on 03/10/22 2346            *Prevention of pressure injury (Pressure Injury - Risk of)    Disciplines:  Interdisciplinary Expected end:  03/24/22        Outcome: Progressing Towards Goal By Dimitri Juan RN on 03/10/22 3666            *Control of Pain (Pain)    Disciplines:  Nurse, Interdisciplinary, RT Expected end:  03/13/22        Outcome: Progressing Towards Goal By Dimitri Juan RN on 03/10/22 2354            Demonstrate understanding of and ability to manage respiratory symptoms at end of life (Dyspnea Due to End of Life)    Disciplines:  Interdisciplinary Expected end:  03/13/22        Outcome: Progressing Towards Goal By Jose Miguel Leon RN on 03/10/22 2354            *Absence of infection signs and symptoms (Infection - Risk of, Urinary Catheter-Associated Urinary Tract Infection)    Disciplines:  Interdisciplinary Expected end:  03/14/22        Outcome: Progressing Towards Goal By Jose Miguel Leon RN on 03/11/22 0308            *Non-infected wound: Improvement of existing wound, absence of infection, and maintenance of skin integrity (General Wound Care)    Disciplines:  Interdisciplinary Expected end:  03/14/22        Outcome: Progressing Towards Goal By Jose Miguel Leon RN on 03/11/22 0216            Patient/family is receiving emotional support (Emotional Support Needs)    Disciplines:  Interdisciplinary Expected end:  03/18/22        Outcome: Progressing Towards Goal By Beverly Celsi LMSW on 03/11/22 1027                         No Outcome (2)      Interventions (General Wound Care)    Disciplines:  Interdisciplinary Expected end:  -          Verbalize or staff assess the ability to manage anxiety (Anxiety/Agitation)    Disciplines:  Interdisciplinary Expected end:  03/13/22                            ___________________    Care Team Notes          POC/IDG Notes      [facility] IDG Nurse Notes by Donny Briseno RN at 03/11/22 1044  Version 1 of 1    Author: Donny Briseno RN Service: NURSING Author Type: Registered Nurse    Filed: 03/11/22 1059 Date of Service: 03/11/22 1044 Status: Signed    : Donny Briseno RN (Registered Nurse)         Patient: Sangeeta Hackett    Date: 03/11/22  Time: 10:44 AM    [facility] Nurse Notes  1st  IDG: Pt is a 80y.o. year-old male with metabolic encephalopathy who is here GIP level of care for management of pain, dyspnea, agitation and secretions.   Billy with UOP of oml   IV access: Peripheral IV   PO intake:NPO  Oxygen: Room air  Wounds:   Wound Arm lower Left; Anterior (Active)   Wound Etiology Other (Comment) 03/11/22 0230   Dressing Status New dressing applied 03/11/22 0230   Cleansed Wound cleanser 03/11/22 0230   Dressing/Treatment ABD pad; Other (Comment) 03/11/22 0230   Number of days: 0       Wound Arm lower Right;Medial (Active)   Wound Etiology Other (Comment) 03/11/22 0230   Dressing Status New dressing applied 03/11/22 0230   Cleansed Wound cleanser 03/11/22 0230   Dressing/Treatment ABD pad; Other (Comment) 03/11/22 0230   Number of days: 0       Wound Leg lower Lateral;Right (Active)   Wound Etiology Other (Comment) 03/11/22 0230   Dressing Status New dressing applied 03/11/22 0230   Cleansed Wound cleanser 03/11/22 0230   Dressing/Treatment Gauze dressing/dressing sponge; Other (Comment) 03/11/22 0230   Number of days: 0       Wound Leg lower Left;Lateral (Active)   Wound Etiology Other (Comment) 03/11/22 0230   Dressing Status New dressing applied 03/11/22 0230   Cleansed Wound cleanser 03/11/22 0230   Dressing/Treatment Gauze dressing/dressing sponge; Other (Comment) 03/11/22 0230   Number of days: 0     PRN medications: Ativan 1 mg x 2 doses IV for agitation / Morphine 4 mg IV x 2 doses for pain   Scheduled meds:    Current Facility-Administered Medications   Medication Dose Route Frequency    haloperidol (HALDOL) 2 mg/mL oral solution 2 mg  2 mg Oral Q12H     Plan: Add Dilaudid 0.5mg Q 30 min PRN. D/C PRN Morphine. D/C all P.O. Medications. Comprehensive plan of care reviewed. IDG and pt./family in agreement with plan of care. The IDG identifies through on-going assessment when a change is needed to the POC; the pt/family will receive care and services necessitated by changes in POC. Medications reviewed by the pharmacist and Medical Director.         Signed by: Jessica Mendoza RN       [facility] IDG  Notes by Anamaria Butterfield at 03/11/22 1045  Version 1 of 1    Author: Ferny Canas Service: Spiritual Care Author Type: Pastoral Care    Filed: 03/11/22 1046 Date of Service: 03/11/22 1045 Status: Signed    : Ferny Canas (Pastoral Care)       Patient: Qi James    Date: 03/11/22  Time: 10:45 AM    [facility]  Notes    Assessment pending for spiritual and bereavement support. Signed by: Nacho Mccracken       [facility] IDG  Notes by Ferny Canas at 03/11/22 0925  Version 1 of 1    Author: Ferny Canas Service: Spiritual Care Author Type: Pastoral Care    Filed: 03/11/22 0926 Date of Service: 03/11/22 0925 Status: Signed    : Ferny Canas (Pastoral Care)       Patient: Qi James    Date: 03/11/22  Time: 9:25 AM    [facility]  Notes    / Grief Counselor has reviewed  Initial Comprehensive Assessment and plan of care. Bereavement and Spiritual Care Assessments to be completed and plan of care put in place to meet patient and family needs. Signed by: Nacho Mccracken       [facility] IDG  Notes by Reinier Moon LMSW at 03/11/22 0910  Version 1 of 1    Author: Reinier Moon LMSW Service: Geo Fonseca Author Type: Licensed Masters in Social Work    Filed: 03/11/22 0910 Date of Service: 03/11/22 0910 Status: Signed    : Reinier Moon [address] [facility] (Licensed Masters in Social Work)       Patient: Qi James    Date: 03/11/22  Time: 9:10 AM    [facility]  Notes  SW has read the initial comprehensive assessment and plan of care. No immediate needs noted. Initial  assessment visit will be completed within 5 days of admission.          Signed by: Steff De La Paz LMSW       [facility] IDG Nurse Notes by Etienne Quezada RN at 03/10/22 1854  Version 1 of 1    Author: Etienne Quezada RN Service: NURSING Author Type: Registered Nurse    Filed: 03/10/22 1905 Date of Service: 03/10/22 1854 Status: Signed    : Etienne Quezada RN (Registered Nurse)       Patient: Qi James    Date: 03/10/22  Time: 6:54 PM    [address] Nurse Notes    Dr. Chandana Huff has been approved for [facility]. Please send room number and transport time. Payer source:  Medicare     Referring physician if not [address] MD:  JAIME      Consents, Code Status, TB screening tool:  DNR and consents will be signed with liaison prior to transport     Security Code:      Infection Control/Covid:  no precautions  covid test negative 3/7/22     Mobility/Fall risk: FALL RISK patient is agitated and had many falls at home     Condition/treatments/Managing Symptoms: agitation, secretions, respiratory distress     Report Nurse and phone: [phone]     [facility] name, address and relationship to patient:    Marco Carrion, son  [phone] cell     Psychosocial/Family Information if needed to share: Patient is a retired local pediatrician. Spiritual assessment of semi/un responsive patient:      On 3/10/2022 Dr. Praneeth Aquino verbally certified that Dr. Chandana Huff is terminally ill with a life expectancy of 6 months or less if the terminal illness runs its normal course. Verbal certification received by: Perry Macedo RN on 3/10/2022 at 1350. 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 He has had several hospitalizations for AMS usually related to urinary and or articular sepsis. He has been on suppressive doxycycline for good long time. He comes into the hospital again with AMS and worsening though not critical renal failure. He has stable heart failure. Brain scanning shows no acute process. Inflammatory markers are benign. Nevertheless he has gradually gotten more confused and increasing agitated, unable to care for himself, unable to swallow safely and started to require sedatives for safety.   My suspicion would be persistent articular sepsis and a possible hematogenous source. Nevertheless it appears that further aggressive diagnostic or therapeutic interventions would not be of any benefit. His family is desiring comfort measures at this time. He has been changed to a DNR status. CERTIFICATION: Keena Martinez is appropriate for hospice level care. I would anticipate death in the next few weeks but have informed his family through our liaison that he might once again stabilize and require an alternate location of care. Due to his agitated delirium he will be admitted to [facility] for appropriate parenteral medication management and daily monitoring and reassessment. Pt arrived via ems on 2 liters of oxygen. Pt alert but very confused. Pt lungs very congested. Pt having lots of secretions. Pt kicking, biting, and swatting. Pt told me he was going to do so and he did. Pt given medication for pain, agitation, and secretions. Signed by: Michelle Lazaro RN                Care Team Present:   Team Members Present: Physician,Nurse,,,Bereavement,Vol Coordinator,Other (Comment)  Physician Team Member: Dr. Anaid Comer  Nurse Team Member: Michael Villarreal  Social Work Team Member: Jared Irene   Team Member: Lisa Hernandes  Vol Coordinator: Sushila Peñaloza  Other Discipline Present (Name):  Donnette Aase, NP

## 2022-03-11 NOTE — PROGRESS NOTES
Anticipatory Grief    GOAL Dr. Hallie Beckham and his children, Lui Alfonso, Skye Marroquin and Ca along with grandchildren and siblings  will demonstrate appropriate anticipatory grief reactions related to Dr. Hallie Beckhma' impending death as evidenced by  their ability to verbalize feelings of denial, bargaining, anger, and depression, display feelings and associated behaviors in an acceptable manner during inpatient hospice stay.     Outcome: Progressing Towards Goal

## 2022-03-11 NOTE — PROGRESS NOTES
Problem: Emotional Support Needs  Goal: Patient/family is receiving emotional support  Description: Pt, pt's son Kisha Garcia, and pt's family will receive emotional support from SW weekly through education on the hospice philosophy, weekly check-ins, validation of feelings, active listening, and rapport building throughout pt's hospice journey as evidenced by Kisha Garcia and family's understanding of LOC changes and room and board charges, acknowledgement of SW services available to the family, and lack of heightened emotional responses.    Outcome: Progressing Towards Goal

## 2022-03-11 NOTE — PROGRESS NOTES
0157:  Patient premedicated with Lorazepam 1 mg IV and Morphine 4 mg IV prior to bath and mendez catheter placement. Mendez catheter inserted via sterile technique and patient tolerated well. Assisted CNA to bathe and shave patient. Wound care performed by Angelia White RN.

## 2022-03-11 NOTE — PROGRESS NOTES
184: Received report from [facility]. Pt being admitted to [facility] for hospice diagnosis of Acute Metabolic encephalopathy  and symptom control of secretions, pain, agitation and dyspnea. Pt has a past history of DMII, HTN, CAD, MI, TIA, A-fib, carotid stenosis, murmer,  hypercholestremia, diverticulitis, GERD, Arthritis, glaucoma,hiatal hernia, hypothyroidism,anemia and dementia. Pt currently presents with eyes closed, resting in bed with gurgling respirations. All other symptoms managed. Pt given meds for agitation and is now calm. Pt tolerated well after administration; eyes closed and body relaxed. HOB up at 45 to assist with breathing. Pt is on RA at this time, Pt has a #22 IV access to anterior left upper arm. IV flushes well, clean, dry and intact. w/ transparent dressing covering. Pt has a brief d/t incontinence. Pt is on a puree diet but has extreme issues with swallowing. Pt has various bruises over body, face and arms from fall at home. Discharge plan initiated; Pt will remain at [facility] until demise. Discharge plan to be evaluated for potential level of care change. RN has discussed plan of care with CNA. Will continue to monitor. : Pt still resting in bed with wet sounding respirations. Pat Ibarra (son) just in from [facility]. Son is an MD in [place]. Discussed plan of care and events that had occured since patient had been admitted to [facility] late this afternoon. Son agrees with sedation status and states that his father has a hard time swallowing and that the po Haldol would probably not be a good idea. He states that his mother  6 yrs ago from dementia and that his father has been thru a lot of changes since then. He states his father is ready to be with his mother. And he feels like his father is ready to go any time now. All questions answered. No further questions or concerns verbalized. Will continue to monitor for any change in status.      : Pt still resting in bed with eyes closed. No acute change in status. Respirations are sounding better. Less gurgling noted. Less use of axillary muscles noted. All symptoms managed at this time. Will continue to monitor. 0010: Pt resting in bed with eyes closed. Breathing less labored. Pt appears to be more relaxed. No acute change in status. 0215: In room to bathe and shave pt. Billy placed by [employer]. Pt tolerated well. Pt bathed and shaved. Pt has 4 abrasions on body that will probably break open if not protected from re-injury. The 2 on the bilateral lower legs were cleansed with wound cleanser, sterile 2x2 applied over abrasions and Tegaderm applied to keep them intact. The 2 on the lower arms (just below bend in arms) were cleansed with wound cleanser, covered with ABD pads to pad them from bumping into side rails, etc... then wrapped with Kerlex. . Pt tolerated all procedures well. Pt still lethargic. Will continue to monitor for any change in level of care. 0430: Pt still resting in bed with eyes closed. Breathing more relaxed. All symptoms managed at this time. Will continue to monitor. 4178: Pt resting I bed with eyes closed. RR even and no accessory muscle usage noted at this time. No acute change in status. Will continue to monitor. Will report off to Albania Escalante RN at change of shift.

## 2022-03-11 NOTE — PROGRESS NOTES
Problem: Falls - Risk of  Goal: *Absence of Falls  Description: Patient Rosenda Hope will remain free from falls AEB no injuries r/t falls each shift during inpatient hospice stay. 3/11/2022 1608 by Colin Valenzuela  Outcome: Progressing Towards Goal  Note: Fall Risk Interventions:       Mentation Interventions: Room close to nurse's station,Door open when patient unattended,Eyeglasses and hearing aids,Adequate sleep, hydration, pain control    Medication Interventions: Bed/chair exit alarm,Evaluate medications/consider consulting pharmacy    Elimination Interventions: Bed/chair exit alarm    History of Falls Interventions: Bed/chair exit alarm,Door open when patient unattended,Evaluate medications/consider consulting pharmacy,Room close to nurse's station      3/11/2022 0828 by Colin Valenzuela  Note: Fall Risk Interventions:       Mentation Interventions: Room close to nurse's station,Door open when patient unattended,Eyeglasses and hearing aids,Adequate sleep, hydration, pain control    Medication Interventions: Bed/chair exit alarm,Evaluate medications/consider consulting pharmacy    Elimination Interventions: Bed/chair exit alarm    History of Falls Interventions: Bed/chair exit alarm,Door open when patient unattended,Evaluate medications/consider consulting pharmacy,Room close to nurse's station         Problem: Pressure Injury - Risk of  Goal: *Prevention of pressure injury  Description: Patient Rosenda Hope will remain free from further acquired pressure injuries AEB zero acquired pressure injuries during assessment of skin each shift during inpatient hospice stay.     3/11/2022 1608 by Colin Valenzuela  Outcome: Progressing Towards Goal  Note: Pressure Injury Interventions:  Sensory Interventions: Assess changes in LOC,Assess need for specialty bed,Pressure redistribution bed/mattress (bed type)    Moisture Interventions: Apply protective barrier, creams and emollients,Assess need for specialty bed,Internal/External urinary devices,Moisture barrier,Limit adult briefs    Activity Interventions: Assess need for specialty bed,Pressure redistribution bed/mattress(bed type)    Mobility Interventions: Assess need for specialty bed,Pressure redistribution bed/mattress (bed type),Float heels    Nutrition Interventions: Discuss nutritional consult with provider    Friction and Shear Interventions: Apply protective barrier, creams and emollients             3/11/2022 0828 by Conrado Merrill  Note: Pressure Injury Interventions:  Sensory Interventions: Assess changes in LOC,Assess need for specialty bed,Pressure redistribution bed/mattress (bed type)    Moisture Interventions: Apply protective barrier, creams and emollients,Assess need for specialty bed,Internal/External urinary devices,Moisture barrier,Limit adult briefs    Activity Interventions: Assess need for specialty bed,Pressure redistribution bed/mattress(bed type)    Mobility Interventions: Assess need for specialty bed,Pressure redistribution bed/mattress (bed type),Float heels    Nutrition Interventions: Discuss nutritional consult with provider    Friction and Shear Interventions: Apply protective barrier, creams and emollients                Problem: Pain  Goal: *Control of Pain  Description: Patient Adia Apo will exhibit decrease in pain AEB rating pain less than 3 on 1-10 scale or 3 FLACC score within each shift of receiving pain medication during inpatient hospice stay. Outcome: Progressing Towards Goal     Problem: Dyspnea Due to End of Life  Goal: Demonstrate understanding of and ability to manage respiratory symptoms at end of life  Description: Patient Adia Apo will indicate effective breathing pattern AEB absence of respiratory distress each shift during inpatient hospice stay.      3/11/2022 1608 by Conrado Merrill  Outcome: Progressing Towards Goal  3/11/2022 0828 by Conrado Merrill  Note: Pt on O2 per NC       Problem: Infection - Risk of, Urinary Catheter-Associated Urinary Tract Infection  Goal: *Absence of infection signs and symptoms  Description: Patient Yo Chery will remain free from infection R/T urinary catheter AEB absence of signs and symptoms of infection each shift during inpatient hospice stay. Outcome: Progressing Towards Goal     Problem: General Wound Care  Goal: *Non-infected wound: Improvement of existing wound, absence of infection, and maintenance of skin integrity  Description: Patient Yo Chery wounds will be free from further complications AEB absence of signs and symptoms of infection each shift during inpatient hospice stay. Outcome: Progressing Towards Goal  Goal: Interventions  Note: Abrasions located to BLE upper and lower. Problem: Anxiety/Agitation  Goal: Verbalize or staff assess the ability to manage anxiety  Description: Kurt Chery will demonstrate appropriate motor behavior AEB less than 2 episodes of fidgety, picking or pulling at clothes on devices, yelling out, getting out of bed, etc. each shift during inpatient hospice stay.    Outcome: Progressing Towards Goal

## 2022-03-11 NOTE — PROGRESS NOTES
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 His wife  in 2016 after an extended mixon with alzheimer's disease. He has 5 adult children and multiple grandchildren. Dr. Indio Ronquillo is a [employer]. His [employer] is [place]. Assessment:   began her assessment by having a conversation with the family. They were gathered in the common area. All were friendly and welcoming of spiritual support. They shared their dad's story leading up to the last few days and his dramatic physical decline. The family appears to be at peace and accepting of Dr. Erickson Fail prognosis. After speaking to the family,  visited with Dr. Indio Ronquillo. Jena Julian and her  were there.  offered words of hope, comfort and prayer. Plan:   Continue to provide spiritual and emotional support for patient and family. Focus on Anticipatory grief.

## 2022-03-11 NOTE — [facility] IDG SOCIAL WORKER NOTES
Patient: Piedad Eaton    Date: 03/11/22  Time: 9:10 AM    [facility]  Notes  SW has read the initial comprehensive assessment and plan of care. No immediate needs noted. Initial SW assessment visit will be completed within 5 days of admission.          Signed by: Yovani Tello LMSW

## 2022-03-11 NOTE — H&P
History and Physical    Patient: Juan Ramon Diop MRN: [id number]  SSN: [id number]    YOB: 1933  Age: 80 y.o. Sex: male      Subjective:      Juan Ramon Diop is a 80 y.o. male who has a hospice diagnosis of metabolic encephalopathy. 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 He has had declining help in the past year due to recurrent UTI and recurrent infection of total knee prosthesis. He was found to have septic arthritis 12/2021. He completed 6 weeks of IV antibiotics and then was converted to po antibiotics. He was discharged to rehab and returned to the hospital from 12/20/21-12/24/21 due to AMS and UTI. He was discharged on 2/3/22 to home. He began to have hallucinations at the beginning of March. He refused to go to the ER for evaluation. He consented to see his PCP and was started on Levaquin for UTI. His family stayed with him 24/7 until he was sent to the ER after a fall at home on 3/7/22. Xrays were negative for fracture and CT of the head showed no acute injury. Lab studies revealed acute kidney injury with creatinine 2.3. Despite aggressive measures, he has had worsening mental status, kidney function and dysphagia. He has lapsed into a deep encephalopathic state. Due to his continued decline, his family has elected to forgo further medical treatment and pursue comfort measures with hospice care. Without further treatment, his life expectancy is less than 7 days. He is unable to take in food, fluids or medications orally and will require parenteral medications for symptom management. Patient admitted GIP for management of pain, dyspnea and agitation.        Past Medical History:   Diagnosis Date    Abscess of foot 4/6/2012    Acute encephalopathy 12/12/2021    Anemia fall 2016    r/t GI bleeds     Arthritis     osteo    Atrial fibrillation ([address] 12/10/2021    CAD (coronary artery disease) 1997    4 stents total (1997, 2012, 2013); \"ECHO 7/2016 normal with good EF\" per cardio office note; \"Stress test 6/2016 Findings consistent with very mild inferior ischemia\"    Carotid stenosis 04/2017    carotid u/s: Right internal carotid artery has 50-69% stenosis. Left internal carotid artery has <50% stenosis.  Coagulopathy ([address] 12/10/2021    Diabetes ([address] 05/1997    type 2; oral med; rare BG checks, last hgba1c- 6.9 (6/2017)    Diverticulosis 12/2011    with GI bleeding; no current problems    GERD (gastroesophageal reflux disease)     on med for control     Glaucoma     Heart murmur     ECHO 7/14/16- mild AS, mild MR, mild TR    Hiatal hernia     History of kidney stones     Hypercholesteremia     on med for control     Hypertension     hx of- no medications currently    Hypothyroid     managed with medication    Infection of total joint prosthesis ([address] 12/9/2021    Instability of prosthetic shoulder joint ([address] 10/10/2017    MI (myocardial infarction) ([address]     x2, STEMI    Rotator cuff tear arthropathy, left 10/10/2017    S/P shoulder hemiarthroplasty, left 10/10/2017    TIA (transient ischemic attack) 07/2016    experienced numbness/tingling L arm and left lower face- \"only lasted a couple of hrs\"; 7/13 CT head: cerebral white matter changes have progressed since 4/19/13 likely indicative of chronic small vessel ischemic disease, no acute intracranial abnormality.       Unspecified sleep apnea     denies     Urethral stenosis     has had difficulty with placement/removal previously     Vasovagal syncope      Past Surgical History:   Procedure Laterality Date    HX BUNIONECTOMY Left     with hammer toe    HX CARPAL TUNNEL RELEASE Right 1968    HX CARPAL TUNNEL RELEASE Left 2012    HX CATARACT REMOVAL  1987 and 1990    Teofilo with IOLens implamts    HX HEART CATHETERIZATION  2395,5529, 2012, 2013    total 4 stents: 2 stents- 1997, 1 stent- 2012 (BMS to RCA), 1 stent- 2013 (ANGELLA to RCA)    HX HEENT Right     eye surgery to replace lens due to fall     HX HEMORRHOIDECTOMY  1987    HX HERNIA REPAIR  1944    L inguinal    HX KNEE REPLACEMENT Bilateral 2002, 2008    HX LAP CHOLECYSTECTOMY  0776    umbilical hernia repair    HX ORTHOPAEDIC Left     trigger finger     HX ORTHOPAEDIC  9639-7405    multiple knee surgery    HX ROTATOR CUFF REPAIR Right 1995    HX ROTATOR CUFF REPAIR Left 2000    HX ROTATOR CUFF REPAIR Right 2007    with martina tendon repair    HX SHOULDER ARTHROSCOPY Left 2015    HX TONSILLECTOMY  1938    HX TOTAL COLECTOMY  01/13/2017    HX UROLOGICAL  1996    cysto with stone extraction and stent      Family History   Problem Relation Age of Onset    Heart Disease Father     Heart Attack Father     Stroke Mother     Alzheimer's Disease Sister     Alzheimer's Disease Sister      Social History     Tobacco Use    Smoking status: Never Smoker    Smokeless tobacco: Never Used   Substance Use Topics    Alcohol use: Yes     Alcohol/week: 7.0 standard drinks     Types: 7 Glasses of wine per week      Prior to Admission medications    Medication Sig Start Date End Date Taking? Authorizing Provider   promethazine (PHENERGAN) 12.5 mg tablet Take 1 Tablet by mouth every six (6) hours as needed for Nausea. 12/15/21  Yes GABE Weston   allopurinoL (ZYLOPRIM) 300 mg tablet Take  by mouth daily. Yes Provider, Historical   traZODone (DESYREL) 100 mg tablet Take 100 mg by mouth nightly as needed for Sleep. Yes Provider, Historical   melatonin 5 mg tablet Take  by mouth nightly. Yes Provider, Historical   apixaban (Eliquis) 5 mg tablet Take 5 mg by mouth two (2) times a day. Yes Provider, Historical   simethicone (GAS-X) 125 mg capsule Take 125 mg by mouth four (4) times daily as needed for Flatulence.    Yes Provider, Historical   loperamide (IMODIUM) 2 mg capsule Take 2 mg by mouth four (4) times daily as needed for Diarrhea. Yes Provider, Historical   diphenoxylate-atropine (LOMOTIL) 2.5-0.025 mg per tablet Take 1 Tablet by mouth four (4) times daily as needed for Diarrhea. Yes Provider, Historical   atorvastatin (LIPITOR) 40 mg tablet Take 40 mg by mouth nightly. Yes Provider, Historical   levothyroxine (SYNTHROID) 50 mcg tablet Take 50 mcg by mouth Daily (before breakfast). Yes Provider, Historical   Psyllium Husk-Sucrose 3.4 gram/7 gram powd Take 3 Caps by mouth two (2) times a day. Yes Provider, Historical   bimatoprost (LUMIGAN) 0.01 % ophthalmic drops Administer 1 Drop to right eye nightly. Yes Provider, Historical   timolol (TIMOPTIC) 0.5 % ophthalmic solution Administer 1 Drop to right eye every morning. Yes Provider, Historical   Omeprazole delayed release (PRILOSEC D/R) 20 mg tablet Take 20 mg by mouth Daily (before dinner). Yes Provider, Historical   brimonidine (ALPHAGAN P) 0.1 % ophthalmic solution Administer 1 Drop to right eye two (2) times a day. Take / use AM day of surgery  per anesthesia protocols. Yes Provider, Historical   glimepiride (AMARYL) 2 mg tablet Take 1 mg by mouth daily. Indications: type 2 diabetes mellitus   Yes Provider, Historical        Allergies   Allergen Reactions    Acetazolamide Unknown (comments)     fatigue    Benzonatate Other (comments)       Review of Systems:  Review of systems not obtained due to patient factors. Objective:     Vitals:    03/10/22 1807 03/11/22 0349   BP: 127/67 (!) 73/47   Pulse: 98 60   Resp: (!) 32 15   Temp: 97.1 °F (36.2 °C)         Physical Exam:  GENERAL: mild distress, appears stated age, pale, cachectic  LUNG: Coarse diminished breath sounds with shallow respirations. HEART: regular rate and rhythm  ABDOMEN: soft, non-tender. Bowel sounds hypoactive. : Billy catheter with dark yellow urine. EXTREMITIES:  extremities with no cyanosis or edema.  + pulses. SKIN: Pale. Warm to touch. Abrasions to all extremities with dressings intact. NEUROLOGIC: Lethargic. Generalized weakness. Bedbound. PSYCHIATRIC: agitated    Assessment:     Hospital Problems  Date Reviewed: 3/11/2022          Codes Class Noted POA    * (Principal) Toxic metabolic encephalopathy UHU-20-DB: G92.8  ICD-9-CM: 349.82  12/12/2021 Unknown        Atrial fibrillation (HCC) (Chronic) ICD-10-CM: I48.91  ICD-9-CM: 427.31  12/10/2021 Yes        Status post revision of total knee replacement, left ICD-10-CM: O76.888  ICD-9-CM: V43.65  12/10/2021 Yes        Infection of total joint prosthesis ([facility] ICD-10-CM: T84.50XA  ICD-9-CM: 996.66  12/9/2021 Yes              Plan:     Current Facility-Administered Medications   Medication Dose Route Frequency    HYDROmorphone (DILAUDID) syringe 0.5 mg  0.5 mg IntraVENous Q30MIN PRN    Or    HYDROmorphone (DILAUDID) syringe 0.5 mg  0.5 mg SubCUTAneous Q30MIN PRN    LORazepam (ATIVAN) injection 1 mg  1 mg IntraVENous Q2H PRN    sodium chloride (NS) flush 3 mL  3 mL IntraVENous Q12H    haloperidol lactate (HALDOL) injection 2 mg  2 mg IntraVENous Q1H PRN    Or    haloperidol lactate (HALDOL) injection 2 mg  2 mg SubCUTAneous Q1H PRN    sodium chloride (NS) flush 3 mL  3 mL IntraVENous PRN    acetaminophen (TYLENOL) suppository 650 mg  650 mg Rectal Q3H PRN    glycopyrrolate (ROBINUL) injection 0.2 mg  0.2 mg IntraVENous Q4H PRN       3/10: ([place]) Admitted GIP with metabolic encephalopathy for management of pain, dyspnea and agitation. Change morphine to dilaudid for pain/dyspnea. Add orders for wound care to all extremities.     Signed By: Keysha Valladares NP     March 11, 2022

## 2022-03-11 NOTE — PROGRESS NOTES
[id number] Yoni received from [address] [place] [address].. Pt name and  identified. Pt in bed, resting with eyes closed. No signs or symptoms of pain, shortness of breath, anxiety , seizures or nausea/vomiting. FLACC 0/10. Comfort and safety measures in place. HOB elevated 30 degrees. Side rails up x2. Bed low and locked. Bed alarm tab in place. Call light in reach of patient, Door open for monitored visualization and hearing of patient. Care Plan reviewed and collaboration done with CNA. Pt expected to pass under GIP however will continue to assess change in LOC, medication & comfort needs, while collaborating with the Interdisciplinary Team.     0800: Pt observed on rounds, sleeping with eyes closed . Respirations even and nonlabored. No facial grimacing noted. No moaning or agitation. Flacc 0/10. No additional symptoms to manage at this time. Comfort and Safety measures maintained. Alarm exit tab in place. 1100: Two elderly female family Friends, Enrike Dickson, in to see pt. Update given. Pt remains asleep, noninteractive. No acute sx's to manage at this time. Comfort and Safety measures maintained. Alarm exit tab in place. 1125: Phone update given to both sons, Addison Barlow and Octavio Brantley 148. Both sons accepting of care and understanding of symptom management. Additional family in to see pt while RN on the phone. 1154: Pt's Daughter Iram Hoffman) at the bedside with her spouse and son. Asking  Krishna Cavanaugh her dad was here rather than Memory Care\". Preceding events shared with her, including his increased confusion, agitation, inability to swallow and recent Fall and overall  inability to do for himself. Daughter upset her dad medicated earlier with Morphine and Ativan stating Eric Youngblood is he going to get better if you just keep him medicated, He needs to be on his medications from home too\". Hospice Care and Protocol discussed with daughter and her family. [address] also provided.  Daughter is not receptive and accepting of Hospice at this point for her father. Dr Maya Ferrera requested to speak with daughter. 1300: Dr Maya Ferrera in to see pt and speak with all three adult siblings, spouses. Extended family present. 1500: Additional family present, in/out of room. Patient resting quietly in bed with eyes closed. Respirations unlabored with no signs or symptoms of distress, pain, agitation, or discomfort noted. FLACC 0.    1607: Family singing at the bedside. Pt remains comfortable with no acute sx management at this time. Comfort and Safety measures maintained. Alarm exit tab in place. 1815: Pt  Remains asleep, family states he has opened his eyes to them. Writer hasnt witnessed any interaction. Respirations remain even and nonlabored. No facial grimacing noted. No moaning or agitation. Flacc 0/10. No additional symptoms to manage at this time. Comfort and Safety measures maintained. Alarm exit tab in place.     Shift Report given to Israel Arroyo RN

## 2022-03-11 NOTE — HSPC IDG NURSE NOTES
Patient: Deann Hurst    Date: 03/11/22  Time: 10:44 AM    [facility] Nurse Notes  1st  IDG: Pt is a 80y.o. year-old male with metabolic encephalopathy who is here GIP level of care for management of pain, dyspnea, agitation and secretions. Billy with UOP of oml   IV access: Peripheral IV   PO intake:NPO  Oxygen: Room air  Wounds:   Wound Arm lower Left; Anterior (Active)   Wound Etiology Other (Comment) 03/11/22 0230   Dressing Status New dressing applied 03/11/22 0230   Cleansed Wound cleanser 03/11/22 0230   Dressing/Treatment ABD pad; Other (Comment) 03/11/22 0230   Number of days: 0       Wound Arm lower Right;Medial (Active)   Wound Etiology Other (Comment) 03/11/22 0230   Dressing Status New dressing applied 03/11/22 0230   Cleansed Wound cleanser 03/11/22 0230   Dressing/Treatment ABD pad; Other (Comment) 03/11/22 0230   Number of days: 0       Wound Leg lower Lateral;Right (Active)   Wound Etiology Other (Comment) 03/11/22 0230   Dressing Status New dressing applied 03/11/22 0230   Cleansed Wound cleanser 03/11/22 0230   Dressing/Treatment Gauze dressing/dressing sponge; Other (Comment) 03/11/22 0230   Number of days: 0       Wound Leg lower Left;Lateral (Active)   Wound Etiology Other (Comment) 03/11/22 0230   Dressing Status New dressing applied 03/11/22 0230   Cleansed Wound cleanser 03/11/22 0230   Dressing/Treatment Gauze dressing/dressing sponge; Other (Comment) 03/11/22 0230   Number of days: 0     PRN medications: Ativan 1 mg x 2 doses IV for agitation / Morphine 4 mg IV x 2 doses for pain   Scheduled meds:    Current Facility-Administered Medications   Medication Dose Route Frequency    haloperidol (HALDOL) 2 mg/mL oral solution 2 mg  2 mg Oral Q12H     Plan: Add Dilaudid 0.5mg Q 30 min PRN. D/C PRN Morphine. D/C all P.O. Medications. Comprehensive plan of care reviewed. IDG and pt./family in agreement with plan of care.  The IDG identifies through on-going assessment when a change is needed to the POC; the pt/family will receive care and services necessitated by changes in POC. Medications reviewed by the pharmacist and Medical Director.         Signed by: Sarai Humphries RN

## 2022-03-11 NOTE — PROGRESS NOTES
Problem: Falls - Risk of  Goal: *Absence of Falls  Description: Patient Didi Soliz will remain free from falls AEB no injuries r/t falls each shift during inpatient hospice stay. Outcome: Progressing Towards Goal  Note: Fall Risk Interventions:       Mentation Interventions: Room close to nurse's station,Door open when patient unattended,Eyeglasses and hearing aids,Adequate sleep, hydration, pain control    Medication Interventions: Bed/chair exit alarm,Evaluate medications/consider consulting pharmacy    Elimination Interventions: Bed/chair exit alarm    History of Falls Interventions: Bed/chair exit alarm,Door open when patient unattended,Evaluate medications/consider consulting pharmacy,Room close to nurse's station         Problem: Pressure Injury - Risk of  Goal: *Prevention of pressure injury  Description: Patient Didi Soliz will remain free from acquired pressure injuries AEB zero acquired pressure injuries during assessment of skin each shift during inpatient hospice stay.     Outcome: Progressing Towards Goal  Note: Pressure Injury Interventions:  Sensory Interventions: Assess changes in LOC,Assess need for specialty bed,Pressure redistribution bed/mattress (bed type)    Moisture Interventions: Apply protective barrier, creams and emollients,Assess need for specialty bed,Internal/External urinary devices,Moisture barrier,Limit adult briefs    Activity Interventions: Assess need for specialty bed,Pressure redistribution bed/mattress(bed type)    Mobility Interventions: Assess need for specialty bed,Pressure redistribution bed/mattress (bed type),Float heels    Nutrition Interventions: Discuss nutritional consult with provider    Friction and Shear Interventions: Apply protective barrier, creams and emollients                Problem: Pain  Goal: *Control of Pain  Description: Kurt Soliz will exhibit decrease in pain AEB rating pain less than 3 on 1-10 scale or 3 FLACC score within each shift of receiving pain medication during inpatient hospice stay. Outcome: Progressing Towards Goal  Goal: *PALLIATIVE CARE:  Alleviation of Pain  Description: Patient Buddy Saini will exhibit decrease in pain AEB rating pain less than 3 on 1-10 scale or 3 FLACC score within each shift of receiving pain medication during inpatient hospice stay. Outcome: Progressing Towards Goal     Problem: Agitation/Terminal Delerium-Palliative Care  Goal: *PALLIATIVE CARE-Alleviation of agitation  Description: Patient Buddy Saini will demonstrate appropriate motor behavior AEB less than 2 episodes of fidgety, picking or pulling at clothes on devices, yelling out, getting out of bed, etc. each shift during inpatient hospice stay. Outcome: Progressing Towards Goal     Problem: Dyspnea Due to End of Life  Goal: Demonstrate understanding of and ability to manage respiratory symptoms at end of life  Description: Patient Buddy Saini will indicate effective breathing pattern AEB absence of respiratory distress each shift during inpatient hospice stay.      Outcome: Progressing Towards Goal

## 2022-03-11 NOTE — HSP[facility] IDG CHAPLAIN NOTES
Patient: Rosenda Hope    Date: 03/11/22  Time: 9:25 AM    [facility]  Notes    / Grief Counselor has reviewed  Initial Comprehensive Assessment and plan of care. Bereavement and Spiritual Care Assessments to be completed and plan of care put in place to meet patient and family needs.          Signed by: Marlin Ly

## 2022-03-11 NOTE — PROGRESS NOTES
Demographics      Information provided by: Pt's sons- Nelson Hall ([facility]) and Myra Lila. SW introduced herself to the many family members at [facility] today and let them know that SW is available to them all. Name of patient: Sheldon Quiñones                                                                 Level of Care:  GIP [x] Routine  [] Respite   []           From the in home program: Yes [] No [x]                                                        Diagnosis: Toxic metabolic encephalopathy       Insurance: Medicare [x]   Medicaid  []  [employer]  []    Other []                Social:    []   Single []     []    [x]  Significant other []  Partner []   **Pt's spouse passed away 6 years ago. Children: Per pt's chart, pt has 5 children. SW met sons Nelson Hall and Myra Sharp and pt's daughter Zunilda Reed today. Community Resources Used in the Home prior to admission: Yes []  No [x]    [employer] Needed? Yes []  No [x]    If yes, explain:        Financial Concerns: No financial concerns noted. SW explained LOC and room and board charges, if applicable. Nelson Hall and Myra Sharp voiced understanding. Princess Application Needed   Yes []  No [x]     Medicaid application needed Yes []  No [x]                                    IFA Form Complete  Yes [x]   No []     Discharge Plans: Plans are to remain [facility] for GIP and comfort care. 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      [address]   Yes []  No [x]  Branch   [employer] []   [employer] [] [employer] [] Myrl []  Affiliated [employer] []  [employer] []  Linked to [facility]   Yes []  No []  Referral made to [employer] Yes [] No []         Advanced Directives Scanned in the system      Living Will  Yes  []  No [x]   HCPOA     Yes  []  No [x]   DPOA        Yes  []  No [x]  DNR           Yes [x]  No []         Spiritual / Yarsani: Per pt's chart, pt is Chuckie. Final Arrangements: The family will be using [facility]. Aleida Bales reports that pt had everything planned and arranged with pt's spouse years ago and have their burial plots paid for as well. Mental Health History: No mental health history noted for pt. Volunteer discussion: Yes []    No [x]  **Due to COVID-19 protocols. Goals of care for the patient and family: To keep pt comfortable and to meet pt and family needs. Coping and Bereavement: Aleida Bales and Kayleen Duval appear to be coping well. They are both understanding of pt's condition and the path he is on. Jenny Long is having a more difficult time due to the quick transition and that she was unaware of pt being moved to hospice. She was able to speak to with [facility] MD Teo Ramsey earlier today and reported to [address] [place] [address] that it was helpful to talk with him. SW will continue to assess for coping and bereavement needs. Was a Referral made to Bereavement  Yes [] No  [x]      Support Needs: Pt and the family are very well supported. There are many people at [facility] today and they are supporting each other well. SW explained SWs role and encouraged them to reach out to SW with any needs and that SW will check-in weekly.        Other Discussions: N/A

## 2022-03-12 NOTE — PROGRESS NOTES
Problem: Falls - Risk of  Goal: *Absence of Falls  Description: Patient Karyn Jackson will remain free from falls AEB no injuries r/t falls each shift during inpatient hospice stay. Outcome: Progressing Towards Goal  Note: Fall Risk Interventions:       Mentation Interventions: Room close to nurse's station,Door open when patient unattended,Eyeglasses and hearing aids,Adequate sleep, hydration, pain control    Medication Interventions: Bed/chair exit alarm,Evaluate medications/consider consulting pharmacy    Elimination Interventions: Bed/chair exit alarm    History of Falls Interventions: Bed/chair exit alarm,Door open when patient unattended,Evaluate medications/consider consulting pharmacy,Room close to nurse's station         Problem: Pressure Injury - Risk of  Goal: *Prevention of pressure injury  Description: Kurt Jackson will remain free from further acquired pressure injuries AEB zero acquired pressure injuries during assessment of skin each shift during inpatient hospice stay.     Outcome: Progressing Towards Goal  Note: Pressure Injury Interventions:  Sensory Interventions: Assess changes in LOC,Assess need for specialty bed,Pressure redistribution bed/mattress (bed type)    Moisture Interventions: Apply protective barrier, creams and emollients,Assess need for specialty bed,Internal/External urinary devices,Moisture barrier,Limit adult briefs    Activity Interventions: Assess need for specialty bed,Pressure redistribution bed/mattress(bed type)    Mobility Interventions: Assess need for specialty bed,Pressure redistribution bed/mattress (bed type),Float heels    Nutrition Interventions: Discuss nutritional consult with provider    Friction and Shear Interventions: Apply protective barrier, creams and emollients                Problem: Pain  Goal: *Control of Pain  Description: Kurt Jackson will exhibit decrease in pain AEB rating pain less than 3 on 1-10 scale or 3 FLACC score within each shift of receiving pain medication during inpatient hospice stay. Outcome: Progressing Towards Goal     Problem: Dyspnea Due to End of Life  Goal: Demonstrate understanding of and ability to manage respiratory symptoms at end of life  Description: Kurt Harrison will indicate effective breathing pattern AEB absence of respiratory distress each shift during inpatient hospice stay. Outcome: Progressing Towards Goal     Problem: Infection - Risk of, Urinary Catheter-Associated Urinary Tract Infection  Goal: *Absence of infection signs and symptoms  Description: Kurt Harrison will remain free from infection R/T urinary catheter AEB absence of signs and symptoms of infection each shift during inpatient hospice stay. Outcome: Progressing Towards Goal     Problem: General Wound Care  Goal: *Non-infected wound: Improvement of existing wound, absence of infection, and maintenance of skin integrity  Description: Kurt Harrison wounds will be free from further complications AEB absence of signs and symptoms of infection each shift during inpatient hospice stay. Outcome: Progressing Towards Goal     Problem: Anxiety/Agitation  Goal: Verbalize or staff assess the ability to manage anxiety  Description: Kurt Harrison will demonstrate appropriate motor behavior AEB less than 2 episodes of fidgety, picking or pulling at clothes on devices, yelling out, getting out of bed, etc. each shift during inpatient hospice stay.    Outcome: Progressing Towards Goal

## 2022-03-12 NOTE — PROGRESS NOTES
Progress Note    Patient: Deann Hurst MRN: [id number]  SSN: [id number]    YOB: 1933  Age: 80 y.o. Sex: male      Admit Date: 3/10/2022    LOS: 2 days     Subjective:     Unresponsive. NPO. Has required dilaudid 0.5mg IV x 2 for pain and haldol x 1 for agitation. Review of Systems:  Review of systems not obtained due to patient factors. Objective:     Vitals:    03/10/22 1807 03/11/22 0349 03/12/22 0400   BP: 127/67 (!) 73/47 (!) 94/43   Pulse: 98 60 64   Resp: (!) 32 15 16   Temp: 97.1 °F (36.2 °C)          Intake and Output:  Current Shift: No intake/output data recorded. Last three shifts: No intake/output data recorded. Physical Exam:   GENERAL: mild distress, appears stated age, pale, cachectic  LUNG: Coarse diminished breath sounds with labored and shallow respirations. HEART: regular rate and rhythm  ABDOMEN: soft, non-tender. Bowel sounds hypoactive. : Billy catheter with dark saida urine. 50ml since this morning. EXTREMITIES:  extremities with no cyanosis or edema. + pulses. SKIN: Pale. Warm to touch. Abrasions to all extremities with dressings intact. Sacral DTI noted. NEUROLOGIC: Unresponsive with no lateralizing deficits noted. Bedbound. Lab/Data Review:  No new labs resulted in the last 24 hours.     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)        Plan:     Current Facility-Administered Medications   Medication Dose Route Frequency    HYDROmorphone (DILAUDID) syringe 0.5 mg  0.5 mg IntraVENous Q30MIN PRN    Or    HYDROmorphone (DILAUDID) syringe 0.5 mg  0.5 mg SubCUTAneous Q30MIN PRN    LORazepam (ATIVAN) injection 1 mg  1 mg IntraVENous Q2H PRN    sodium chloride (NS) flush 3 mL  3 mL IntraVENous Q12H    haloperidol lactate (HALDOL) injection 2 mg  2 mg IntraVENous Q1H PRN    Or    haloperidol lactate (HALDOL) injection 2 mg  2 mg SubCUTAneous Q1H PRN    sodium chloride (NS) flush 3 mL  3 mL IntraVENous PRN    acetaminophen (TYLENOL) suppository 650 mg  650 mg Rectal Q3H PRN    glycopyrrolate (ROBINUL) injection 0.2 mg  0.2 mg IntraVENous Q4H PRN       3/10: (Kita) Admitted GIP with metabolic encephalopathy for management of pain, dyspnea and agitation. Add orders for wound care to sacral DTI.     Signed By: Roel Gutierrez NP     March 12, 2022

## 2022-03-12 NOTE — PROGRESS NOTES
1900 Received bedside report from off-going nurse. Patient admitted on 3/10 for GIP with diagnosis of toxic metabolic encephalopathy. Patent lying in bed with eyes closed, respirations even. He is unresponsive to touch. He is 2 liters 02 per NC. He is incontinent of bowel with mendez catheter draining. Multiple family members at bedside. FLACC 0/10. Plan of care reviewed with CNA. Safety measures in place including bed in low and locked position. Tab alarms in place. [address] [place] [address] plan is for patient to remain until passing. 2105 Patient observed on rounds lying in bed with eyes closed. Respirations even with accessory muscle use. Daughter at bedside. No signs of distress or discomfort noted. Safety measures remain in place. 2330 Patient observed on rounds lying in bed resting quietly. Daughter is at bedside. No signs of agitation, distress or discomfort noted. Safety measures remain in place. 56 Daughter at nursing station stating patient is moving around in bed and trying to talk. RN observed patient who appears to be restless. Haldol 2 mg given. 0140 Patient lying in bed with eyes closed, resting quietly. 6076 Daughter called nursing station stating patient is yelling out and squeezing her hand. RN observed patient yelling out and moving around in bed. Dilaudid 0.5mg IV given. 0405 Patient observed lying in bed with HOB elevated for comfort. Daughter at bedside. No pain, agitation or distress noted. 1759 Daughter states patient is moaning and squeezing her hand as if he is in pain. RN observed patient moaning and restless with increased secretions. HOB elevated for comfort 02   2 liters per NC. Dilaudid 0.5 mg IV given as ordered. 5032 Report given to on coming nurse.

## 2022-03-12 NOTE — PROGRESS NOTES
6718Alradharui Villa received from [id number] [place] [address]. Pt name and  identified. Pt in bed, resting with eyes closed. No signs or symptoms of pain, shortness of breath, anxiety , seizures or nausea/vomiting. FLACC 0/10. Comfort and safety measures in place. HOB elevated 30 degrees. Side rails up x2. Bed low and locked. Bed alarm tab in place. Call light in reach of patient, Door open for monitored visualization and hearing of patient. Care Plan reviewed and collaboration done with CNA. Pt expected to pass under GIP however will continue to assess change in LOC, medication & comfort needs, while collaborating with the Interdisciplinary Team.  Daughter at bedside. 0800:. Additional family coming in. Emotional support provided. Patient continues  resting quietly in bed with eyes closed. Respirations unlabored with no signs or symptoms of distress, pain, agitation, or discomfort noted. FLACC 0.    3988: Face to Face update with son, Demar White. More family coming in to be with pt. Pt's Bilateral Lower extremities warm to touch, soles of feet becoming purple. 1130: Patient resting quietly in bed with eyes closed. Respirations unlabored with no signs or symptoms of distress, pain, agitation, or discomfort noted. FLACC 0.    1230: Pt floated on pillows and BLE elevated  after partial bath with assist of CNA. Lotion & powder  rubbed on pt's back,Barrier cream to lisandro & coccyx area. DTI  area noted to coccyx, intact presently, approx 1 cm in size. .  Mouth care done. Pt non reactive, only opening his right eye slightly when turned. One of the daughters remain at bedside, other family members in/out or in the Family Room. No  Sx's to manage at this time. 1500: Pt continues to rest without sx's. Family in and out, Emotional support offered. 1723: Pt observed on rounds, sleeping with eyes closed . Family asking about Oxygen, if it needed to be on. Pt is mouth breathing, with ca change in pattern . Respirations now irregular yet nonlabored. Oxygen removed for comfort, since pt mouth breathing,Several questions addressed at the bedside from family members. Pt exhibits  no facial grimacing,  moaning or agitation. Flacc 0/10. No additional symptoms to manage at this time. Comfort and Safety measures maintained. Alarm exit tab in place.     Shift Report given to Kyleppdat Rule RN

## 2022-03-12 NOTE — PROGRESS NOTES
Problem: Falls - Risk of  Goal: *Absence of Falls  Description: Patient Alisia Harley will remain free from falls AEB no injuries r/t falls each shift during inpatient hospice stay. Outcome: Progressing Towards Goal  Note: Fall Risk Interventions:       Mentation Interventions: Room close to nurse's station,Door open when patient unattended,Eyeglasses and hearing aids,Adequate sleep, hydration, pain control    Medication Interventions: Bed/chair exit alarm,Evaluate medications/consider consulting pharmacy    Elimination Interventions: Bed/chair exit alarm    History of Falls Interventions: Bed/chair exit alarm,Door open when patient unattended,Evaluate medications/consider consulting pharmacy,Room close to nurse's station    Problem: Pressure Injury - Risk of  Goal: *Prevention of pressure injury  Description: Kurt Harley will remain free from further acquired pressure injuries AEB zero acquired pressure injuries during assessment of skin each shift during inpatient hospice stay.     Outcome: Progressing Towards Goal  Note: Pressure Injury Interventions:  Sensory Interventions: Assess changes in LOC,Assess need for specialty bed,Pressure redistribution bed/mattress (bed type)    Moisture Interventions: Apply protective barrier, creams and emollients,Assess need for specialty bed,Internal/External urinary devices,Moisture barrier,Limit adult briefs    Activity Interventions: Assess need for specialty bed,Pressure redistribution bed/mattress(bed type)    Mobility Interventions: Assess need for specialty bed,Pressure redistribution bed/mattress (bed type),Float heels    Nutrition Interventions: Discuss nutritional consult with provider    Friction and Shear Interventions: Apply protective barrier, creams and emollients         Problem: Pain  Goal: *Control of Pain  Description: Kurt Harley will exhibit decrease in pain AEB rating pain less than 3 on 1-10 scale or 3 FLACC score within each shift of receiving pain medication during inpatient hospice stay. Outcome: Progressing Towards Goal     Problem: Infection - Risk of, Urinary Catheter-Associated Urinary Tract Infection  Goal: *Absence of infection signs and symptoms  Description: Kurt Wyatt will remain free from infection R/T urinary catheter AEB absence of signs and symptoms of infection each shift during inpatient hospice stay. Outcome: Progressing Towards Goal     Problem: General Wound Care  Goal: *Non-infected wound: Improvement of existing wound, absence of infection, and maintenance of skin integrity  Description: Kurt Wyatt wounds will be free from further complications AEB absence of signs and symptoms of infection each shift during inpatient hospice stay. Outcome: Progressing Towards Goal     Problem: Anxiety/Agitation  Goal: Verbalize or staff assess the ability to manage anxiety  Description: Kurt Wyatt will demonstrate appropriate motor behavior AEB less than 2 episodes of fidgety, picking or pulling at clothes on devices, yelling out, getting out of bed, etc. each shift during inpatient hospice stay.    Outcome: Progressing Towards Goal

## 2022-03-13 NOTE — PROGRESS NOTES
Bedside report received from off-going RN visual identification made, assumed care of pt. Pt resting quietly with eyes closed, no agitation or restlessness, no grimacing or groaning. Pt respirations unlabored. Family at bedside no question or concerns noted at this time. Tab alert in place, rails up x 2, bed in lowest position, safety maintained. FLACC 0. Pt is at GIP level of care, no change to pt discharge plan. Pt to stay at [facility] until passing. Plan of care reviewed with CNA. 2145-Pt resting comfortably in bed with eyes closed; no signs of pain or distress noted. RR non labored. No agitation, NVD, or SOB. FLACC 0/10.    2326-PRN glycopyrrolate given for secretions. Family at bedside. 0126-Pt observed on rounds, resting with eyes closed. Respirations remain even and nonlabored. No facial grimacing noted. No moaning or agitation. Flacc 0/10. No additional symptoms to manage at this time. No acute changes noted. Comfort and Safety measures maintained. Alarm exit tab in place. Call light in reach. 0425-Pt resting comfortably in bed with eyes closed; no signs of pain or distress noted. RR non labored. No agitation, NVD, or SOB. FLACC 0/10.     0608-Pt observed on rounds, resting with eyes closed. Respirations remain even and nonlabored. No facial grimacing noted. No moaning or agitation. Flacc 0/10. No additional symptoms to manage at this time. No acute changes noted. Comfort and Safety measures maintained. Alarm exit tab in place. Call light in reach.

## 2022-03-13 NOTE — PROGRESS NOTES
Problem: Falls - Risk of  Goal: *Absence of Falls  Description: Patient Susanne Meyers will remain free from falls AEB no injuries r/t falls each shift during inpatient hospice stay. Outcome: Progressing Towards Goal  Note: Fall Risk Interventions:       Mentation Interventions: Room close to nurse's station,Door open when patient unattended,Eyeglasses and hearing aids,Adequate sleep, hydration, pain control    Medication Interventions: Bed/chair exit alarm,Evaluate medications/consider consulting pharmacy    Elimination Interventions: Bed/chair exit alarm    History of Falls Interventions: Bed/chair exit alarm,Door open when patient unattended,Evaluate medications/consider consulting pharmacy,Room close to nurse's station         Problem: Pressure Injury - Risk of  Goal: *Prevention of pressure injury  Description: Patient Susanne Meyers will remain free from further acquired pressure injuries AEB zero acquired pressure injuries during assessment of skin each shift during inpatient hospice stay.     Outcome: Progressing Towards Goal  Note: Pressure Injury Interventions:  Sensory Interventions: Assess changes in LOC,Assess need for specialty bed,Pressure redistribution bed/mattress (bed type)    Moisture Interventions: Apply protective barrier, creams and emollients,Assess need for specialty bed,Internal/External urinary devices,Moisture barrier,Limit adult briefs    Activity Interventions: Assess need for specialty bed,Pressure redistribution bed/mattress(bed type)    Mobility Interventions: Assess need for specialty bed,Pressure redistribution bed/mattress (bed type),Float heels    Nutrition Interventions: Discuss nutritional consult with provider    Friction and Shear Interventions: Apply protective barrier, creams and emollients

## 2022-03-13 NOTE — PROGRESS NOTES
6305: Received report from [address] [place] [address]. Pt was admitted to [facility] for hospice diagnosis of Acute Metabolic encephalopathy  and symptom control of secretions, pain, agitation and dyspnea. Pt currently presents with eyes closed, resting in bed with gurgling respirations. All other symptoms managed. HOB up at 45 to assist with breathing. Pt is on RA at this time, Pt has a #22 IV access to anterior left upper arm; clean, dry and intact. w/ transparent dressing covering. FLACC 0. Bed in low position, side rails up x 2, tabs/bed alarm in place. Call light within reach. Family member sleeping in recliner beside pt. Pt is non-responsive to verbal and tactile stimuli. Discharge plan is unchanged pt will remain at [facility] until demise. Discharge plan to be evaluated for potential level of care change. RN has discussed plan of care with CNA. Will continue to monitor. 8582: Morning med pass performed. Pt breathing pattern is regularly irregular. 3 gurgling deep breaths followed by a long pause. (Chene-galvan). Eloisa given prn for secretioyns and dilaudid given for dyspnea. Pt tolerated well. Son still asleep beside patient bed. Daughter came in. Discussed medications given and purpose. Daughter states that patients pattern was very irregular yesterday and changed often. NO further questions or concerns verbalized. Will continue to monitor as patient transitions. All other symptoms are managed at this time. 1100: Pt resting in bed with family members around bed. Breathing pattern has changed to a regular panting rhythm. Discussed pt's BP this morning and his breathing pattern changes. No other questions or concerns verbalized. Will continue to monitor. 1129: Pt panting breathing pattern continuing. Dilaudid given for dyspnea. Family at bedside with pt. All questions answered. No further questions or concerns verbalized. Will continue to monitor for any potential change in level of care.     1331: Jaqui Thakkar given for secretions and gurgling sound. Dilaudid given for dyspnea. Pt tolerating well. Family states pt occasionally has jerking of right leg. But this has stopped right now. Pt family to make RN (writer) aware if this starts back, or if family thinks he is in distress. Will continue to monitor. 1522: Pt respiratory pattern irregular and faster. [id number] given IV. No acute change in status. Will continue to monitor for any young in status. Family still at bedside. 1610: RN called in to room along with other family members in the waiting area. Pt taking last breath as RN (Clark Currie) entered room. Heart and lung sounds assessed as pt took last breath. RN (writer) notified preceptor in regards to pt passing. RN (writer) assisted Prakash Harden RN in preparing body for remainder of family viewing and preparing for the mortuary. Preceptor assumed remainder of care.

## 2022-03-13 NOTE — PROGRESS NOTES
Problem: Falls - Risk of  Goal: *Absence of Falls  Description: Patient Luz Elena Mcdonnell will remain free from falls AEB no injuries r/t falls each shift during inpatient hospice stay. Outcome: Resolved/Met     Problem: Pressure Injury - Risk of  Goal: *Prevention of pressure injury  Description: Patient Luz Elena Mcdonnell will remain free from further acquired pressure injuries AEB zero acquired pressure injuries during assessment of skin each shift during inpatient hospice stay. Outcome: Resolved/Met     Problem: Pain  Goal: *Control of Pain  Description: Patient Luz lEena Mcdonnell will exhibit decrease in pain AEB rating pain less than 3 on 1-10 scale or 3 FLACC score within each shift of receiving pain medication during inpatient hospice stay. Outcome: Resolved/Met     Problem: Dyspnea Due to End of Life  Goal: Demonstrate understanding of and ability to manage respiratory symptoms at end of life  Description: Kurt Mcdonnell will indicate effective breathing pattern AEB absence of respiratory distress each shift during inpatient hospice stay. Outcome: Resolved/Met     Problem: Infection - Risk of, Urinary Catheter-Associated Urinary Tract Infection  Goal: *Absence of infection signs and symptoms  Description: Kurt Mcdonnell will remain free from infection R/T urinary catheter AEB absence of signs and symptoms of infection each shift during inpatient hospice stay. Outcome: Resolved/Met     Problem: General Wound Care  Goal: *Non-infected wound: Improvement of existing wound, absence of infection, and maintenance of skin integrity  Description: Patient Luz Elena Mcdonnell wounds will be free from further complications AEB absence of signs and symptoms of infection each shift during inpatient hospice stay.    Outcome: Resolved/Met  Goal: Interventions  Outcome: Resolved/Met     Problem: Anxiety/Agitation  Goal: Verbalize or staff assess the ability to manage anxiety  Description: Kurt Mcdonnell will demonstrate appropriate motor behavior AEB less than 2 episodes of fidgety, picking or pulling at clothes on devices, yelling out, getting out of bed, etc. each shift during inpatient hospice stay. Outcome: Resolved/Met     Problem: Emotional Support Needs  Goal: Patient/family is receiving emotional support  Description: Pt, pt's son Barry Perry, and pt's family will receive emotional support from SW weekly through education on the hospice philosophy, weekly check-ins, validation of feelings, active listening, and rapport building throughout pt's hospice journey as evidenced by Barry Perry and family's understanding of LOC changes and room and board charges, acknowledgement of SW services available to the family, and lack of heightened emotional responses. 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 Philipp Thapa' impending death as evidenced by  their ability to verbalize feelings of denial, bargaining, anger, and depression, display feelings and associated behaviors in an acceptable manner during inpatient hospice stay.     Outcome: Resolved/Met

## 2022-03-13 NOTE — PROGRESS NOTES
[address], RN in the room as pt took his last breath. Family at the bedside at that time. [facility] contacted and information given to [place]. She will dispatch for . Family remains at the bedside till Megan arrives. Adin Neal from [facility] at bedside to take possession of pt's body.

## 2022-03-28 NOTE — PROGRESS NOTES
Problem: Mobility Impaired (Adult and Pediatric)  Goal: *Acute Goals and Plan of Care (Insert Text)  GOALS (1-4 days):  (1.) Patient will move from supine to sit and sit to supine in bed with STAND BY ASSIST.   (2.) Patient will transfer from bed to chair and chair to bed with SUPERVISION using the least restrictive device. (3.) Patient will ambulate with SUPERVISION for 300 feet with the least restrictive device. (4.) Patient will be independent with shoulder HEP to increase range of motion per MD orders. ________________________________________________________________________________________________      PHYSICAL THERAPY: Daily Note, Treatment Day: 1st and PM 10/15/2017  INPATIENT: Hospital Day: 3  [place] / [place] / Product Type: Medicare /      NAME/AGE/GENDER: Damien Bazzi is a 80 y.o. 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  REVISION LEFT TOTAL SHOULDER ARTHROPLASTY WITH REVERSE DELTA EXTEND PROSTHESIS, LATISSIMUS DORSI AND TERES MAJOR TENDON TRANSFERS LEFT SHOULDER   (Left)  2 Days Post-Op  ICD-10: Treatment Diagnosis:       · Pain in Left Shoulder (M25.512)  · Stiffness of Left Shoulder, Not elsewhere classified (M25.612)  · Generalized Muscle Weakness (M62.81)  · Other lack of cordination (R27.8)  · Difficulty in walking, Not elsewhere classified (R26.2)  · Other abnormalities of gait and mobility (R26.89)   Precaution/Allergies:  Review of patient's allergies indicates no known allergies.        ASSESSMENT:      Mr. Feliciano Armas presents supine on contact stating he just got back into bed and has been up all day and up and down multiple times to the restroom. Did his wrist and elbow exercises in the bed and he said he would do his pendulums later when he got up to use the restroom. Pt doing well with his wrist and elbow. Lots of bruising. Sling in place and needs in reach. Will continue to follow. This section established at most recent assessment   PROBLEM LIST (Impairments causing functional limitations):  1. Decreased Duval with Bed Mobility  2. Decreased Duval with Transfers  3. Decreased Duval with Ambulation  4. Decreased Duval with shoulder HEP    INTERVENTIONS PLANNED: (Benefits and precautions of physical therapy have been discussed with the patient.)  1. Bed Mobility Training  2. Transfer Training  3. Gait Training  4. Therapeutic Exercises per MD orders  5. Modalities for Pain      TREATMENT PLAN: Frequency/Duration: twice daily for duration of hospital stay  Rehabilitation Potential For Stated Goals: GOOD      RECOMMENDED REHABILITATION/EQUIPMENT: (at time of discharge pending progress): Continue Skilled Therapy and Rehab. HISTORY:   History of Present Injury/Illness (Reason for Referral):  Painful left shoulder  Past Medical History/Comorbidities:   Mr. Feliciano Armas  has a past medical history of Anemia (fall 2016); Arthritis; CAD (coronary artery disease) (1997); Carotid stenosis (04/2017); Diabetes ([address] (05/1997); Diverticulosis (12/2011); GERD (gastroesophageal reflux disease); Glaucoma; Heart murmur; Hiatal hernia; History of kidney stones; Hypercholesteremia; Hypertension; Hypothyroid; MI (myocardial infarction); TIA (transient ischemic attack) (07/2016); Unspecified sleep apnea; Urethral stenosis; and Vasovagal syncope. He also has no past medical history of Adverse effect of anesthesia; Aneurysm ([address] Arrhythmia; Asthma; Autoimmune disease ([address] Cancer ([address] Chronic kidney disease; Chronic pain; Coagulation disorder ([address] COPD; DEMENTIA;  Difficult intubation; Endocarditis; Gastrointestinal disorder; Heart failure ([facility] Ill-defined condition; Infectious disease; Liver disease; Malignant hyperthermia due to anesthesia; Morbid obesity ([facility] Nausea & vomiting; Neurological disorder; Other ill-defined conditions(799.89); Pseudocholinesterase deficiency; Psychiatric disorder; PUD (peptic ulcer disease); Rheumatic fever; Seizures ([facility] [address] [facility] Sleep disorder; Stroke [facility]); or Thromboembolus ([facility] Mr. Shireen Olivarez  has a past surgical history that includes heart catheterization (4678,6531, 2012, 2013); tonsillectomy (1938); cataract removal (1987 and 1990); lap cholecystectomy (2003); hernia repair (1944); urological (1996); orthopaedic (Left); orthopaedic (5158-2960); shoulder arthroscopy (Left, 2015); knee replacement (Bilateral, 2002, 2008); hemorrhoidectomy (1987); heent (Right); carpal tunnel release (Right, 1968); carpal tunnel release (Left, 2012); bunionectomy (Left); rotator cuff repair (Right, 1995); rotator cuff repair (Left, 2000); rotator cuff repair (Right, 2007); and colectomy (01/13/2017).   Social History/Living Environment:   Home Environment: Private residence  # Steps to Enter: 3  Rails to Enter: No  One/Two Story Residence: One story  Living Alone: Yes  Support Systems: Child(wojciech)  Patient Expects to be Discharged to[de-identified] Skilled nursing facility  Current DME Used/Available at Home: Raised toilet seat, Walker  Prior Level of Function/Work/Activity:  Pt was independent without an assistive device prior to this admission   Number of Personal Factors/Comorbidities that affect the Plan of Care: 3+: HIGH COMPLEXITY   EXAMINATION:   Most Recent Physical Functioning:   Gross Assessment:                       Balance:  Sitting: Intact  Standing: Intact  Standing - Static: Good  Standing - Dynamic : Fair Bed Mobility:          Transfers:  Sit to Stand: Supervision;Stand-by asssistance  Stand to Sit: Supervision;Stand-by asssistance  Gait:     Speed/Tracy: Fluctuations  Step Length: Left shortened;Right shortened  Gait Abnormalities: Decreased step clearance  Distance (ft): 15 Feet (ft)  Assistive Device:  (held to IV pole)  Ambulation - Level of Assistance: Stand-by asssistance   Functional Mobility:         Gait/Ambulation:  cga        Transfers:  cga        Bed Mobility:  min   Body Structures Involved:  1. Joints  2. Muscles Body Functions Affected:  1. Sensory/Pain  2. Movement Related Activities and Participation Affected:  1. General Tasks and Demands  2. Mobility   Number of elements that affect the Plan of Care: 4+: HIGH COMPLEXITY   CLINICAL PRESENTATION:   Presentation: Evolving clinical presentation with changing clinical characteristics: MODERATE COMPLEXITY   CLINICAL DECISION MAKIN [facility] Mobility Inpatient Short Form  How much difficulty does the patient currently have. .. Unable A Lot A Little None   1. Turning over in bed (including adjusting bedclothes, sheets and blankets)? [ ] 1   [ ] 2   [X] 3   [ ] 4   2. Sitting down on and standing up from a chair with arms ( e.g., wheelchair, bedside commode, etc.)   [ ] 1   [ ] 2   [X] 3   [ ] 4   3. Moving from lying on back to sitting on the side of the bed? [ ] 1   [ ] 2   [X] 3   [ ] 4   How much help from another person does the patient currently need. .. Total A Lot A Little None   4. Moving to and from a bed to a chair (including a wheelchair)? [ ] 1   [ ] 2   [X] 3   [ ] 4   5. Need to walk in hospital room? [ ] 1   [ ] 2   [X] 3   [ ] 4   6. Climbing 3-5 steps with a railing? [X] 1   [ ] 2   [ ] 3   [ ] 4   © , Trustees of [address] [place] [address] [address] [address], under license to [employer]. All rights reserved    Score:  Initial: 16 Most Recent: X (Date: -- )     Interpretation of Tool:  Represents activities that are increasingly more difficult (i.e. Bed mobility, Transfers, Gait).        Score 24 23 22-20 19-15 14-10 9-7 6       Modifier CH CI CJ CK CL CM CN         · Mobility - Walking and Moving Around:               - CURRENT STATUS:    CK - 40%-59% impaired, limited or restricted               - GOAL STATUS:           CJ - 20%-39% impaired, limited or restricted               - D/C STATUS:                       ---------------To be determined---------------  [place] / [place] / Product Type: Medicare /       Medical Necessity:     · Patient is expected to demonstrate progress in strength, range of motion, balance, coordination and functional technique to decrease assistance required with bed mobility, transfers, gait & HEP. Reason for Services/Other Comments:  · Patient continues to require skilled intervention due to pt not safe with functional mobility & HEP. Use of outcome tool(s) and clinical judgement create a POC that gives a: Questionable prediction of patient's progress: MODERATE COMPLEXITY                 TREATMENT:   (In addition to Assessment/Re-Assessment sessions the following treatments were rendered)   Pre-treatment Symptoms/Complaints:  none  Pain: Initial: visual scale     Post Session:  3/10      Therapeutic Exercise: (10 Minutes):  Exercises per grid below to improve mobility and dynamic movement of arm - left to improve functional endurance. Required minimal verbal cues to promote proper body alignment and promote proper body mechanics. Progressed repetitions as indicated.              Date:  10/14 Date:  10/15/17  Date:      ACTIVITY/EXERCISE AM PM AM PM AM PM   Gripping 15 20  20  20        Wrist Flexion/Extension 15  20  20  20       Wrist Ulnar/Radial Deviation               Pronation/Supination 15  20  20  20       Elbow Flexion/Extension 15aa  20aa  20  20       Shoulder Flexion/Extension               Shoulder AB/ADduction               Shoulder IR/ER               Pulleys               Pendulums Gentle 15p  Clock & counter clockwise  gentle 20p clock & counter clockwise 20 gentle          Shrugs Isometric:                 Flexion               Extension               ABduction               ADduction               Biceps/Triceps                               B = bilateral; AA = active assistive; A = active; P = passive  Education:  [X]  Home Exercises      [X]  Sling Application       [X]  Movement Precautions        [ ]  Pulleys          [ ]  Use of Ice    [ ]  Other:   Treatment/Session Assessment:    · Response to Treatment:  No concerns  · Interdisciplinary Collaboration:  · Registered Nurse and Certified Nursing Assistant/Patient Care Technician  · After treatment position/precautions:  · Supine in bed, Bed/Chair-wheels locked, Bed in low position and Call light within reach  · Compliance with Program/Exercises: Will assess as treatment progresses. · Recommendations/Intent for next treatment session: \"Next visit will focus on reduction in assistance provided\".   Total Treatment Duration:  PT Patient Time In/Time Out  Time In: 1330  Time Out: 60[address] [facility],  DISCHARGE

## 2024-01-19 NOTE — PROGRESS NOTES
Case Frost  : 1933  [place] / [place] PART [place] / Product Type: Medicare /  [address] at [facility] [place]  [address], [address], [place].  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 Fall Risk Score: 2 (? 5 = High Risk)  MD Orders: Eval and Treat; HEP; ROM; Strength; \"full motion/full strength\" (3-21-18) MEDICAL/REFERRING DIAGNOSIS:REM Implant Lt Shldr/ Revision TSA    DATE OF ONSET: 10-13-17  REFERRING PHYSICIAN: Na Herron MD  RETURN PHYSICIAN APPOINTMENT:~18     PROGRESS ASSESSMENT (18):  Mr. Eyal Barry has now attended 26 total visits to date. He is about 6 months s/p removal of implant of L shoulder hemiarthroplasty, and revision L TSA with a reverse Delta Xtend prosthesis, and latissimus dorsi and teres major tendon transfer. He continues to have PROM in functional ranges. Weakness continues to be his primary impairment. We have been working shoulder girdle, deltoid strength for improved humeral elevation function. This is slowly progress. We have also been working on across body and behind the back reaching for grooming and dressing ADL's. This is difficult. His R shoulder is limited by a rotator cuff tear as well. This makes independent function difficult still at this time. He is made progress toward his goals, but not fully achieved yet. Again, his R/dominant UE has known rotator cuff pathology, which is severely limiting functional use of this UE.  He also has co-morbid lumbar spine pathology with radicular weakness to L4-5 levels that affects his gait and stability. He lives alone, and needs to be able to perform basic personal care and household ADL's independently. He will benefit from continued PT to further progress reverse TSA rehab to promote safe return to functional use of the L UE, and to prep for pending R shoulder surgery. PROBLEM LIST (Impacting functional limitations):  1. Decreased L shoulder ROM   2. Weakness L shoulder INTERVENTIONS PLANNED:  1. Manual therapies, therapeutic exercises, HEP for ROM    2. Therapeutic exercises and HEP for strength   TREATMENT PLAN:  Effective Dates: 3/5/2018 to 5/18/2018. Frequency/Duration: 3 times a week for an additional 8 weeks to further progress strengthening for functional independence. GOALS: (Goals have been discussed and agreed upon with patient.)  Short-Term Functional Goals: Time Frame: 6 weeks   1. L shoulder AROM forward elevation greater than 120 degrees to progress into functional ranges. Progressing, ongoing 4/27/18  2. Demonstrate good L shoulder forward elevation strength for reach to head with grooming ADL's and reaching to shoulder height with household ADL's. Met with compensation 4-4-18  3. Independent with initial HEP. Met 1/12/18  Discharge Goals: Time Frame: 12 weeks  1. Score less than 20% on the DASH. Ongoing 4/27/18  2. L shoulder AROM forward elevation greater than 135 degrees for improved use with household activities reaching overhead. Ongoing 4/27/18  3. Demonstrate good functional L shoulder strength and endurance for improved use of his L UE with his normalized daily activities. Ongoing 4/27/18  4. Independent with advanced shoulder HEP for continued self-management. Ongoing 4/27/18  Rehabilitation Potential For Stated Goals: Good              The information in this section was collected on 12/18/17 (except where otherwise noted).   HISTORY:   History of Present Injury/Illness (Reason for Referral): Long, complex history of L shoulder pain and dysfunction with rotator cuff pathology. He was s/p L rotator cuff repair on 8/28/15. He had a fall to both hands when walking up steps at his house in June 2016, resulting complete cuff tears B shoulders. L shoulder hemiarthroplasty done 7/2016. He did rehab, but poor outcome, and was still unable to raise and use L arm over shoulder. This revision Reverse TSA on L was done on 10/13/2017. Was in the hospital 3 days, then [facility] to [facility], and finally [facility] to home on 12/15/17. He had complication of a hemearthrosis with drop in hematocrit and hemoglobin. It was aspirated. Blood values improved. He as been doing OT post-op shoulder rehab when in the nursing home, consisting of shoulder/UE ROM and strengthening. He has not done HEP yet. He has co-morbid R rotator cuff pathology limiting functional use of his dominant arm, and anticipates shoulder arthroplasty on this in the near future. he also has co-morbid lumbar pathology with L L4-5 myotome weakness resulting in L foot drop. He lives alone and needs to be able to manage all his ADL's independently. Past Medical History/Comorbidities: Mr. Eveline Castro  has a past medical history of Anemia (fall 2016); Arthritis; CAD (coronary artery disease) (1997); Carotid stenosis (04/2017); Diabetes ([facility] [address] (05/1997); Diverticulosis (12/2011); GERD (gastroesophageal reflux disease); Glaucoma; Heart murmur; Hiatal hernia; History of kidney stones; Hypercholesteremia; Hypertension; Hypothyroid; MI (myocardial infarction); TIA (transient ischemic attack) (07/2016); Unspecified sleep apnea; Urethral stenosis; and Vasovagal syncope.  Mr. Eveline Castro  has a past surgical history that includes heart catheterization ([id number],2804, 2012, 2013); tonsillectomy (1938); cataract removal (1987 and 1990); lap cholecystectomy (2003); hernia repair (1944); urological (1996); orthopaedic (Left); orthopaedic ([id number]); shoulder arthroscopy (Left, 2015); knee replacement (Bilateral, 2002, 2008); hemorrhoidectomy (1987); heent (Right); carpal tunnel release (Right, 1968); carpal tunnel release (Left, 2012); bunionectomy (Left); rotator cuff repair (Right, 1995); rotator cuff repair (Left, 2000); rotator cuff repair (Right, 2007); and colectomy (01/13/2017). Social History/Living Environment:  Lives alone. Has supportive children and grandchildren living in town. Prior Level of Function/Work/Activity: Limited overhead use of L UE prior to this surgery. Limited use of R UE currently secondary to cuff pathology. Dominant Side: RIGHT  Current Medications: hydromorphone started 4/17 for R toe pain.   simethicone (GAS-X) 125 mg capsule, Take 125 mg by mouth four (4) times daily as needed for Flatulence. , Disp: , Rfl:     loperamide (IMODIUM) 2 mg capsule, Take 2 mg by mouth four (4) times daily as needed for Diarrhea., Disp: , Rfl:     diphenoxylate-atropine (LOMOTIL) 2.5-0.025 mg per tablet, Take 1 Tab by mouth two (2) times daily as needed for Diarrhea., Disp: , Rfl:     atorvastatin (LIPITOR) 40 mg tablet, Take 40 mg by mouth nightly., Disp: , Rfl:     levothyroxine (SYNTHROID) 50 mcg tablet, Take 50 mcg by mouth Daily (before breakfast). , Disp: , Rfl:     Psyllium Husk-Sucrose 3.4 gram/7 gram powd, Take 3 Caps by mouth two (2) times a day., Disp: , Rfl:     omega-3 fatty acids-vitamin e (FISH OIL) 1,000 mg cap, Take 2 Caps by mouth daily. , Disp: , Rfl:     bimatoprost (LUMIGAN) 0.01 % ophthalmic drops, Administer 1 Drop to right eye nightly., Disp: , Rfl:     timolol (TIMOPTIC) 0.5 % ophthalmic solution, Administer 1 Drop to right eye every morning., Disp: , Rfl:     Omeprazole delayed release (PRILOSEC D/R) 20 mg tablet, Take 20 mg by mouth Daily (before dinner). , Disp: , Rfl:     meloxicam (MOBIC) 15 mg tablet, Take 15 mg by mouth daily. Indications: OSTEOARTHRITIS, Disp: , Rfl:     brimonidine (ALPHAGAN P) 0.1 % ophthalmic solution, Administer 1 Drop to right eye two (2) times a day.  Take / use AM day of surgery  per anesthesia artery disease involving native coronary artery of native heart with unstable angina pectoris (HCC)        7. Impaired fasting blood sugar            Data reviewed:  Previous notes, labs and Specialists notes, if any, reviewed and discussed with patient.  CPE today, HM reviewed.  Recommend colonoscopy but he refuses.  Will do a Cologuard instead.  Reviewed vaccination schedule which she also refuses.  Hyperlipidemia.  He has started back on his cholesterol medicine when he found out that his cholesterol levels were high.  Prescription sent to the pharmacy.  Continue vitamin D supplement.  Hypertension.  Blood pressure is borderline today.  CAD.  Keep appointment with cardiology.  Discussed possibility of a vegan diet to help his coronary artery disease.  Recommend the book\" how not to die\".  IFG.  Reinforced lifestyle changes.  Advised to continue lifestyle changes: regular exercise at least 150 mins a week, well balanced diet rich in grains, fruits and vegetables, get at least 6-8 hrs of sleep a night.  Opportunity to ask questions was offered and were answered to the best of my ability.  Call, send RPOhart message or RTC if with questions or concerns  Follow-up in 6 months with labs prior to his visit.    Over 50% of today's office visit was spent in face to face time reviewing test results, prognosis, importance of compliance, education about disease process, benefits of medications, instructions for management of disease and follow up plans.  Total face to face time spent with patient was at least 25 minutes      There are no Patient Instructions on file for this visit.    No follow-up provider specified.    Meka Marshall MD       protocols. , Disp: , Rfl:     glimepiride (AMARYL) 2 mg tablet, Take 1 mg by mouth daily. Indications: type 2 diabetes mellitus, Disp: , Rfl:     b complex vitamins tablet, Take 1 Tab by mouth daily. , Disp: , Rfl:     aspirin 81 mg Tab, Take 81 mg by mouth daily. Take / use 81 mg AM day of surgery  per anesthesia protocols. , Disp: , Rfl:    Date Last Reviewed: 4-30-18   EXAMINATION:   4/30/2018   Observation/Orthostatic Postural Assessment: unremarkable. Palpation: unremarkable. ROM: Not measured. Strength:   L Shoulder Forward elevation, abd: 3- in standing             CLINICAL DECISION MAKING:   Outcome Measure: Tool Used: Disabilities of the Arm, Shoulder and Hand (DASH) Questionnaire - Quick Version  Score:  Initial: 25/55 or 32% disability 24/55 or30% disability (1/12/18) 20/55 or 20% disability (3-5-18) Most recent: 22/55 or 25% disability (4-2-18)   Interpretation of Score: The DASH is designed to measure the activities of daily living in person's with upper extremity dysfunction or pain. Each section is scored on a 1-5 scale, 5 representing the greatest disability. The scores of each section are added together for a total score of 55. This number is divided by 11, followed by subtracting 1 and multiplying by 25 to get a percent score of disability. This value represents the percentage disability: 0-20% minimal disability; 20-40% moderate disability; 40-60% severe disability; % dependent for care or exaggerated symptom behavior. Minimal detectable change is 12%. Score 11 12-19 20-28 29-37 38-45 46-54 55   Modifier CH CI CJ CK CL CM CN ?    Carrying, Moving, and Handling Objects:     - CURRENT STATUS: CJ - 20%-39% impaired, limited or restricted (Based on objective data, assessment of progress---4/27/18)     - GOAL STATUS: CI - 1%-19% impaired, limited or restricted    - D/C STATUS:  ---------------To be determined---------------    Medical Necessity: · Patient is expected to demonstrate progress in strength and range of motion to promote increased functional use of his L/non-dominant UE. · Co-morbid R rotator cuff pathology and lumbar spine pathology will most likely complicate progress. · Lives alone and will need to be able function independently. Reason for Services/Other Comments:  · Patient continues to require skilled intervention due to the complexity of his history of shoulder pathology and the complexity of this revision reverse TSA. · He will need safe progression of post-op rehab to maximize return to functional use of his L UE.  · He is pending to have R shoulder arthroplasty in the future          TREATMENT:   (In addition to Assessment/Re-Assessment sessions the following treatments were rendered)  4/30/2018   Pre-treatment Symptoms/Complaints: Doing \"OK\". Did work around the house and yard over the weekend. Has been working on his HEP. Pain: Initial:    No pain, just soreness Post Session:  No pain     UBE for active warm up per below. Therapeutic Exercise (35 Minutes): Exercises for L shoulder, deltoid strength in standing as per grid. Manual guiding and assist for terminal ranges to humerus and shoulder girdle re-positioning for each as needed. HEP:He is to continue with his HEP. He verbalizes understanding.    Date  4-9-18 Date  4-11-18 Date  4-12-18 Date  4-16-18 Date  4-18-18 Date  4-20-18 Date  4-23-18 Date  4-25-18 Date  4-27-18 Date  4-30-18   Activity/Exercise             UBE UBE L. 3 x15' UBE L. 3  x15' L 3 x10' L3. x15' L. 3 x15' L.3 x15' L. 3 x10' L. 3 x15' L. 3 x15' L. 3 x15'   Shoulder ROM - - Assisted stretch as above  Assisted stretching as above Assisted as above AROM wall slides, 4 ways x10 ea AROM to AAROM wall slides, 4 ways 2x10 ea - - -   Rhythmic Stabilization - - Rhyth stab in supine at 0-deg ER, 100 deg flexion holding 2#  X\" each Rhyth stab in supine at 0-deg ER, 100 deg flexion, 90-deg abd holding 2# 6x15-20\" each Rhyth stab in supine at 0-deg ER, 100 deg flexion, 90-deg abd holding 2# 6x15-20\" each Ball on wall oscil 90-deg forward, 4-ways 2x30 ea; Wall dribble 6 to 9 o'clock up/down 3x10 each Ball on wall oscil 90-deg forward, and abd 4-ways 2x30 ea; Wall dribble 10 to 6 o'clock  3x15 each - - -   Mid and lower trap - - Side-lying  2# 1x5-7 ea Side-lying  2# 3x10 ea Side-lying  2# 1x15 ea - - Side-lying  L:  2# x15 ea,   3# 2x5-10 ea;  R: 0# 3x10 ea Side-lying  L Mid trap 3# 2x12-15  Low trap 3# 3x5 ea -   Push up + - - Supine press   2# 1x30  5# 2x10 Supine press  2# with rhyth stab   2x18-20 Supine press  2# with rhyth stab   2x15 - - Supine press  L 5# x15  6# x15  7# x10  R 2# 3x5-10 Supine press  L 6# x15  7# x15  R 2# 1x10 -   Side lying shoulder ABD - Standing unilat  2x10 Side-lying  2# x10;  Standing  1# x30 total Side-lying  2# with rhyth stab x18;  Standing abd+D1 to top of head, AAROM 3x10 Side-lying  2# 1x10 AAROM guiding - - Side-lying  R AAROM 2x10 - -   Shoulder flexion Wall slide  3x5 Standing scaption 2x5-6 Standing scaption  1# 3x10 45-deg beach chair short arc anterior deltoid  X10,  2# 2x10   standing overhead lift to top shelf  1# AAROM conentric  2x5 Forward lift to shoulder height shelf   1# 1x10, 2# 2x10; To overhead shelf, AAROM  1# 2x8 - Standing full arc active eccentrics  2x10 Standing full arc active eccentrics  3x5-8 Forward lift to shoulder height shelf   1# 4x5; To abd  1# 3x6-8;   Wall slide full range  1x10  Yellow band  4x5   Horz ABD standing Horiz Add  3x5-6   - - - Supine  AROM x10; with manual resistance 5'x10 ea - - - - -   Shoulder EXT Ext/add behind back standing AAROM  3x5,   Yellow tubing eccentrics  3x5 Standing unilat  Yellow tubing  extension x30,   Ext/add behind back x30 Standing unilat  red tubing  extension x30,   Ext/add behind back x30 - - Standing behind back assisted place/active hold return 2x10 Standing behind back assisted place/active hold return 2x10 - - Standing behind back assisted place/active hold return 2x10   Row/pulling - - - - - - - - - -   Elbow EXT - - - - - - - - - -   Side lying ER - - - - - - - R: 0# 2x15-20 R 3x10 -   IR with band  - Standing in shoulder neutral,  Single-yellow with stabilization x15 Standing in shoulder neutral,  Single-yellow with stabilization x30 total - - - - IR side-lyiing  R: 0# x20  2# 2x15 Side-lying   R 2# 2x15 -   ER with band  - Standing in shoulder neutral,  Single-yellow with stabilization x15 Standing in shoulder neutral,  Single-yellow with stabilization x30 total - - - - - - -   B bicep curls - - - - - - - - - -   Diagonal Flex Standing  AAROM D1 toward R shoulder  3x5,  Yellow tubing active eccentrics  3x5 Standing  short arc  Single yellow   D1 flex 1x10   Standing  short arc  Single yellow   D1 flex 3x10 - Supine D1 and D2 AROM x10; with manual resistance 5'x10 ea - - - - Standing at wall  x10 ea   Diagonal Ext Standing   AAROM toward R hip, active eccentrics   yellow  3x5-6 Standing  short arc add  Single yellow 1x10,   Short arc D2 ext 1x10 Standing  short arc add  Single yellow 3x10,   Short arc D2 ext 3x10 - Supine D1 and D2 AROM x10; with manual resistance 5'x10 ea - - - -    CKC UE - - - - - - - -     -  Treament/Session Assessment:    · Response to Treatment: Good effort with the exercises. Just working on specific moves for improving functional use of his LE UE.   · Compliance with Program/Exercises: Appear compliant. · Recommendations/Intent for next treatment session: We will continue with shoulder progressive strengthening for functional use of the L UE.     Total Treatment Duration: 35 minutes  PT Patient Time In/Time Out  Time In: 1130  Time Out: [address], PT, [facility], OCS

## (undated) DEVICE — SOLUTION IRRIG 1000ML H2O STRL BLT

## (undated) DEVICE — T4 HOOD

## (undated) DEVICE — BAG WND LAV 1L CLR ETH ACET ACID SOD ACETT BENZALKONIUM CHL

## (undated) DEVICE — SYSTEM CULT COLL OR TRNSPRT CLR DBL SWAB W/ MOD AERB AMIES

## (undated) DEVICE — PAD,ABDOMINAL,5"X9",STERILE,LF,1/PK: Brand: MEDLINE INDUSTRIES, INC.

## (undated) DEVICE — 2000CC GUARDIAN II: Brand: GUARDIAN

## (undated) DEVICE — SOLUTION IRRIG 3000ML 0.9% SOD CHL FLX CONT 0797208] ICU MEDICAL INC]

## (undated) DEVICE — SUTURE 5 MERS GRN 30 TO 40 IN D9211

## (undated) DEVICE — SHEET, DRAPE, SPLIT, STERILE: Brand: MEDLINE

## (undated) DEVICE — SYR 10ML LUER LOK 1/5ML GRAD --

## (undated) DEVICE — BIPOLAR SEALER 23-112-1 AQM 6.0: Brand: AQUAMANTYS ®

## (undated) DEVICE — COVER,MAYO STAND,STERILE: Brand: MEDLINE

## (undated) DEVICE — SOLUTION IV 1000ML 0.9% SOD CHL

## (undated) DEVICE — ELECTRODE NDL 2.8IN COAT VALLEYLAB

## (undated) DEVICE — BANDAGE COMPR SELF ADH 5 YDX4 IN TAN STRL PREMIERPRO LF

## (undated) DEVICE — YANKAUER,BULB TIP,W/O VENT,RIGID,STERILE: Brand: MEDLINE

## (undated) DEVICE — 3M™ COBAN™ NL STERILE NON-LATEX SELF-ADHERENT WRAP, 2084S, 4 IN X 5 YD (10 CM X 4,5 M), 18 ROLLS/CASE: Brand: 3M™ COBAN™

## (undated) DEVICE — ARGYLE SIGMOID SURGICAL SUCTION INSTRUMENT 23 FR/CH (7.7 MM): Brand: ARGYLE

## (undated) DEVICE — AMD ANTIMICROBIAL BANDAGE ROLL,6 PLY: Brand: KERLIX

## (undated) DEVICE — DISPOSABLE DRAPE, STERILE, FOR A CDS-3060 5 FOOT TABLE: Brand: PEDIGO PRODUCTS, INC.

## (undated) DEVICE — SUTURE VCRL SZ 0 L27IN ABSRB UD L36MM CP-1 1/2 CIR REV CUT J267H

## (undated) DEVICE — OSCILLATING TIP SAW CARTRIDGE: Brand: STRYKER PRECISION

## (undated) DEVICE — X-LARGE COTTON GLOVE: Brand: DEROYAL

## (undated) DEVICE — FAN SPRAY KIT: Brand: PULSAVAC®

## (undated) DEVICE — MEDI-VAC YANKAUER SUCTION HANDLE W/BULBOUS TIP: Brand: CARDINAL HEALTH

## (undated) DEVICE — DRAPE,TOP,102X53,STERILE: Brand: MEDLINE

## (undated) DEVICE — SUTURE PDS II SZ 1 L96IN ABSRB VLT TP-1 L65MM 1/2 CIR Z880G

## (undated) DEVICE — CARDINAL HEALTH FLEXIBLE LIGHT HANDLE COVER: Brand: CARDINAL HEALTH

## (undated) DEVICE — TRAY PREP DRY W/ PREM GLV 2 APPL 6 SPNG 2 UNDPD 1 OVERWRAP

## (undated) DEVICE — REM POLYHESIVE ADULT PATIENT RETURN ELECTRODE: Brand: VALLEYLAB

## (undated) DEVICE — 3000CC GUARDIAN II: Brand: GUARDIAN

## (undated) DEVICE — SYR LR LCK 1ML GRAD NSAF 30ML --

## (undated) DEVICE — SYR 50ML LR LCK 1ML GRAD NSAF --

## (undated) DEVICE — HANDPIECE SET WITH COAXIAL HIGH FLOW TIP AND SUCTION TUBE: Brand: INTERPULSE

## (undated) DEVICE — TOTAL KNEE DR JENNINGS: Brand: MEDLINE INDUSTRIES, INC.

## (undated) DEVICE — Z DISCONTINUED USE 2744636  DRESSING AQUACEL 14 IN ALG W3.5XL14IN POLYUR FLM CVR W/ HYDRCOLL

## (undated) DEVICE — Device

## (undated) DEVICE — SUT ETHBND 2 30IN LR DA GRN --

## (undated) DEVICE — ZIMMER® STERILE DISPOSABLE TOURNIQUET CUFF WITH PROTECTIVE SLEEVE, DUAL PORT, SINGLE BLADDER, 34 IN. (86 CM)

## (undated) DEVICE — SUTURE ETHBND EXCEL SZ 2 L27IN NONABSORBABLE GRN WHT MO-7 D7485

## (undated) DEVICE — Z DISCONTINUED PER MEDLINE USE 2741944 DRESSING AQUACEL 12 IN SURG W9XL30CM SIL CVR WTRPRF VIR BACT BARR ANTIMIC

## (undated) DEVICE — (D)PREP SKN CHLRAPRP APPL 26ML -- CONVERT TO ITEM 371833

## (undated) DEVICE — BUTTON SWITCH PENCIL BLADE ELECTRODE, HOLSTER: Brand: EDGE

## (undated) DEVICE — SUTURE PROL SZ 0 L30IN NONABSORBABLE BLU FSLX L36MM 3/8 CIR 8690H

## (undated) DEVICE — SUTURE VCRL SZ 1 L27IN ABSRB UD L36MM CP-1 1/2 CIR REV CUT J268H

## (undated) DEVICE — STOCKINETTE TUBE 6X48 -- MEDICHOICE

## (undated) DEVICE — SURGICAL PROCEDURE PACK BASIC ST FRANCIS

## (undated) DEVICE — PACKING WND W1INXL6FT VAG WVN COT GZ RADPQ

## (undated) DEVICE — 3M™ STERI-DRAPE™ INSTRUMENT POUCH 1018: Brand: STERI-DRAPE™

## (undated) DEVICE — PREVENA INCISION MANAGEMENT SYSTEM- PEEL & PLACE DRESSING: Brand: PREVENA™ PEEL & PLACE™

## (undated) DEVICE — TOTAL 1-LAYER TRAY, LATEX FOLEY, 16FR 10: Brand: MEDLINE

## (undated) DEVICE — (D)STRIP SKN CLSR 0.5X4IN WHT --

## (undated) DEVICE — SYRINGE CATH TIP 50ML

## (undated) DEVICE — SOLUTION IV 250ML 0.9% SOD CHL CLR INJ FLX BG CONT PRT CLSR

## (undated) DEVICE — GOWN,REINFORCED,POLY,AURORA,XXLARGE,STR: Brand: MEDLINE

## (undated) DEVICE — SINGLE BASIN: Brand: CARDINAL HEALTH

## (undated) DEVICE — SUTURE STRATAFIX SYMMETRIC PDS + SZ 2-0 L18IN ABSRB VLT SXPP1A403

## (undated) DEVICE — DRAPE TWL SURG 16X26IN BLU ORB04] ALLCARE INC]

## (undated) DEVICE — SUTURE ABSRB X-1 REV CUT 1/2 CIR 22MM UD BRAID 27IN SZ 3-0 J458H

## (undated) DEVICE — SPONGE LAP 18X18IN STRL -- 5/PK